# Patient Record
Sex: FEMALE | Race: WHITE | NOT HISPANIC OR LATINO | Employment: OTHER | URBAN - METROPOLITAN AREA
[De-identification: names, ages, dates, MRNs, and addresses within clinical notes are randomized per-mention and may not be internally consistent; named-entity substitution may affect disease eponyms.]

---

## 2017-01-02 ENCOUNTER — HOSPITAL ENCOUNTER (INPATIENT)
Facility: HOSPITAL | Age: 82
LOS: 6 days | Discharge: NURSING, REHAB OR LONG-TERM CARE | End: 2017-01-08
Attending: EMERGENCY MEDICINE | Admitting: INTERNAL MEDICINE

## 2017-01-02 ENCOUNTER — APPOINTMENT (OUTPATIENT)
Dept: GENERAL RADIOLOGY | Facility: HOSPITAL | Age: 82
End: 2017-01-02

## 2017-01-02 ENCOUNTER — APPOINTMENT (OUTPATIENT)
Dept: CT IMAGING | Facility: HOSPITAL | Age: 82
End: 2017-01-02

## 2017-01-02 DIAGNOSIS — N39.0 ACUTE UTI: ICD-10-CM

## 2017-01-02 DIAGNOSIS — A41.9 SEPSIS, DUE TO UNSPECIFIED ORGANISM: Primary | ICD-10-CM

## 2017-01-02 DIAGNOSIS — N17.9 ACUTE KIDNEY INJURY (HCC): ICD-10-CM

## 2017-01-02 DIAGNOSIS — J18.9 PNEUMONIA OF RIGHT UPPER LOBE DUE TO INFECTIOUS ORGANISM: ICD-10-CM

## 2017-01-02 DIAGNOSIS — R65.21 SEPTIC SHOCK (HCC): ICD-10-CM

## 2017-01-02 DIAGNOSIS — A41.9 SEPTIC SHOCK (HCC): ICD-10-CM

## 2017-01-02 DIAGNOSIS — I50.9 CONGESTIVE HEART FAILURE, UNSPECIFIED CONGESTIVE HEART FAILURE CHRONICITY, UNSPECIFIED CONGESTIVE HEART FAILURE TYPE: ICD-10-CM

## 2017-01-02 LAB
ALBUMIN SERPL-MCNC: 3.2 G/DL (ref 3.5–5.2)
ALBUMIN/GLOB SERPL: 2 G/DL
ALP SERPL-CCNC: 187 U/L (ref 40–129)
ALT SERPL W P-5'-P-CCNC: 467 U/L (ref 5–33)
ANION GAP SERPL CALCULATED.3IONS-SCNC: 15.1 MMOL/L
APTT PPP: 31.8 SECONDS (ref 24.3–38.1)
ARTERIAL PATENCY WRIST A: POSITIVE
AST SERPL-CCNC: 542 U/L (ref 5–32)
ATMOSPHERIC PRESS: 747 MMHG
BACTERIA UR QL AUTO: ABNORMAL /HPF
BASE EXCESS BLDA CALC-SCNC: 3.7 MMOL/L (ref -2.3–2.3)
BDY SITE: ABNORMAL
BILIRUB SERPL-MCNC: 2.8 MG/DL (ref 0.2–1.2)
BILIRUB UR QL STRIP: NEGATIVE
BUN BLD-MCNC: 46 MG/DL (ref 8–23)
BUN/CREAT SERPL: 14.6 (ref 7–25)
CALCIUM SPEC-SCNC: 9.3 MG/DL (ref 8.8–10.5)
CHLORIDE SERPL-SCNC: 97 MMOL/L (ref 98–107)
CLARITY UR: ABNORMAL
CO2 SERPL-SCNC: 30.9 MMOL/L (ref 22–29)
COLOR UR: YELLOW
CREAT BLD-MCNC: 3.15 MG/DL (ref 0.57–1)
D-LACTATE SERPL-SCNC: 1.8 MMOL/L (ref 0.5–2)
D-LACTATE SERPL-SCNC: 2.1 MMOL/L (ref 0.5–2)
DEPRECATED RDW RBC AUTO: 45.7 FL (ref 37–54)
ERYTHROCYTE [DISTWIDTH] IN BLOOD BY AUTOMATED COUNT: 14 % (ref 11.5–14.5)
GAS FLOW AIRWAY: 5 LPM
GFR SERPL CREATININE-BSD FRML MDRD: 14 ML/MIN/1.73
GLOBULIN UR ELPH-MCNC: 1.6 GM/DL
GLUCOSE BLD-MCNC: 58 MG/DL (ref 65–99)
GLUCOSE BLDC GLUCOMTR-MCNC: 84 MG/DL (ref 70–130)
GLUCOSE UR STRIP-MCNC: NEGATIVE MG/DL
HCO3 BLDA-SCNC: 30.2 MMOL/L (ref 22–26)
HCT VFR BLD AUTO: 40 % (ref 37–47)
HGB BLD-MCNC: 12.9 G/DL (ref 12–16)
HGB BLDA-MCNC: 13.1 G/DL (ref 12–18)
HGB UR QL STRIP.AUTO: ABNORMAL
HYALINE CASTS UR QL AUTO: ABNORMAL /LPF
INR PPP: 2.26 (ref 0.9–1.1)
KETONES UR QL STRIP: NEGATIVE
LEUKOCYTE ESTERASE UR QL STRIP.AUTO: ABNORMAL
LYMPHOCYTES # BLD MANUAL: 2.54 10*3/MM3 (ref 0.6–4.8)
LYMPHOCYTES NFR BLD MANUAL: 10 % (ref 20–45)
LYMPHOCYTES NFR BLD MANUAL: 8 % (ref 3–8)
MCH RBC QN AUTO: 28.9 PG (ref 27–31)
MCHC RBC AUTO-ENTMCNC: 32.3 G/DL (ref 31–37)
MCV RBC AUTO: 89.5 FL (ref 81–99)
MODALITY: ABNORMAL
MONOCYTES # BLD AUTO: 2.03 10*3/MM3 (ref 0–1)
NEUTROPHILS # BLD AUTO: 20.79 10*3/MM3 (ref 1.5–8.3)
NEUTROPHILS NFR BLD MANUAL: 68 % (ref 45–70)
NEUTS BAND NFR BLD MANUAL: 14 % (ref 0–5)
NITRITE UR QL STRIP: NEGATIVE
PCO2 BLDA: 53.4 MM HG (ref 35–45)
PH BLDA: 7.37 PH UNITS (ref 7.35–7.45)
PH UR STRIP.AUTO: 7 [PH] (ref 4.5–8)
PLATELET # BLD AUTO: 160 10*3/MM3 (ref 140–500)
PMV BLD AUTO: 10.7 FL (ref 7.4–10.4)
PO2 BLDA: 91.1 MM HG (ref 80–100)
POTASSIUM BLD-SCNC: 4.4 MMOL/L (ref 3.5–5.2)
PROT SERPL-MCNC: 4.8 G/DL (ref 6–8.5)
PROT UR QL STRIP: ABNORMAL
PROTHROMBIN TIME: 24.4 SECONDS (ref 12.1–15)
PULSE OX: 95 %
RBC # BLD AUTO: 4.47 10*6/MM3 (ref 4.2–5.4)
RBC # UR: ABNORMAL /HPF
RBC MORPH BLD: NORMAL
REF LAB TEST METHOD: ABNORMAL
SAO2 % BLDCOA: 96.6 % (ref 91–100)
SMALL PLATELETS BLD QL SMEAR: ADEQUATE
SODIUM BLD-SCNC: 143 MMOL/L (ref 136–145)
SP GR UR STRIP: 1.01 (ref 1–1.03)
SQUAMOUS #/AREA URNS HPF: ABNORMAL /HPF
TOTAL RATE: 24 BREATHS/MINUTE
TROPONIN T SERPL-MCNC: 0.04 NG/ML (ref 0–0.03)
UROBILINOGEN UR QL STRIP: ABNORMAL
WBC MORPH BLD: NORMAL
WBC NRBC COR # BLD: 25.35 10*3/MM3 (ref 4.8–10.8)
WBC UR QL AUTO: ABNORMAL /HPF

## 2017-01-02 PROCEDURE — 36600 WITHDRAWAL OF ARTERIAL BLOOD: CPT | Performed by: EMERGENCY MEDICINE

## 2017-01-02 PROCEDURE — 71010 HC CHEST PA OR AP: CPT

## 2017-01-02 PROCEDURE — 82803 BLOOD GASES ANY COMBINATION: CPT | Performed by: EMERGENCY MEDICINE

## 2017-01-02 PROCEDURE — 85007 BL SMEAR W/DIFF WBC COUNT: CPT | Performed by: EMERGENCY MEDICINE

## 2017-01-02 PROCEDURE — 83605 ASSAY OF LACTIC ACID: CPT | Performed by: EMERGENCY MEDICINE

## 2017-01-02 PROCEDURE — 93010 ELECTROCARDIOGRAM REPORT: CPT | Performed by: INTERNAL MEDICINE

## 2017-01-02 PROCEDURE — 70450 CT HEAD/BRAIN W/O DYE: CPT

## 2017-01-02 PROCEDURE — 87186 SC STD MICRODIL/AGAR DIL: CPT | Performed by: EMERGENCY MEDICINE

## 2017-01-02 PROCEDURE — 25010000002 VANCOMYCIN: Performed by: EMERGENCY MEDICINE

## 2017-01-02 PROCEDURE — 25010000002 PIPERACILLIN-TAZOBACTAM: Performed by: EMERGENCY MEDICINE

## 2017-01-02 PROCEDURE — 99291 CRITICAL CARE FIRST HOUR: CPT | Performed by: EMERGENCY MEDICINE

## 2017-01-02 PROCEDURE — 94799 UNLISTED PULMONARY SVC/PX: CPT

## 2017-01-02 PROCEDURE — 93005 ELECTROCARDIOGRAM TRACING: CPT | Performed by: EMERGENCY MEDICINE

## 2017-01-02 PROCEDURE — 85025 COMPLETE CBC W/AUTO DIFF WBC: CPT | Performed by: EMERGENCY MEDICINE

## 2017-01-02 PROCEDURE — 85610 PROTHROMBIN TIME: CPT | Performed by: EMERGENCY MEDICINE

## 2017-01-02 PROCEDURE — 99285 EMERGENCY DEPT VISIT HI MDM: CPT

## 2017-01-02 PROCEDURE — 99223 1ST HOSP IP/OBS HIGH 75: CPT | Performed by: HOSPITALIST

## 2017-01-02 PROCEDURE — 94640 AIRWAY INHALATION TREATMENT: CPT

## 2017-01-02 PROCEDURE — 25010000002 CEFTRIAXONE: Performed by: EMERGENCY MEDICINE

## 2017-01-02 PROCEDURE — 84484 ASSAY OF TROPONIN QUANT: CPT | Performed by: EMERGENCY MEDICINE

## 2017-01-02 PROCEDURE — 87086 URINE CULTURE/COLONY COUNT: CPT | Performed by: EMERGENCY MEDICINE

## 2017-01-02 PROCEDURE — 82962 GLUCOSE BLOOD TEST: CPT

## 2017-01-02 PROCEDURE — 25010000002 PIPERACILLIN SOD-TAZOBACTAM PER 1 G: Performed by: INTERNAL MEDICINE

## 2017-01-02 PROCEDURE — 87040 BLOOD CULTURE FOR BACTERIA: CPT | Performed by: EMERGENCY MEDICINE

## 2017-01-02 PROCEDURE — 85730 THROMBOPLASTIN TIME PARTIAL: CPT | Performed by: EMERGENCY MEDICINE

## 2017-01-02 PROCEDURE — 84145 PROCALCITONIN (PCT): CPT | Performed by: HOSPITALIST

## 2017-01-02 PROCEDURE — 80053 COMPREHEN METABOLIC PANEL: CPT | Performed by: EMERGENCY MEDICINE

## 2017-01-02 PROCEDURE — 81001 URINALYSIS AUTO W/SCOPE: CPT | Performed by: EMERGENCY MEDICINE

## 2017-01-02 RX ORDER — GUAIFENESIN 600 MG/1
600 TABLET, EXTENDED RELEASE ORAL 2 TIMES DAILY
COMMUNITY
End: 2017-01-17 | Stop reason: HOSPADM

## 2017-01-02 RX ORDER — METOPROLOL SUCCINATE 25 MG/1
25 TABLET, EXTENDED RELEASE ORAL DAILY
Status: DISCONTINUED | OUTPATIENT
Start: 2017-01-02 | End: 2017-01-02

## 2017-01-02 RX ORDER — WARFARIN SODIUM 4 MG/1
4 TABLET ORAL
Status: DISCONTINUED | OUTPATIENT
Start: 2017-01-04 | End: 2017-01-05

## 2017-01-02 RX ORDER — WARFARIN SODIUM 3 MG/1
6 TABLET ORAL
Status: DISCONTINUED | OUTPATIENT
Start: 2017-01-03 | End: 2017-01-05

## 2017-01-02 RX ORDER — WARFARIN SODIUM 3 MG/1
3 TABLET ORAL
Status: DISCONTINUED | OUTPATIENT
Start: 2017-01-04 | End: 2017-01-05

## 2017-01-02 RX ORDER — ACETAMINOPHEN 325 MG/1
650 TABLET ORAL EVERY 6 HOURS PRN
Status: DISCONTINUED | OUTPATIENT
Start: 2017-01-02 | End: 2017-01-08 | Stop reason: HOSPADM

## 2017-01-02 RX ORDER — IPRATROPIUM BROMIDE AND ALBUTEROL SULFATE 2.5; .5 MG/3ML; MG/3ML
3 SOLUTION RESPIRATORY (INHALATION)
Status: DISCONTINUED | OUTPATIENT
Start: 2017-01-02 | End: 2017-01-08 | Stop reason: HOSPADM

## 2017-01-02 RX ORDER — PANTOPRAZOLE SODIUM 40 MG/10ML
40 INJECTION, POWDER, LYOPHILIZED, FOR SOLUTION INTRAVENOUS
Status: DISCONTINUED | OUTPATIENT
Start: 2017-01-03 | End: 2017-01-04

## 2017-01-02 RX ORDER — PREDNISONE 1 MG/1
5 TABLET ORAL 2 TIMES DAILY
COMMUNITY
End: 2017-01-17 | Stop reason: HOSPADM

## 2017-01-02 RX ORDER — GABAPENTIN 300 MG/1
300 CAPSULE ORAL 2 TIMES DAILY
Status: DISCONTINUED | OUTPATIENT
Start: 2017-01-02 | End: 2017-01-08 | Stop reason: HOSPADM

## 2017-01-02 RX ORDER — DEXTROSE AND SODIUM CHLORIDE 5; .45 G/100ML; G/100ML
75 INJECTION, SOLUTION INTRAVENOUS CONTINUOUS
Status: DISCONTINUED | OUTPATIENT
Start: 2017-01-02 | End: 2017-01-03

## 2017-01-02 RX ORDER — DEXTROSE MONOHYDRATE 25 G/50ML
25 INJECTION, SOLUTION INTRAVENOUS AS NEEDED
Status: DISCONTINUED | OUTPATIENT
Start: 2017-01-02 | End: 2017-01-08 | Stop reason: HOSPADM

## 2017-01-02 RX ORDER — DEXTROSE AND SODIUM CHLORIDE 5; .45 G/100ML; G/100ML
125 INJECTION, SOLUTION INTRAVENOUS ONCE
Status: COMPLETED | OUTPATIENT
Start: 2017-01-02 | End: 2017-01-02

## 2017-01-02 RX ORDER — ATORVASTATIN CALCIUM 20 MG/1
20 TABLET, FILM COATED ORAL NIGHTLY
Status: DISCONTINUED | OUTPATIENT
Start: 2017-01-02 | End: 2017-01-02

## 2017-01-02 RX ORDER — NALOXONE HYDROCHLORIDE 1 MG/ML
2 INJECTION INTRAMUSCULAR; INTRAVENOUS; SUBCUTANEOUS ONCE
Status: COMPLETED | OUTPATIENT
Start: 2017-01-02 | End: 2017-01-02

## 2017-01-02 RX ORDER — SODIUM CHLORIDE, SODIUM LACTATE, POTASSIUM CHLORIDE, CALCIUM CHLORIDE 600; 310; 30; 20 MG/100ML; MG/100ML; MG/100ML; MG/100ML
INJECTION, SOLUTION INTRAVENOUS
Status: COMPLETED
Start: 2017-01-02 | End: 2017-01-02

## 2017-01-02 RX ORDER — NOREPINEPHRINE BITARTRATE 1 MG/ML
INJECTION, SOLUTION INTRAVENOUS
Status: DISPENSED
Start: 2017-01-02 | End: 2017-01-03

## 2017-01-02 RX ORDER — GUAIFENESIN 600 MG/1
1200 TABLET, EXTENDED RELEASE ORAL 2 TIMES DAILY
Status: DISCONTINUED | OUTPATIENT
Start: 2017-01-02 | End: 2017-01-02

## 2017-01-02 RX ORDER — NICOTINE POLACRILEX 4 MG
15 LOZENGE BUCCAL AS NEEDED
Status: DISCONTINUED | OUTPATIENT
Start: 2017-01-02 | End: 2017-01-08 | Stop reason: HOSPADM

## 2017-01-02 RX ORDER — ALLOPURINOL 100 MG/1
100 TABLET ORAL 2 TIMES DAILY
Status: DISCONTINUED | OUTPATIENT
Start: 2017-01-02 | End: 2017-01-02

## 2017-01-02 RX ORDER — MULTIPLE VITAMINS W/ MINERALS TAB 9MG-400MCG
1 TAB ORAL DAILY
Status: DISCONTINUED | OUTPATIENT
Start: 2017-01-02 | End: 2017-01-08 | Stop reason: HOSPADM

## 2017-01-02 RX ORDER — DOCUSATE SODIUM 250 MG
250 CAPSULE ORAL DAILY
Status: DISCONTINUED | OUTPATIENT
Start: 2017-01-02 | End: 2017-01-08 | Stop reason: HOSPADM

## 2017-01-02 RX ORDER — FOLIC ACID 1 MG/1
1 TABLET ORAL DAILY
Status: DISCONTINUED | OUTPATIENT
Start: 2017-01-02 | End: 2017-01-08 | Stop reason: HOSPADM

## 2017-01-02 RX ORDER — SODIUM CHLORIDE 9 MG/ML
INJECTION, SOLUTION INTRAVENOUS
Status: DISPENSED
Start: 2017-01-02 | End: 2017-01-03

## 2017-01-02 RX ORDER — SODIUM CHLORIDE 0.9 % (FLUSH) 0.9 %
10 SYRINGE (ML) INJECTION AS NEEDED
Status: DISCONTINUED | OUTPATIENT
Start: 2017-01-02 | End: 2017-01-08 | Stop reason: HOSPADM

## 2017-01-02 RX ORDER — SODIUM CHLORIDE 9 MG/ML
125 INJECTION, SOLUTION INTRAVENOUS CONTINUOUS
Status: DISCONTINUED | OUTPATIENT
Start: 2017-01-02 | End: 2017-01-02

## 2017-01-02 RX ORDER — WARFARIN SODIUM 3 MG/1
6 TABLET ORAL
Status: DISCONTINUED | OUTPATIENT
Start: 2017-01-02 | End: 2017-01-05

## 2017-01-02 RX ADMIN — NALOXONE HYDROCHLORIDE 2 MG: 1 INJECTION PARENTERAL at 14:32

## 2017-01-02 RX ADMIN — SODIUM CHLORIDE, POTASSIUM CHLORIDE, SODIUM LACTATE AND CALCIUM CHLORIDE 500 ML: 600; 310; 30; 20 INJECTION, SOLUTION INTRAVENOUS at 19:00

## 2017-01-02 RX ADMIN — SODIUM CHLORIDE 4.5 G: 900 INJECTION, SOLUTION INTRAVENOUS at 22:29

## 2017-01-02 RX ADMIN — DEXTROSE AND SODIUM CHLORIDE 75 ML/HR: 5; 450 INJECTION, SOLUTION INTRAVENOUS at 20:09

## 2017-01-02 RX ADMIN — IPRATROPIUM BROMIDE AND ALBUTEROL SULFATE 3 ML: .5; 3 SOLUTION RESPIRATORY (INHALATION) at 19:35

## 2017-01-02 RX ADMIN — SODIUM CHLORIDE, POTASSIUM CHLORIDE, SODIUM LACTATE AND CALCIUM CHLORIDE 500 ML: 600; 310; 30; 20 INJECTION, SOLUTION INTRAVENOUS at 19:47

## 2017-01-02 RX ADMIN — SODIUM CHLORIDE 1000 ML: 9 INJECTION, SOLUTION INTRAVENOUS at 16:17

## 2017-01-02 RX ADMIN — TAZOBACTAM SODIUM AND PIPERACILLIN SODIUM 4.5 G: .5; 4 INJECTION, POWDER, LYOPHILIZED, FOR SOLUTION INTRAVENOUS at 16:17

## 2017-01-02 RX ADMIN — CEFTRIAXONE 2 G: 2 INJECTION, POWDER, FOR SOLUTION INTRAMUSCULAR; INTRAVENOUS at 15:53

## 2017-01-02 RX ADMIN — NOREPINEPHRINE BITARTRATE 0.02 MCG/KG/MIN: 1 INJECTION, SOLUTION, CONCENTRATE INTRAVENOUS at 21:11

## 2017-01-02 RX ADMIN — SODIUM CHLORIDE 1000 ML: 9 INJECTION, SOLUTION INTRAVENOUS at 14:47

## 2017-01-02 RX ADMIN — DEXTROSE AND SODIUM CHLORIDE 125 ML/HR: 5; 450 INJECTION, SOLUTION INTRAVENOUS at 15:37

## 2017-01-02 RX ADMIN — VANCOMYCIN HYDROCHLORIDE 1500 MG: 750 INJECTION, POWDER, LYOPHILIZED, FOR SOLUTION INTRAVENOUS at 20:16

## 2017-01-02 NOTE — ED NOTES
Dr Sheriff at bedside to evaluate.  States no stroke workup.     Shara Velázquez RN  01/02/17 4685

## 2017-01-02 NOTE — ED PROVIDER NOTES
Subjective   History of Present Illness  History of Present Illness    Chief complaint: Altered mental status    Location: Nursing home    Quality/Severity:  Moderate    Timing/Onset/Duration: Noted this afternoon at the nursing home    Modifying Factors: Paramedics report that an amp of Narcan improved the patient's level of alertness    Associated Symptoms: The patient is unable to give associated symptoms given her altered mental status.    Narrative: This 81-year-old white female was noted to have decreased mental status at the nursing home.  EMS was called and gave the patient in for Narcan and she responded to Narcan.  The patient is unable to give me any other history.    PCP:  Gerardo Williamson      Review of Systems   Unable to perform ROS: Patient unresponsive        Medication List      ASK your doctor about these medications          acetaminophen 325 MG tablet   Commonly known as:  TYLENOL   Take 2 tablets by mouth Every 4 (Four) Hours As Needed for mild pain   (1-3).       albuterol (2.5 MG/3ML) 0.083% nebulizer solution   Commonly known as:  PROVENTIL   Take 2.5 mg by nebulization 4 (Four) Times a Day.       allopurinol 100 MG tablet   Commonly known as:  ZYLOPRIM       citalopram 20 MG tablet   Commonly known as:  CeleXA   Take 1 tablet by mouth Daily.       docusate sodium 250 MG capsule   Commonly known as:  COLACE       folic acid 1 MG tablet   Commonly known as:  FOLVITE       furosemide 40 MG tablet   Commonly known as:  LASIX   Take 2 tablets by mouth Daily.       gabapentin 300 MG capsule   Commonly known as:  NEURONTIN       glucosamine-chondroitin 500-400 MG capsule capsule       guaiFENesin 600 MG 12 hr tablet   Commonly known as:  MUCINEX       insulin aspart 100 UNIT/ML injection   Commonly known as:  novoLOG   Inject 3 Units under the skin 3 (Three) Times a Day With Meals. Hold if   blood sugar less than 120       insulin detemir 100 UNIT/ML injection   Commonly known as:  LEVEMIR   Inject 20  Units under the skin Every Night.       melatonin 5 MG tablet tablet       metoprolol succinate XL 25 MG 24 hr tablet   Commonly known as:  TOPROL-XL       multivitamin-minerals tablet       NON FORMULARY       pantoprazole 40 MG EC tablet   Commonly known as:  PROTONIX       polyethylene glycol packet   Commonly known as:  MIRALAX       predniSONE 5 MG tablet   Commonly known as:  DELTASONE       simvastatin 40 MG tablet   Commonly known as:  ZOCOR       * vitamin D3 5000 UNITS capsule capsule       * cholecalciferol 17395 UNITS capsule   Commonly known as:  VITAMIN D3       warfarin 6 MG tablet   Commonly known as:  COUMADIN       * Notice:  This list has 2 medication(s) that are the same as other   medications prescribed for you. Read the directions carefully, and ask   your doctor or other care provider to review them with you.        Past Medical History   Diagnosis Date   • Anemia    • Arthritis    • CHF (congestive heart failure)    • Chronic a-fib    • DVT (deep venous thrombosis)    • Dyslipidemia    • Gout    • HTN (hypertension)    • Hyperkalemia    • Hypertension    • Kidney disease 2011   • Lymphedema of left leg      chronic   • Obstructive sleep apnea of adult      untreated   • Pulmonary HTN    • Respiratory failure, acute      hypoxic       Allergies   Allergen Reactions   • Codeine Nausea And Vomiting   • Demerol [Meperidine]      Dizzy reaction   • Propoxyphene      Dizzy reaction       Past Surgical History   Procedure Laterality Date   • Hernia repair     • Eye surgery     • Femur fracture surgery Right      closed reduction external fixation       Family History   Problem Relation Age of Onset   • Cancer Mother    • Heart disease Father    • Diabetes Father    • COPD Brother    • Heart disease Brother        Social History     Social History   • Marital status:      Spouse name: N/A   • Number of children: N/A   • Years of education: N/A     Social History Main Topics   • Smoking  status: Never Smoker   • Smokeless tobacco: None   • Alcohol use No   • Drug use: None   • Sexual activity: Not Currently     Other Topics Concern   • None     Social History Narrative           Objective   Physical Exam   Constitutional: She appears well-developed and well-nourished. No distress.   ED Triage Vitals:  Temp: 97.3 °F (36.3 °C) (01/02/17 1422)  Heart Rate: 74 (01/02/17 1422)  Resp: 20 (01/02/17 1422)  BP: 56/20 (01/02/17 1422)  SpO2: 85 % (01/02/17 1422)  Temp src: Oral (01/02/17 1422)  Heart Rate Source: n/a  Patient Position: n/a  BP Location: n/a  FiO2 (%): n/a    The patient's vitals were reviewed by me.  Unless otherwise noted they are within normal limits.  The patient is hypotensive with a blood pressure 56/20.     HENT:   Head: Normocephalic and atraumatic.   Right Ear: External ear normal.   Left Ear: External ear normal.   Nose: Nose normal.   Dry mucous membranes   Eyes: Conjunctivae and EOM are normal. Pupils are equal, round, and reactive to light. Right eye exhibits no discharge. Left eye exhibits no discharge. No scleral icterus.   Neck: Normal range of motion. Neck supple. No JVD present. No tracheal deviation present. No thyromegaly present.   Cardiovascular: Normal rate, regular rhythm, normal heart sounds and intact distal pulses.  Exam reveals no gallop and no friction rub.    No murmur heard.  Rectal exam: Heme negative normal tone no masses.  There is some bruising noted on both right and left buttocks.   Pulmonary/Chest: Effort normal and breath sounds normal. No stridor. No respiratory distress. She has no wheezes. She has no rales. She exhibits no tenderness.   Abdominal: Soft. Bowel sounds are normal. She exhibits no distension and no mass. There is no tenderness. There is no rebound and no guarding. No hernia.   Musculoskeletal: Normal range of motion. She exhibits no edema or deformity.   Lymphadenopathy:     She has no cervical adenopathy.   Neurological:   The pupils are  equal round and reactive to light.  The patient localizes pain.   Skin: Skin is warm and dry. No rash noted. She is not diaphoretic. No erythema. No pallor.   Nursing note and vitals reviewed.      Procedures         ED Course  ED Course   Comment By Time   The left lower values were reviewed by me.  The urinalysis shows large leukocyte esterase.  There is 3-5 RBCs.  There is tenderness to count WBCs.  There is 4+ bacteria.  There is 13-20 squamous epithelial cells.  There is a venous lactate of 2.1.  The PT is 24.  The INR is 2.2.  The white blood cell count is 25,000.  RR 14 bands noted on the differential of the white blood cell count. Claudio Sheriff MD 01/02 4722   On December 6, 2016 the patient had a BNP of 1704.  The hemoglobin was 13 and a white blood cell count 9000.  The platelet count 170,000.  On October 1, 2016 the patient had a sodium of 140.  A potassium of 4.2.  A chloride of 106.  The CO2 is 24.  A glucose of 128.  And a BUN of 14.  The creatinine was 0.82. Claudio Sheriff MD 01/02 7522      4:02 PM, 01/02/17:  The EKG was obtained at 1432.  EKG was interpreted 1435.  EKG indicates a true fibrillation with a rate of 83.  There is right ventricular hypertrophy.  There is a normal axis.  There is no acute ST elevation or depression.  There is no T-wave abnormality.  There is no ectopy.            MDM  XR Chest 1 View   ED Interpretation   The chest x-ray was contemporaneously reviewed by me.  Is   cardiomegaly which appears to be unchanged.  There is pulmonary   vascular congestion.  There is a right upper lobe opacity.      CT Head Without Contrast    (Results Pending)     Labs Reviewed   COMPREHENSIVE METABOLIC PANEL - Abnormal; Notable for the following:        Result Value    Glucose 58 (*)     BUN 46 (*)     Creatinine 3.15 (*)     Chloride 97 (*)     CO2 30.9 (*)     Total Protein 4.8 (*)     Albumin 3.20 (*)     ALT (SGPT) 467 (*)     AST (SGOT) 542 (*)     Alkaline Phosphatase 187 (*)      Total Bilirubin 2.8 (*)     eGFR Non  Amer 14 (*)     All other components within normal limits    Narrative:     The MDRD GFR formula is only valid for adults with stable renal function between ages 18 and 70.   PROTIME-INR - Abnormal; Notable for the following:     Protime 24.4 (*)     INR 2.26 (*)     All other components within normal limits    Narrative:     Therapeutic Ranges for INR: 2.0-3.0 (PT 20-30)                              2.5-3.5 (PT 25-34)   URINALYSIS W/ CULTURE IF INDICATED - Abnormal; Notable for the following:     Appearance, UA Cloudy (*)     Blood, UA Moderate (2+) (*)     Protein, UA 30 mg/dL (1+) (*)     Leuk Esterase, UA Large (3+) (*)     All other components within normal limits   TROPONIN (IN-HOUSE) - Abnormal; Notable for the following:     Troponin T 0.036 (*)     All other components within normal limits    Narrative:     Troponin T Reference Ranges:  Less than 0.03 ng/mL:    Negative for AMI  0.03 to 0.09 ng/mL:      Indeterminant for AMI  Greater than 0.09 ng/mL: Positive for AMI   LACTIC ACID, PLASMA - Abnormal; Notable for the following:     Lactate 2.1 (*)     All other components within normal limits   CBC WITH AUTO DIFFERENTIAL - Abnormal; Notable for the following:     WBC 25.35 (*)     MPV 10.7 (*)     All other components within normal limits   URINALYSIS, MICROSCOPIC ONLY - Abnormal; Notable for the following:     RBC, UA 3-5 (*)     WBC, UA Too Numerous to Count (*)     Bacteria, UA 4+ (*)     Squamous Epithelial Cells, UA 13-20 (*)     All other components within normal limits   MANUAL DIFFERENTIAL - Abnormal; Notable for the following:     Lymphocyte % 10.0 (*)     Bands %  14.0 (*)     Neutrophils Absolute 20.79 (*)     Monocytes Absolute 2.03 (*)     All other components within normal limits   APTT - Normal    Narrative:     PTT = The equivalent PTT values for the therapeutic range of heparin levels at 0.1 to 0.7 U/ml are 53 to 110 seconds.   BLOOD CULTURE    URINE CULTURE   BLOOD CULTURE   LACTATE ACID, REFLEX   BLOOD GAS, ARTERIAL   POCT GLUCOSE FINGERSTICK   CBC AND DIFFERENTIAL    Narrative:     The following orders were created for panel order CBC & Differential.  Procedure                               Abnormality         Status                     ---------                               -----------         ------                     Manual Differential[[id number]]           Abnormal            Final result               Scan Slide[[id number]]                                                                   CBC Auto Differential[[id number]]         Abnormal            Final result                 Please view results for these tests on the individual orders.     4:02 PM, 01/02/17:  The patient was reassessed.  Her vital signs are improved.  Neurological exam: The patient localizes pain and her pupils are equal round and reactive to light. Abdominal Exam: Soft nontender no masses positive bowel sounds.    4:03 PM, 01/02/17:  I spoke with pharmacy and they recommended an additional dose of vancomycin 1500 mg IV.    4:03 PM, 01/02/17:  I spoke with Dr. Diallo and discussed the case with him.  He will admit the patient in serious condition.    4:11 PM, 01/02/17:  I discussed the patient's condition with her POA.  He indicated that she did not wish to have CPR or be intubated.  He indicated that she would want IV antibiotics and IV medications.    4:12 PM, 01/02/17:  The total critical care time on this patient exclusive of separately billable procedures is 35 minutes.    Final diagnoses:   Sepsis, due to unspecified organism   Acute UTI   Pneumonia of right upper lobe due to infectious organism   Congestive heart failure, unspecified congestive heart failure chronicity, unspecified congestive heart failure type   Acute kidney injury         ED Medications:  Medications   sodium chloride 0.9 % flush 10 mL (not administered)   cefTRIAXone (ROCEPHIN) 2 g/100 mL 0.9% NS VTB (GEOFFREY)  (2 g Intravenous New Bag 1/2/17 5523)   AZITHROMYCIN 500 MG/250 ML 0.9% NS IVPB (MBP) (not administered)   vancomycin 1500 mg/250 mL 0.9% NS IVPB (not administered)   Naloxone HCl (NARCAN) injection 2 mg (2 mg Intravenous Given 1/2/17 1432)   sodium chloride 0.9 % bolus 1,000 mL (1,000 mL Intravenous New Bag 1/2/17 1447)   dextrose 5 % and sodium chloride 0.45 % infusion (125 mL/hr Intravenous New Bag 1/2/17 1537)       New Medications:     Medication List      ASK your doctor about these medications          acetaminophen 325 MG tablet   Commonly known as:  TYLENOL   Take 2 tablets by mouth Every 4 (Four) Hours As Needed for mild pain   (1-3).       albuterol (2.5 MG/3ML) 0.083% nebulizer solution   Commonly known as:  PROVENTIL   Take 2.5 mg by nebulization 4 (Four) Times a Day.       allopurinol 100 MG tablet   Commonly known as:  ZYLOPRIM       citalopram 20 MG tablet   Commonly known as:  CeleXA   Take 1 tablet by mouth Daily.       docusate sodium 250 MG capsule   Commonly known as:  COLACE       folic acid 1 MG tablet   Commonly known as:  FOLVITE       furosemide 40 MG tablet   Commonly known as:  LASIX   Take 2 tablets by mouth Daily.       gabapentin 300 MG capsule   Commonly known as:  NEURONTIN       glucosamine-chondroitin 500-400 MG capsule capsule       guaiFENesin 600 MG 12 hr tablet   Commonly known as:  MUCINEX       insulin aspart 100 UNIT/ML injection   Commonly known as:  novoLOG   Inject 3 Units under the skin 3 (Three) Times a Day With Meals. Hold if   blood sugar less than 120       insulin detemir 100 UNIT/ML injection   Commonly known as:  LEVEMIR   Inject 20 Units under the skin Every Night.       melatonin 5 MG tablet tablet       metoprolol succinate XL 25 MG 24 hr tablet   Commonly known as:  TOPROL-XL       multivitamin-minerals tablet       NON FORMULARY       pantoprazole 40 MG EC tablet   Commonly known as:  PROTONIX       polyethylene glycol packet   Commonly known as:  MIRALAX        predniSONE 5 MG tablet   Commonly known as:  DELTASONE       simvastatin 40 MG tablet   Commonly known as:  ZOCOR       * vitamin D3 5000 UNITS capsule capsule       * cholecalciferol 53436 UNITS capsule   Commonly known as:  VITAMIN D3       warfarin 6 MG tablet   Commonly known as:  COUMADIN       * Notice:  This list has 2 medication(s) that are the same as other   medications prescribed for you. Read the directions carefully, and ask   your doctor or other care provider to review them with you.        Stopped Medications:     Medication List      ASK your doctor about these medications          acetaminophen 325 MG tablet   Commonly known as:  TYLENOL   Take 2 tablets by mouth Every 4 (Four) Hours As Needed for mild pain   (1-3).       albuterol (2.5 MG/3ML) 0.083% nebulizer solution   Commonly known as:  PROVENTIL   Take 2.5 mg by nebulization 4 (Four) Times a Day.       allopurinol 100 MG tablet   Commonly known as:  ZYLOPRIM       citalopram 20 MG tablet   Commonly known as:  CeleXA   Take 1 tablet by mouth Daily.       docusate sodium 250 MG capsule   Commonly known as:  COLACE       folic acid 1 MG tablet   Commonly known as:  FOLVITE       furosemide 40 MG tablet   Commonly known as:  LASIX   Take 2 tablets by mouth Daily.       gabapentin 300 MG capsule   Commonly known as:  NEURONTIN       glucosamine-chondroitin 500-400 MG capsule capsule       guaiFENesin 600 MG 12 hr tablet   Commonly known as:  MUCINEX       insulin aspart 100 UNIT/ML injection   Commonly known as:  novoLOG   Inject 3 Units under the skin 3 (Three) Times a Day With Meals. Hold if   blood sugar less than 120       insulin detemir 100 UNIT/ML injection   Commonly known as:  LEVEMIR   Inject 20 Units under the skin Every Night.       melatonin 5 MG tablet tablet       metoprolol succinate XL 25 MG 24 hr tablet   Commonly known as:  TOPROL-XL       multivitamin-minerals tablet       NON FORMULARY       pantoprazole 40 MG EC tablet    Commonly known as:  PROTONIX       polyethylene glycol packet   Commonly known as:  MIRALAX       predniSONE 5 MG tablet   Commonly known as:  DELTASONE       simvastatin 40 MG tablet   Commonly known as:  ZOCOR       * vitamin D3 5000 UNITS capsule capsule       * cholecalciferol 05738 UNITS capsule   Commonly known as:  VITAMIN D3       warfarin 6 MG tablet   Commonly known as:  COUMADIN       * Notice:  This list has 2 medication(s) that are the same as other   medications prescribed for you. Read the directions carefully, and ask   your doctor or other care provider to review them with you.          Final diagnoses:   Sepsis, due to unspecified organism   Acute UTI   Pneumonia of right upper lobe due to infectious organism   Congestive heart failure, unspecified congestive heart failure chronicity, unspecified congestive heart failure type   Acute kidney injury            Claudio Sheriff MD  01/02/17 5154

## 2017-01-02 NOTE — PROGRESS NOTES
SEPTIC SHOCK FOCUSED EXAM    *Must be completed by a licensed independent Practitioner within 6 hours of diagnosis for the following conditions -- Septic Shock or Severe Sepsis with Lactate >/= 4.    Fluid bolus must be completed prior to assessment.      Diagnosis: Septic Shock     Vital Signs (Attestation) Reviewed Temp, HR, RR, BP   General ill appearing, lethargic   Respiratory decreased breath sounds L>R   Cardiac/Chest regular rate, rhythm   Skin (documentation of skin color is required) pallor   Capillary Refill delayed   Peripheral Pulses Checked                          Radial and dorsalis pedis palpable     † Septic shock is defined by CMS as severe sepsis plus one of the following: persistent hypotension after fluid bolus OR tissue hypoperfusion (Lactic Acid ?4)  †† TWO OF THE FOLLOWING can be done in lieu of the Focused Exam: Measure CVP; Measure ScVO2; Bedside cardiovascular ultrasound; Dynamic assessment of fluid responsiveness with passive leg raise or fluid challenge.

## 2017-01-02 NOTE — IP AVS SNAPSHOT
AFTER VISIT SUMMARY             Alexandria Villanueva           About your hospitalization     You were admitted on:  January 2, 2017 You last received care in the:  [facility] SURG       Procedures & Surgeries         Medications    If you or your caregiver advised us that you are currently taking a medication and that medication is marked below as “Resume”, this simply indicates that we have reviewed those medications to make sure our new therapy recommendations do not interfere.  If you have concerns about medications other than those new ones which we are prescribing today, please consult the physician who prescribed them (or your primary physician).  Our review of your home medications is not meant to indicate that we are directing their use.             Your Medications      START taking these medications     cefTRIAXone 1 G injection   Infuse 1 g into a venous catheter Daily for 8 days.   Commonly known as:  ROCEPHIN           HYDROcodone-acetaminophen 5-325 MG per tablet   Take 1 tablet by mouth Every 6 (Six) Hours As Needed for moderate pain (4-6).   Last time this was given:  1/8/2017 11:10 AM   Commonly known as:  NORCO           ipratropium-albuterol 0.5-2.5 mg/mL nebulizer   Take 3 mL by nebulization 4 (Four) Times a Day.   Last time this was given:  1/8/2017 11:20 AM   Commonly known as:  DUO-NEB             CHANGE how you take these medications     cholecalciferol 52969 UNITS capsule   Take 50,000 Units by mouth Every 30 (Thirty) Days.   Commonly known as:  VITAMIN D3   What changed:  Another medication with the same name was removed. Continue taking this medication, and follow the directions you see here.           citalopram 20 MG tablet   Take 1 tablet by mouth Daily.   Last time this was given:  1/6/2017  9:18 AM   According to our records, you may have been taking this medication differently.   Commonly known as:  CeleXA   What changed:    - when to take this  - additional  instructions           furosemide 40 MG tablet   Take 1 tablet by mouth 2 (Two) Times a Day.   Last time this was given:  1/8/2017 11:11 AM   Commonly known as:  LASIX   What changed:    - how much to take  - when to take this             CONTINUE taking these medications     acetaminophen 325 MG tablet   Take 2 tablets by mouth Every 4 (Four) Hours As Needed for mild pain (1-3).   Last time this was given:  1/6/2017  3:15 AM   Commonly known as:  TYLENOL           docusate sodium 250 MG capsule   Take 250 mg by mouth daily.   Last time this was given:  1/8/2017  9:29 AM   Commonly known as:  COLACE           folic acid 1 MG tablet   Take 1 mg by mouth daily.   Last time this was given:  1/8/2017  9:03 AM   Commonly known as:  FOLVITE           gabapentin 300 MG capsule   Take 300 mg by mouth 2 (two) times a day.   Last time this was given:  1/8/2017  9:03 AM   Commonly known as:  NEURONTIN           glucosamine-chondroitin 500-400 MG capsule capsule   Take 2 capsules by mouth daily.           guaiFENesin 600 MG 12 hr tablet   Take 1,200 mg by mouth 2 (Two) Times a Day.   Commonly known as:  MUCINEX           insulin aspart 100 UNIT/ML injection   Inject 3 Units under the skin 3 (Three) Times a Day With Meals. Hold if blood sugar less than 120   Last time this was given:  1/8/2017  1:11 PM   Commonly known as:  novoLOG           insulin detemir 100 UNIT/ML injection   Inject 20 Units under the skin Every Night.   Last time this was given:  1/7/2017  9:19 PM   Commonly known as:  LEVEMIR           melatonin 5 MG tablet tablet   Take 5 mg by mouth every night.   Last time this was given:  1/7/2017  9:21 PM           metoprolol succinate XL 25 MG 24 hr tablet   Take 25 mg by mouth daily.   Last time this was given:  1/8/2017  9:02 AM   Commonly known as:  TOPROL-XL           multivitamin-minerals tablet   Take 1 tablet by mouth daily.   Last time this was given:  1/8/2017  9:03 AM           NON FORMULARY   2LNC  continuous           pantoprazole 40 MG EC tablet   Take 40 mg by mouth every evening.   Last time this was given:  1/7/2017  5:30 PM   Commonly known as:  PROTONIX           polyethylene glycol packet   Take 17 g by mouth daily.   Commonly known as:  MIRALAX           predniSONE 5 MG tablet   Take 5 mg by mouth 2 (Two) Times a Day.   Last time this was given:  1/8/2017  9:02 AM   Commonly known as:  DELTASONE           simvastatin 40 MG tablet   Take 40 mg by mouth every night.   Commonly known as:  ZOCOR             STOP taking these medications     albuterol (2.5 MG/3ML) 0.083% nebulizer solution   Commonly known as:  PROVENTIL           allopurinol 100 MG tablet   Commonly known as:  ZYLOPRIM           warfarin 6 MG tablet   Commonly known as:  COUMADIN                Where to Get Your Medications      Information about where to get these medications is not yet available     ! Ask your nurse or doctor about these medications     cefTRIAXone 1 G injection    furosemide 40 MG tablet    HYDROcodone-acetaminophen 5-325 MG per tablet    ipratropium-albuterol 0.5-2.5 mg/mL nebulizer                  Your Medications      Your Medication List           Morning Noon Evening Bedtime As Needed    acetaminophen 325 MG tablet   Take 2 tablets by mouth Every 4 (Four) Hours As Needed for mild pain (1-3).   Commonly known as:  TYLENOL                                cefTRIAXone 1 G injection   Infuse 1 g into a venous catheter Daily for 8 days.   Commonly known as:  ROCEPHIN                                cholecalciferol 41393 UNITS capsule   Take 50,000 Units by mouth Every 30 (Thirty) Days.   Commonly known as:  VITAMIN D3                                citalopram 20 MG tablet   Take 1 tablet by mouth Daily.   Commonly known as:  CeleXA                                docusate sodium 250 MG capsule   Take 250 mg by mouth daily.   Commonly known as:  COLACE                                folic acid 1 MG tablet   Take 1 mg by  mouth daily.   Commonly known as:  FOLVITE                                furosemide 40 MG tablet   Take 1 tablet by mouth 2 (Two) Times a Day.   Commonly known as:  LASIX                                gabapentin 300 MG capsule   Take 300 mg by mouth 2 (two) times a day.   Commonly known as:  NEURONTIN                                glucosamine-chondroitin 500-400 MG capsule capsule   Take 2 capsules by mouth daily.                                guaiFENesin 600 MG 12 hr tablet   Take 1,200 mg by mouth 2 (Two) Times a Day.   Commonly known as:  MUCINEX                                HYDROcodone-acetaminophen 5-325 MG per tablet   Take 1 tablet by mouth Every 6 (Six) Hours As Needed for moderate pain (4-6).   Commonly known as:  NORCO                                insulin aspart 100 UNIT/ML injection   Inject 3 Units under the skin 3 (Three) Times a Day With Meals. Hold if blood sugar less than 120   Commonly known as:  novoLOG                                insulin detemir 100 UNIT/ML injection   Inject 20 Units under the skin Every Night.   Commonly known as:  LEVEMIR                                ipratropium-albuterol 0.5-2.5 mg/mL nebulizer   Take 3 mL by nebulization 4 (Four) Times a Day.   Commonly known as:  DUO-NEB                                melatonin 5 MG tablet tablet   Take 5 mg by mouth every night.                                metoprolol succinate XL 25 MG 24 hr tablet   Take 25 mg by mouth daily.   Commonly known as:  TOPROL-XL                                multivitamin-minerals tablet   Take 1 tablet by mouth daily.                                NON FORMULARY   2LNC continuous                                pantoprazole 40 MG EC tablet   Take 40 mg by mouth every evening.   Commonly known as:  PROTONIX                                polyethylene glycol packet   Take 17 g by mouth daily.   Commonly known as:  MIRALAX                                predniSONE 5 MG tablet   Take 5 mg by mouth 2  (Two) Times a Day.   Commonly known as:  DELTASONE                                simvastatin 40 MG tablet   Take 40 mg by mouth every night.   Commonly known as:  ZOCOR                                         Instructions for After Discharge        Activity Instructions     Other Instructions (Specify)       With Assistance             Diet Instructions     Diet: Cardiac, Consistent Carbohydrate; Thin Liquids, No Restrictions       Discharge Diet:   Cardiac  Consistent Carbohydrate      Fluid Consistency:  Thin Liquids, No Restrictions       Diet: Regular, Consistent Carbohydrate, Cardiac; Thin Liquids, No Restrictions       Discharge Diet:   Regular  Consistent Carbohydrate  Cardiac      Fluid Consistency:  Thin Liquids, No Restrictions   [place] breakfast supplement daily             Other Instructions     Discharge Dressing / Wound Instructions       Instructions: continue waffle support surfaces and Z-guard as barrier protection to buttocks       Discharge Instructions       PT/OT to evaluate and treat at [place], PT/INR on Monday 1/9/16, Routine PICC line care. Accu checks AC and HS. BMP in 1/11/16.              Follow-ups for After Discharge        Follow-up Information     Follow up with [facility] .    Specialties:  Skilled Nursing Facility, Intermediate Care Facility    Contact information:    [address] [place] [id number] [phone]        Follow up with Gerardo Williamson MD .    Specialty:  Family Medicine    Contact information:    [address] [place], [place] [id number] [phone]        Referrals and Follow-ups to Schedule     Follow-Up    As directed    Hospitalists to continue to follow at [facility] Signup     [facility] [employer] allows you to send messages to your doctor, view your test results, renew your prescriptions, schedule appointments, and more. To sign up, go to [employer] and click on the Sign Up Now link in the New User?  box. Enter your [facility] Activation Code exactly as it appears below along with the last four digits of your Social Security Number and your Date of Birth () to complete the sign-up process. If you do not sign up before the expiration date, you must request a new code.    [facility] Activation Code: [email or web address]  Expires: 2017  2:43 PM    If you have questions, you can email [email or web address]) or call [phone] to talk to our [facility] staff. Remember, [facility] is NOT to be used for urgent needs. For medical emergencies, dial 911.           Summary of Your Hospitalization        Reason for Hospitalization     Your primary diagnosis was:  Sepsis Due To Urinary Tract Infection    Your diagnoses also included:  Heart Failure, Atrial Fibrillation (Irregular Heartbeat), Acute Kidney Failure      Care Providers     Provider Service Role Specialty    Trevor Diallo MD Internal Medicine Attending Provider Internal Medicine      Your Allergies  Date Reviewed: 2017    Allergen Reactions    Codeine Nausea And Vomiting         Demerol (Meperidine) Not Noted    Dizzy reaction         Propoxyphene Not Noted    Dizzy reaction      Patient Belongings Returned     Document Return of Belongings Flowsheet     Were the patient bedside belongings sent home?   --   Belongings Retrieved from Security & Sent Home   --    Belongings Sent to Safe   --   Medications Retrieved from Pharmacy & Sent Home   --               SYMPTOMS OF A STROKE    Call 911 or have someone take you to the Emergency Department if you have any of the following:    · Sudden numbness or weakness of your face, arm or leg especially on one side of the body  · Sudden confusion, diffiiculty speaking or trouble understanding   · Changes in your vision or loss of sight in one eye  · Sudden severe headache with no known cause  · sudden dizziness, trouble walking, loss of balance or coordination    It is important to seek emergency care right away if  you have further stroke symptoms. If you get emergency help quickly, the powerful clot-dissolving medicines can reduce the disabilities caused by a stroke.     For more information:    American Stroke Association  9-773-3-STROKE  www.strokeassociation.org           IF YOU SMOKE OR USE TOBACCO PLEASE READ THE FOLLOWING:    Why is smoking bad for me?  Smoking increases the risk of heart disease, lung disease, vascular disease, stroke, and cancer.     If you smoke, STOP!    If you would like more information on quitting smoking, please visit the [employer] website: [email or web address]   This link will provide additional resources including the QUIT line and the Beat the Pack support groups.     For more information:    American Cancer Society  ([phone] 411-9880    American Heart Association  1-707.648.9447               YOU ARE THE MOST IMPORTANT FACTOR IN YOUR RECOVERY.     Follow all instructions carefully.     I have reviewed my discharge instructions with my nurse, including the following information, if applicable:     Information about my illness and diagnosis   Follow up appointments (including lab draws)   Wound Care   Equipment Needs   Medications (new and continuing) along with side effects   Preventative information such as vaccines and smoking cessations   Diet   Pain   I know when to contact my Doctor's office or seek emergency care      I want my nurse to describe the side effects of my medications: YES NO   If the answer is no, I understand the side effects of my medications: YES NO   My nurse described the side effects of my medications in a way that I could understand: YES NO   I have taken my personal belongings and my own medications with me at discharge: YES NO            I have received this information and my questions have been answered. I have discussed any concerns I see with this plan with the nurse or physician. I understand these  instructions.    Signature of Patient or Responsible Person: _____________________________________    Date: _________________  Time: __________________    Signature of Healthcare Provider: _______________________________________  Date: _________________  Time: __________________

## 2017-01-02 NOTE — ED NOTES
Dr Sheriff aware of low sats and hypotension.  Orders rec'd.     Sahra Velázquez RN  01/02/17 3278

## 2017-01-02 NOTE — Clinical Note
Level of Care: Critical Care [6]   Diagnosis: Sepsis, due to unspecified organism [[id number]]   Admitting Physician: JOSE ANTONIO JOHNSON [[id number]]   Estimated length of stay?: Past midnight tomorrow   Certification: I certify that inpatient services are medically necessary for this patient for a duration of greater than two midnights. See H&P and MD Progress Notes for additional information about the patient's course of treatment.

## 2017-01-02 NOTE — ED NOTES
Report called to Benita in ICU.  Will update with changes when she arrives to ICU.     Sahra Velázquez RN  01/02/17 3312

## 2017-01-03 ENCOUNTER — APPOINTMENT (OUTPATIENT)
Dept: GENERAL RADIOLOGY | Facility: HOSPITAL | Age: 82
End: 2017-01-03

## 2017-01-03 PROBLEM — N39.0 SEPSIS DUE TO URINARY TRACT INFECTION (HCC): Status: ACTIVE | Noted: 2017-01-02

## 2017-01-03 PROBLEM — N17.9 ACUTE RENAL FAILURE (HCC): Status: ACTIVE | Noted: 2017-01-03

## 2017-01-03 LAB
ALBUMIN SERPL-MCNC: 2.6 G/DL (ref 3.5–5.2)
ALBUMIN/GLOB SERPL: 1 G/DL
ALP SERPL-CCNC: 147 U/L (ref 40–129)
ALT SERPL W P-5'-P-CCNC: 301 U/L (ref 5–33)
ANION GAP SERPL CALCULATED.3IONS-SCNC: 21 MMOL/L
ANISOCYTOSIS BLD QL: ABNORMAL
AST SERPL-CCNC: 237 U/L (ref 5–32)
BACTERIA UR QL AUTO: ABNORMAL /HPF
BILIRUB SERPL-MCNC: 1.4 MG/DL (ref 0.2–1.2)
BILIRUB UR QL STRIP: NEGATIVE
BUN BLD-MCNC: 43 MG/DL (ref 8–23)
BUN/CREAT SERPL: 18.6 (ref 7–25)
CALCIUM SPEC-SCNC: 7.9 MG/DL (ref 8.8–10.5)
CHLORIDE SERPL-SCNC: 100 MMOL/L (ref 98–107)
CLARITY UR: ABNORMAL
CO2 SERPL-SCNC: 22 MMOL/L (ref 22–29)
COLOR UR: YELLOW
CREAT BLD-MCNC: 2.31 MG/DL (ref 0.57–1)
D-LACTATE SERPL-SCNC: 1.4 MMOL/L (ref 0.5–2)
DEPRECATED RDW RBC AUTO: 46 FL (ref 37–54)
ERYTHROCYTE [DISTWIDTH] IN BLOOD BY AUTOMATED COUNT: 14.4 % (ref 11.5–14.5)
GFR SERPL CREATININE-BSD FRML MDRD: 20 ML/MIN/1.73
GLOBULIN UR ELPH-MCNC: 2.6 GM/DL
GLUCOSE BLD-MCNC: 247 MG/DL (ref 65–99)
GLUCOSE BLDC GLUCOMTR-MCNC: 157 MG/DL (ref 70–130)
GLUCOSE BLDC GLUCOMTR-MCNC: 278 MG/DL (ref 70–130)
GLUCOSE BLDC GLUCOMTR-MCNC: 337 MG/DL (ref 70–130)
GLUCOSE BLDC GLUCOMTR-MCNC: 340 MG/DL (ref 70–130)
GLUCOSE BLDC GLUCOMTR-MCNC: 91 MG/DL (ref 70–130)
GLUCOSE UR STRIP-MCNC: NEGATIVE MG/DL
GRAN CASTS URNS QL MICRO: ABNORMAL /LPF
HCT VFR BLD AUTO: 39.9 % (ref 37–47)
HGB BLD-MCNC: 13 G/DL (ref 12–16)
HGB UR QL STRIP.AUTO: ABNORMAL
HYALINE CASTS UR QL AUTO: ABNORMAL /LPF
INR PPP: 2.39 (ref 0.9–1.1)
KETONES UR QL STRIP: NEGATIVE
LEUKOCYTE ESTERASE UR QL STRIP.AUTO: ABNORMAL
LYMPHOCYTES # BLD MANUAL: 1.19 10*3/MM3 (ref 0.6–4.8)
LYMPHOCYTES NFR BLD MANUAL: 5 % (ref 3–8)
LYMPHOCYTES NFR BLD MANUAL: 6 % (ref 20–45)
MCH RBC QN AUTO: 28.6 PG (ref 27–31)
MCHC RBC AUTO-ENTMCNC: 32.6 G/DL (ref 31–37)
MCV RBC AUTO: 87.9 FL (ref 81–99)
MONOCYTES # BLD AUTO: 0.99 10*3/MM3 (ref 0–1)
NEUTROPHILS # BLD AUTO: 17.59 10*3/MM3 (ref 1.5–8.3)
NEUTROPHILS NFR BLD MANUAL: 78 % (ref 45–70)
NEUTS BAND NFR BLD MANUAL: 11 % (ref 0–5)
NITRITE UR QL STRIP: NEGATIVE
PH UR STRIP.AUTO: 6.5 [PH] (ref 4.5–8)
PLAT MORPH BLD: NORMAL
PLATELET # BLD AUTO: 134 10*3/MM3 (ref 140–500)
PMV BLD AUTO: 10.4 FL (ref 7.4–10.4)
POIKILOCYTOSIS BLD QL SMEAR: ABNORMAL
POTASSIUM BLD-SCNC: 3.4 MMOL/L (ref 3.5–5.2)
PROCALCITONIN SERPL-MCNC: 56.19 NG/ML (ref 0.1–0.25)
PROT SERPL-MCNC: 5.2 G/DL (ref 6–8.5)
PROT UR QL STRIP: ABNORMAL
PROTHROMBIN TIME: 25.5 SECONDS (ref 12.1–15)
RBC # BLD AUTO: 4.54 10*6/MM3 (ref 4.2–5.4)
RBC # UR: ABNORMAL /HPF
REF LAB TEST METHOD: ABNORMAL
SODIUM BLD-SCNC: 143 MMOL/L (ref 136–145)
SP GR UR STRIP: <=1.005 (ref 1–1.03)
SQUAMOUS #/AREA URNS HPF: ABNORMAL /HPF
UROBILINOGEN UR QL STRIP: ABNORMAL
VANCOMYCIN SERPL-MCNC: 12 MCG/ML
WBC MORPH BLD: NORMAL
WBC NRBC COR # BLD: 19.76 10*3/MM3 (ref 4.8–10.8)
WBC UR QL AUTO: ABNORMAL /HPF

## 2017-01-03 PROCEDURE — 71010 HC CHEST PA OR AP: CPT

## 2017-01-03 PROCEDURE — 02HV33Z INSERTION OF INFUSION DEVICE INTO SUPERIOR VENA CAVA, PERCUTANEOUS APPROACH: ICD-10-PCS | Performed by: HOSPITALIST

## 2017-01-03 PROCEDURE — 80053 COMPREHEN METABOLIC PANEL: CPT | Performed by: INTERNAL MEDICINE

## 2017-01-03 PROCEDURE — 82962 GLUCOSE BLOOD TEST: CPT

## 2017-01-03 PROCEDURE — 94799 UNLISTED PULMONARY SVC/PX: CPT

## 2017-01-03 PROCEDURE — 85025 COMPLETE CBC W/AUTO DIFF WBC: CPT | Performed by: INTERNAL MEDICINE

## 2017-01-03 PROCEDURE — 25010000002 VANCOMYCIN PER 500 MG: Performed by: INTERNAL MEDICINE

## 2017-01-03 PROCEDURE — 94640 AIRWAY INHALATION TREATMENT: CPT

## 2017-01-03 PROCEDURE — 99232 SBSQ HOSP IP/OBS MODERATE 35: CPT | Performed by: INTERNAL MEDICINE

## 2017-01-03 PROCEDURE — 81001 URINALYSIS AUTO W/SCOPE: CPT | Performed by: INTERNAL MEDICINE

## 2017-01-03 PROCEDURE — 25010000002 PIPERACILLIN SOD-TAZOBACTAM PER 1 G: Performed by: HOSPITALIST

## 2017-01-03 PROCEDURE — 85610 PROTHROMBIN TIME: CPT | Performed by: HOSPITALIST

## 2017-01-03 PROCEDURE — 63710000001 INSULIN ASPART PER 5 UNITS: Performed by: HOSPITALIST

## 2017-01-03 PROCEDURE — 25010000002 HYDROCORTISONE SODIUM SUCCINATE 100 MG RECONSTITUTED SOLUTION: Performed by: HOSPITALIST

## 2017-01-03 PROCEDURE — 80202 ASSAY OF VANCOMYCIN: CPT | Performed by: INTERNAL MEDICINE

## 2017-01-03 PROCEDURE — 87086 URINE CULTURE/COLONY COUNT: CPT | Performed by: INTERNAL MEDICINE

## 2017-01-03 PROCEDURE — 99222 1ST HOSP IP/OBS MODERATE 55: CPT | Performed by: INTERNAL MEDICINE

## 2017-01-03 PROCEDURE — C1894 INTRO/SHEATH, NON-LASER: HCPCS

## 2017-01-03 PROCEDURE — 83605 ASSAY OF LACTIC ACID: CPT | Performed by: INTERNAL MEDICINE

## 2017-01-03 PROCEDURE — 25010000002 PIPERACILLIN SOD-TAZOBACTAM PER 1 G: Performed by: INTERNAL MEDICINE

## 2017-01-03 PROCEDURE — 85007 BL SMEAR W/DIFF WBC COUNT: CPT | Performed by: INTERNAL MEDICINE

## 2017-01-03 RX ORDER — SODIUM CHLORIDE 0.9 % (FLUSH) 0.9 %
10 SYRINGE (ML) INJECTION EVERY 12 HOURS SCHEDULED
Status: DISCONTINUED | OUTPATIENT
Start: 2017-01-03 | End: 2017-01-08 | Stop reason: HOSPADM

## 2017-01-03 RX ORDER — SODIUM CHLORIDE 450 MG/100ML
75 INJECTION, SOLUTION INTRAVENOUS CONTINUOUS
Status: DISCONTINUED | OUTPATIENT
Start: 2017-01-03 | End: 2017-01-08 | Stop reason: HOSPADM

## 2017-01-03 RX ORDER — SODIUM CHLORIDE 0.9 % (FLUSH) 0.9 %
10 SYRINGE (ML) INJECTION AS NEEDED
Status: DISCONTINUED | OUTPATIENT
Start: 2017-01-03 | End: 2017-01-08 | Stop reason: HOSPADM

## 2017-01-03 RX ORDER — VANCOMYCIN HYDROCHLORIDE
1250 ONCE
Status: DISCONTINUED | OUTPATIENT
Start: 2017-01-03 | End: 2017-01-03 | Stop reason: CLARIF

## 2017-01-03 RX ADMIN — INSULIN ASPART 2 UNITS: 100 INJECTION, SOLUTION INTRAVENOUS; SUBCUTANEOUS at 08:08

## 2017-01-03 RX ADMIN — IPRATROPIUM BROMIDE AND ALBUTEROL SULFATE 3 ML: .5; 3 SOLUTION RESPIRATORY (INHALATION) at 07:42

## 2017-01-03 RX ADMIN — GABAPENTIN 300 MG: 300 CAPSULE ORAL at 08:16

## 2017-01-03 RX ADMIN — HYDROCORTISONE SODIUM SUCCINATE 50 MG: 100 INJECTION, POWDER, FOR SOLUTION INTRAMUSCULAR; INTRAVENOUS at 06:08

## 2017-01-03 RX ADMIN — SODIUM CHLORIDE, PRESERVATIVE FREE 10 ML: 5 INJECTION INTRAVENOUS at 12:14

## 2017-01-03 RX ADMIN — SODIUM CHLORIDE, PRESERVATIVE FREE 10 ML: 5 INJECTION INTRAVENOUS at 20:33

## 2017-01-03 RX ADMIN — PIPERACILLIN AND TAZOBACTAM 2.25 G: 2; .25 INJECTION, POWDER, LYOPHILIZED, FOR SOLUTION INTRAVENOUS; PARENTERAL at 21:54

## 2017-01-03 RX ADMIN — PIPERACILLIN AND TAZOBACTAM 2.25 G: 2; .25 INJECTION, POWDER, LYOPHILIZED, FOR SOLUTION INTRAVENOUS; PARENTERAL at 15:14

## 2017-01-03 RX ADMIN — HYDROCORTISONE SODIUM SUCCINATE 50 MG: 100 INJECTION, POWDER, FOR SOLUTION INTRAMUSCULAR; INTRAVENOUS at 00:49

## 2017-01-03 RX ADMIN — IPRATROPIUM BROMIDE AND ALBUTEROL SULFATE 3 ML: .5; 3 SOLUTION RESPIRATORY (INHALATION) at 14:32

## 2017-01-03 RX ADMIN — FOLIC ACID 1 MG: 1 TABLET ORAL at 08:16

## 2017-01-03 RX ADMIN — HYDROCORTISONE SODIUM SUCCINATE 50 MG: 100 INJECTION, POWDER, FOR SOLUTION INTRAMUSCULAR; INTRAVENOUS at 12:14

## 2017-01-03 RX ADMIN — HYDROCORTISONE SODIUM SUCCINATE 50 MG: 100 INJECTION, POWDER, FOR SOLUTION INTRAMUSCULAR; INTRAVENOUS at 18:23

## 2017-01-03 RX ADMIN — SODIUM CHLORIDE 75 ML/HR: 4.5 INJECTION, SOLUTION INTRAVENOUS at 18:20

## 2017-01-03 RX ADMIN — DEXTROSE AND SODIUM CHLORIDE 75 ML/HR: 5; 450 INJECTION, SOLUTION INTRAVENOUS at 08:16

## 2017-01-03 RX ADMIN — PANTOPRAZOLE SODIUM 40 MG: 40 INJECTION, POWDER, FOR SOLUTION INTRAVENOUS at 06:08

## 2017-01-03 RX ADMIN — INSULIN ASPART 5 UNITS: 100 INJECTION, SOLUTION INTRAVENOUS; SUBCUTANEOUS at 20:32

## 2017-01-03 RX ADMIN — GABAPENTIN 300 MG: 300 CAPSULE ORAL at 17:11

## 2017-01-03 RX ADMIN — VANCOMYCIN HYDROCHLORIDE 1250 MG: 1 INJECTION, POWDER, LYOPHILIZED, FOR SOLUTION INTRAVENOUS at 20:32

## 2017-01-03 RX ADMIN — INSULIN ASPART 5 UNITS: 100 INJECTION, SOLUTION INTRAVENOUS; SUBCUTANEOUS at 17:15

## 2017-01-03 RX ADMIN — SODIUM CHLORIDE 4.5 G: 900 INJECTION, SOLUTION INTRAVENOUS at 04:05

## 2017-01-03 RX ADMIN — WARFARIN SODIUM 6 MG: 3 TABLET ORAL at 17:11

## 2017-01-03 RX ADMIN — IPRATROPIUM BROMIDE AND ALBUTEROL SULFATE 3 ML: .5; 3 SOLUTION RESPIRATORY (INHALATION) at 19:29

## 2017-01-03 RX ADMIN — INSULIN ASPART 4 UNITS: 100 INJECTION, SOLUTION INTRAVENOUS; SUBCUTANEOUS at 12:13

## 2017-01-03 RX ADMIN — MULTIPLE VITAMINS W/ MINERALS TAB 1 TABLET: TAB at 08:16

## 2017-01-03 NOTE — PLAN OF CARE
Problem: Sepsis (Adult)  Intervention: Provide Oxygenation/Ventilation/Perfusion Support    01/02/17 2111   Respiratory Interventions   Airway/Ventilation Management airway patency maintained

## 2017-01-03 NOTE — PROGRESS NOTES
"    HOSPITALIST SERVICES  @ [facility] LARON MALAGON            HOSPITALIST TEAM   PROGRESS NOTE      Patient Care Team:  Gerardo Williamson MD as PCP - General  Gerardo Williamson MD as PCP - Family Medicine        Chief Complaint:        Lethargic and febrile at [facility]      Subjective    82 y/o female with chronic a-fib on coumadin, hypertension, gout, chronic anemia, Chronic hypoxic respiratory failure with nocturnal hypoxia and KIERA, Chronic diastolic CHF, cor pulmonale and severe pulmonary HTN, h/o DVT, Chronic lymphedema, DM-2, CKD stage II-III, chronic immobility, Vitamin D deficiency and obesity presented to the ER via EMS from the [facility] secondary to lethagy and fever to 100.3. The patient admitted to ICU with septic shock with suspected urinary source. She is on Levophed along with fluids.         Interval History and ROS:     Patient States she feels better since yesterday  Patient Complaints: No new complaints. Is hungry and wants to eat  Patient Denies:  Any sx of chest pain, n/v, abdominal pain or n/v  History taken from: Patient and her Nurse      Objective    Vital Signs  Temp:  [96.9 °F (36.1 °C)-98 °F (36.7 °C)] 98 °F (36.7 °C)  Heart Rate:  [] 92  Resp:  [16-20] 16  BP: ()/(20-94) 108/61  Arterial Line BP: ()/(17-94) 96/48    Flowsheet Rows         First Filed Value    Admission Height  64\" (162.6 cm) Documented at 01/02/2017 1422    Admission Weight  222 lb 2 oz (101 kg) Documented at 01/02/2017 1422              Physical Exam:      PHYSICAL EXAMINATION:    VITAL SIGNS: As per Nurse's notes    GENERAL APPEARANCE: The patient is a well developed, well nourished,  female, in no acute distress without complaints of shortness of breath, dyspnea or acute pain. Lips and nail beds are pink. Her BP is being maintained with Levophed    HEENT: Normocephalic, atraumatic. PERRL. The sclerae anicteric and conjunctivae pink and moist.    NECK: Supple and symmetric. No masses. " No thyromegaly. No tenderness. Trachea central. No carotid bruits. No evidence of JVD.    SKIN: Warm, dry and intact. No rash or lesions or wounds or patechiae.    LUNGS: Clear to auscultation bilaterally. Diminished breath sounds near bases.     CARDIOVASCULAR: Irregularly irregular. S1, S2 normal without murmur/gallop/rub. No S3, S4.     ABDOMEN: Obese, protuberent, Soft, non-tender, non-distended. No masses. No rebound/guarding.     EXTREMITIES:  No evidence of cyanosis, clubbing, but 2-3+ edema with lymphedema legs. No rash or lesions. pedal pulses hard to palpate. No ulcers or varicosities identified.     MUSCULOSKELETAL: Unremarkable. Muscle strength and tone normal.    PSYCHIATRY: Responds appropriately to questions. No evidence of acute anxiety, depression, panic attacks or hallucinations.    MENTAL STATUS EXAMINATION: Affect and insight appropriate. Speech and behavior are normal. Patient is alert and oriented x3. Thought Process WNL.     NEUROLOGIC: Examination is grossly intact globally with no focal deficits. Cranial nerves II through XII are grossly intact.        Results Review:     I reviewed the patient's new clinical results.    Lab Results (last 24 hours)     Procedure Component Value Units Date/Time    Urinalysis With / Culture If Indicated [[id number]]  (Abnormal) Collected:  01/02/17 1451    Specimen:  Urine from Urine, Catheter Updated:  01/02/17 1507     Color, UA Yellow      Appearance, UA Cloudy (A)      pH, UA 7.0      Specific Gravity, UA 1.010      Glucose, UA Negative      Ketones, UA Negative      Bilirubin, UA Negative      Blood, UA Moderate (2+) (A)      Protein, UA 30 mg/dL (1+) (A)      Leuk Esterase, UA Large (3+) (A)      Nitrite, UA Negative      Urobilinogen, UA 1.0 E.U./dL     Urinalysis, Microscopic Only [[id number]]  (Abnormal) Collected:  01/02/17 1451    Specimen:  Urine from Urine, Catheter Updated:  01/02/17 1518     RBC, UA 3-5 (A) /HPF      WBC, UA Too Numerous to Count  (A) /HPF      Bacteria, UA 4+ (A) /HPF      Squamous Epithelial Cells, UA 13-20 (A) /HPF      Hyaline Casts, UA None Seen /LPF      Methodology Manual Light Microscopy     CBC Auto Differential [[id number]]  (Abnormal) Collected:  01/02/17 1441    Specimen:  Blood Updated:  01/02/17 1523     WBC 25.35 (H) 10*3/mm3      RBC 4.47 10*6/mm3      Hemoglobin 12.9 g/dL      Hematocrit 40.0 %      MCV 89.5 fL      MCH 28.9 pg      MCHC 32.3 g/dL      RDW 14.0 %      RDW-SD 45.7 fl      MPV 10.7 (H) fL      Platelets 160 10*3/mm3     Manual Differential [[id number]]  (Abnormal) Collected:  01/02/17 1441    Specimen:  Blood Updated:  01/02/17 1523     Neutrophil % 68.0 %      Lymphocyte % 10.0 (L) %      Monocyte % 8.0 %      Bands %  14.0 (H) %      Neutrophils Absolute 20.79 (H) 10*3/mm3      Lymphocytes Absolute 2.54 10*3/mm3      Monocytes Absolute 2.03 (H) 10*3/mm3      RBC Morphology Normal      WBC Morphology Normal      Platelet Estimate Adequate     CBC & Differential [[id number]] Collected:  01/02/17 1441    Specimen:  Blood Updated:  01/02/17 1523    Narrative:       The following orders were created for panel order CBC & Differential.  Procedure                               Abnormality         Status                     ---------                               -----------         ------                     Manual Differential[[id number]]           Abnormal            Final result               Scan Slide[[id number]]                                                                   CBC Auto Differential[[id number]]         Abnormal            Final result                 Please view results for these tests on the individual orders.    Protime-INR [[id number]]  (Abnormal) Collected:  01/02/17 1441    Specimen:  Blood Updated:  01/02/17 1523     Protime 24.4 (H) Seconds      INR 2.26 (H)     Narrative:       Therapeutic Ranges for INR: 2.0-3.0 (PT 20-30)                              2.5-3.5 (PT 25-34)    aPTT [[id number]]  (Normal)  Collected:  01/02/17 1441    Specimen:  Blood Updated:  01/02/17 1523     PTT 31.8 seconds     Narrative:       PTT = The equivalent PTT values for the therapeutic range of heparin levels at 0.1 to 0.7 U/ml are 53 to 110 seconds.    Lactic Acid, Plasma [[id number]]  (Abnormal) Collected:  01/02/17 1441    Specimen:  Blood Updated:  01/02/17 1526     Lactate 2.1 (C) mmol/L     Comprehensive Metabolic Panel [[id number]]  (Abnormal) Collected:  01/02/17 1441    Specimen:  Blood Updated:  01/02/17 1548     Glucose 58 (L) mg/dL      BUN 46 (H) mg/dL      Creatinine 3.15 (H) mg/dL      Sodium 143 mmol/L      Potassium 4.4 mmol/L       Specimen hemolyzed.  Results may be affected.        Chloride 97 (L) mmol/L      CO2 30.9 (H) mmol/L      Calcium 9.3 mg/dL      Total Protein 4.8 (L) g/dL      Albumin 3.20 (L) g/dL      ALT (SGPT) 467 (H) U/L       Specimen hemolyzed.  Results may be affected.        AST (SGOT) 542 (H) U/L       Specimen hemolyzed.  Results may be affected.        Alkaline Phosphatase 187 (H) U/L      Total Bilirubin 2.8 (H) mg/dL      eGFR Non African Amer 14 (L) mL/min/1.73      Globulin 1.6 gm/dL      A/G Ratio 2.0 g/dL      BUN/Creatinine Ratio 14.6      Anion Gap 15.1 mmol/L     Narrative:       The MDRD GFR formula is only valid for adults with stable renal function between ages 18 and 70.    Troponin [[id number]]  (Abnormal) Collected:  01/02/17 1441    Specimen:  Blood Updated:  01/02/17 1548     Troponin T 0.036 (H) ng/mL       Specimen hemolyzed.  Results may be affected.       Narrative:       Troponin T Reference Ranges:  Less than 0.03 ng/mL:    Negative for AMI  0.03 to 0.09 ng/mL:      Indeterminant for AMI  Greater than 0.09 ng/mL: Positive for AMI    Blood Gas, Arterial [[id number]]  (Abnormal) Collected:  01/02/17 1618    Specimen:  Arterial Blood Updated:  01/02/17 1626     Site Arterial: left radial      Prashant's Test Positive      pH, Arterial 7.370 pH units      pCO2, Arterial 53.4 (H) mm Hg   "    pO2, Arterial 91.1 mm Hg      HCO3, Arterial 30.2 (H) mmol/L      Base Excess, Arterial 3.7 (H) mmol/L      O2 Saturation Calculated 96.6 %      Hemoglobin, Blood Gas 13.1 g/dL      Barometric Pressure for Blood Gas 747 mmHg      Modality Nasal Cannula      Flow Rate 5 lpm      Rate 24 Breaths/minute      Pulse Ox 95 %     Lactate Acid, Reflex [[id number]]  (Normal) Collected:  01/02/17 1850    Specimen:  Blood Updated:  01/02/17 1929     Lactate 1.8 mmol/L     POC Glucose Fingerstick [[id number]]  (Normal) Collected:  01/02/17 2114    Specimen:  Blood Updated:  01/02/17 2120     Glucose 84 mg/dL     Narrative:       Meter: [id number] : [id number] Prashant Loredo RN    POC Glucose Fingerstick [[id number]]  (Normal) Collected:  01/03/17 0009    Specimen:  Blood Updated:  01/03/17 0016     Glucose 91 mg/dL     Narrative:       Meter: [id number] : [id number] Davian Bee RN Validator    Procalcitonin [[id number]]  (Abnormal) Collected:  01/02/17 2300    Specimen:  Blood Updated:  01/03/17 0110     Procalcitonin 56.19 (C) ng/mL     Narrative:       As a Marker for Sepsis (Non-Neonates):   1. <0.5 ng/mL represents a low risk of severe sepsis and/or septic shock.  1. >2 ng/mL represents a high risk of severe sepsis and/or septic shock.    As a Marker for Lower Respiratory Tract Infections that require antibiotic therapy:  PCT on Admission     Antibiotic Therapy             6-12 Hrs later  > 0.5                Strongly Recommended            >0.25 - <0.5         Recommended  0.1 - 0.25           Discouraged                   Remeasure/reassess PCT  <0.1                 Strongly Discouraged          Remeasure/reassess PCT      As 28 day mortality risk marker: \"Change in Procalcitonin Result\" (> 80 % or <=80 %) if Day 0 (or Day 1) and Day 4 values are available. Refer to http://www.GLSSs-pct-calculator.com/   Change in PCT <=80 %   A decrease of PCT levels below or equal to 80 % defines a positive change in PCT test result " representing a higher risk for 28-day all-cause mortality of patients diagnosed with severe sepsis or septic shock.  Change in PCT > 80 %   A decrease of PCT levels of more than 80 % defines a negative change in PCT result representing a lower risk for 28-day all-cause mortality of patients diagnosed with severe sepsis or septic shock.                Blood Culture [[id number]]  (Normal) Collected:  01/02/17 1441    Specimen:  Blood from Blood, Venous Line Updated:  01/03/17 0307     Blood Culture No growth at less than 24 hours     Blood Culture [[id number]]  (Normal) Collected:  01/02/17 1516    Specimen:  Blood from Blood, Venous Line Updated:  01/03/17 0401     Blood Culture No growth at less than 24 hours     CBC Auto Differential [[id number]]  (Abnormal) Collected:  01/03/17 0601    Specimen:  Blood Updated:  01/03/17 0630     WBC 19.76 (H) 10*3/mm3      RBC 4.54 10*6/mm3      Hemoglobin 13.0 g/dL      Hematocrit 39.9 %      MCV 87.9 fL      MCH 28.6 pg      MCHC 32.6 g/dL      RDW 14.4 %      RDW-SD 46.0 fl      MPV 10.4 fL      Platelets 134 (L) 10*3/mm3     POC Glucose Fingerstick [[id number]]  (Abnormal) Collected:  01/03/17 0627    Specimen:  Blood Updated:  01/03/17 0635     Glucose 157 (H) mg/dL     Narrative:       Meter: [id number] : [id number] Prashant Loredo RN    Urine Culture [[id number]] Collected:  01/03/17 0616    Specimen:  Urine from Urine, Catheter Updated:  01/03/17 0648    Lactic Acid, Plasma [[id number]]  (Normal) Collected:  01/03/17 0638    Specimen:  Blood Updated:  01/03/17 0659     Lactate 1.4 mmol/L     Urinalysis With / Culture If Indicated [[id number]]  (Abnormal) Collected:  01/03/17 0616    Specimen:  Urine from Urine, Catheter Updated:  01/03/17 0716     Color, UA Yellow      Appearance, UA Slightly Cloudy (A)       Corrected result. Previous result was Clear on 1/3/2017 at 0655 EST        pH, UA 6.5      Specific Gravity, UA <=1.005      Glucose, UA Negative      Ketones, UA Negative       Bilirubin, UA Negative      Blood, UA Large (3+) (A)      Protein, UA 30 mg/dL (1+) (A)      Leuk Esterase, UA Large (3+) (A)      Nitrite, UA Negative      Urobilinogen, UA 0.2 E.U./dL     Urinalysis, Microscopic Only [[id number]]  (Abnormal) Collected:  01/03/17 0616    Specimen:  Urine from Urine, Catheter Updated:  01/03/17 0716     RBC, UA 3-5 (A) /HPF      WBC, UA 31-50 (A) /HPF      Bacteria, UA 3+ (A) /HPF      Squamous Epithelial Cells, UA 3-6 (A) /HPF      Hyaline Casts, UA None Seen /LPF      Granular Casts, UA 0-2 /LPF      Methodology Manual Light Microscopy     CBC & Differential [[id number]] Collected:  01/03/17 0601    Specimen:  Blood Updated:  01/03/17 0736    Narrative:       The following orders were created for panel order CBC & Differential.  Procedure                               Abnormality         Status                     ---------                               -----------         ------                     Manual Differential[[id number]]           Abnormal            Final result               Scan Slide[[id number]]                                                                   CBC Auto Differential[[id number]]         Abnormal            Final result                 Please view results for these tests on the individual orders.    Manual Differential [[id number]]  (Abnormal) Collected:  01/03/17 0601    Specimen:  Blood Updated:  01/03/17 0736     Neutrophil % 78.0 (H) %      Lymphocyte % 6.0 (L) %      Monocyte % 5.0 %      Bands %  11.0 (H) %      Neutrophils Absolute 17.59 (H) 10*3/mm3      Lymphocytes Absolute 1.19 10*3/mm3      Monocytes Absolute 0.99 10*3/mm3      Anisocytosis Slight/1+      Poikilocytes Slight/1+      WBC Morphology Normal      Platelet Morphology Normal     Comprehensive Metabolic Panel [[id number]]     Specimen:  Blood Updated:  01/03/17 0741    Urine Culture [[id number]]  (Abnormal) Collected:  01/02/17 1451    Specimen:  Urine from Urine, Catheter Updated:  01/03/17 3957      Urine Culture >100,000 CFU/mL Escherichia coli (A)           Imaging Results (last 24 hours)     Procedure Component Value Units Date/Time    XR Chest 1 View [[id number]] Resulted:  01/02/17 1552     Updated:  01/02/17 1555    CT Head Without Contrast [[id number]] Resulted:  01/02/17 1616     Updated:  01/02/17 1617          Xray reviewed personally by physician.      ECG reviewed personally by physician  ECG/EMG Results (most recent)     Procedure Component Value Units Date/Time    ECG 12 Lead [[id number]] Collected:  01/02/17 1432     Updated:  01/02/17 1434          Medication Review:   I have reviewed the patient's current medication list    Current Facility-Administered Medications:   •  acetaminophen (TYLENOL) tablet 650 mg, 650 mg, Oral, Q6H PRN, Trevor Diallo MD  •  dextrose (D50W) solution 25 g, 25 g, Intravenous, PRN, Glendy Gonsalez MD  •  dextrose (GLUTOSE) oral gel 15 g, 15 g, Oral, PRN, Glendy Gonsalez MD  •  dextrose 5 % and sodium chloride 0.45 % infusion, 75 mL/hr, Intravenous, Continuous, Glendy Gonsalez MD, Last Rate: 75 mL/hr at 01/02/17 2009, 75 mL/hr at 01/02/17 2009  •  docusate sodium (COLACE) capsule 250 mg, 250 mg, Oral, Daily, Trevor Diallo MD, 250 mg at 01/02/17 1949  •  folic acid (FOLVITE) tablet 1 mg, 1 mg, Oral, Daily, Trevor Diallo MD, 1 mg at 01/02/17 1949  •  gabapentin (NEURONTIN) capsule 300 mg, 300 mg, Oral, BID, Trevor Diallo MD, 300 mg at 01/02/17 1950  •  hydrocortisone sodium succinate (Solu-CORTEF) injection 50 mg, 50 mg, Intravenous, Q6H, Glendy Gonsalez MD, 50 mg at 01/03/17 0608  •  insulin aspart (novoLOG) injection 0-7 Units, 0-7 Units, Subcutaneous, 4x Daily AC & at Bedtime, Glendy Gonsalez MD, 0 Units at 01/02/17 2115  •  ipratropium-albuterol (DUO-NEB) nebulizer solution 3 mL, 3 mL, Nebulization, 4x Daily - RT, Trevor Diallo MD, 3 mL at 01/03/17 0742  •  multivitamin with minerals 1 tablet, 1  tablet, Oral, Daily, Trevor Diallo MD, 1 tablet at 01/02/17 1950  •  norepinephrine (LEVOPHED) 1 MG/ML injection  - ADS Override Pull, , , ,   •  norepinephrine (LEVOPHED) 32 mcg/mL (8 mg/250 mL) infusion, 0.02-0.3 mcg/kg/min, Intravenous, Titrated, Glendy Gonsalez MD, Last Rate: 3.79 mL/hr at 01/03/17 0630, 0.02 mcg/kg/min at 01/03/17 0630  •  pantoprazole (PROTONIX) injection 40 mg, 40 mg, Intravenous, Q AM, Trevor Diallo MD, 40 mg at 01/03/17 0608  •  Pharmacy Consult - Pharmacy to dose, , Does not apply, Continuous PRN, Glendy Gonsalez MD  •  Pharmacy to dose vancomycin, , Does not apply, Continuous PRN, Trevor Diallo MD  •  piperacillin-tazobactam (ZOSYN) IVPB 2.25 g in 100 mL NS, 2.25 g, Intravenous, Q8H, Glendy Gonsalez MD  •  sodium chloride 0.9 % bolus 1,000 mL, 1,000 mL, Intravenous, Once, Claudio Sheriff MD, 1,000 mL at 01/02/17 1952  •  Insert peripheral IV, , , Once **AND** sodium chloride 0.9 % flush 10 mL, 10 mL, Intravenous, PRN, Claudio Sheriff MD  •  sodium chloride 0.9 % infusion  - ADS Override Pull, , , ,   •  [START ON 1/4/2017] vancomycin 1500 mg/500 mL 0.9% NS IVPB ([facility]), 1,500 mg, Intravenous, Q48H, Glendy Gonsalez MD  •  [START ON 1/4/2017] warfarin (COUMADIN) tablet 4 mg, 4 mg, Oral, Once per day on Wed Fri **AND** [START ON 1/4/2017] warfarin (COUMADIN) tablet 3 mg, 3 mg, Oral, Once per day on Wed Fri, Glendy Gonsalez MD  •  warfarin (COUMADIN) tablet 6 mg, 6 mg, Oral, Once per day on Sun Mon Tue Thu Sat, Trevor Diallo MD  •  warfarin (COUMADIN) tablet 6 mg, 6 mg, Oral, Once, Glendy Gonsalez MD, 6 mg at 01/02/17 1951      Assessment/Plan       ASSESSMENT AND PLAN:               SUMMARY:    ?   PROPHYLAXIS:   -DVT Prophylaxis: On Coumadin  -Davidson Catheter: Patient is more alert and responsive now; Will remove indwelling Davidson    NUTRITION AND FLUIDS:  -Diet/ Nutrition: Regular, cardiac low salt diet    -Fluid Status/Electrolytes: D5 1/2NS 1 L 75 mL/Hr  With Levophed drip    SOCIAL ISSUES:    -Behavioral/ Agitation Issues: NONE   -Social Issues: Lives at home with his family.       THERAPEUTIC:    -ANTIBIOTICS: Inj. Vanco and Inj Zithromax  -PAIN MANAGEMENT: N/A            PRIMARY DIAGNOSES:    1. Septic Shock: Secondary to UTI, see below. Patient received IVF bolus in ER but remained hypotensive. Patient admitted to ICU and advanced cardiac monitoring with EV-1000 initiated. Initially patient was fluid responsive with a NL SVR and continued fluids were administered then the patient's SV stabilized and SVR began to drop so levophed has been started to keep MAP > or = 65. Fluids put to maintinence, continue to monitor closely. LA now WNL. Patient afebrile here. Patient on chronic low dose steroids out-patient so stress dose steroids started here, would taper quickly back to po prednisone once BP stable.     2. Complicated UTI: Patient started on Vanco and zosyn which will cover for urinary, GI or pulmonary causes of sepsis. UCx. Pending, await results.     3. Atalectasis vs infiltrate: Suspect atalectasis secondary to the patient lethargy. Start IS and check procalcitonin level. Abx's will cover for PNA as noted above, monitor.     4. Abdominal pain and N/V: Unclear etiology. Abdomen mildly tender on exam but no c/o crampy pain or N/V since yesterday. If acute infectious etiology is present Abx's will cover. Patient has had no diarrhea. Consider CT scan when patient more hemodynamically stable.     5. 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 Aggressive fluid resusitation and normalization of BP. Re-check labs in AM.     6. Elevated LFTs: Suspect secondary to shock liver from hypotension. Monitor for now.      7. DM-2: Hold insulin, patient on D5 1/2 NS secondary to hypoglycemia, patient NPO. Fernando hayes.     8. Acute on Chronic hypoxic respiratory failure: Suspect secondary to  atalectasis and lethargy. Start IS and titrate O2 as tolerated back to patient's previous 2L.      9. Nocturnal hypoxia and KIERA: O2 As above.      10. Chronic diastolic CHF with Cor Pulmonale and pulmonary HTN: Currently no signs of volume overload, monitor closely as patient has received a significant amount of IVFs over a short period of time. Home lasix on hold secondary to above.     11. Chronic A-fib on coumadin: Holding BB secondary to hypotension. INR theraputic, continue coumadin when patient more awake and safely able to take po. Monitor INR. Indeterminate troponin noted and likely secondary to ARF and poor clearance. No s/s of acute cardiac process. EKG with A-fib.     12. Chronic lymphedema: Legs wrapped, no acute issues.     13. Vitamin D deficiency: supplementation on hold until patient more awake.     14. H/O DVT: INR theraputic on coumadin, no acute issues here.     15. Code status: I spoke with patient's POA Vincent Amos and patient is noted to be a DNR (i.e. No CPR or Ventilator)  ?     SECONDARY DIAGNOSES:  ?     As above      SURGICAL DIAGNOSES:      S/P Cataract surgery, S/P closed reduction and external fixation Rt Femoral fracture, S/P Herniorrhaphy        PLAN:    -Labs and diagnostic tests reviewed: Venous lactate 1.4, WBC 19.76, Hb 13.0, Plt 134, 78.0N, 11.0L, Glucose 157    -Diagnostic tests reviewed:    CXR consistent with RUL pneumonia,, CHF    -Will request Cardio consults  -Any new recommendations: To continue fluids and Levophed for now  -New Labs ordered: CBC, CMP and Lactic Acid for AM  -New diagnostic tests ordered: N/A  -Any changes in medications: N/A  -Patient is clinically stable and hemodynamically she is on Levophed  -To continue current management and supportive care  -Will follow patient closely  -Nothing new to add for right now  -Discharge planning issues: N/A      Plan for disposition:Patient should be able to return to [facility] once she is ready for  discharge    Trevor Diallo MD  01/03/17  8:06 AM            Trevor Diallo M.D., [facility]  Internal Medicine/ Hospitalist          Time:       EMR Dragon/Transcription disclaimer:      Much of this encounter note is an electronic transcription/translation of spoken language to printed text. The electronic translation of spoken language may permit erroneous, or at times, nonsensical words or phrases to be inadvertently transcribed; Although I have reviewed the note for such errors, some may still exist.

## 2017-01-03 NOTE — CONSULTS
Date of Hospital Visit: 17  Encounter Provider: Jeronimo Mac MD  Place of Service: [facility] CARDIOLOGY  Patient Name: Alexandria Villanueva  :1935  Referral Provider: Trevor Diallo MD    Chief complaint: history of CHF    History of Present Illness    Ms. Villanueva is an 80 yo patient of Dr. Davey who was admitted with altered mental status.  She was diagnosed with a UTI and admitted, but decompensated overnight and was felt to be septic.  She was transferred to the ICU and started on norepi, which was stopped this morning. She is more awake and alert today.  She denies dyspnea or chest pain.  She has chronic lower extremity edema and has lymphedema.  An echocardiogram in 2016 showed an LVEF of 45% with severe RV enlargement (due to morbid obesity and KIERA).    She has permanent atrial fibrillation; her rate here has been fine.    Past Medical History   Diagnosis Date   • Anemia    • Arthritis    • Chronic diastolic (congestive) heart failure    • DVT (deep venous thrombosis)    • Dyslipidemia    • Gout    • HTN (hypertension)    • Hypertension    • Kidney disease    • Lymphedema    • Morbid obesity    • Obstructive sleep apnea of adult      untreated   • Permanent atrial fibrillation    • Respiratory failure, acute      hypoxic       Past Surgical History   Procedure Laterality Date   • Hernia repair     • Eye surgery     • Femur fracture surgery Right      closed reduction external fixation       Prior to Admission medications    Medication Sig Start Date End Date Taking? Authorizing Provider   cholecalciferol (VITAMIN D3) 90549 UNITS capsule Take 50,000 Units by mouth Every 30 (Thirty) Days.   Yes Historical Provider, MD   citalopram (CeleXA) 20 MG tablet Take 1 tablet by mouth Daily.  Patient taking differently: Take 20 mg by mouth 3 (Three) Times a Week. MON, WED, FRI 10/2/16  Yes Alice Garcia DO   docusate sodium (COLACE) 250 MG capsule Take 250 mg by  mouth daily.   Yes Historical Provider, MD   folic acid (FOLVITE) 1 MG tablet Take 1 mg by mouth daily.   Yes Historical Provider, MD   furosemide (LASIX) 40 MG tablet Take 2 tablets by mouth Daily. 10/2/16  Yes Alice Garcia DO   gabapentin (NEURONTIN) 300 MG capsule Take 300 mg by mouth 2 (two) times a day.   Yes Historical Provider, MD   glucosamine-chondroitin 500-400 MG capsule capsule Take 2 capsules by mouth daily.   Yes Historical Provider, MD   guaiFENesin (MUCINEX) 600 MG 12 hr tablet Take 1,200 mg by mouth 2 (Two) Times a Day.   Yes Historical Provider, MD   melatonin 5 MG tablet tablet Take 5 mg by mouth every night.   Yes Historical Provider, MD   metoprolol succinate XL (TOPROL-XL) 25 MG 24 hr tablet Take 25 mg by mouth daily.   Yes Historical Provider, MD   multivitamin-minerals (OCUVITE) tablet Take 1 tablet by mouth daily.   Yes Historical Provider, MD   pantoprazole (PROTONIX) 40 MG EC tablet Take 40 mg by mouth every evening.   Yes Historical Provider, MD   predniSONE (DELTASONE) 5 MG tablet Take 5 mg by mouth 2 (Two) Times a Day.   Yes Historical Provider, MD   simvastatin (ZOCOR) 40 MG tablet Take 40 mg by mouth every night.   Yes Historical Provider, MD   warfarin (COUMADIN) 6 MG tablet Take 6 mg by mouth Daily. 7 MG Wednesday AND Friday, 6 MG MON, TUE, THURS, SAT, SUN   Yes Historical Provider, MD   acetaminophen (TYLENOL) 325 MG tablet Take 2 tablets by mouth Every 4 (Four) Hours As Needed for mild pain (1-3). 10/2/16   Alice Garcia DO   albuterol (PROVENTIL) (2.5 MG/3ML) 0.083% nebulizer solution Take 2.5 mg by nebulization 4 (Four) Times a Day. 10/2/16   Alice Garcia DO   allopurinol (ZYLOPRIM) 100 MG tablet Take 100 mg by mouth 2 (two) times a day.    Historical Provider, MD   Cholecalciferol (VITAMIN D3) 5000 UNITS capsule capsule Take 50,000 Units by mouth every 30 (thirty) days.    Historical Provider, MD   insulin aspart (NovoLOG) 100 UNIT/ML injection Inject 3 Units  under the skin 3 (Three) Times a Day With Meals. Hold if blood sugar less than 120 10/2/16   Alice Garcia DO   insulin detemir (LEVEMIR) 100 UNIT/ML injection Inject 20 Units under the skin Every Night. 10/2/16   Alice Garcia DO   NON FORMULARY 2LNC continuous    Historical Provider, MD   polyethylene glycol (MIRALAX) packet Take 17 g by mouth daily.    Historical Provider, MD       Social History     Social History   • Marital status:      Spouse name: N/A   • Number of children: N/A   • Years of education: N/A     Occupational History   • Not on file.     Social History Main Topics   • Smoking status: Never Smoker   • Smokeless tobacco: Not on file   • Alcohol use No   • Drug use: Not on file   • Sexual activity: Not Currently     Other Topics Concern   • Not on file     Social History Narrative       Family History   Problem Relation Age of Onset   • Cancer Mother    • Heart disease Father    • Diabetes Father    • COPD Brother    • Heart disease Brother        REVIEW OF SYSTEMS:   CONSTITUTIONAL: + fatigue  HEENT: Eyes: No visual loss, blurred vision, double vision or yellow sclerae. Ears, Nose, Throat: No hearing loss, sneezing, congestion, runny nose or sore throat.   SKIN: No rash or itching.   CARDIOVASCULAR: No chest pain, chest pressure or chest discomfort. No palpitations.  RESPIRATORY: No shortness of breath, cough, hempotysis or sputum.   GASTROINTESTINAL: No anorexia, nausea, vomiting or diarrhea. No abdominal pain, bright red blood per rectum or melena.  GENITOURINARY: no burning on urination, hematuria, increased frequency.  NEUROLOGICAL: No headache, dizziness, syncope, paralysis, ataxia, numbness or tingling in the extremities. No change in bowel or bladder control.   MUSCULOSKELETAL: diffuse joint pain, limited mobility  HEMATOLOGIC: No anemia, bleeding or bruising.   LYMPHATICS: No enlarged nodes. No history of splenectomy.   PSYCHIATRIC: No history of depression or anxiety.  "  ENDOCRINOLOGIC: No reports of sweating, cold or heat intolerance. No polyuria or polydipsia.   ALLERGIES: No history of asthma, hives, eczema.    Objective:     Vitals:    01/03/17 0633 01/03/17 0647 01/03/17 0700 01/03/17 0744   BP: 127/49 108/61     BP Location:       Patient Position:       Pulse: 105 90  92   Resp:    16   Temp:       TempSrc:       SpO2: 94% 93% 94% 92%   Weight:       Height:         Body mass index is 38.13 kg/(m^2).  Flowsheet Rows         First Filed Value    Admission Height  64\" (162.6 cm) Documented at 01/02/2017 1422    Admission Weight  222 lb 2 oz (101 kg) Documented at 01/02/2017 1422          Physical Exam   Constitutional: She is oriented to person, place, and time.   obese   HENT:   Head: Normocephalic.   Nose: Nose normal.   Mouth/Throat: Oropharynx is clear and moist.   Eyes: Conjunctivae and EOM are normal. Pupils are equal, round, and reactive to light.   Neck: Normal range of motion.   Cannot assess JVD due to body habitus   Cardiovascular: Normal rate and intact distal pulses.  An irregularly irregular rhythm present. Exam reveals distant heart sounds.    Pulmonary/Chest: Effort normal and breath sounds normal.   Abdominal: Soft.   Cannot feel organs or aorta   Musculoskeletal: Normal range of motion. She exhibits edema (NON-pitting edema).   Neurological: She is alert and oriented to person, place, and time. No cranial nerve deficit.   Skin: Skin is warm and dry. No erythema.   Psychiatric: She has a normal mood and affect. Her behavior is normal. Judgment and thought content normal. Cognition and memory are impaired.   Vitals reviewed.              Lab Review:                  Results from last 7 days  Lab Units 01/02/17  1441   SODIUM mmol/L 143   POTASSIUM mmol/L 4.4   CHLORIDE mmol/L 97*   TOTAL CO2 mmol/L 30.9*   BUN mg/dL 46*   CREATININE mg/dL 3.15*   GLUCOSE mg/dL 58*   CALCIUM mg/dL 9.3       Results from last 7 days  Lab Units 01/02/17  1441   TROPONIN T ng/mL " 0.036*       Results from last 7 days  Lab Units 01/03/17  0601   WBC 10*3/mm3 19.76*   HEMOGLOBIN g/dL 13.0   HEMATOCRIT % 39.9   PLATELETS 10*3/mm3 134*       Results from last 7 days  Lab Units 01/03/17  0934 01/02/17  1441   INR  2.39* 2.26*   APTT seconds  --  31.8                 I personally viewed and interpreted the patient's EKG/Telemetry data -- AF, o/w unremarkable, no change from prior    Assessment/PLan:         Sepsis due to urinary tract infection    Acute renal failure    Chronic diastolic (congestive) heart failure    Permanent atrial fibrillation    Her cardiac status is stable.  I suspect she was actually a little dry on arrival.  Her BP has normalized and she's no longer requiring pressors.  It's reasonable to continue IVF for another day; I would stop them tomorrow if she's taking good PO, to avoid volume overload.  Her echo was just checked in 6/2016.  She has severe RV enlargement due to morbid obesity and KIERA and will always have lower extremity edema.    Her AF is rate controlled.  Her INR is therapeutic; watch this while on antibiotics.    Resume metoprolol soon (probably tomorrow).  Can decide when to restart furosemide at a later date.

## 2017-01-03 NOTE — PROGRESS NOTES
Pharmacy was consulted to dose Vancomycin.  Patient was given Vancomycin 1500mg IV x1 1/2/17 @2016.      CrCl= 22.4    Pharmacy will pulse dose Vancomycin by levels.  Random Vanc level= 12.    Will give a one time dose of Vancomycin 1250mg IV.  Pharmacy will continue to follow.\    Elmira Ariza, PharmD  1/3/2017   6:58 PM

## 2017-01-03 NOTE — PROGRESS NOTES
"Adult Nutrition  Assessment/PES    Patient Name:  Alexandria Villanueva  YOB: 1935  MRN: [id number]  Admit Date:  1/2/2017    Assessment Date:  1/3/2017        Reason for Assessment       01/03/17 1150    Reason for Assessment    Reason For Assessment/Visit nurse/nurse practitioner consult    Identified At Risk By Screening Criteria MST SCORE 2+    Diagnosis --   Sepsis hx HTN, CHF, DM, CKD              Nutrition/Diet History       01/03/17 1151    Nutrition/Diet History    Typical Food/Fluid Intake Pt appears to be in procedure at visit to room. Per diet clerk pt requesting Diabetic diet this am, spoke with MD and changed order per his verbal agreement.             Anthropometrics       01/03/17 1154    Anthropometrics    RD Documented Current Weight  101 kg (222 lb 10.6 oz)    Anthropometrics (Special Considerations)    RD Calculated BMI (kg/m2) 38.1    Body Mass Index (BMI)    BMI Grade 35 - 39.9 - obesity - grade II            Labs/Tests/Procedures/Meds       01/03/17 1154    Labs/Tests/Procedures/Meds    Labs/Tests Review Reviewed;AST;ALT;Glucose    Medication Review Reviewed, pertinent;Insulin;Multivitamin;Antibiotic;Anticoag   folic acid, levophed    Significant Vitals Other (comment)   noted suspected deep tissue injury to coccyx              Estimated/Assessed Needs       01/03/17 1155    Calculation Measurements    Weight Used For Calculations 54.4 kg (120 lb)   IBW for obese, critical care     Height Used for Calculations 1.626 m (5' 4.02\")    Estimated/Assessed Energy Needs    Energy Need Method Kcal/kg    kcal/kg 25    25 Kcal/Kg (kcal) 1360.8    Estimated Kcal Range  1361 kcal     Estimated/Assessed Protein Needs    Weight Used for Protein Calculation 54.4 kg (120 lb)    Protein (gm/kg) 1.5    1.5 Gm Protein (gm) 81.65    Estimated/Assessed Fluid Needs    Fluid Need Method RDA method    RDA Method (mL)  1361            Nutrition Prescription Ordered       01/03/17 1157    Nutrition " Prescription PO    Common Modifiers Cardiac;Consistent Carbohydrate            Evaluation of Received Nutrient/Fluid Intake       01/03/17 1158    Evaluation of Received Nutrient/Fluid Intake    Nutrition Delivered Fluid Evaluation    Fluid Intake Evaluation    % Fluid Needs no I/O complete recent admit    PO Evaluation    Number of Days PO Intake Evaluated Insufficient Data    % PO Intake recent admit              Problem/Interventions:        Problem 1       01/03/17 1200    Nutrition Diagnoses Problem 1    Problem 1 Inadequate Intake/Infusion    Inadequate Intake Type Oral    Etiology (related to) Factors Affecting Nutrition                    Intervention Goal       01/03/17 1201    Intervention Goal    PO Increase intake;PO intake (%)    PO Intake % 50 %   goal is greater 50% of meals             Nutrition Intervention       01/03/17 1201    Nutrition Intervention    RD/Tech Action Follow Tx progress            Nutrition Prescription       01/03/17 1202    Other Orders    Other Continue current diet order             Education/Evaluation       01/03/17 1202    Education    Education Education not appropriate at this time    Monitor/Evaluation    Monitor Per protocol;I&O;PO intake;Pertinent labs;Weight;Skin status        Comments:    Agree with current diet and MVT to aid with skin.    Will cont to follow and monitor.    Electronically signed by:  Marguerite Manning RD  01/03/17 12:02 PM

## 2017-01-03 NOTE — PLAN OF CARE
Problem: Patient Care Overview (Adult)  Goal: Plan of Care Review  Outcome: Ongoing (interventions implemented as appropriate)    01/03/17 0457   Coping/Psychosocial Response Interventions   Plan Of Care Reviewed With patient   Patient Care Overview   Progress progress toward functional goals as expected   Outcome Evaluation   Outcome Summary/Follow up Plan pt remains on levophed gtt; pts mentation has improved, she is more alert and responsive; VS improving; afebrile through the night; continue to wean levophed       Goal: Adult Individualization and Mutuality  Outcome: Ongoing (interventions implemented as appropriate)  Goal: Discharge Needs Assessment  Outcome: Ongoing (interventions implemented as appropriate)    Problem: Sepsis (Adult)  Goal: Signs and Symptoms of Listed Potential Problems Will be Absent or Manageable (Sepsis)  Outcome: Ongoing (interventions implemented as appropriate)    01/03/17 0457   Sepsis   Problems Assessed (Sepsis) all   Problems Present (Sepsis) hypoperfusion/hemodynamic instability;hypoxia/hypoxemia;progression of infection;situational response         Problem: Pressure Ulcer (Adult)  Goal: Signs and Symptoms of Listed Potential Problems Will be Absent or Manageable (Pressure Ulcer)  Outcome: Ongoing (interventions implemented as appropriate)    01/03/17 0457   Pressure Ulcer   Problems Assessed (Pressure Ulcer) all   Problems Present (Pressure Ulcer) (deep tissue injury to buttock)

## 2017-01-03 NOTE — PLAN OF CARE
Problem: Patient Care Overview (Adult)  Goal: Discharge Needs Assessment  Outcome: Ongoing (interventions implemented as appropriate)    01/02/17 2000 01/03/17 0853   Discharge Needs Assessment   Discharge Planning Comments --  Patient is resident at [facility]. Plans to return to [place] when medically stable.    Living Environment   Transportation Available van, wheelchair accessible --    Self-Care   Equipment Currently Used at Home wheelchair;lift device --

## 2017-01-03 NOTE — PLAN OF CARE
Problem: Sepsis (Adult)  Goal: Signs and Symptoms of Listed Potential Problems Will be Absent or Manageable (Sepsis)  Outcome: Ongoing (interventions implemented as appropriate)    01/02/17 2018   Sepsis   Problems Assessed (Sepsis) hypoperfusion/hemodynamic instability;hypoxia/hypoxemia   Problems Present (Sepsis) hypoperfusion/hemodynamic instability;hypoxia/hypoxemia

## 2017-01-03 NOTE — H&P
[facility]     Gerardo Williamson MD    CHIEF COMPLAINT: lethargy, fever    HISTORY OF PRESENT ILLNESS:    82 y/o female with chronic a-fib on coumadin, hypertension, gout, chronic anemia, Chronic hypoxic respiratory failure with nocturnal hypoxia and KIERA, Chronic diastolic CHF, cor pulmonale and severe pulmonary HTN, h/o DVT, Chronic lymphedema, DM-2, CKD stage II-III, chronic immobility, Vitamin D deficiency and obesity presented to the ER via EMS from the [facility] secondary to lethagy and fever to 100.3.  I spoke with the patient's nurse at the [facility] and she notes that yesterday the patient c/o abdominal pain and nausea.  The abdominal pain per nursing started just below her sternum and moved down below her umbilicus and was crampy in nature.  The patient vomited twice and the attending on call was notified and the patient was given a 1 time dose of phenergan.  This caused some sedation in the patient then this morning the patient remained lethargic.  The nurse at [facility] noted the patient looking very pale and a temp to 100.3 and the patient was sent to ER for evaluation.  The patient was found to have low BP with elevated WBC, ARF and elevated LFTs, patient admitted to ICU with septic shock with suspected urinary source.  The patient is very lethargy at the time of my exam but will open her eyes and answer my questions.  She notes no crampy abdominal pain today and no more vomiting.  Denies dysuria.  Denies any SOA or CP.  Denies heart palpitations.  Notes soreness in her legs and feet.  Denies any cough or congestion.  Denies any diarrhea.      Past Medical History   Diagnosis Date   • Anemia    • Arthritis    • CHF (congestive heart failure)    • Chronic a-fib    • DVT (deep venous thrombosis)    • Dyslipidemia    • Gout    • HTN (hypertension)    • Hyperkalemia    • Hypertension    • Kidney disease 2011   • Lymphedema of left leg      chronic   • Obstructive sleep apnea of  adult      untreated   • Pulmonary HTN    • Respiratory failure, acute      hypoxic     Past Surgical History   Procedure Laterality Date   • Hernia repair     • Eye surgery     • Femur fracture surgery Right      closed reduction external fixation     Family History   Problem Relation Age of Onset   • Cancer Mother    • Heart disease Father    • Diabetes Father    • COPD Brother    • Heart disease Brother      Social History   Substance Use Topics   • Smoking status: Never Smoker   • Smokeless tobacco: None   • Alcohol use No     Prescriptions Prior to Admission   Medication Sig Dispense Refill Last Dose   • cholecalciferol (VITAMIN D3) 33877 UNITS capsule Take 50,000 Units by mouth Every 30 (Thirty) Days.   1/2/2017 at 0900   • citalopram (CeleXA) 20 MG tablet Take 1 tablet by mouth Daily. (Patient taking differently: Take 20 mg by mouth 3 (Three) Times a Week. MON, WED, FRI)   1/2/2017 at 0900   • docusate sodium (COLACE) 250 MG capsule Take 250 mg by mouth daily.   1/2/2017 at 0900   • folic acid (FOLVITE) 1 MG tablet Take 1 mg by mouth daily.   1/2/2017 at 0900   • furosemide (LASIX) 40 MG tablet Take 2 tablets by mouth Daily.   1/2/2017 at 0600   • gabapentin (NEURONTIN) 300 MG capsule Take 300 mg by mouth 2 (two) times a day.   1/2/2017 at 0900   • glucosamine-chondroitin 500-400 MG capsule capsule Take 2 capsules by mouth daily.   1/2/2017 at 0900   • guaiFENesin (MUCINEX) 600 MG 12 hr tablet Take 1,200 mg by mouth 2 (Two) Times a Day.   1/2/2017 at 0900   • melatonin 5 MG tablet tablet Take 5 mg by mouth every night.   1/1/2017 at 2100   • metoprolol succinate XL (TOPROL-XL) 25 MG 24 hr tablet Take 25 mg by mouth daily.   1/2/2017 at 0900   • multivitamin-minerals (OCUVITE) tablet Take 1 tablet by mouth daily.   1/2/2017 at 0900   • pantoprazole (PROTONIX) 40 MG EC tablet Take 40 mg by mouth every evening.   1/1/2017 at 2100   • predniSONE (DELTASONE) 5 MG tablet Take 5 mg by mouth 2 (Two) Times a Day.    "1/2/2017 at 0900   • simvastatin (ZOCOR) 40 MG tablet Take 40 mg by mouth every night.   1/1/2017 at 2100   • warfarin (COUMADIN) 6 MG tablet Take 6 mg by mouth Daily. 7 MG Wednesday AND Friday, 6 MG MON, TUE, THURS, SAT, SUN   1/1/2017 at 1600   • acetaminophen (TYLENOL) 325 MG tablet Take 2 tablets by mouth Every 4 (Four) Hours As Needed for mild pain (1-3).  0 Unknown at Unknown time   • albuterol (PROVENTIL) (2.5 MG/3ML) 0.083% nebulizer solution Take 2.5 mg by nebulization 4 (Four) Times a Day.  12    • allopurinol (ZYLOPRIM) 100 MG tablet Take 100 mg by mouth 2 (two) times a day.   9/27/16 at 0900   • Cholecalciferol (VITAMIN D3) 5000 UNITS capsule capsule Take 50,000 Units by mouth every 30 (thirty) days.   9/8/2016 at 0900   • insulin aspart (NovoLOG) 100 UNIT/ML injection Inject 3 Units under the skin 3 (Three) Times a Day With Meals. Hold if blood sugar less than 120  12    • insulin detemir (LEVEMIR) 100 UNIT/ML injection Inject 20 Units under the skin Every Night.  12    • NON FORMULARY 2LNC continuous   9/27/2016 at 0700   • polyethylene glycol (MIRALAX) packet Take 17 g by mouth daily.   9/27/2016 at 0900     Allergies:  Codeine; Demerol [meperidine]; and Propoxyphene    REVIEW OF SYSTEMS:  Please see the above history of present illness for pertinent positives and negatives.  The remainder of the patient's systems have been reviewed and are negative.     Vital Signs  Temp:  [97.3 °F (36.3 °C)] 97.3 °F (36.3 °C)  Heart Rate:  [63-96] 76  Resp:  [20] 20  BP: ()/(20-91) 87/65  Arterial Line BP: (83-93)/(49-59) 88/54   O2 Saturations: 92-97% on 5L NC    Flowsheet Rows         First Filed Value    Admission Height  64\" (162.6 cm) Documented at 01/02/2017 1422    Admission Weight  222 lb 2 oz (101 kg) Documented at 01/02/2017 1422           Physical Exam:  Physical Exam   Constitutional: Patient appears well-developed and Obese and ill appearing   HEENT:   Head: Normocephalic and atraumatic.   Eyes:  " Pupils are equal, round, and reactive to light. EOM are intact. Sclera are anicteric and non-injected.  Mouth and Throat: Patient has dry mucous membranes. Oropharynx is clear of any erythema or exudate.     Neck: Neck supple. No JVD present. No thyromegaly present. No lymphadenopathy present.  Cardiovascular: Irregularly irregular rhythm, rate WNL  Exam reveals no gallop and no friction rub.  No murmur heard.  Pulmonary/Chest: Lungs with diminished breath sounds bilateral bases L>R, No respiratory distress. No wheezes. No rhonchi. No rales.   Abdominal: Obese, Soft. Bowel sounds are normal. No mass. There is no hepatosplenomegaly. Mild diffuse ttp.   Musculoskeletal: Normal Muscle tone  Extremities: Chronic lymphedema with LEs wrapped. Pulses are palpable in all 4 extremities.  Neurological: Lethargic but will wake to voice and answer questions. Patient is alert and oriented to person, place, and time. Cranial nerves II-XII are grossly intact with no focal deficits noted.  Skin: Skin is cool in finger tips and toes. No rash noted. Nails show no clubbing.  No cyanosis or erythema.     Results Review:    I reviewed the patient's new clinical results.  Lab Results (most recent)     Procedure Component Value Units Date/Time    Blood Culture [[id number]] Collected:  01/02/17 1441    Specimen:  Blood from Blood, Venous Line Updated:  01/02/17 1502    Urine Culture [[id number]] Collected:  01/02/17 1451    Specimen:  Urine from Urine, Catheter Updated:  01/02/17 1507    Urinalysis With / Culture If Indicated [[id number]]  (Abnormal) Collected:  01/02/17 1451    Specimen:  Urine from Urine, Catheter Updated:  01/02/17 1507     Color, UA Yellow      Appearance, UA Cloudy (A)      pH, UA 7.0      Specific Gravity, UA 1.010      Glucose, UA Negative      Ketones, UA Negative      Bilirubin, UA Negative      Blood, UA Moderate (2+) (A)      Protein, UA 30 mg/dL (1+) (A)      Leuk Esterase, UA Large (3+) (A)      Nitrite, UA Negative       Urobilinogen, UA 1.0 E.U./dL     Urinalysis, Microscopic Only [[id number]]  (Abnormal) Collected:  01/02/17 1451    Specimen:  Urine from Urine, Catheter Updated:  01/02/17 1518     RBC, UA 3-5 (A) /HPF      WBC, UA Too Numerous to Count (A) /HPF      Bacteria, UA 4+ (A) /HPF      Squamous Epithelial Cells, UA 13-20 (A) /HPF      Hyaline Casts, UA None Seen /LPF      Methodology Manual Light Microscopy     CBC Auto Differential [[id number]]  (Abnormal) Collected:  01/02/17 1441    Specimen:  Blood Updated:  01/02/17 1523     WBC 25.35 (H) 10*3/mm3      RBC 4.47 10*6/mm3      Hemoglobin 12.9 g/dL      Hematocrit 40.0 %      MCV 89.5 fL      MCH 28.9 pg      MCHC 32.3 g/dL      RDW 14.0 %      RDW-SD 45.7 fl      MPV 10.7 (H) fL      Platelets 160 10*3/mm3     Manual Differential [[id number]]  (Abnormal) Collected:  01/02/17 1441    Specimen:  Blood Updated:  01/02/17 1523     Neutrophil % 68.0 %      Lymphocyte % 10.0 (L) %      Monocyte % 8.0 %      Bands %  14.0 (H) %      Neutrophils Absolute 20.79 (H) 10*3/mm3      Lymphocytes Absolute 2.54 10*3/mm3      Monocytes Absolute 2.03 (H) 10*3/mm3      RBC Morphology Normal      WBC Morphology Normal      Platelet Estimate Adequate     CBC & Differential [[id number]] Collected:  01/02/17 1441    Specimen:  Blood Updated:  01/02/17 1523    Narrative:       The following orders were created for panel order CBC & Differential.  Procedure                               Abnormality         Status                     ---------                               -----------         ------                     Manual Differential[[id number]]           Abnormal            Final result               Scan Slide[[id number]]                                                                   CBC Auto Differential[[id number]]         Abnormal            Final result                 Please view results for these tests on the individual orders.    Protime-INR [[id number]]  (Abnormal) Collected:  01/02/17  1441    Specimen:  Blood Updated:  01/02/17 1523     Protime 24.4 (H) Seconds      INR 2.26 (H)     Narrative:       Therapeutic Ranges for INR: 2.0-3.0 (PT 20-30)                              2.5-3.5 (PT 25-34)    aPTT [[id number]]  (Normal) Collected:  01/02/17 1441    Specimen:  Blood Updated:  01/02/17 1523     PTT 31.8 seconds     Narrative:       PTT = The equivalent PTT values for the therapeutic range of heparin levels at 0.1 to 0.7 U/ml are 53 to 110 seconds.    Lactic Acid, Plasma [[id number]]  (Abnormal) Collected:  01/02/17 1441    Specimen:  Blood Updated:  01/02/17 1526     Lactate 2.1 (C) mmol/L     Blood Culture [[id number]] Collected:  01/02/17 1516    Specimen:  Blood from Blood, Venous Line Updated:  01/02/17 1541    Comprehensive Metabolic Panel [[id number]]  (Abnormal) Collected:  01/02/17 1441    Specimen:  Blood Updated:  01/02/17 1548     Glucose 58 (L) mg/dL      BUN 46 (H) mg/dL      Creatinine 3.15 (H) mg/dL      Sodium 143 mmol/L      Potassium 4.4 mmol/L       Specimen hemolyzed.  Results may be affected.        Chloride 97 (L) mmol/L      CO2 30.9 (H) mmol/L      Calcium 9.3 mg/dL      Total Protein 4.8 (L) g/dL      Albumin 3.20 (L) g/dL      ALT (SGPT) 467 (H) U/L       Specimen hemolyzed.  Results may be affected.        AST (SGOT) 542 (H) U/L       Specimen hemolyzed.  Results may be affected.        Alkaline Phosphatase 187 (H) U/L      Total Bilirubin 2.8 (H) mg/dL      eGFR Non African Amer 14 (L) mL/min/1.73      Globulin 1.6 gm/dL      A/G Ratio 2.0 g/dL      BUN/Creatinine Ratio 14.6      Anion Gap 15.1 mmol/L     Narrative:       The MDRD GFR formula is only valid for adults with stable renal function between ages 18 and 70.    Troponin [[id number]]  (Abnormal) Collected:  01/02/17 1441    Specimen:  Blood Updated:  01/02/17 1548     Troponin T 0.036 (H) ng/mL       Specimen hemolyzed.  Results may be affected.       Narrative:       Troponin T Reference Ranges:  Less than 0.03 ng/mL:     Negative for AMI  0.03 to 0.09 ng/mL:      Indeterminant for AMI  Greater than 0.09 ng/mL: Positive for AMI    Blood Gas, Arterial [[id number]]  (Abnormal) Collected:  01/02/17 1618    Specimen:  Arterial Blood Updated:  01/02/17 1626     Site Arterial: left radial      Prashant's Test Positive      pH, Arterial 7.370 pH units      pCO2, Arterial 53.4 (H) mm Hg      pO2, Arterial 91.1 mm Hg      HCO3, Arterial 30.2 (H) mmol/L      Base Excess, Arterial 3.7 (H) mmol/L      O2 Saturation Calculated 96.6 %      Hemoglobin, Blood Gas 13.1 g/dL      Barometric Pressure for Blood Gas 747 mmHg      Modality Nasal Cannula      Flow Rate 5 lpm      Rate 24 Breaths/minute      Pulse Ox 95 %     Lactate Acid, Reflex [[id number]] Collected:  01/02/17 1850    Specimen:  Blood Updated:  01/02/17 1910          Imaging Results (most recent)     Procedure Component Value Units Date/Time    XR Chest 1 View [[id number]] Resulted:  01/02/17 1552     Updated:  01/02/17 1555    CT Head Without Contrast [[id number]] Resulted:  01/02/17 1616     Updated:  01/02/17 1617          ECG/EMG Results (most recent)     Procedure Component Value Units Date/Time    ECG 12 Lead [[id number]] Collected:  01/02/17 1432     Updated:  01/02/17 1434            Assessment/Plan     1. Septic Shock: Secondary to UTI, see below.  Patient received IVF bolus in ER but remained hypotensive.  Patient admitted to ICU and advanced cardiac monitoring with EV-1000 initiated.  Initially patient was fluid responsive with a NL SVR and continued fluids were administered then the patient's SV stabilized and SVR began to drop so levophed has been started to keep MAP > or =  65. Fluids put to maintinence, continue to monitor closely.  LA now WNL. Patient afebrile here. Patient on chronic low dose steroids out-patient so stress dose steroids started here, would taper quickly back to po prednisone once BP stable.    2. Complicated UTI: Patient started on Vanco and zosyn which will cover  for urinary, GI or pulmonary causes of sepsis.  UCx. Pending, await results.    3. Atalectasis vs infiltrate: Suspect atalectasis secondary to the patient lethargy.  Start IS and check procalcitonin level.  Abx's will cover for PNA as noted above, monitor.    4. Abdominal pain and N/V: Unclear etiology.  Abdomen mildly tender on exam but no c/o crampy pain or N/V since yesterday.  If acute infectious etiology is present Abx's will cover.  Patient has had no diarrhea.  Consider CT scan when patient more hemodynamically stable.    5. 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  Aggressive fluid resusitation and normalization of BP.  Re-check labs in AM.    6. Elevated LFTs: Suspect secondary to shock liver from hypotension.  Monitor for now.     7. DM-2: Hold insulin, patient on D5 1/2 NS secondary to hypoglycemia, patient NPO.  Montor accuchecks.    8. Acute on Chronic hypoxic respiratory failure: Suspect secondary to atalectasis and lethargy.  Start IS and titrate O2 as tolerated back to patient's previous 2L.     9. Nocturnal hypoxia and KIERA: O2 As above.     10. Chronic diastolic CHF with Cor Pulmonale and pulmonary HTN:  Currently no signs of volume overload, monitor closely as patient has received a significant amount of IVFs over a short period of time.  Home lasix on hold secondary to above.    11. Chronic A-fib on coumadin: Holding BB secondary to hypotension.  INR theraputic, continue coumadin when patient more awake and safely able to take po. Monitor INR. Indeterminate troponin noted and likely secondary to ARF and poor clearance. No s/s of acute cardiac process. EKG with A-fib.    12. Chronic lymphedema: Legs wrapped, no acute issues.    13. Vitamin D deficiency: supplementation on hold until patient more awake.    14. H/O DVT: INR theraputic on coumadin, no acute issues here.    15. Code status: I spoke with patient's POA Vincent Amos and patient is noted to be a DNR (i.e. No CPR or  Ventilator)    I discussed the patients findings and my recommendations with patient and Vincent KOLB.     Glendy Gonsalez MD  01/02/17  8:14 PM

## 2017-01-04 ENCOUNTER — APPOINTMENT (OUTPATIENT)
Dept: GENERAL RADIOLOGY | Facility: HOSPITAL | Age: 82
End: 2017-01-04

## 2017-01-04 LAB
ANION GAP SERPL CALCULATED.3IONS-SCNC: 13.7 MMOL/L
ANISOCYTOSIS BLD QL: ABNORMAL
BACTERIA SPEC AEROBE CULT: ABNORMAL
BACTERIA SPEC AEROBE CULT: ABNORMAL
BUN BLD-MCNC: 38 MG/DL (ref 8–23)
BUN/CREAT SERPL: 19.7 (ref 7–25)
CALCIUM SPEC-SCNC: 8.4 MG/DL (ref 8.8–10.5)
CHLORIDE SERPL-SCNC: 100 MMOL/L (ref 98–107)
CO2 SERPL-SCNC: 27.3 MMOL/L (ref 22–29)
CREAT BLD-MCNC: 1.93 MG/DL (ref 0.57–1)
DEPRECATED RDW RBC AUTO: 45.2 FL (ref 37–54)
ERYTHROCYTE [DISTWIDTH] IN BLOOD BY AUTOMATED COUNT: 13.7 % (ref 11.5–14.5)
GFR SERPL CREATININE-BSD FRML MDRD: 25 ML/MIN/1.73
GLUCOSE BLD-MCNC: 272 MG/DL (ref 65–99)
GLUCOSE BLDC GLUCOMTR-MCNC: 225 MG/DL (ref 70–130)
GLUCOSE BLDC GLUCOMTR-MCNC: 229 MG/DL (ref 70–130)
GLUCOSE BLDC GLUCOMTR-MCNC: 278 MG/DL (ref 70–130)
GLUCOSE BLDC GLUCOMTR-MCNC: 321 MG/DL (ref 70–130)
HCT VFR BLD AUTO: 35.8 % (ref 37–47)
HGB BLD-MCNC: 11.4 G/DL (ref 12–16)
INR PPP: 2.19 (ref 0.9–1.1)
LYMPHOCYTES # BLD MANUAL: 0.69 10*3/MM3 (ref 0.6–4.8)
LYMPHOCYTES NFR BLD MANUAL: 2 % (ref 3–8)
LYMPHOCYTES NFR BLD MANUAL: 7 % (ref 20–45)
MAGNESIUM SERPL-MCNC: 2 MG/DL (ref 1.6–2.4)
MCH RBC QN AUTO: 28.4 PG (ref 27–31)
MCHC RBC AUTO-ENTMCNC: 31.8 G/DL (ref 31–37)
MCV RBC AUTO: 89.3 FL (ref 81–99)
MONOCYTES # BLD AUTO: 0.2 10*3/MM3 (ref 0–1)
NEUTROPHILS # BLD AUTO: 8.98 10*3/MM3 (ref 1.5–8.3)
NEUTROPHILS NFR BLD MANUAL: 85 % (ref 45–70)
NEUTS BAND NFR BLD MANUAL: 6 % (ref 0–5)
PLAT MORPH BLD: NORMAL
PLATELET # BLD AUTO: 109 10*3/MM3 (ref 140–500)
PMV BLD AUTO: 10.9 FL (ref 7.4–10.4)
POIKILOCYTOSIS BLD QL SMEAR: ABNORMAL
POTASSIUM BLD-SCNC: 2.8 MMOL/L (ref 3.5–5.2)
POTASSIUM BLD-SCNC: 3.4 MMOL/L (ref 3.5–5.2)
PROCALCITONIN SERPL-MCNC: 14.71 NG/ML (ref 0.1–0.25)
PROTHROMBIN TIME: 24.7 SECONDS (ref 12.1–15)
RBC # BLD AUTO: 4.01 10*6/MM3 (ref 4.2–5.4)
SODIUM BLD-SCNC: 141 MMOL/L (ref 136–145)
VANCOMYCIN SERPL-MCNC: 16.9 MCG/ML
WBC MORPH BLD: NORMAL
WBC NRBC COR # BLD: 9.87 10*3/MM3 (ref 4.8–10.8)

## 2017-01-04 PROCEDURE — 94640 AIRWAY INHALATION TREATMENT: CPT

## 2017-01-04 PROCEDURE — 99232 SBSQ HOSP IP/OBS MODERATE 35: CPT | Performed by: HOSPITALIST

## 2017-01-04 PROCEDURE — 63710000001 INSULIN DETEMIR PER 5 UNITS: Performed by: HOSPITALIST

## 2017-01-04 PROCEDURE — 83735 ASSAY OF MAGNESIUM: CPT | Performed by: HOSPITALIST

## 2017-01-04 PROCEDURE — 25010000002 HYDROCORTISONE SODIUM SUCCINATE 100 MG RECONSTITUTED SOLUTION: Performed by: HOSPITALIST

## 2017-01-04 PROCEDURE — 63710000001 PREDNISONE PER 5 MG: Performed by: HOSPITALIST

## 2017-01-04 PROCEDURE — 94799 UNLISTED PULMONARY SVC/PX: CPT

## 2017-01-04 PROCEDURE — 80048 BASIC METABOLIC PNL TOTAL CA: CPT | Performed by: INTERNAL MEDICINE

## 2017-01-04 PROCEDURE — 85007 BL SMEAR W/DIFF WBC COUNT: CPT | Performed by: INTERNAL MEDICINE

## 2017-01-04 PROCEDURE — 80202 ASSAY OF VANCOMYCIN: CPT | Performed by: INTERNAL MEDICINE

## 2017-01-04 PROCEDURE — 63710000001 INSULIN ASPART PER 5 UNITS: Performed by: HOSPITALIST

## 2017-01-04 PROCEDURE — 84132 ASSAY OF SERUM POTASSIUM: CPT | Performed by: INTERNAL MEDICINE

## 2017-01-04 PROCEDURE — 25010000002 KETOROLAC TROMETHAMINE PER 15 MG

## 2017-01-04 PROCEDURE — 99232 SBSQ HOSP IP/OBS MODERATE 35: CPT | Performed by: INTERNAL MEDICINE

## 2017-01-04 PROCEDURE — 84145 PROCALCITONIN (PCT): CPT | Performed by: HOSPITALIST

## 2017-01-04 PROCEDURE — 82962 GLUCOSE BLOOD TEST: CPT

## 2017-01-04 PROCEDURE — 85025 COMPLETE CBC W/AUTO DIFF WBC: CPT | Performed by: INTERNAL MEDICINE

## 2017-01-04 PROCEDURE — 85610 PROTHROMBIN TIME: CPT | Performed by: INTERNAL MEDICINE

## 2017-01-04 PROCEDURE — 74020 HC XR ABDOMEN FLAT & UPRIGHT: CPT

## 2017-01-04 PROCEDURE — 25010000002 PIPERACILLIN SOD-TAZOBACTAM PER 1 G: Performed by: HOSPITALIST

## 2017-01-04 RX ORDER — POTASSIUM CHLORIDE 1.5 G/1.77G
40 POWDER, FOR SOLUTION ORAL AS NEEDED
Status: DISCONTINUED | OUTPATIENT
Start: 2017-01-04 | End: 2017-01-08 | Stop reason: HOSPADM

## 2017-01-04 RX ORDER — CITALOPRAM 20 MG/1
20 TABLET ORAL 3 TIMES WEEKLY
Status: DISCONTINUED | OUTPATIENT
Start: 2017-01-04 | End: 2017-01-08 | Stop reason: HOSPADM

## 2017-01-04 RX ORDER — CHOLECALCIFEROL (VITAMIN D3) 125 MCG
5 CAPSULE ORAL NIGHTLY
Status: DISCONTINUED | OUTPATIENT
Start: 2017-01-04 | End: 2017-01-08 | Stop reason: HOSPADM

## 2017-01-04 RX ORDER — METOPROLOL SUCCINATE 25 MG/1
25 TABLET, EXTENDED RELEASE ORAL DAILY
Status: DISCONTINUED | OUTPATIENT
Start: 2017-01-04 | End: 2017-01-08 | Stop reason: HOSPADM

## 2017-01-04 RX ORDER — PANTOPRAZOLE SODIUM 40 MG/1
40 TABLET, DELAYED RELEASE ORAL EVERY EVENING
Status: DISCONTINUED | OUTPATIENT
Start: 2017-01-04 | End: 2017-01-08 | Stop reason: HOSPADM

## 2017-01-04 RX ORDER — KETOROLAC TROMETHAMINE 30 MG/ML
INJECTION, SOLUTION INTRAMUSCULAR; INTRAVENOUS
Status: COMPLETED
Start: 2017-01-04 | End: 2017-01-04

## 2017-01-04 RX ORDER — PREDNISONE 1 MG/1
5 TABLET ORAL 2 TIMES DAILY WITH MEALS
Status: DISCONTINUED | OUTPATIENT
Start: 2017-01-04 | End: 2017-01-08 | Stop reason: HOSPADM

## 2017-01-04 RX ORDER — POTASSIUM CHLORIDE 20 MEQ/1
TABLET, EXTENDED RELEASE ORAL
Status: DISPENSED
Start: 2017-01-04 | End: 2017-01-05

## 2017-01-04 RX ORDER — KETOROLAC TROMETHAMINE 30 MG/ML
30 INJECTION, SOLUTION INTRAMUSCULAR; INTRAVENOUS ONCE
Status: COMPLETED | OUTPATIENT
Start: 2017-01-04 | End: 2017-01-04

## 2017-01-04 RX ORDER — POTASSIUM CHLORIDE 750 MG/1
40 CAPSULE, EXTENDED RELEASE ORAL AS NEEDED
Status: DISCONTINUED | OUTPATIENT
Start: 2017-01-04 | End: 2017-01-08 | Stop reason: HOSPADM

## 2017-01-04 RX ADMIN — CITALOPRAM HYDROBROMIDE 20 MG: 20 TABLET ORAL at 15:46

## 2017-01-04 RX ADMIN — PANTOPRAZOLE SODIUM 40 MG: 40 TABLET, DELAYED RELEASE ORAL at 18:53

## 2017-01-04 RX ADMIN — IPRATROPIUM BROMIDE AND ALBUTEROL SULFATE 3 ML: .5; 3 SOLUTION RESPIRATORY (INHALATION) at 13:59

## 2017-01-04 RX ADMIN — PIPERACILLIN AND TAZOBACTAM 2.25 G: 2; .25 INJECTION, POWDER, LYOPHILIZED, FOR SOLUTION INTRAVENOUS; PARENTERAL at 06:02

## 2017-01-04 RX ADMIN — PANTOPRAZOLE SODIUM 40 MG: 40 INJECTION, POWDER, FOR SOLUTION INTRAVENOUS at 06:02

## 2017-01-04 RX ADMIN — PIPERACILLIN AND TAZOBACTAM 2.25 G: 2; .25 INJECTION, POWDER, LYOPHILIZED, FOR SOLUTION INTRAVENOUS; PARENTERAL at 14:04

## 2017-01-04 RX ADMIN — HYDROCORTISONE SODIUM SUCCINATE 50 MG: 100 INJECTION, POWDER, FOR SOLUTION INTRAMUSCULAR; INTRAVENOUS at 06:02

## 2017-01-04 RX ADMIN — SODIUM CHLORIDE, PRESERVATIVE FREE 10 ML: 5 INJECTION INTRAVENOUS at 08:36

## 2017-01-04 RX ADMIN — PREDNISONE 5 MG: 5 TABLET ORAL at 17:49

## 2017-01-04 RX ADMIN — INSULIN ASPART 3 UNITS: 100 INJECTION, SOLUTION INTRAVENOUS; SUBCUTANEOUS at 17:49

## 2017-01-04 RX ADMIN — SODIUM CHLORIDE, PRESERVATIVE FREE 10 ML: 5 INJECTION INTRAVENOUS at 20:58

## 2017-01-04 RX ADMIN — POTASSIUM CHLORIDE 40 MEQ: 750 CAPSULE, EXTENDED RELEASE ORAL at 12:46

## 2017-01-04 RX ADMIN — MELATONIN 5 MG TABLET 5 MG: at 20:58

## 2017-01-04 RX ADMIN — INSULIN DETEMIR 20 UNITS: 100 INJECTION, SOLUTION SUBCUTANEOUS at 22:20

## 2017-01-04 RX ADMIN — WARFARIN SODIUM 4 MG: 4 TABLET ORAL at 18:53

## 2017-01-04 RX ADMIN — PIPERACILLIN AND TAZOBACTAM 2.25 G: 2; .25 INJECTION, POWDER, LYOPHILIZED, FOR SOLUTION INTRAVENOUS; PARENTERAL at 21:02

## 2017-01-04 RX ADMIN — SODIUM CHLORIDE, PRESERVATIVE FREE 10 ML: 5 INJECTION INTRAVENOUS at 20:59

## 2017-01-04 RX ADMIN — PREDNISONE 5 MG: 5 TABLET ORAL at 10:28

## 2017-01-04 RX ADMIN — INSULIN ASPART 5 UNITS: 100 INJECTION, SOLUTION INTRAVENOUS; SUBCUTANEOUS at 12:48

## 2017-01-04 RX ADMIN — WARFARIN SODIUM 3 MG: 3 TABLET ORAL at 17:49

## 2017-01-04 RX ADMIN — IPRATROPIUM BROMIDE AND ALBUTEROL SULFATE 3 ML: .5; 3 SOLUTION RESPIRATORY (INHALATION) at 08:14

## 2017-01-04 RX ADMIN — IPRATROPIUM BROMIDE AND ALBUTEROL SULFATE 3 ML: .5; 3 SOLUTION RESPIRATORY (INHALATION) at 20:12

## 2017-01-04 RX ADMIN — KETOROLAC TROMETHAMINE 30 MG: 30 INJECTION, SOLUTION INTRAMUSCULAR; INTRAVENOUS at 22:45

## 2017-01-04 RX ADMIN — POTASSIUM CHLORIDE 40 MEQ: 1.5 POWDER, FOR SOLUTION ORAL at 10:29

## 2017-01-04 RX ADMIN — IPRATROPIUM BROMIDE AND ALBUTEROL SULFATE 3 ML: .5; 3 SOLUTION RESPIRATORY (INHALATION) at 17:05

## 2017-01-04 RX ADMIN — FOLIC ACID 1 MG: 1 TABLET ORAL at 08:35

## 2017-01-04 RX ADMIN — HYDROCORTISONE SODIUM SUCCINATE 50 MG: 100 INJECTION, POWDER, FOR SOLUTION INTRAMUSCULAR; INTRAVENOUS at 00:14

## 2017-01-04 RX ADMIN — KETOROLAC TROMETHAMINE 30 MG: 30 INJECTION, SOLUTION INTRAMUSCULAR at 22:45

## 2017-01-04 RX ADMIN — POTASSIUM CHLORIDE 40 MEQ: 750 CAPSULE, EXTENDED RELEASE ORAL at 17:50

## 2017-01-04 RX ADMIN — GABAPENTIN 300 MG: 300 CAPSULE ORAL at 08:35

## 2017-01-04 RX ADMIN — POTASSIUM CHLORIDE 40 MEQ: 750 CAPSULE, EXTENDED RELEASE ORAL at 23:28

## 2017-01-04 RX ADMIN — ACETAMINOPHEN 650 MG: 325 TABLET, FILM COATED ORAL at 12:45

## 2017-01-04 RX ADMIN — INSULIN ASPART 4 UNITS: 100 INJECTION, SOLUTION INTRAVENOUS; SUBCUTANEOUS at 20:58

## 2017-01-04 RX ADMIN — METOPROLOL SUCCINATE 25 MG: 25 TABLET, EXTENDED RELEASE ORAL at 08:35

## 2017-01-04 RX ADMIN — SODIUM CHLORIDE 75 ML/HR: 4.5 INJECTION, SOLUTION INTRAVENOUS at 10:33

## 2017-01-04 RX ADMIN — INSULIN ASPART 3 UNITS: 100 INJECTION, SOLUTION INTRAVENOUS; SUBCUTANEOUS at 08:36

## 2017-01-04 RX ADMIN — ACETAMINOPHEN 650 MG: 325 TABLET, FILM COATED ORAL at 20:58

## 2017-01-04 RX ADMIN — GABAPENTIN 300 MG: 300 CAPSULE ORAL at 17:49

## 2017-01-04 RX ADMIN — ACETAMINOPHEN 650 MG: 325 TABLET, FILM COATED ORAL at 04:33

## 2017-01-04 RX ADMIN — MULTIPLE VITAMINS W/ MINERALS TAB 1 TABLET: TAB at 08:35

## 2017-01-04 RX ADMIN — DOCUSATE SODIUM 250 MG: 250 CAPSULE, LIQUID FILLED ORAL at 08:35

## 2017-01-04 NOTE — PLAN OF CARE
Problem: Patient Care Overview (Adult)  Goal: Adult Individualization and Mutuality  Outcome: Ongoing (interventions implemented as appropriate)    01/04/17 0984   Individualization   Patient Specific Preferences meds in applesauce         Problem: Sepsis (Adult)  Goal: Signs and Symptoms of Listed Potential Problems Will be Absent or Manageable (Sepsis)  Outcome: Ongoing (interventions implemented as appropriate)    Problem: Pressure Ulcer (Adult)  Goal: Signs and Symptoms of Listed Potential Problems Will be Absent or Manageable (Pressure Ulcer)  Outcome: Ongoing (interventions implemented as appropriate)    Problem: Skin Integrity Impairment, Risk/Actual (Adult)  Goal: Skin Integrity/Wound Healing  Outcome: Ongoing (interventions implemented as appropriate)

## 2017-01-04 NOTE — PLAN OF CARE
Problem: Sepsis (Adult)  Intervention: Provide Oxygenation/Ventilation/Perfusion Support    01/03/17 2479   Respiratory Interventions   Airway/Ventilation Management airway patency maintained;pulmonary hygiene promoted

## 2017-01-04 NOTE — PLAN OF CARE
Problem: Patient Care Overview (Adult)  Goal: Plan of Care Review  Outcome: Ongoing (interventions implemented as appropriate)    01/04/17 0234   Coping/Psychosocial Response Interventions   Plan Of Care Reviewed With patient   Patient Care Overview   Progress progress toward functional goals as expected   Outcome Evaluation   Outcome Summary/Follow up Plan levophed gtt has been turned off; pts BP is stabilizing; continues on IV antibiotics; VSS       Goal: Adult Individualization and Mutuality  Outcome: Ongoing (interventions implemented as appropriate)  Goal: Discharge Needs Assessment  Outcome: Ongoing (interventions implemented as appropriate)    Problem: Sepsis (Adult)  Goal: Signs and Symptoms of Listed Potential Problems Will be Absent or Manageable (Sepsis)  Outcome: Ongoing (interventions implemented as appropriate)    01/04/17 0234   Sepsis   Problems Assessed (Sepsis) all   Problems Present (Sepsis) hypoperfusion/hemodynamic instability;situational response         Problem: Pressure Ulcer (Adult)  Goal: Signs and Symptoms of Listed Potential Problems Will be Absent or Manageable (Pressure Ulcer)  Outcome: Ongoing (interventions implemented as appropriate)    Problem: Skin Integrity Impairment, Risk/Actual (Adult)  Goal: Identify Related Risk Factors and Signs and Symptoms  Outcome: Outcome(s) achieved Date Met:  01/04/17 01/04/17 0234   Skin Integrity Impairment, Risk/Actual   Skin Integrity Impairment, Risk/Actual: Related Risk Factors age extremes;edema;fluid/nutrition status;immobility;moisture   Signs and Symptoms (Skin Integrity Impairment) edema       Goal: Skin Integrity/Wound Healing  Outcome: Ongoing (interventions implemented as appropriate)    01/04/17 0234   Skin Integrity Impairment, Risk/Actual (Adult)   Skin Integrity/Wound Healing making progress toward outcome

## 2017-01-04 NOTE — PROGRESS NOTES
"    Patient Name: Alexandria Villanueva  :1935  81 y.o.      Patient Care Team:  Gerardo Williamson MD as PCP - General  Gerardo Williamson MD as PCP - Family Medicine    Chief Complaint: UTI, sepsis, chronic AFIB    Interval History: Overnight the patient has done well without any specific cardiac complaints.  She denies chest pain or shortness of breath.  Did not sleep well.       Objective   Vital Signs  Temp:  [97 °F (36.1 °C)-98.3 °F (36.8 °C)] 97.3 °F (36.3 °C)  Heart Rate:  [] 74  Resp:  [16-20] 20  BP: ()/(47-90) 149/64  Arterial Line BP: ()/(41-54) 101/53    Intake/Output Summary (Last 24 hours) at 17 0742  Last data filed at 17 0602   Gross per 24 hour   Intake 3400.98 ml   Output   2125 ml   Net 1275.98 ml     Flowsheet Rows         First Filed Value    Admission Height  64\" (162.6 cm) Documented at 2017 1422    Admission Weight  222 lb 2 oz (101 kg) Documented at 2017 1422          Physical Exam:   General Appearance:    Alert, cooperative, in no acute distress   Lungs:     Clear to auscultation.  Normal respiratory effort and rate.      Heart:    irregular rhythm and normal rate, normal S1 and S2, no murmurs, gallops or rubs.     Chest Wall:    No abnormalities observed   Abdomen:     Soft, nontender, positive bowel sounds.     Extremities:   no cyanosis, clubbing or edema.  No marked joint deformities.  Adequate musculoskeletal strength.       Results Review:      Results from last 7 days  Lab Units 17  0430   SODIUM mmol/L 141   POTASSIUM mmol/L 2.8*   CHLORIDE mmol/L 100   TOTAL CO2 mmol/L 27.3   BUN mg/dL 38*   CREATININE mg/dL 1.93*   GLUCOSE mg/dL 272*   CALCIUM mg/dL 8.4*       Results from last 7 days  Lab Units 17  1441   TROPONIN T ng/mL 0.036*       Results from last 7 days  Lab Units 17  0601   WBC 10*3/mm3 19.76*   HEMOGLOBIN g/dL 13.0   HEMATOCRIT % 39.9   PLATELETS 10*3/mm3 134*       Results from last 7 days  Lab Units " 01/04/17  0430 01/03/17  0934 01/02/17  1441   INR  2.19* 2.39* 2.26*   APTT seconds  --   --  31.8                       Medication Review:     docusate sodium 250 mg Oral Daily   folic acid 1 mg Oral Daily   gabapentin 300 mg Oral BID   hydrocortisone sodium succinate 50 mg Intravenous Q6H   insulin aspart 0-7 Units Subcutaneous 4x Daily AC & at Bedtime   ipratropium-albuterol 3 mL Nebulization 4x Daily - RT   multivitamin with minerals 1 tablet Oral Daily   pantoprazole 40 mg Intravenous Q AM   piperacillin-tazobactam 2.25 g Intravenous Q8H   sodium chloride 1,000 mL Intravenous Once   sodium chloride 10 mL Intracatheter Q12H   sodium chloride 10 mL Intracatheter Q12H   warfarin 4 mg Oral Once per day on Wed Fri   And      warfarin 3 mg Oral Once per day on Wed Fri   warfarin 6 mg Oral Once per day on Sun Mon Tue Thu Sat   warfarin 6 mg Oral Once          norepinephrine 0.02-0.3 mcg/kg/min Last Rate: Stopped (01/03/17 0150)   Pharmacy Consult - Pharmacy to dose     Pharmacy to dose vancomycin     sodium chloride 75 mL/hr Last Rate: 75 mL/hr (01/03/17 7647)       Assessment/Plan   Active Problems:    Sepsis due to urinary tract infection    Chronic diastolic (congestive) heart failure    Permanent atrial fibrillation    Acute renal failure    This morning I will resume her home metoprolol dose.  She is also hypokalemic-potassium protocol.    Luis Fernando Stevens III, MD  [facility]  01/04/17  7:42 AM

## 2017-01-04 NOTE — PROGRESS NOTES
"Hospitalist Team      Patient Care Team:  Gerardo Williamson MD as PCP - General  Gerardo Williamson MD as PCP - Family Medicine        Chief Complaint:  F/U septic shock and UTI    Subjective    Interval History and ROS:     Patient feeling much better today.  Her only complaint is low back pain which is chronic but has been exacerbated here.  She denies any SOA or N/V.  She does note a little epigastric discomfort but no N/V and is tolerating her diet.  Notes minimal nonproductive cough.  Afebrile.      Objective    Vital Signs  Temp:  [97 °F (36.1 °C)-98.3 °F (36.8 °C)] 97.3 °F (36.3 °C)  Heart Rate:  [] 85  Resp:  [16-20] 20  BP: ()/(47-99) 151/99  O2 Saturations: 95% on 2L NC    Flowsheet Rows         First Filed Value    Admission Height  64\" (162.6 cm) Documented at 01/02/2017 1422    Admission Weight  222 lb 2 oz (101 kg) Documented at 01/02/2017 1422          Physical Exam:  Constitutional: Patient appears well-developed and Obese and in NAD   HEENT:    Head: Normocephalic and atraumatic.   Eyes: Pupils are equal, round, and reactive to light. EOM are intact. Sclera are anicteric and non-injected.  Mouth and Throat: Patient has moist mucous membranes. Oropharynx is clear of any erythema or exudate.   Neck: Neck supple. No JVD present. No lymphadenopathy present.  Cardiovascular: Irregularly irregular rhythm, rate WNL. Exam reveals no gallop and no friction rub. No murmur heard.  Pulmonary/Chest: Lungs with diminished breath sounds bilateral bases L>R, No respiratory distress. No wheezes. No rhonchi. No rales.   Abdominal: Obese, Soft. Bowel sounds are normal. No mass. There is no hepatosplenomegaly. Mild ttp in the epigastric area.   Extremities: Chronic lymphedema with LEs unchanged from baseline. Pulses are palpable in all 4 extremities.  Neurological: Patient is alert and oriented to person, place, and time.   Skin: No rash noted. Nails show no clubbing.  No cyanosis or erythema.  Psychiatric: " Normal mood and affect    Results Review:     I reviewed the patient's new clinical results.    Lab Results (last 24 hours)     Procedure Component Value Units Date/Time    Protime-INR [[id number]]  (Abnormal) Collected:  01/03/17 0934    Specimen:  Blood Updated:  01/03/17 0957     Protime 25.5 (H) Seconds      INR 2.39 (H)     Narrative:       Therapeutic Ranges for INR: 2.0-3.0 (PT 20-30)                              2.5-3.5 (PT 25-34)    Comprehensive Metabolic Panel [[id number]]  (Abnormal) Collected:  01/03/17 0934    Specimen:  Blood Updated:  01/03/17 1035     Glucose 247 (H) mg/dL      BUN 43 (H) mg/dL      Creatinine 2.31 (H) mg/dL      Sodium 143 mmol/L      Potassium 3.4 (L) mmol/L      Chloride 100 mmol/L      CO2 22.0 mmol/L      Calcium 7.9 (L) mg/dL      Total Protein 5.2 (L) g/dL      Albumin 2.60 (L) g/dL      ALT (SGPT) 301 (H) U/L      AST (SGOT) 237 (H) U/L      Alkaline Phosphatase 147 (H) U/L      Total Bilirubin 1.4 (H) mg/dL      eGFR Non African Amer 20 (L) mL/min/1.73      Globulin 2.6 gm/dL      A/G Ratio 1.0 g/dL      BUN/Creatinine Ratio 18.6      Anion Gap 21.0 mmol/L     Narrative:       The MDRD GFR formula is only valid for adults with stable renal function between ages 18 and 70.    POC Glucose Fingerstick [[id number]]  (Abnormal) Collected:  01/03/17 1206    Specimen:  Blood Updated:  01/03/17 1215     Glucose 278 (H) mg/dL     Narrative:       Meter: [id number] : [id number] Kimber BETANCOURT    Blood Culture [[id number]]  (Normal) Collected:  01/02/17 1441    Specimen:  Blood from Blood, Venous Line Updated:  01/03/17 1601     Blood Culture No growth at 24 hours     POC Glucose Fingerstick [[id number]]  (Abnormal) Collected:  01/03/17 1659    Specimen:  Blood Updated:  01/03/17 1724     Glucose 337 (H) mg/dL     Narrative:       Meter: [id number] : [id number] Kimber BETANCOURT    Vancomycin, Random [[id number]] Collected:  01/03/17 1804    Specimen:  Blood Updated:  01/03/17 0198      Vancomycin Random 12.00 mcg/mL     POC Glucose Fingerstick [[id number]]  (Abnormal) Collected:  01/03/17 2028    Specimen:  Blood Updated:  01/03/17 2034     Glucose 340 (H) mg/dL     Narrative:       Meter: [id number] : [id number] Elfego Jordan    Basic Metabolic Panel [[id number]]  (Abnormal) Collected:  01/04/17 0430    Specimen:  Blood from Arm, Right Updated:  01/04/17 0639     Glucose 272 (H) mg/dL      BUN 38 (H) mg/dL      Creatinine 1.93 (H) mg/dL      Sodium 141 mmol/L      Potassium 2.8 (L) mmol/L      Chloride 100 mmol/L      CO2 27.3 mmol/L      Calcium 8.4 (L) mg/dL      eGFR Non African Amer 25 (L) mL/min/1.73      BUN/Creatinine Ratio 19.7      Anion Gap 13.7 mmol/L     Narrative:       The MDRD GFR formula is only valid for adults with stable renal function between ages 18 and 70.    Blood Culture [[id number]]  (Abnormal) Collected:  01/02/17 1516    Specimen:  Blood from Blood, Venous Line Updated:  01/04/17 0659     Blood Culture Abnormal Stain (A)       Gram Negative Bacilli (A)      Gram Stain Result Aerobic Bottle Gram negative bacilli     Protime-INR [[id number]]  (Abnormal) Collected:  01/04/17 0430    Specimen:  Blood from Arm, Right Updated:  01/04/17 0703     Protime 24.7 (H) Seconds      INR 2.19 (H)     Narrative:       Therapeutic Ranges for INR: 2.0-3.0 (PT 20-30)                              2.5-3.5 (PT 25-34)    CBC Auto Differential [[id number]]  (Abnormal) Collected:  01/04/17 0430    Specimen:  Blood from Arm, Right Updated:  01/04/17 0753     WBC 9.87 10*3/mm3      RBC 4.01 (L) 10*6/mm3      Hemoglobin 11.4 (L) g/dL      Hematocrit 35.8 (L) %      MCV 89.3 fL      MCH 28.4 pg      MCHC 31.8 g/dL      RDW 13.7 %      RDW-SD 45.2 fl      MPV 10.9 (H) fL      Platelets 109 (L) 10*3/mm3     POC Glucose Fingerstick [[id number]]  (Abnormal) Collected:  01/04/17 0828    Specimen:  Blood Updated:  01/04/17 0837     Glucose 229 (H) mg/dL     Narrative:       Meter: [id number] : [id number]  David Edgar    Urine Culture [[id number]]  (Abnormal) Collected:  01/03/17 0616    Specimen:  Urine from Urine, Catheter Updated:  01/04/17 0842     Urine Culture <10,000 CFU/mL Escherichia coli (A)       Call if further workup needed.       CBC & Differential [[id number]] Collected:  01/04/17 0430    Specimen:  Blood Updated:  01/04/17 0843    Narrative:       The following orders were created for panel order CBC & Differential.  Procedure                               Abnormality         Status                     ---------                               -----------         ------                     Manual Differential[[id number]]           Abnormal            Final result               Scan Slide[[id number]]                                                                   CBC Auto Differential[[id number]]         Abnormal            Final result                 Please view results for these tests on the individual orders.    Manual Differential [[id number]]  (Abnormal) Collected:  01/04/17 0430    Specimen:  Blood from Arm, Right Updated:  01/04/17 0843     Neutrophil % 85.0 (H) %      Lymphocyte % 7.0 (L) %      Monocyte % 2.0 (L) %      Bands %  6.0 (H) %      Neutrophils Absolute 8.98 (H) 10*3/mm3      Lymphocytes Absolute 0.69 10*3/mm3      Monocytes Absolute 0.20 10*3/mm3      Anisocytosis Slight/1+      Poikilocytes Slight/1+      WBC Morphology Normal      Platelet Morphology Normal           Imaging Results (last 24 hours)     Procedure Component Value Units Date/Time    CT Head Without Contrast [[id number]] Collected:  01/03/17 0844     Updated:  01/03/17 0948    Narrative:       EXAM: NONCONTRAST CT HEAD      DATE: 01/02/2017 AT 1526      HISTORY: 81-year-old female became unresponsive around 1 PM at the  nursing home. No known injury.     COMPARISON: Noncontrast CT head 06/12/2009.     FINDINGS: A few of the images are moderately degraded by motion.  Allowing for this limitation, no gross acute intracranial  hemorrhage,  mass lesion, mass effect, or midline shift. Gray matter-white matter  junction distinction appears preserved, there is no compelling CT  evidence of acute or evolving infarct. Physiologic calcifications  demonstrated within the basal ganglia. No definite displaced calvarial  fracture is seen. Major paranasal sinuses and mastoid air cells appear  clear.       Impression:       1. This study is significantly degraded by patient motion, limiting the  diagnostic quality of the study. Recommend repeat imaging, if required,  when the patient is able to cooperate.  2. No gross acute intracranial findings.  3. Preliminary report was provided by Dr. Bateman on 01/02/2016 at  1609.     This report was finalized on 1/3/2017 9:46 AM by Dr. Ariadne Singer MD.       XR Chest 1 View [[id number]] Collected:  01/03/17 0957     Updated:  01/03/17 1002    Narrative:       EXAM: AP portable chest     DATE: 01/02/2017 at 1503     HISTORY: Unresponsive 2:00 PM today.     COMPARISON: AP portable chest 09/27/2016.     FINDINGS: Stable moderate cardiac enlargement particularly involving the  left heart border. Suspected small left pleural effusion with left  basilar atelectasis or infiltrate. Right lung appears free of acute  airspace disease.. No pneumothorax. Mild central vascular congestion is  thought to be present, but no erika pulmonary edema is identified.       Impression:       1. Suspected small left pleural effusion with left basilar atelectasis  or infiltrate in the retrocardiac distribution.  2. Stable cardiomegaly.  3. Central vascular congestion. No erika pulmonary edema.        4. Preliminary report was provided by Dr. Diaz on 01/02/2016 1855.     This report was finalized on 1/3/2017 10:00 AM by Dr. Ariadne Singer MD.       XR Chest 1 View [[id number]] Collected:  01/03/17 1208     Updated:  01/03/17 1211    Narrative:       EXAM: AP portable chest     DATE: 01/03/2017 at 1200     HISTORY: PICC line placement  today     COMPARISON: AP portable chest 01/02/2017.     FINDINGS: Right arm approach PICC extends into the upper SVC. No visible  pneumothorax. Persistent left basilar consolidation. Suspected small  left pleural effusion stable cardiac enlargement.       Impression:       :  1. Right arm approach PICC tip extends to the upper SVC level. No  visible pneumothorax. The remainder of FINDINGS are not appreciated  change from 01/02/2017 at 1503.     This report was finalized on 1/3/2017 12:09 PM by Dr. Ariadne Singer MD.             ECG/EMG Results (most recent)     Procedure Component Value Units Date/Time    ECG 12 Lead [[id number]] Collected:  01/02/17 1432     Updated:  01/03/17 1004    Narrative:       RR Interval= 723 ms  PA Interval= ms  QRSD Interval= 76 ms  QT Interval= 400 ms  QTc Interval= 470 ms  Heart Rate= 83 ms  P Axis= deg  QRS Axis= 105 deg  T Wave Axis= 64 deg  I: 40 Axis= 85 deg  T: 40 Axis= 122 deg  ST Axis= 48 deg  ATRIAL FIBRILLATION, V-RATE 67-93  CONSIDER RIGHT VENTRICULAR HYPERTROPHY  NO SIGNIFICANT CHANGE FROM PREVIOUS ECG  Electronically Signed by:  Nika Mack ([facility]) 03-Jan-2017 10:03:33  Date and Time of Study: 2017-01-02 14:32:34          Medication Review:   I have reviewed the patient's current medication list    Current Facility-Administered Medications:   •  acetaminophen (TYLENOL) tablet 650 mg, 650 mg, Oral, Q6H PRN, Trevor Diallo MD, 650 mg at 01/04/17 2058  •  citalopram (CeleXA) tablet 20 mg, 20 mg, Oral, Once per day on Mon Wed Fri, Glendy Gonsalez MD, 20 mg at 01/04/17 1546  •  dextrose (D50W) solution 25 g, 25 g, Intravenous, PRN, Glendy Gonsalez MD  •  dextrose (GLUTOSE) oral gel 15 g, 15 g, Oral, PRN, Glendy Gonsalez MD  •  docusate sodium (COLACE) capsule 250 mg, 250 mg, Oral, Daily, Trevor Diallo MD, 250 mg at 01/04/17 0835  •  folic acid (FOLVITE) tablet 1 mg, 1 mg, Oral, Daily, Trevor Diallo MD, 1 mg at 01/04/17 0835  •   gabapentin (NEURONTIN) capsule 300 mg, 300 mg, Oral, BID, Trevor Diallo MD, 300 mg at 01/04/17 1749  •  insulin aspart (novoLOG) injection 0-7 Units, 0-7 Units, Subcutaneous, 4x Daily AC & at Bedtime, Glendy Gonsalez MD, 4 Units at 01/04/17 2058  •  ipratropium-albuterol (DUO-NEB) nebulizer solution 3 mL, 3 mL, Nebulization, 4x Daily - RT, Trevor Diallo MD, 3 mL at 01/04/17 2012  •  melatonin tablet 5 mg, 5 mg, Oral, Nightly, Glendy Gonsalez MD, 5 mg at 01/04/17 2058  •  metoprolol succinate XL (TOPROL-XL) 24 hr tablet 25 mg, 25 mg, Oral, Daily, Luis Fernando Stevens III, MD, 25 mg at 01/04/17 0835  •  multivitamin with minerals 1 tablet, 1 tablet, Oral, Daily, Trevor Diallo MD, 1 tablet at 01/04/17 0835  •  pantoprazole (PROTONIX) EC tablet 40 mg, 40 mg, Oral, Q PM, Glendy Gonsalez MD, 40 mg at 01/04/17 1853  •  Pharmacy Consult - Pharmacy to dose, , Does not apply, Continuous PRN, Glendy Gonsalez MD  •  Pharmacy to dose vancomycin, , Does not apply, Continuous PRN, Trevor Diallo MD  •  piperacillin-tazobactam (ZOSYN) IVPB 2.25 g in 100 mL NS, 2.25 g, Intravenous, Q8H, Glendy Gonsalez MD, Last Rate: 0 mL/hr at 01/03/17 2231, 2.25 g at 01/04/17 1404  •  potassium chloride (KLOR-CON) packet 40 mEq, 40 mEq, Oral, PRN, Luis Fernando Stevens III, MD, 40 mEq at 01/04/17 1029  •  potassium chloride (MICRO-K) CR capsule 40 mEq, 40 mEq, Oral, PRN, Luis Fernando Stevens III, MD, 40 mEq at 01/04/17 1750  •  predniSONE (DELTASONE) tablet 5 mg, 5 mg, Oral, BID With Meals, Glendy Gonsalez MD, 5 mg at 01/04/17 1749  •  sodium chloride 0.45 % infusion, 75 mL/hr, Intravenous, Continuous, Trevor Diallo MD, Last Rate: 75 mL/hr at 01/04/17 1033, 75 mL/hr at 01/04/17 1033  •  sodium chloride 0.9 % bolus 1,000 mL, 1,000 mL, Intravenous, Once, Claudio Sheriff MD, 1,000 mL at 01/02/17 1952  •  [CANCELED] Insert peripheral IV, , , Once **AND** sodium chloride 0.9 % flush  10 mL, 10 mL, Intravenous, PRN, Claudio Sheriff MD  •  sodium chloride 0.9 % flush 10 mL, 10 mL, Intracatheter, Q12H, Trevor Diallo MD, 10 mL at 01/04/17 2059  •  sodium chloride 0.9 % flush 10 mL, 10 mL, Intracatheter, PRN, Trevor Diallo MD  •  sodium chloride 0.9 % flush 10 mL, 10 mL, Intracatheter, Q12H, Trevor Diallo MD, 10 mL at 01/04/17 2058  •  sodium chloride 0.9 % flush 10 mL, 10 mL, Intracatheter, PRN, Trevor Diallo MD  •  warfarin (COUMADIN) tablet 4 mg, 4 mg, Oral, Once per day on Wed Fri, 4 mg at 01/04/17 1853 **AND** warfarin (COUMADIN) tablet 3 mg, 3 mg, Oral, Once per day on Wed Fri, Glendy Gonsalez MD, 3 mg at 01/04/17 1749  •  warfarin (COUMADIN) tablet 6 mg, 6 mg, Oral, Once per day on Sun Mon Tue Thu Sat, Trevor Diallo MD, 6 mg at 01/03/17 1711  •  warfarin (COUMADIN) tablet 6 mg, 6 mg, Oral, Once, Glendy Gonsalez MD, 6 mg at 01/02/17 1951      Assessment/Plan     1. Septic Shock: Secondary to UTI and probable PNA, patient s/p fluid resusitasion and was on levophed briefly secondary to decreased SVR.  Now hemodynamics stable, actually patient slightly hypertensive.  Patient awake and alert and mentation at baseline.  Blood culture with gram negative bacilli and urine with e-coli. PCT trending down.  Continue Abx's see below.    2. Complicated UTI: Patient started on Vanco and zosyn. + e-coli sensetive to zosyn. With + blood culture pending continue zosyn for now.       3. Probable PNA: Initially suspect atalectasis secondary to the patient lethargy but procalcitonin level was significantly elevated. Patient initiated on vanco and zosyn.   Patient not coughing up sputum for sample.  No h/o MRSA, will consider de-escalating Abx therapy tomorrow.    4. Abdominal pain and N/V: Unclear etiology. Occurred at NH prior to admission, possibly secondary to infection and #2 above.  Still with some mild epigastric discomfort but tolerating diet, check abdominal  x-ray.      5. 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 Improving with treatment of sepsis and IVFs, continue IVFs today.      6. Elevated LFTs: Improving last checked, Suspect secondary to shock liver from hypotension. Monitor for now.        7. DM-2: BG elevated and patient on diet. Resume levemir and continue to monitor accu checks.      8. Acute on Chronic hypoxic respiratory failure: Resolved, Suspect secondary to lethargy plus possible PNA. Continue IS. Patient now back on home 2L NC.      9. Nocturnal hypoxia and KIERA: O2 As above.        10. Chronic diastolic CHF with Cor Pulmonale and pulmonary HTN: Currently no signs of volume overload, monitor closely as patient has received a significant amount of IVFs. Home lasix on hold secondary to above.      11. Chronic A-fib on coumadin: Cardiology following and BB resumed today. INR theraputic, continue coumadin  Monitor INR. Indeterminate troponin noted and likely secondary to ARF and poor clearance. No s/s of acute cardiac process. EKG with A-fib.      12. Chronic lymphedema: no acute issues.      13. Vitamin D deficiency: resume supplementation at discharge.      14. H/O DVT: INR theraputic on coumadin, no acute issues here.    15. Hypokalemia: Mag WNL, protocol initiated.      Plan for disposition:  Back to [facility], transfer out of ICU today.    Glendy Gonsalez MD  01/04/17  9:01 PM

## 2017-01-04 NOTE — CONSULTS
"Adult Nutrition  Assessment/PES    Patient Name:  Alexandria Villanueva  YOB: 1935  MRN: [id number]  Admit Date:  1/2/2017    Assessment Date:  1/4/2017        Reason for Assessment       01/04/17 1649    Reason for Assessment    Reason For Assessment/Visit nurse/nurse practitioner consult    Endocrine DM    Renal CKD    Other diagnosis --   sepsis due to UTI              Nutrition/Diet History       01/04/17 1650    Nutrition/Diet History    Patient Reported Diet/Restrictions/Preferences other (see comments)   at nursing home is on a diet for blood glucose and low sodium to avoid fluid accumulation    Oral Other (comment)   poor dentition and wants to try ground/chopped meats            Anthropometrics       01/04/17 1652    Anthropometrics    Height 162.6 cm (64.02\")    RD Documented Weight on Admission 101 kg (222 lb 2 oz)    Anthropometrics (Special Considerations)    RD Calculated BMI (kg/m2) 38    Ideal Body Weight (IBW)    Ideal Body Weight (IBW), Female 55.44    Body Mass Index (BMI)    BMI Grade 35 - 39.9 - obesity - grade II            Labs/Tests/Procedures/Meds       01/04/17 1652    Labs/Tests/Procedures/Meds    Labs/Tests Review Reviewed   potassium 2.8-supplement given    Medication Review Reviewed, pertinent;Multivitamin            Physical Findings       01/04/17 1653    Physical Appearance    Skin other (see comments)   coccyx suspected to have deep tissue injury              Nutrition Prescription Ordered       01/04/17 1655    Nutrition Prescription PO    Common Modifiers Cardiac;Consistent Carbohydrate            Evaluation of Received Nutrient/Fluid Intake       01/04/17 1655    Fluid Intake Evaluation    % Fluid Needs --   insufficient data as has been NPO part of the time    PO Evaluation    Number of Meals 2    % PO Intake 75%              Problem/Interventions:          Problem 2       01/04/17 1656    Nutrition Diagnoses Problem 2    Problem 2 Increased Nutrient Needs    Etiology " (related to) Other (comment)   skin breakdown            Problem 3       01/04/17 1701    Nutrition Diagnoses Problem 3    Problem 3 Biting/Chewing Difficulty    Etiology (related to) Factors Affecting Nutrition    Signs/Symptoms (evidenced by) Report/Observation    Reported/Observed By Patient                Intervention Goal       01/04/17 1659    Intervention Goal    PO PO intake (%)    PO Intake % 75 %   or greater of meals and any supplements ordered and meet estimated fluid needs            Nutrition Intervention       01/04/17 1659    Nutrition Intervention    RD/Tech Action Interview for preference;Encourage intake;Follow Tx progress            Nutrition Prescription       01/04/17 1659    Nutrition Prescription PO    PO Prescription Begin/change supplement;Begin/change diet    Begin/Change Diet to Soft Texture    Texture Ground   add soft texture for meats (chopped or ground as needed), pt. does not want pureed    Supplement Leola Breakfast Essentials    Supplement Frequency Daily    Common Modifiers Cardiac;Consistent Carbohydrate            Education/Evaluation       01/04/17 1700    Education    Education Education not appropriate at this time    Monitor/Evaluation    Monitor I&O;PO intake;Supplement intake;Pertinent labs;Weight;Food/activity diary      Recommend:  1.  Add soft meats (chopped or ground as needed) pt. does not not want pureed.  2.  Also, send Leola Breakfast Essentials once daily to provide additional nutrition/protein for wound healing.     Electronically signed by:  Heydi Flores RD  01/04/17 5:03 PM

## 2017-01-05 LAB
ALBUMIN SERPL-MCNC: 2.4 G/DL (ref 3.5–5.2)
ALBUMIN/GLOB SERPL: 0.8 G/DL
ALP SERPL-CCNC: 129 U/L (ref 40–129)
ALT SERPL W P-5'-P-CCNC: 152 U/L (ref 5–33)
ANION GAP SERPL CALCULATED.3IONS-SCNC: 12.5 MMOL/L
AST SERPL-CCNC: 27 U/L (ref 5–32)
BACTERIA SPEC AEROBE CULT: ABNORMAL
BACTERIA SPEC AEROBE CULT: ABNORMAL
BASOPHILS # BLD AUTO: 0.02 10*3/MM3 (ref 0–0.2)
BASOPHILS NFR BLD AUTO: 0.2 % (ref 0–2)
BILIRUB SERPL-MCNC: 0.6 MG/DL (ref 0.2–1.2)
BUN BLD-MCNC: 32 MG/DL (ref 8–23)
BUN/CREAT SERPL: 19.9 (ref 7–25)
CALCIUM SPEC-SCNC: 9.1 MG/DL (ref 8.8–10.5)
CHLORIDE SERPL-SCNC: 107 MMOL/L (ref 98–107)
CO2 SERPL-SCNC: 23.5 MMOL/L (ref 22–29)
CREAT BLD-MCNC: 1.61 MG/DL (ref 0.57–1)
DEPRECATED RDW RBC AUTO: 44.2 FL (ref 37–54)
EOSINOPHIL # BLD AUTO: 0.01 10*3/MM3 (ref 0.1–0.3)
EOSINOPHIL NFR BLD AUTO: 0.1 % (ref 0–4)
ERYTHROCYTE [DISTWIDTH] IN BLOOD BY AUTOMATED COUNT: 13.6 % (ref 11.5–14.5)
GFR SERPL CREATININE-BSD FRML MDRD: 31 ML/MIN/1.73
GLOBULIN UR ELPH-MCNC: 3 GM/DL
GLUCOSE BLD-MCNC: 157 MG/DL (ref 65–99)
GLUCOSE BLDC GLUCOMTR-MCNC: 119 MG/DL (ref 70–130)
GLUCOSE BLDC GLUCOMTR-MCNC: 151 MG/DL (ref 70–130)
GLUCOSE BLDC GLUCOMTR-MCNC: 151 MG/DL (ref 70–130)
GLUCOSE BLDC GLUCOMTR-MCNC: 158 MG/DL (ref 70–130)
GLUCOSE BLDC GLUCOMTR-MCNC: 159 MG/DL (ref 70–130)
GRAM STN SPEC: ABNORMAL
HCT VFR BLD AUTO: 38.2 % (ref 37–47)
HGB BLD-MCNC: 12.1 G/DL (ref 12–16)
IMM GRANULOCYTES # BLD: 0.09 10*3/MM3 (ref 0–0.03)
IMM GRANULOCYTES NFR BLD: 0.8 % (ref 0–0.5)
INR PPP: 3.14 (ref 0.9–1.1)
LYMPHOCYTES # BLD AUTO: 0.76 10*3/MM3 (ref 0.6–4.8)
LYMPHOCYTES NFR BLD AUTO: 6.6 % (ref 20–45)
MCH RBC QN AUTO: 28.1 PG (ref 27–31)
MCHC RBC AUTO-ENTMCNC: 31.7 G/DL (ref 31–37)
MCV RBC AUTO: 88.8 FL (ref 81–99)
MONOCYTES # BLD AUTO: 0.47 10*3/MM3 (ref 0–1)
MONOCYTES NFR BLD AUTO: 4.1 % (ref 3–8)
NEUTROPHILS # BLD AUTO: 10.2 10*3/MM3 (ref 1.5–8.3)
NEUTROPHILS NFR BLD AUTO: 88.2 % (ref 45–70)
NRBC BLD MANUAL-RTO: 0 /100 WBC (ref 0–0)
PLATELET # BLD AUTO: 132 10*3/MM3 (ref 140–500)
PMV BLD AUTO: 10.6 FL (ref 7.4–10.4)
POTASSIUM BLD-SCNC: 3.9 MMOL/L (ref 3.5–5.2)
POTASSIUM BLD-SCNC: 4.2 MMOL/L (ref 3.5–5.2)
PROT SERPL-MCNC: 5.4 G/DL (ref 6–8.5)
PROTHROMBIN TIME: 33 SECONDS (ref 12.1–15)
RBC # BLD AUTO: 4.3 10*6/MM3 (ref 4.2–5.4)
SODIUM BLD-SCNC: 143 MMOL/L (ref 136–145)
WBC NRBC COR # BLD: 11.55 10*3/MM3 (ref 4.8–10.8)

## 2017-01-05 PROCEDURE — 80053 COMPREHEN METABOLIC PANEL: CPT | Performed by: HOSPITALIST

## 2017-01-05 PROCEDURE — 99231 SBSQ HOSP IP/OBS SF/LOW 25: CPT | Performed by: INTERNAL MEDICINE

## 2017-01-05 PROCEDURE — 25010000002 PIPERACILLIN SOD-TAZOBACTAM PER 1 G: Performed by: HOSPITALIST

## 2017-01-05 PROCEDURE — 82962 GLUCOSE BLOOD TEST: CPT

## 2017-01-05 PROCEDURE — 25010000002 VANCOMYCIN PER 500 MG: Performed by: INTERNAL MEDICINE

## 2017-01-05 PROCEDURE — 63710000001 INSULIN ASPART PER 5 UNITS: Performed by: HOSPITALIST

## 2017-01-05 PROCEDURE — 94640 AIRWAY INHALATION TREATMENT: CPT

## 2017-01-05 PROCEDURE — 25010000002 VANCOMYCIN 750 MG RECONSTITUTED SOLUTION 1 EACH VIAL: Performed by: INTERNAL MEDICINE

## 2017-01-05 PROCEDURE — 63710000001 INSULIN DETEMIR PER 5 UNITS: Performed by: HOSPITALIST

## 2017-01-05 PROCEDURE — 85025 COMPLETE CBC W/AUTO DIFF WBC: CPT | Performed by: HOSPITALIST

## 2017-01-05 PROCEDURE — 63710000001 PREDNISONE PER 5 MG: Performed by: HOSPITALIST

## 2017-01-05 PROCEDURE — 84132 ASSAY OF SERUM POTASSIUM: CPT | Performed by: INTERNAL MEDICINE

## 2017-01-05 PROCEDURE — 99232 SBSQ HOSP IP/OBS MODERATE 35: CPT | Performed by: HOSPITALIST

## 2017-01-05 PROCEDURE — 85610 PROTHROMBIN TIME: CPT | Performed by: HOSPITALIST

## 2017-01-05 RX ORDER — HYDROCODONE BITARTRATE AND ACETAMINOPHEN 5; 325 MG/1; MG/1
1 TABLET ORAL EVERY 6 HOURS PRN
Status: DISCONTINUED | OUTPATIENT
Start: 2017-01-05 | End: 2017-01-08 | Stop reason: HOSPADM

## 2017-01-05 RX ORDER — POTASSIUM CHLORIDE 20 MEQ/1
TABLET, EXTENDED RELEASE ORAL
Status: DISPENSED
Start: 2017-01-05 | End: 2017-01-05

## 2017-01-05 RX ORDER — WARFARIN SODIUM 5 MG/1
5 TABLET ORAL
Status: DISCONTINUED | OUTPATIENT
Start: 2017-01-05 | End: 2017-01-08 | Stop reason: HOSPADM

## 2017-01-05 RX ADMIN — IPRATROPIUM BROMIDE AND ALBUTEROL SULFATE 3 ML: .5; 3 SOLUTION RESPIRATORY (INHALATION) at 15:54

## 2017-01-05 RX ADMIN — SODIUM CHLORIDE, PRESERVATIVE FREE 10 ML: 5 INJECTION INTRAVENOUS at 22:53

## 2017-01-05 RX ADMIN — SODIUM CHLORIDE, PRESERVATIVE FREE 10 ML: 5 INJECTION INTRAVENOUS at 10:26

## 2017-01-05 RX ADMIN — ACETAMINOPHEN 650 MG: 325 TABLET, FILM COATED ORAL at 01:00

## 2017-01-05 RX ADMIN — GABAPENTIN 300 MG: 300 CAPSULE ORAL at 18:10

## 2017-01-05 RX ADMIN — MELATONIN 5 MG TABLET 5 MG: at 22:39

## 2017-01-05 RX ADMIN — GABAPENTIN 300 MG: 300 CAPSULE ORAL at 10:14

## 2017-01-05 RX ADMIN — INSULIN ASPART 2 UNITS: 100 INJECTION, SOLUTION INTRAVENOUS; SUBCUTANEOUS at 12:29

## 2017-01-05 RX ADMIN — MULTIPLE VITAMINS W/ MINERALS TAB 1 TABLET: TAB at 10:14

## 2017-01-05 RX ADMIN — PIPERACILLIN AND TAZOBACTAM 2.25 G: 2; .25 INJECTION, POWDER, LYOPHILIZED, FOR SOLUTION INTRAVENOUS; PARENTERAL at 05:16

## 2017-01-05 RX ADMIN — VANCOMYCIN HYDROCHLORIDE 1250 MG: 500 INJECTION, POWDER, LYOPHILIZED, FOR SOLUTION INTRAVENOUS at 10:33

## 2017-01-05 RX ADMIN — IPRATROPIUM BROMIDE AND ALBUTEROL SULFATE 3 ML: .5; 3 SOLUTION RESPIRATORY (INHALATION) at 08:22

## 2017-01-05 RX ADMIN — INSULIN ASPART 2 UNITS: 100 INJECTION, SOLUTION INTRAVENOUS; SUBCUTANEOUS at 22:52

## 2017-01-05 RX ADMIN — SODIUM CHLORIDE, PRESERVATIVE FREE 10 ML: 5 INJECTION INTRAVENOUS at 10:25

## 2017-01-05 RX ADMIN — FOLIC ACID 1 MG: 1 TABLET ORAL at 10:14

## 2017-01-05 RX ADMIN — WARFARIN SODIUM 5 MG: 5 TABLET ORAL at 18:10

## 2017-01-05 RX ADMIN — PANTOPRAZOLE SODIUM 40 MG: 40 TABLET, DELAYED RELEASE ORAL at 18:10

## 2017-01-05 RX ADMIN — METOPROLOL SUCCINATE 25 MG: 25 TABLET, EXTENDED RELEASE ORAL at 10:14

## 2017-01-05 RX ADMIN — INSULIN DETEMIR 20 UNITS: 100 INJECTION, SOLUTION SUBCUTANEOUS at 22:52

## 2017-01-05 RX ADMIN — PREDNISONE 5 MG: 5 TABLET ORAL at 18:10

## 2017-01-05 RX ADMIN — ACETAMINOPHEN 650 MG: 325 TABLET, FILM COATED ORAL at 07:02

## 2017-01-05 RX ADMIN — SODIUM CHLORIDE 75 ML/HR: 4.5 INJECTION, SOLUTION INTRAVENOUS at 04:17

## 2017-01-05 RX ADMIN — IPRATROPIUM BROMIDE AND ALBUTEROL SULFATE 3 ML: .5; 3 SOLUTION RESPIRATORY (INHALATION) at 12:11

## 2017-01-05 RX ADMIN — IPRATROPIUM BROMIDE AND ALBUTEROL SULFATE 3 ML: .5; 3 SOLUTION RESPIRATORY (INHALATION) at 20:54

## 2017-01-05 RX ADMIN — POTASSIUM CHLORIDE 40 MEQ: 750 CAPSULE, EXTENDED RELEASE ORAL at 04:14

## 2017-01-05 RX ADMIN — PREDNISONE 5 MG: 5 TABLET ORAL at 10:14

## 2017-01-05 RX ADMIN — HYDROCODONE BITARTRATE AND ACETAMINOPHEN 1 TABLET: 5; 325 TABLET ORAL at 18:10

## 2017-01-05 RX ADMIN — DOCUSATE SODIUM 250 MG: 250 CAPSULE, LIQUID FILLED ORAL at 10:13

## 2017-01-05 RX ADMIN — PIPERACILLIN AND TAZOBACTAM 2.25 G: 2; .25 INJECTION, POWDER, LYOPHILIZED, FOR SOLUTION INTRAVENOUS; PARENTERAL at 22:51

## 2017-01-05 RX ADMIN — INSULIN ASPART 2 UNITS: 100 INJECTION, SOLUTION INTRAVENOUS; SUBCUTANEOUS at 18:10

## 2017-01-05 RX ADMIN — PIPERACILLIN AND TAZOBACTAM 2.25 G: 2; .25 INJECTION, POWDER, LYOPHILIZED, FOR SOLUTION INTRAVENOUS; PARENTERAL at 15:16

## 2017-01-05 NOTE — PLAN OF CARE
Problem: Patient Care Overview (Adult)  Goal: Plan of Care Review  Outcome: Ongoing (interventions implemented as appropriate)    01/05/17 0504   Coping/Psychosocial Response Interventions   Plan Of Care Reviewed With patient   Patient Care Overview   Progress progress toward functional goals is gradual   Outcome Evaluation   Outcome Summary/Follow up Plan pt c/o unrelieved back pain; MD aware; pt recieving 650 tylenol q4hr; Kpad to her back; 1x dose of toradol given overnight with minimal relief to pt.        Goal: Adult Individualization and Mutuality  Outcome: Ongoing (interventions implemented as appropriate)  Goal: Discharge Needs Assessment  Outcome: Ongoing (interventions implemented as appropriate)    Problem: Sepsis (Adult)  Goal: Signs and Symptoms of Listed Potential Problems Will be Absent or Manageable (Sepsis)  Outcome: Ongoing (interventions implemented as appropriate)    01/05/17 0504   Sepsis   Problems Assessed (Sepsis) all   Problems Present (Sepsis) none         Problem: Skin Integrity Impairment, Risk/Actual (Adult)  Goal: Skin Integrity/Wound Healing  Outcome: Ongoing (interventions implemented as appropriate)    01/05/17 0504   Skin Integrity Impairment, Risk/Actual (Adult)   Skin Integrity/Wound Healing making progress toward outcome

## 2017-01-05 NOTE — PROGRESS NOTES
"Hospitalist Team      Patient Care Team:  Gerardo Williamson MD as PCP - General  Gerardo Williamson MD as PCP - Family Medicine        Chief Complaint:  F/U septic shock and UTI    Subjective    Interval History and ROS:     Patient's major complaint today is back pain in mid-lower back, continues to worsen. Pain not relieved by toradol given overnight, getting up to chair or heating pad. Afebrile.  Patient denies any SOA or N/V. Notes poor appetite.  Denies abdominal pain today.    Objective    Vital Signs  Temp:  [98 °F (36.7 °C)-98.1 °F (36.7 °C)] 98.1 °F (36.7 °C)  Heart Rate:  [75-96] 88  Resp:  [16-20] 20  BP: (116-138)/(71-83) 116/71   O2 Saturations: 93-96% on 2L    Flowsheet Rows         First Filed Value    Admission Height  64\" (162.6 cm) Documented at 01/02/2017 1422    Admission Weight  222 lb 2 oz (101 kg) Documented at 01/02/2017 1422          Physical Exam:  Constitutional: Patient appears well-developed and Obese and in NAD    HEENT:    Head: Normocephalic and atraumatic.    Eyes: Pupils are equal, round, and reactive to light. EOM are intact. Sclera are anicteric and non-injected.  Mouth and Throat: Patient has moist mucous membranes. Oropharynx is clear of any erythema or exudate.    Neck: Neck supple. No JVD present. No lymphadenopathy present.  Cardiovascular: Irregularly irregular rhythm, rate WNL. Exam reveals no gallop and no friction rub. No murmur heard.  Pulmonary/Chest: Lungs with diminished breath sounds bilateral bases L>R, No respiratory distress. No wheezes. No rhonchi. No rales.    Abdominal: Obese, Soft. Bowel sounds are normal. No mass. No tenderness.   Extremities: Chronic lymphedema with LEs unchanged from baseline. Pulses are palpable in all 4 extremities.  Neurological: Patient is alert and oriented to person, place, and time.    Skin: No rash noted. Nails show no clubbing. No cyanosis or erythema.  Psychiatric: Normal mood and affect  Musculoskeletal: TTP over lower thoracic and " upper lumbar spine, no significant paraspinus muscle tenderness.    Results Review:     I reviewed the patient's new clinical results.    Lab Results (last 24 hours)     Procedure Component Value Units Date/Time    Magnesium [[id number]]  (Normal) Collected:  01/04/17 0914    Specimen:  Blood Updated:  01/04/17 1411     Magnesium 2.0 mg/dL     Blood Culture [[id number]]  (Normal) Collected:  01/02/17 1441    Specimen:  Blood from Blood, Venous Line Updated:  01/04/17 1601     Blood Culture No growth at 2 days     POC Glucose Fingerstick [[id number]]  (Abnormal) Collected:  01/04/17 1708    Specimen:  Blood Updated:  01/04/17 1715     Glucose 225 (H) mg/dL     Narrative:       Meter: [id number] : [id number] David Edgar    Vancomycin, Random [[id number]] Collected:  01/04/17 1915    Specimen:  Blood Updated:  01/04/17 1935     Vancomycin Random 16.90 mcg/mL     POC Glucose Fingerstick [[id number]]  (Abnormal) Collected:  01/04/17 2043    Specimen:  Blood Updated:  01/04/17 2049     Glucose 278 (H) mg/dL     Narrative:       Meter: [id number] : [id number] Sukhjinder French    Potassium [[id number]]  (Abnormal) Collected:  01/04/17 2237    Specimen:  Blood Updated:  01/04/17 2311     Potassium 3.4 (L) mmol/L     CBC & Differential [[id number]] Collected:  01/05/17 0359    Specimen:  Blood Updated:  01/05/17 0416    Narrative:       The following orders were created for panel order CBC & Differential.  Procedure                               Abnormality         Status                     ---------                               -----------         ------                     CBC Auto Differential[[id number]]         Abnormal            Final result                 Please view results for these tests on the individual orders.    CBC Auto Differential [[id number]]  (Abnormal) Collected:  01/05/17 0359    Specimen:  Blood Updated:  01/05/17 0416     WBC 11.55 (H) 10*3/mm3      RBC 4.30 10*6/mm3      Hemoglobin 12.1 g/dL       Hematocrit 38.2 %      MCV 88.8 fL      MCH 28.1 pg      MCHC 31.7 g/dL      RDW 13.6 %      RDW-SD 44.2 fl      MPV 10.6 (H) fL      Platelets 132 (L) 10*3/mm3      Neutrophil % 88.2 (H) %      Lymphocyte % 6.6 (L) %      Monocyte % 4.1 %      Eosinophil % 0.1 %      Basophil % 0.2 %      Immature Grans % 0.8 (H) %      Neutrophils, Absolute 10.20 (H) 10*3/mm3      Lymphocytes, Absolute 0.76 10*3/mm3      Monocytes, Absolute 0.47 10*3/mm3      Eosinophils, Absolute 0.01 (L) 10*3/mm3      Basophils, Absolute 0.02 10*3/mm3      Immature Grans, Absolute 0.09 (H) 10*3/mm3      nRBC 0.0 /100 WBC     Protime-INR [[id number]]  (Abnormal) Collected:  01/05/17 0359    Specimen:  Blood Updated:  01/05/17 0433     Protime 33.0 (H) Seconds      INR 3.14 (H)     Narrative:       Therapeutic Ranges for INR: 2.0-3.0 (PT 20-30)                              2.5-3.5 (PT 25-34)    Comprehensive Metabolic Panel [[id number]]  (Abnormal) Collected:  01/05/17 0403    Specimen:  Blood Updated:  01/05/17 0438     Glucose 157 (H) mg/dL      BUN 32 (H) mg/dL      Creatinine 1.61 (H) mg/dL      Sodium 143 mmol/L      Potassium 4.2 mmol/L      Chloride 107 mmol/L      CO2 23.5 mmol/L      Calcium 9.1 mg/dL      Total Protein 5.4 (L) g/dL      Albumin 2.40 (L) g/dL      ALT (SGPT) 152 (H) U/L      AST (SGOT) 27 U/L      Alkaline Phosphatase 129 U/L      Total Bilirubin 0.6 mg/dL      eGFR Non African Amer 31 (L) mL/min/1.73      Globulin 3.0 gm/dL      A/G Ratio 0.8 g/dL      BUN/Creatinine Ratio 19.9      Anion Gap 12.5 mmol/L     Narrative:       The MDRD GFR formula is only valid for adults with stable renal function between ages 18 and 70.    Blood Culture [[id number]]  (Abnormal)  (Susceptibility) Collected:  01/02/17 1516    Specimen:  Blood from Blood, Venous Line Updated:  01/05/17 0615     Blood Culture Abnormal Stain (A)       Escherichia coli (A)      Gram Stain Result Aerobic Bottle Gram negative bacilli     Susceptibility       Escherichia coli     ERNESTO     Ampicillin >=32 ug/ml Resistant     Ampicillin + Sulbactam >=32 ug/ml Resistant     Cefazolin >=64 ug/ml Resistant     Cefepime <=1 ug/ml Susceptible     Cefoxitin 32 ug/ml Resistant     Ceftriaxone <=1 ug/ml Susceptible     Ertapenem <=0.5 ug/ml Susceptible     Gentamicin <=1 ug/ml Susceptible     Levofloxacin <=0.12 ug/ml Susceptible     Meropenem <=0.25 ug/ml Susceptible     Piperacillin + Tazobactam 8 ug/ml Susceptible     Trimethoprim + Sulfamethoxazole <=20 ug/ml Susceptible                    POC Glucose Fingerstick [[id number]]  (Normal) Collected:  01/05/17 0741    Specimen:  Blood Updated:  01/05/17 0758     Glucose 119 mg/dL     Narrative:       Meter: [id number] : [id number] Valentin Chang    Potassium [[id number]]  (Normal) Collected:  01/05/17 0911    Specimen:  Blood Updated:  01/05/17 0933     Potassium 3.9 mmol/L     POC Glucose Fingerstick [[id number]]  (Abnormal) Collected:  01/05/17 1151    Specimen:  Blood Updated:  01/05/17 1207     Glucose 151 (H) mg/dL     Narrative:       Meter: [id number] : [id number] Valentin Chang          Imaging Results (last 24 hours)     Procedure Component Value Units Date/Time    XR Abdomen Flat & Upright [[id number]] Collected:  01/04/17 1442     Updated:  01/04/17 1448    Narrative:       FLAT AND UPRIGHT VIEWS OF THE ABDOMEN     INDICATION: Flank pain and urinary tract infection for the past 4 days     PROCEDURE: Supine and upright views of the abdomen     COMPARISON: Portable chest from 01/03/2017     FINDINGS: Stable cardiomegaly and left basilar opacity/pleural fluid. No  free air. Nonobstructed bowel gas pattern. No definite radiodense  urinary system calculus but study is significantly degraded by body  habitus       Impression:       No clearly acute finding.     Cardiomegaly with persistent left basilar opacity/pleural fluid     This report was finalized on 1/4/2017 2:46 PM by Dr. Kei Diaz MD.             ECG/EMG Results  (most recent)     Procedure Component Value Units Date/Time    ECG 12 Lead [[id number]] Collected:  01/02/17 1432     Updated:  01/03/17 1004    Narrative:       RR Interval= 723 ms  ID Interval= ms  QRSD Interval= 76 ms  QT Interval= 400 ms  QTc Interval= 470 ms  Heart Rate= 83 ms  P Axis= deg  QRS Axis= 105 deg  T Wave Axis= 64 deg  I: 40 Axis= 85 deg  T: 40 Axis= 122 deg  ST Axis= 48 deg  ATRIAL FIBRILLATION, V-RATE 67-93  CONSIDER RIGHT VENTRICULAR HYPERTROPHY  NO SIGNIFICANT CHANGE FROM PREVIOUS ECG  Electronically Signed by:  Nika Mack ([facility]) 03-Jan-2017 10:03:33  Date and Time of Study: 2017-01-02 14:32:34          Medication Review:   I have reviewed the patient's current medication list    Current Facility-Administered Medications:   •  acetaminophen (TYLENOL) tablet 650 mg, 650 mg, Oral, Q6H PRN, Trevor Diallo MD, 650 mg at 01/05/17 0702  •  citalopram (CeleXA) tablet 20 mg, 20 mg, Oral, Once per day on Mon Wed Fri, Glendy Gonsalez MD, 20 mg at 01/04/17 1546  •  dextrose (D50W) solution 25 g, 25 g, Intravenous, PRN, Glendy Gonsalez MD  •  dextrose (GLUTOSE) oral gel 15 g, 15 g, Oral, PRN, Glendy Gonsalez MD  •  docusate sodium (COLACE) capsule 250 mg, 250 mg, Oral, Daily, Trevor Diallo MD, 250 mg at 01/05/17 1013  •  folic acid (FOLVITE) tablet 1 mg, 1 mg, Oral, Daily, Trevor Diallo MD, 1 mg at 01/05/17 1014  •  gabapentin (NEURONTIN) capsule 300 mg, 300 mg, Oral, BID, Trevor Diallo MD, 300 mg at 01/05/17 1014  •  insulin aspart (novoLOG) injection 0-7 Units, 0-7 Units, Subcutaneous, 4x Daily AC & at Bedtime, Glendy Gonsalez MD, 2 Units at 01/05/17 1229  •  insulin detemir (LEVEMIR) injection 20 Units, 20 Units, Subcutaneous, Nightly, Glendy Gonsalez MD, 20 Units at 01/04/17 2220  •  ipratropium-albuterol (DUO-NEB) nebulizer solution 3 mL, 3 mL, Nebulization, 4x Daily - RT, Trevor Diallo MD, 3 mL at 01/05/17 1211  •  melatonin  tablet 5 mg, 5 mg, Oral, Nightly, Glendy Gonsalez MD, 5 mg at 01/04/17 2058  •  metoprolol succinate XL (TOPROL-XL) 24 hr tablet 25 mg, 25 mg, Oral, Daily, Luis Fernando Stevens III, MD, 25 mg at 01/05/17 1014  •  multivitamin with minerals 1 tablet, 1 tablet, Oral, Daily, Trevor Diallo MD, 1 tablet at 01/05/17 1014  •  pantoprazole (PROTONIX) EC tablet 40 mg, 40 mg, Oral, Q PM, Glendy Gonsalez MD, 40 mg at 01/04/17 1853  •  Pharmacy Consult - Pharmacy to dose, , Does not apply, Continuous PRN, Glendy Gonsalez MD  •  Pharmacy to dose vancomycin, , Does not apply, Continuous PRN, Trevor Diallo MD  •  piperacillin-tazobactam (ZOSYN) IVPB 2.25 g in 100 mL NS, 2.25 g, Intravenous, Q8H, Glendy Gonsalez MD, Stopped at 01/05/17 0604  •  potassium chloride (K-DUR,KLOR-CON) 20 MEQ CR tablet  - ADS Override Pull, , , ,   •  potassium chloride (KLOR-CON) packet 40 mEq, 40 mEq, Oral, PRN, Luis Fernando Stevens III, MD, 40 mEq at 01/04/17 1029  •  potassium chloride (MICRO-K) CR capsule 40 mEq, 40 mEq, Oral, PRN, Luis Fernando Stevens III, MD, 40 mEq at 01/05/17 0414  •  predniSONE (DELTASONE) tablet 5 mg, 5 mg, Oral, BID With Meals, Glendy Gonsalez MD, 5 mg at 01/05/17 1014  •  sodium chloride 0.45 % infusion, 75 mL/hr, Intravenous, Continuous, Trevor Diallo MD, Last Rate: 75 mL/hr at 01/05/17 0417, 75 mL/hr at 01/05/17 0417  •  sodium chloride 0.9 % bolus 1,000 mL, 1,000 mL, Intravenous, Once, Claudio Sheriff MD, 1,000 mL at 01/02/17 1952  •  [CANCELED] Insert peripheral IV, , , Once **AND** sodium chloride 0.9 % flush 10 mL, 10 mL, Intravenous, PRN, Claudio Sheriff MD  •  sodium chloride 0.9 % flush 10 mL, 10 mL, Intracatheter, Q12H, Trevor Diallo MD, 10 mL at 01/05/17 1026  •  sodium chloride 0.9 % flush 10 mL, 10 mL, Intracatheter, PRN, Trevor Diallo MD  •  sodium chloride 0.9 % flush 10 mL, 10 mL, Intracatheter, Q12H, Trevor Diallo MD, 10 mL at 01/05/17 1025  •   sodium chloride 0.9 % flush 10 mL, 10 mL, Intracatheter, PRN, Trevor Diallo MD  •  warfarin (COUMADIN) tablet 5 mg, 5 mg, Oral, Daily, Glendy Gonsalez MD      Assessment/Plan     1. Septic Shock: Now resolved, Secondary to UTI and probable PNA, patient s/p fluid resusitasion and was on levophed briefly secondary to decreased SVR. Now hemodynamics stable. Patient awake and alert and mentation at baseline. Blood culture e-coli and urine with e-coli. PCT trending down. Continue Abx's see below.     2. Complicated UTI: Patient started on Vanco and zosyn. + e-coli sensetive to zosyn. E-coli also sensative to levaquin, WBC nearing NL and patient afebrile, will change in AM to po levoquin in anticipation of discharge back to NH soon.      3. Probable PNA: Initially suspect atalectasis secondary to the patient lethargy but procalcitonin level was significantly elevated. Patient initiated on vanco and zosyn.   Patient not coughing up sputum for sample. No h/o MRSA, D/C vancomycin, likely change to po levaquin to complete course tomorrow as above.       4. Abdominal pain and N/V: Unclear etiology. Occurred at NH prior to admission, possibly secondary to sepsis and #2 above. Abdominal x-ray negative.  Appears resolved but patient with poor appetite, will have nutrition to see.      5. 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 Improving with treatment of sepsis and IVFs, continue IVFs for now. Baseline Cr appear 0.8-1.1.      6. Elevated LFTs: Improving, Suspect secondary to shock liver from hypotension. Monitor for now.        7. DM-2: BG much better now that home levemir resumed. Continue to monitor accu checks.      8. Acute on Chronic hypoxic respiratory failure: Resolved, Suspect secondary to lethargy plus possible PNA. Continue IS. Patient now back on home 2L NC.      9. Nocturnal hypoxia and KIERA: O2 As above.        10. Chronic diastolic CHF with Cor Pulmonale and pulmonary HTN:  Currently no signs of volume overload, monitor closely as patient has received a significant amount of IVFs. Home lasix on hold secondary to above.      11. Chronic A-fib on coumadin: Cardiology following and BB resumed. INR supratheraputic, reduce coumadin dose and monitor INR. Indeterminate troponin noted and likely secondary to ARF and poor clearance. No s/s of acute cardiac process. EKG with A-fib.      12. Chronic lymphedema: no acute issues.      13. Vitamin D deficiency: resume supplementation at discharge.      14. H/O DVT: INR supra-theraputic on coumadin, no acute issues here. See above.     15. Hypokalemia: Mag WNL, resolved with replacement, on protocol.    16. Acute on Chronic Back pain: ttp over spine, discussed with radiology and will get MRI thoracic and lumbar spine to r/o infection or fracture. Start low dose norco for now.    Plan for disposition: Back to [facility] soon.    Glendy Gonsalez MD  01/05/17  1:07 PM

## 2017-01-05 NOTE — PROGRESS NOTES
"    Patient Name: Alexandria Villanueva  :1935  81 y.o.      Patient Care Team:  Gerardo iWlliamson MD as PCP - General  Gerardo Williamson MD as PCP - Family Medicine    Chief Complaint: UTI, sepsis, chronic AFIB    Interval History: C/O back pain.  She denies chest pain or shortness of breath.  Did not sleep well.       Objective   Vital Signs  Temp:  [97.8 °F (36.6 °C)-98.1 °F (36.7 °C)] 98.1 °F (36.7 °C)  Heart Rate:  [73-96] 96  Resp:  [16-20] 20  BP: (109-138)/(71-83) 116/71    Intake/Output Summary (Last 24 hours) at 17 1001  Last data filed at 17 0604   Gross per 24 hour   Intake 1797.5 ml   Output    400 ml   Net 1397.5 ml     Flowsheet Rows         First Filed Value    Admission Height  64\" (162.6 cm) Documented at 2017 1422    Admission Weight  222 lb 2 oz (101 kg) Documented at 2017 1422          Physical Exam:   General Appearance:    Alert, cooperative, c/o back pain, uncomfortable   Lungs:     Clear to auscultation.  Normal respiratory effort and rate.      Heart:    irregular rhythm and normal rate, normal S1 and S2, no murmurs, gallops or rubs.     Chest Wall:    No abnormalities observed   Abdomen:     Soft, nontender, positive bowel sounds.     Extremities:   no cyanosis, clubbing or edema.  No marked joint deformities.  Adequate musculoskeletal strength.       Results Review:      Results from last 7 days  Lab Units 17  0911 17  0403   SODIUM mmol/L  --  143   POTASSIUM mmol/L 3.9 4.2   CHLORIDE mmol/L  --  107   TOTAL CO2 mmol/L  --  23.5   BUN mg/dL  --  32*   CREATININE mg/dL  --  1.61*   GLUCOSE mg/dL  --  157*   CALCIUM mg/dL  --  9.1       Results from last 7 days  Lab Units 17  1441   TROPONIN T ng/mL 0.036*       Results from last 7 days  Lab Units 17  0359   WBC 10*3/mm3 11.55*   HEMOGLOBIN g/dL 12.1   HEMATOCRIT % 38.2   PLATELETS 10*3/mm3 132*       Results from last 7 days  Lab Units 17  0359 17  0430 17  0934 " 01/02/17  1441   INR  3.14* 2.19* 2.39* 2.26*   APTT seconds  --   --   --  31.8       Results from last 7 days  Lab Units 01/04/17  0914   MAGNESIUM mg/dL 2.0                   Medication Review:     citalopram 20 mg Oral Once per day on Mon Wed Fri   docusate sodium 250 mg Oral Daily   folic acid 1 mg Oral Daily   gabapentin 300 mg Oral BID   insulin aspart 0-7 Units Subcutaneous 4x Daily AC & at Bedtime   insulin detemir 20 Units Subcutaneous Nightly   ipratropium-albuterol 3 mL Nebulization 4x Daily - RT   melatonin 5 mg Oral Nightly   metoprolol succinate XL 25 mg Oral Daily   multivitamin with minerals 1 tablet Oral Daily   pantoprazole 40 mg Oral Q PM   piperacillin-tazobactam 2.25 g Intravenous Q8H   potassium chloride      potassium chloride      predniSONE 5 mg Oral BID With Meals   sodium chloride 1,000 mL Intravenous Once   sodium chloride 10 mL Intracatheter Q12H   sodium chloride 10 mL Intracatheter Q12H   vancomycin 1,250 mg Intravenous Once   warfarin 4 mg Oral Once per day on Wed Fri   And      warfarin 3 mg Oral Once per day on Wed Fri   warfarin 6 mg Oral Once per day on Sun Mon Tue Thu Sat   warfarin 6 mg Oral Once          Pharmacy Consult - Pharmacy to dose     Pharmacy to dose vancomycin     sodium chloride 75 mL/hr Last Rate: 75 mL/hr (01/05/17 0417)       Assessment/Plan   Principal Problem:    Sepsis due to urinary tract infection  Active Problems:    Chronic diastolic (congestive) heart failure    Permanent atrial fibrillation    Acute renal failure    Stable cardiac status back on oral regimen.  We'll follow on an as-needed basis-please call if we can help in any way.    Luis Fernando Stevens III, MD  [facility]  01/05/17  10:01 AM

## 2017-01-05 NOTE — PLAN OF CARE
Problem: Patient Care Overview (Adult)  Goal: Discharge Needs Assessment  Outcome: Ongoing (interventions implemented as appropriate)    01/02/17 2000 01/05/17 0911   Discharge Needs Assessment   Discharge Planning Comments --  Received approval from Dr. Gonsalez to have [facility] start precert for possible return to facility Fri/Sat.    Living Environment   Transportation Available van, wheelchair accessible --    Self-Care   Equipment Currently Used at Home wheelchair;lift device --

## 2017-01-06 ENCOUNTER — APPOINTMENT (OUTPATIENT)
Dept: MRI IMAGING | Facility: HOSPITAL | Age: 82
End: 2017-01-06

## 2017-01-06 LAB
ALBUMIN SERPL-MCNC: 2.8 G/DL (ref 3.5–5.2)
ALBUMIN/GLOB SERPL: 1.1 G/DL
ALP SERPL-CCNC: 115 U/L (ref 40–129)
ALT SERPL W P-5'-P-CCNC: 95 U/L (ref 5–33)
ANION GAP SERPL CALCULATED.3IONS-SCNC: 10.4 MMOL/L
AST SERPL-CCNC: 17 U/L (ref 5–32)
BASOPHILS # BLD AUTO: 0.01 10*3/MM3 (ref 0–0.2)
BASOPHILS NFR BLD AUTO: 0.1 % (ref 0–2)
BILIRUB SERPL-MCNC: 0.5 MG/DL (ref 0.2–1.2)
BUN BLD-MCNC: 26 MG/DL (ref 8–23)
BUN/CREAT SERPL: 19.4 (ref 7–25)
CALCIUM SPEC-SCNC: 9.6 MG/DL (ref 8.8–10.5)
CHLORIDE SERPL-SCNC: 109 MMOL/L (ref 98–107)
CO2 SERPL-SCNC: 25.6 MMOL/L (ref 22–29)
CREAT BLD-MCNC: 1.34 MG/DL (ref 0.57–1)
DEPRECATED RDW RBC AUTO: 44.6 FL (ref 37–54)
EOSINOPHIL # BLD AUTO: 0.02 10*3/MM3 (ref 0.1–0.3)
EOSINOPHIL NFR BLD AUTO: 0.2 % (ref 0–4)
ERYTHROCYTE [DISTWIDTH] IN BLOOD BY AUTOMATED COUNT: 13.7 % (ref 11.5–14.5)
GFR SERPL CREATININE-BSD FRML MDRD: 38 ML/MIN/1.73
GLOBULIN UR ELPH-MCNC: 2.5 GM/DL
GLUCOSE BLD-MCNC: 95 MG/DL (ref 65–99)
GLUCOSE BLDC GLUCOMTR-MCNC: 106 MG/DL (ref 70–130)
GLUCOSE BLDC GLUCOMTR-MCNC: 136 MG/DL (ref 70–130)
GLUCOSE BLDC GLUCOMTR-MCNC: 199 MG/DL (ref 70–130)
GLUCOSE BLDC GLUCOMTR-MCNC: 73 MG/DL (ref 70–130)
HCT VFR BLD AUTO: 37.7 % (ref 37–47)
HGB BLD-MCNC: 12 G/DL (ref 12–16)
IMM GRANULOCYTES # BLD: 0.05 10*3/MM3 (ref 0–0.03)
IMM GRANULOCYTES NFR BLD: 0.5 % (ref 0–0.5)
INR PPP: 3.16 (ref 0.9–1.1)
LYMPHOCYTES # BLD AUTO: 0.84 10*3/MM3 (ref 0.6–4.8)
LYMPHOCYTES NFR BLD AUTO: 8.1 % (ref 20–45)
MCH RBC QN AUTO: 28.1 PG (ref 27–31)
MCHC RBC AUTO-ENTMCNC: 31.8 G/DL (ref 31–37)
MCV RBC AUTO: 88.3 FL (ref 81–99)
MONOCYTES # BLD AUTO: 0.49 10*3/MM3 (ref 0–1)
MONOCYTES NFR BLD AUTO: 4.8 % (ref 3–8)
NEUTROPHILS # BLD AUTO: 8.9 10*3/MM3 (ref 1.5–8.3)
NEUTROPHILS NFR BLD AUTO: 86.3 % (ref 45–70)
NRBC BLD MANUAL-RTO: 0 /100 WBC (ref 0–0)
PLATELET # BLD AUTO: 104 10*3/MM3 (ref 140–500)
PMV BLD AUTO: 10 FL (ref 7.4–10.4)
POTASSIUM BLD-SCNC: 4.5 MMOL/L (ref 3.5–5.2)
PROT SERPL-MCNC: 5.3 G/DL (ref 6–8.5)
PROTHROMBIN TIME: 33.1 SECONDS (ref 12.1–15)
RBC # BLD AUTO: 4.27 10*6/MM3 (ref 4.2–5.4)
SODIUM BLD-SCNC: 145 MMOL/L (ref 136–145)
VANCOMYCIN SERPL-MCNC: 15.4 MCG/ML
WBC NRBC COR # BLD: 10.31 10*3/MM3 (ref 4.8–10.8)

## 2017-01-06 PROCEDURE — 80053 COMPREHEN METABOLIC PANEL: CPT | Performed by: HOSPITALIST

## 2017-01-06 PROCEDURE — 94640 AIRWAY INHALATION TREATMENT: CPT

## 2017-01-06 PROCEDURE — 80202 ASSAY OF VANCOMYCIN: CPT | Performed by: INTERNAL MEDICINE

## 2017-01-06 PROCEDURE — 85610 PROTHROMBIN TIME: CPT | Performed by: HOSPITALIST

## 2017-01-06 PROCEDURE — 94799 UNLISTED PULMONARY SVC/PX: CPT

## 2017-01-06 PROCEDURE — 63710000001 PREDNISONE PER 5 MG: Performed by: HOSPITALIST

## 2017-01-06 PROCEDURE — 72157 MRI CHEST SPINE W/O & W/DYE: CPT

## 2017-01-06 PROCEDURE — 63710000001 INSULIN DETEMIR PER 5 UNITS: Performed by: HOSPITALIST

## 2017-01-06 PROCEDURE — A9577 INJ MULTIHANCE: HCPCS | Performed by: INTERNAL MEDICINE

## 2017-01-06 PROCEDURE — 82962 GLUCOSE BLOOD TEST: CPT

## 2017-01-06 PROCEDURE — 85025 COMPLETE CBC W/AUTO DIFF WBC: CPT | Performed by: HOSPITALIST

## 2017-01-06 PROCEDURE — 99231 SBSQ HOSP IP/OBS SF/LOW 25: CPT | Performed by: HOSPITALIST

## 2017-01-06 PROCEDURE — 0 GADOBENATE DIMEGLUMINE 529 MG/ML SOLUTION: Performed by: INTERNAL MEDICINE

## 2017-01-06 PROCEDURE — 25010000002 PIPERACILLIN SOD-TAZOBACTAM PER 1 G: Performed by: HOSPITALIST

## 2017-01-06 PROCEDURE — 63710000001 INSULIN ASPART PER 5 UNITS: Performed by: HOSPITALIST

## 2017-01-06 PROCEDURE — 72158 MRI LUMBAR SPINE W/O & W/DYE: CPT

## 2017-01-06 RX ADMIN — PIPERACILLIN AND TAZOBACTAM 2.25 G: 2; .25 INJECTION, POWDER, LYOPHILIZED, FOR SOLUTION INTRAVENOUS; PARENTERAL at 14:17

## 2017-01-06 RX ADMIN — INSULIN ASPART 2 UNITS: 100 INJECTION, SOLUTION INTRAVENOUS; SUBCUTANEOUS at 21:50

## 2017-01-06 RX ADMIN — PIPERACILLIN AND TAZOBACTAM 2.25 G: 2; .25 INJECTION, POWDER, LYOPHILIZED, FOR SOLUTION INTRAVENOUS; PARENTERAL at 21:54

## 2017-01-06 RX ADMIN — HYDROCODONE BITARTRATE AND ACETAMINOPHEN 1 TABLET: 5; 325 TABLET ORAL at 16:35

## 2017-01-06 RX ADMIN — INSULIN DETEMIR 20 UNITS: 100 INJECTION, SOLUTION SUBCUTANEOUS at 21:50

## 2017-01-06 RX ADMIN — SODIUM CHLORIDE, PRESERVATIVE FREE 10 ML: 5 INJECTION INTRAVENOUS at 09:21

## 2017-01-06 RX ADMIN — MULTIPLE VITAMINS W/ MINERALS TAB 1 TABLET: TAB at 09:18

## 2017-01-06 RX ADMIN — DOCUSATE SODIUM 250 MG: 250 CAPSULE, LIQUID FILLED ORAL at 09:18

## 2017-01-06 RX ADMIN — SODIUM CHLORIDE, PRESERVATIVE FREE 10 ML: 5 INJECTION INTRAVENOUS at 21:52

## 2017-01-06 RX ADMIN — GADOBENATE DIMEGLUMINE 20 ML: 529 INJECTION, SOLUTION INTRAVENOUS at 12:15

## 2017-01-06 RX ADMIN — GABAPENTIN 300 MG: 300 CAPSULE ORAL at 17:40

## 2017-01-06 RX ADMIN — PREDNISONE 5 MG: 5 TABLET ORAL at 09:18

## 2017-01-06 RX ADMIN — PIPERACILLIN AND TAZOBACTAM 2.25 G: 2; .25 INJECTION, POWDER, LYOPHILIZED, FOR SOLUTION INTRAVENOUS; PARENTERAL at 05:51

## 2017-01-06 RX ADMIN — METOPROLOL SUCCINATE 25 MG: 25 TABLET, EXTENDED RELEASE ORAL at 09:18

## 2017-01-06 RX ADMIN — ACETAMINOPHEN 650 MG: 325 TABLET, FILM COATED ORAL at 03:15

## 2017-01-06 RX ADMIN — SODIUM CHLORIDE, PRESERVATIVE FREE 10 ML: 5 INJECTION INTRAVENOUS at 09:24

## 2017-01-06 RX ADMIN — IPRATROPIUM BROMIDE AND ALBUTEROL SULFATE 3 ML: .5; 3 SOLUTION RESPIRATORY (INHALATION) at 07:52

## 2017-01-06 RX ADMIN — MELATONIN 5 MG TABLET 5 MG: at 21:53

## 2017-01-06 RX ADMIN — IPRATROPIUM BROMIDE AND ALBUTEROL SULFATE 3 ML: .5; 3 SOLUTION RESPIRATORY (INHALATION) at 16:33

## 2017-01-06 RX ADMIN — WARFARIN SODIUM 5 MG: 5 TABLET ORAL at 17:40

## 2017-01-06 RX ADMIN — IPRATROPIUM BROMIDE AND ALBUTEROL SULFATE 3 ML: .5; 3 SOLUTION RESPIRATORY (INHALATION) at 20:12

## 2017-01-06 RX ADMIN — CITALOPRAM HYDROBROMIDE 20 MG: 20 TABLET ORAL at 09:18

## 2017-01-06 RX ADMIN — FOLIC ACID 1 MG: 1 TABLET ORAL at 09:19

## 2017-01-06 RX ADMIN — PREDNISONE 5 MG: 5 TABLET ORAL at 17:41

## 2017-01-06 RX ADMIN — PANTOPRAZOLE SODIUM 40 MG: 40 TABLET, DELAYED RELEASE ORAL at 16:35

## 2017-01-06 RX ADMIN — SODIUM CHLORIDE 75 ML/HR: 4.5 INJECTION, SOLUTION INTRAVENOUS at 03:11

## 2017-01-06 RX ADMIN — GABAPENTIN 300 MG: 300 CAPSULE ORAL at 09:18

## 2017-01-06 NOTE — CONSULTS
"Adult Nutrition  Assessment/PES    Patient Name:  Alexandria Villanueva  YOB: 1935  MRN: [id number]  Admit Date:  1/2/2017    Assessment Date:  1/6/2017        Reason for Assessment       01/06/17 1115    Reason for Assessment    Reason For Assessment/Visit physician consult    Diagnosis --   Septic shock, PNA, UTI, Abd pain: improved              Nutrition/Diet History       01/06/17 1116    Nutrition/Diet History    Typical Food/Fluid Intake Pt being taken off unit at visit. Received consult per pt with poor appetite, noted 60% average of meals recorded.             Anthropometrics       01/06/17 1117    Anthropometrics    RD Documented Current Weight  --   no new wt    Anthropometrics (Special Considerations)    RD Calculated BMI (kg/m2) 38    Body Mass Index (BMI)    BMI Grade 35 - 39.9 - obesity - grade II            Labs/Tests/Procedures/Meds       01/06/17 1117    Labs/Tests/Procedures/Meds    Labs/Tests Review Reviewed;Glucose;Creat;BUN;ALT    Medication Review Reviewed, pertinent;Multivitamin;Antibiotic;Anticoag;Insulin   melatonin, folic acid noted              Estimated/Assessed Needs       01/06/17 1118    Calculation Measurements    Weight Used For Calculations 101 kg (222 lb 10.6 oz)    Height Used for Calculations 1.626 m (5' 4.02\")    Estimated/Assessed Energy Needs    Energy Need Method [place] Jeor    Age 81    RMR ([place]-St[place] Jeor Equation) 1465.25    Activity Factors ([place] St or)  Confined to bed  1.2    Total estimated needs ([place] St[place] Jeor) 1758 kcal     Estimated Kcal Range  5055-1209 kcal ( mifflin 1.2 - 250-500 kcal for obesity)    Estimated/Assessed Protein Needs    Weight Used for Protein Calculation 101 kg (222 lb 10.6 oz)    Protein (gm/kg) 0.8    0.8 Gm Protein (gm) 80.8    Estimated/Assessed Fluid Needs    Fluid Need Method RDA method    RDA Method (mL)  1379            Nutrition Prescription Ordered       01/06/17 1121    Nutrition Prescription PO    Current PO " Diet Soft Texture    Texture Ground    Common Modifiers Cardiac;Consistent Carbohydrate            Evaluation of Received Nutrient/Fluid Intake       01/06/17 1121    Fluid Intake Evaluation    Oral Fluid (mL) 720    Total Fluid Intake (mL) 720    % Fluid Needs 52% of needs ave 3 days    PO Evaluation    Number of Meals 7    % PO Intake 60%              Problem/Interventions:        Problem 1       01/06/17 1122    Nutrition Diagnoses Problem 1    Problem 1 Inadequate Intake/Infusion    Inadequate Intake Type Oral    Etiology (related to) Factors Affecting Nutrition    Signs/Symptoms (evidenced by) PO Intake                    Intervention Goal       01/06/17 1123    Intervention Goal    PO Increase intake    PO Intake % 75 %   greater 75% of meals            Nutrition Intervention       01/06/17 1123    Nutrition Intervention    RD/Tech Action Follow Tx progress            Nutrition Prescription       01/06/17 1123    Other Orders    Other continue current diet order and oral supplement.            Education/Evaluation       01/06/17 1123    Education    Education Education not appropriate at this time    Monitor/Evaluation    Monitor Per protocol;I&O;PO intake;Supplement intake;Pertinent labs;Weight        Comments:    Pt ave intake recorded 60% of meals with 1 oral supplement ordered per day to aid with coccyx (suspected deep injury).    Encourage po and voice prefs.    Will cont to follow and monitor.     Electronically signed by:  Marguerite Manning RD  01/06/17 11:24 AM

## 2017-01-06 NOTE — PLAN OF CARE
Problem: Patient Care Overview (Adult)  Goal: Plan of Care Review  Outcome: Ongoing (interventions implemented as appropriate)    01/06/17 1415   Coping/Psychosocial Response Interventions   Plan Of Care Reviewed With patient   Patient Care Overview   Progress (all bony prominences intact)   Outcome Evaluation   Outcome Summary/Follow up Plan WOCN consult received per nursing, to clarify that chronic purplish/blue discoloration on (R) buttock, extending slightly onto (L) buttock, is indeed, a 'birthmark.'        Goal: Adult Individualization and Mutuality  Outcome: Ongoing (interventions implemented as appropriate)    01/06/17 1415   Mutuality/Individual Preferences   What Information Would Help Us Give You More Personalized Care? Patient has chronic purple/blue 'birthmark' on (R) buttock, extending toward (L) and wants nursing to be aware whenever she is a patient here.         Problem: Pressure Ulcer (Adult)  Goal: Signs and Symptoms of Listed Potential Problems Will be Absent or Manageable (Pressure Ulcer)  Outcome: Outcome(s) achieved Date Met:  01/06/17

## 2017-01-06 NOTE — PLAN OF CARE
Problem: Skin Integrity Impairment, Risk/Actual (Adult)  Goal: Skin Integrity/Wound Healing  Outcome: Ongoing (interventions implemented as appropriate)    01/06/17 9117   Skin Integrity Impairment, Risk/Actual (Adult)   Skin Integrity/Wound Healing making progress toward outcome

## 2017-01-06 NOTE — PROGRESS NOTES
"Hospitalist Team      Patient Care Team:  Gerardo Williamson MD as PCP - General  Gerardo Williamson MD as PCP - Family Medicine        Chief Complaint:  UTI with septic shock    Subjective    Interval History and ROS:     Patient States I just do not understand why I developed an infection.  We discussed all of the reasons for developing UTI and she now understands  Patient Complaints: concerned that she is weak again and was doing so well.  I reassured her that we would get her back into PT and OT when we go back to the [facility].  Patient Denies:  Nausea and vomiting and abdominal pain.  She is eating her evening meal and doing quiet well.  History taken from: patient      Objective    Vital Signs  Temp:  [97.2 °F (36.2 °C)-98 °F (36.7 °C)] 97.2 °F (36.2 °C)  Heart Rate:  [84-97] 89  Resp:  [16-20] 20  BP: (118-152)/(70-85) 152/85    Flowsheet Rows         First Filed Value    Admission Height  64\" (162.6 cm) Documented at 01/02/2017 1422    Admission Weight  222 lb 2 oz (101 kg) Documented at 01/02/2017 1422          Physical Exam:    Physical Exam   Constitutional: Patient appears well-developed (obese) and well-nourished and in no acute distress at this time.  She is essentially bed ridden secondary to leg pain from a fracture that has never healed well.  She did not want to go through the surgery she needed at the time of the fracture.  Also she has chronic back pain that waxes and wanes.  HEENT:   Head: Normocephalic and atraumatic.   Eyes:  Pupils are equal, round, and reactive to light. EOM are intact. Sclera are anicteric and non-injected.  Mouth and Throat: Patient has moist mucous membranes. Oropharynx is clear of any erythema or exudate.     Neck: Neck supple. No JVD present. No thyromegaly present. No lymphadenopathy present.  Cardiovascular: Regular rate, regular rhythm, S1 normal and S2 normal.  Exam reveals no gallop and no friction rub.  No murmur heard.  Pulmonary/Chest: Lungs are clear to auscultation " bilaterally. No respiratory distress. No wheezes. No rhonchi. No rales.   Abdominal: Soft.  Obese.   Bowel sounds are normal. No distension and no mass. There is no hepatosplenomegaly. There is no tenderness.   Musculoskeletal: Normal Muscle tone  Extremities: No edema however the legs are large. Pulses are palpable in all 4 extremities.  Neurological: Patient is alert and oriented to person, place, and time. Cranial nerves II-XII are grossly intact with no focal deficits.  Skin: Skin is warm. No rash noted. Nails show no clubbing.  No cyanosis or erythema.         Results Review:     I reviewed the patient's new clinical results.    Lab Results (last 24 hours)     Procedure Component Value Units Date/Time    POC Glucose Fingerstick [[id number]]  (Abnormal) Collected:  01/05/17 2052    Specimen:  Blood Updated:  01/05/17 2101     Glucose 158 (H) mg/dL     Narrative:       Meter: [id number] : [id number] Sukhjinder French    POC Glucose Fingerstick [[id number]]  (Abnormal) Collected:  01/05/17 2235    Specimen:  Blood Updated:  01/05/17 2243     Glucose 151 (H) mg/dL     Narrative:       Meter: [id number] : [id number] Mikey Alexander    CBC & Differential [[id number]] Collected:  01/06/17 0551    Specimen:  Blood Updated:  01/06/17 0608    Narrative:       The following orders were created for panel order CBC & Differential.  Procedure                               Abnormality         Status                     ---------                               -----------         ------                     CBC Auto Differential[[id number]]         Abnormal            Final result                 Please view results for these tests on the individual orders.    CBC Auto Differential [[id number]]  (Abnormal) Collected:  01/06/17 0551    Specimen:  Blood Updated:  01/06/17 0608     WBC 10.31 10*3/mm3      RBC 4.27 10*6/mm3      Hemoglobin 12.0 g/dL      Hematocrit 37.7 %      MCV 88.3 fL      MCH 28.1 pg      MCHC 31.8 g/dL      RDW  13.7 %      RDW-SD 44.6 fl      MPV 10.0 fL      Platelets 104 (L) 10*3/mm3      Neutrophil % 86.3 (H) %      Lymphocyte % 8.1 (L) %      Monocyte % 4.8 %      Eosinophil % 0.2 %      Basophil % 0.1 %      Immature Grans % 0.5 %      Neutrophils, Absolute 8.90 (H) 10*3/mm3      Lymphocytes, Absolute 0.84 10*3/mm3      Monocytes, Absolute 0.49 10*3/mm3      Eosinophils, Absolute 0.02 (L) 10*3/mm3      Basophils, Absolute 0.01 10*3/mm3      Immature Grans, Absolute 0.05 (H) 10*3/mm3      nRBC 0.0 /100 WBC     Comprehensive Metabolic Panel [[id number]]  (Abnormal) Collected:  01/06/17 0551    Specimen:  Blood Updated:  01/06/17 0621     Glucose 95 mg/dL      BUN 26 (H) mg/dL      Creatinine 1.34 (H) mg/dL      Sodium 145 mmol/L      Potassium 4.5 mmol/L      Chloride 109 (H) mmol/L      CO2 25.6 mmol/L      Calcium 9.6 mg/dL      Total Protein 5.3 (L) g/dL      Albumin 2.80 (L) g/dL      ALT (SGPT) 95 (H) U/L      AST (SGOT) 17 U/L      Alkaline Phosphatase 115 U/L      Total Bilirubin 0.5 mg/dL      eGFR Non African Amer 38 (L) mL/min/1.73      Globulin 2.5 gm/dL      A/G Ratio 1.1 g/dL      BUN/Creatinine Ratio 19.4      Anion Gap 10.4 mmol/L     Narrative:       The MDRD GFR formula is only valid for adults with stable renal function between ages 18 and 70.    Protime-INR [[id number]]  (Abnormal) Collected:  01/06/17 0551    Specimen:  Blood Updated:  01/06/17 0628     Protime 33.1 (H) Seconds      INR 3.16 (H)     Narrative:       Therapeutic Ranges for INR: 2.0-3.0 (PT 20-30)                              2.5-3.5 (PT 25-34)    POC Glucose Fingerstick [[id number]]  (Normal) Collected:  01/06/17 0727    Specimen:  Blood Updated:  01/06/17 0744     Glucose 73 mg/dL     Narrative:       Meter: [id number] : [id number] Valentinkarson Pughie    Vancomycin, Random [[id number]] Collected:  01/06/17 0944    Specimen:  Blood Updated:  01/06/17 1013     Vancomycin Random 15.40 mcg/mL     POC Glucose Fingerstick [[id number]]  (Normal)  Collected:  01/06/17 1255    Specimen:  Blood Updated:  01/06/17 1320     Glucose 106 mg/dL     Narrative:       Meter: [id number] : [id number]    Blood Culture [[id number]]  (Normal) Collected:  01/02/17 1441    Specimen:  Blood from Blood, Venous Line Updated:  01/06/17 1601     Blood Culture No growth at 4 days     POC Glucose Fingerstick [[id number]]  (Abnormal) Collected:  01/06/17 1621    Specimen:  Blood Updated:  01/06/17 1633     Glucose 136 (H) mg/dL     Narrative:       Meter: [id number] : [id number] Valentin Chang          Imaging Results (last 24 hours)     Procedure Component Value Units Date/Time    MRI Thoracic Spine With & Without Contrast [[id number]] Resulted:  01/06/17 1434     Updated:  01/06/17 1445    Narrative:       THORACIC SPINE MRI WITH/WITHOUT CONTRAST    HISTORY: Chronic back pain with recent superimposed acute onset of back   pain, patient unable to bear weight, recent history of sepsis and elevated   white count.    TECHNIQUE: Multiplanar imaging of the thoracic spine was performed with   short and long TR. 20 cc of Multihance was used.    FINDINGS:    There are moderately severe degenerative changes seen at all thoracic   disks. At the mid to lower thoracic levels prominent posterior disk   osteophyte complexes are seen accompanied by facet hypertrophy. This   causes moderate canal narrowing throughout the thoracic spine.  There is   no evidence of cord compression as a result. T10-11 shows the most   prominent central stenosis. The cord itself is normal in size and signal.   There is no evidence of diskitis or vertebral osteomyelitis. There is no   evidence of abnormal enhancement after contrast administration. No   paraspinal mass lesions are noted. There is a chronic appearing healed   wedge compression fracture of T12. No acute fractures are noted.      Impression:           1. There is degenerative disk disease seen throughout the thoracic spine   especially at the mid to lower  thoracic levels most prominent at T10-11.   Degree of central canal narrowing is only moderate without evidence of   cord compression.  2. No evidence of focal cord lesion.  3. No evidence of diskitis or vertebral osteomyelitis.  4. Old healed mild wedge compression fracture of T12.    MRI Lumbar Spine With & Without Contrast [[id number]] Resulted:  01/06/17 1446     Updated:  01/06/17 1500    Narrative:       MRI OF LUMBAR SPINE WITH/WITHOUT CONTRAST    HISTORY:  Acute superimposed on chronic mid and lower back pain with   difficulty walking and difficulty with weight bearing on the right leg.   Recent history of septic shock and elevated white count.    TECHNIQUE: Multiplanar imaging of the lumbar spine was performed with   short and long TR. 20 cc of Multihance was used.    FINDING:    There are degenerative changes at all lower thoracic and lumbar levels.    At L1-2, the disk is collapsed. There is a broad based posterior disk   osteophyte complex that results in only mild to moderate central stenosis.       At the L2-3 level the disk is collapsed. There is a degenerative   retrolisthesis of about 1 cm. This results in moderate central spinal   stenosis. Facet hypertrophy is also seen.    At L3-4, there is a broad based posterior disk osteophyte complex with   advanced facet disease. Central stenosis is moderately severe. Foraminal   stenosis is also moderately severe on both sides.    At L4-5, there is broad based posterior disk bulging and osteophyte   formation with advanced facet disease and severe central stenosis.   Foraminal stenosis is moderate bilaterally.    At L5-S1, the disk is degenerated but shows only minimal bulging. There is   advanced facet hypertrophy with moderate central stenosis as a result.   Foraminal stenosis at L5 S1 is mild to moderate on both sidesl    The conus is normal.   The nerve roots of the cauda equina have a normal   branching pattern. After contrast administration no abnormal  enhancement   is seen. There is no evidence of diskitis or vertebral osteomyelitis. No   paraspinal abscesses are identified.      Impression:          1, Advanced multilevel lumbar degenerative disk and facet disease as   described level by level. Central stenosis is most prominent at L4-5 but   is also present to a significant degree at L3-4 and L5 S1.  2. No evidence of epidural abscess, diskitis or vertebral osteomyelitis.   No active inflammatory process is seen. No evidence of recent fracture.          Xray not reviewed personally by physician.      ECG not reviewed personally by physician  ECG/EMG Results (most recent)     Procedure Component Value Units Date/Time    ECG 12 Lead [[id number]] Collected:  01/02/17 1432     Updated:  01/03/17 1004    Narrative:       RR Interval= 723 ms  IA Interval= ms  QRSD Interval= 76 ms  QT Interval= 400 ms  QTc Interval= 470 ms  Heart Rate= 83 ms  P Axis= deg  QRS Axis= 105 deg  T Wave Axis= 64 deg  I: 40 Axis= 85 deg  T: 40 Axis= 122 deg  ST Axis= 48 deg  ATRIAL FIBRILLATION, V-RATE 67-93  CONSIDER RIGHT VENTRICULAR HYPERTROPHY  NO SIGNIFICANT CHANGE FROM PREVIOUS ECG  Electronically Signed by:  Nika Mack ([facility]) 03-Jan-2017 10:03:33  Date and Time of Study: 2017-01-02 14:32:34          Medication Review:   I have reviewed the patient's current medication list    Current Facility-Administered Medications:   •  acetaminophen (TYLENOL) tablet 650 mg, 650 mg, Oral, Q6H PRN, Trevor Diallo MD, 650 mg at 01/06/17 0315  •  citalopram (CeleXA) tablet 20 mg, 20 mg, Oral, Once per day on Mon Wed Fri, Glendy Gonsalez MD, 20 mg at 01/06/17 0918  •  dextrose (D50W) solution 25 g, 25 g, Intravenous, PRN, Glendy Gonsalez MD  •  dextrose (GLUTOSE) oral gel 15 g, 15 g, Oral, PRN, Glendy Gonsalez MD  •  docusate sodium (COLACE) capsule 250 mg, 250 mg, Oral, Daily, Trevor Diallo MD, 250 mg at 01/06/17 0918  •  folic acid (FOLVITE) tablet 1 mg,  1 mg, Oral, Daily, Trevor Diallo MD, 1 mg at 01/06/17 0919  •  gabapentin (NEURONTIN) capsule 300 mg, 300 mg, Oral, BID, Trevor Diallo MD, 300 mg at 01/06/17 1740  •  HYDROcodone-acetaminophen (NORCO) 5-325 MG per tablet 1 tablet, 1 tablet, Oral, Q6H PRN, Glendy Gonsalez MD, 1 tablet at 01/06/17 1635  •  insulin aspart (novoLOG) injection 0-7 Units, 0-7 Units, Subcutaneous, 4x Daily AC & at Bedtime, Glendy Gonsalez MD, 2 Units at 01/05/17 2252  •  insulin detemir (LEVEMIR) injection 20 Units, 20 Units, Subcutaneous, Nightly, Glendy Gonsalez MD, 20 Units at 01/05/17 2252  •  ipratropium-albuterol (DUO-NEB) nebulizer solution 3 mL, 3 mL, Nebulization, 4x Daily - RT, Trevor Diallo MD, 3 mL at 01/06/17 1633  •  melatonin tablet 5 mg, 5 mg, Oral, Nightly, Glendy Gonsalez MD, 5 mg at 01/05/17 2239  •  metoprolol succinate XL (TOPROL-XL) 24 hr tablet 25 mg, 25 mg, Oral, Daily, Luis Fernando Stevens III, MD, 25 mg at 01/06/17 0918  •  multivitamin with minerals 1 tablet, 1 tablet, Oral, Daily, Trevor Diallo MD, 1 tablet at 01/06/17 0918  •  pantoprazole (PROTONIX) EC tablet 40 mg, 40 mg, Oral, Q PM, Glendy Gonsalez MD, 40 mg at 01/06/17 1635  •  Pharmacy Consult - Pharmacy to dose, , Does not apply, Continuous PRN, Glendy Gonsalez MD  •  piperacillin-tazobactam (ZOSYN) IVPB 2.25 g in 100 mL NS, 2.25 g, Intravenous, Q8H, Glendy Gonsalez MD, Last Rate: 0 mL/hr at 01/06/17 0646, 2.25 g at 01/06/17 1417  •  potassium chloride (KLOR-CON) packet 40 mEq, 40 mEq, Oral, PRN, Luis Fernando Stevens III, MD, 40 mEq at 01/04/17 1029  •  potassium chloride (MICRO-K) CR capsule 40 mEq, 40 mEq, Oral, PRN, Luis Fernando Stevens III, MD, 40 mEq at 01/05/17 0414  •  predniSONE (DELTASONE) tablet 5 mg, 5 mg, Oral, BID With Meals, Glendy Gonsalez MD, 5 mg at 01/06/17 9470  •  sodium chloride 0.45 % infusion, 75 mL/hr, Intravenous, Continuous, Trevor DYER  MD Yoel, Last Rate: 75 mL/hr at 01/06/17 0311, 75 mL/hr at 01/06/17 0311  •  sodium chloride 0.9 % bolus 1,000 mL, 1,000 mL, Intravenous, Once, Claudio Sheriff MD, 1,000 mL at 01/02/17 1952  •  [CANCELED] Insert peripheral IV, , , Once **AND** sodium chloride 0.9 % flush 10 mL, 10 mL, Intravenous, PRN, Claudio Sheriff MD  •  sodium chloride 0.9 % flush 10 mL, 10 mL, Intracatheter, Q12H, Trevor Diallo MD, 10 mL at 01/06/17 0921  •  sodium chloride 0.9 % flush 10 mL, 10 mL, Intracatheter, PRN, Trevor Diallo MD  •  sodium chloride 0.9 % flush 10 mL, 10 mL, Intracatheter, Q12H, Trevor Diallo MD, 10 mL at 01/06/17 0924  •  sodium chloride 0.9 % flush 10 mL, 10 mL, Intracatheter, PRN, Trevor Diallo MD  •  warfarin (COUMADIN) tablet 5 mg, 5 mg, Oral, Daily, Glendy Gonsalez MD, 5 mg at 01/06/17 1740      Assessment/Plan     Septic shock resolved now secondary to complicated UTI  Probable pneumonia  Acute on chronic renal disease secondary to sepsis  Probable shock liver secondary to sepsis  DM2  Chronic back pain with acute exacerbation      Plan for disposition:  Return to the [facility] when improved.    Alice Garcia DO  01/06/17  6:08 PM      Time: 20 minutes

## 2017-01-06 NOTE — NURSING NOTE
WOCN consult received per nursing, to clarify that chronic purplish/blue discoloration on (R) buttock, extending slightly onto (L) buttock, is indeed, a 'birthmark.' Patient informed chart will be marked for future.  Please re-consult prn. Has waffle support surfaces bed/chair and Z-guard in use as barrier protection to buttocks.    See orders per recommendation: continue waffle support surfaces and Z-guard as barrier protection to buttocks.

## 2017-01-07 LAB
BACTERIA SPEC AEROBE CULT: NORMAL
GLUCOSE BLDC GLUCOMTR-MCNC: 136 MG/DL (ref 70–130)
GLUCOSE BLDC GLUCOMTR-MCNC: 177 MG/DL (ref 70–130)
GLUCOSE BLDC GLUCOMTR-MCNC: 243 MG/DL (ref 70–130)
GLUCOSE BLDC GLUCOMTR-MCNC: 88 MG/DL (ref 70–130)

## 2017-01-07 PROCEDURE — 63710000001 INSULIN ASPART PER 5 UNITS: Performed by: HOSPITALIST

## 2017-01-07 PROCEDURE — 99231 SBSQ HOSP IP/OBS SF/LOW 25: CPT | Performed by: HOSPITALIST

## 2017-01-07 PROCEDURE — 63710000001 INSULIN DETEMIR PER 5 UNITS: Performed by: HOSPITALIST

## 2017-01-07 PROCEDURE — 82962 GLUCOSE BLOOD TEST: CPT

## 2017-01-07 PROCEDURE — 63710000001 PREDNISONE PER 5 MG: Performed by: HOSPITALIST

## 2017-01-07 PROCEDURE — 25010000002 PIPERACILLIN SOD-TAZOBACTAM PER 1 G: Performed by: HOSPITALIST

## 2017-01-07 PROCEDURE — 94640 AIRWAY INHALATION TREATMENT: CPT

## 2017-01-07 RX ADMIN — SODIUM CHLORIDE, PRESERVATIVE FREE 10 ML: 5 INJECTION INTRAVENOUS at 21:21

## 2017-01-07 RX ADMIN — MULTIPLE VITAMINS W/ MINERALS TAB 1 TABLET: TAB at 09:58

## 2017-01-07 RX ADMIN — PIPERACILLIN AND TAZOBACTAM 2.25 G: 2; .25 INJECTION, POWDER, LYOPHILIZED, FOR SOLUTION INTRAVENOUS; PARENTERAL at 21:19

## 2017-01-07 RX ADMIN — HYDROCODONE BITARTRATE AND ACETAMINOPHEN 1 TABLET: 5; 325 TABLET ORAL at 09:58

## 2017-01-07 RX ADMIN — GABAPENTIN 300 MG: 300 CAPSULE ORAL at 17:30

## 2017-01-07 RX ADMIN — SODIUM CHLORIDE, PRESERVATIVE FREE 10 ML: 5 INJECTION INTRAVENOUS at 09:02

## 2017-01-07 RX ADMIN — PREDNISONE 5 MG: 5 TABLET ORAL at 17:30

## 2017-01-07 RX ADMIN — IPRATROPIUM BROMIDE AND ALBUTEROL SULFATE 3 ML: .5; 3 SOLUTION RESPIRATORY (INHALATION) at 15:12

## 2017-01-07 RX ADMIN — MELATONIN 5 MG TABLET 5 MG: at 21:21

## 2017-01-07 RX ADMIN — INSULIN DETEMIR 20 UNITS: 100 INJECTION, SOLUTION SUBCUTANEOUS at 21:19

## 2017-01-07 RX ADMIN — FOLIC ACID 1 MG: 1 TABLET ORAL at 09:58

## 2017-01-07 RX ADMIN — INSULIN ASPART 3 UNITS: 100 INJECTION, SOLUTION INTRAVENOUS; SUBCUTANEOUS at 21:20

## 2017-01-07 RX ADMIN — METOPROLOL SUCCINATE 25 MG: 25 TABLET, EXTENDED RELEASE ORAL at 09:58

## 2017-01-07 RX ADMIN — PIPERACILLIN AND TAZOBACTAM 2.25 G: 2; .25 INJECTION, POWDER, LYOPHILIZED, FOR SOLUTION INTRAVENOUS; PARENTERAL at 14:47

## 2017-01-07 RX ADMIN — PREDNISONE 5 MG: 5 TABLET ORAL at 09:58

## 2017-01-07 RX ADMIN — HYDROCODONE BITARTRATE AND ACETAMINOPHEN 1 TABLET: 5; 325 TABLET ORAL at 04:39

## 2017-01-07 RX ADMIN — INSULIN ASPART 2 UNITS: 100 INJECTION, SOLUTION INTRAVENOUS; SUBCUTANEOUS at 17:30

## 2017-01-07 RX ADMIN — SODIUM CHLORIDE 75 ML/HR: 4.5 INJECTION, SOLUTION INTRAVENOUS at 01:04

## 2017-01-07 RX ADMIN — PANTOPRAZOLE SODIUM 40 MG: 40 TABLET, DELAYED RELEASE ORAL at 17:30

## 2017-01-07 RX ADMIN — DOCUSATE SODIUM 250 MG: 250 CAPSULE, LIQUID FILLED ORAL at 09:58

## 2017-01-07 RX ADMIN — IPRATROPIUM BROMIDE AND ALBUTEROL SULFATE 3 ML: .5; 3 SOLUTION RESPIRATORY (INHALATION) at 19:50

## 2017-01-07 RX ADMIN — HYDROCODONE BITARTRATE AND ACETAMINOPHEN 1 TABLET: 5; 325 TABLET ORAL at 17:30

## 2017-01-07 RX ADMIN — SODIUM CHLORIDE 75 ML/HR: 4.5 INJECTION, SOLUTION INTRAVENOUS at 14:47

## 2017-01-07 RX ADMIN — IPRATROPIUM BROMIDE AND ALBUTEROL SULFATE 3 ML: .5; 3 SOLUTION RESPIRATORY (INHALATION) at 11:27

## 2017-01-07 RX ADMIN — GABAPENTIN 300 MG: 300 CAPSULE ORAL at 09:58

## 2017-01-07 RX ADMIN — SODIUM CHLORIDE, PRESERVATIVE FREE 10 ML: 5 INJECTION INTRAVENOUS at 10:00

## 2017-01-07 RX ADMIN — WARFARIN SODIUM 5 MG: 5 TABLET ORAL at 17:30

## 2017-01-07 RX ADMIN — PIPERACILLIN AND TAZOBACTAM 2.25 G: 2; .25 INJECTION, POWDER, LYOPHILIZED, FOR SOLUTION INTRAVENOUS; PARENTERAL at 06:24

## 2017-01-07 RX ADMIN — IPRATROPIUM BROMIDE AND ALBUTEROL SULFATE 3 ML: .5; 3 SOLUTION RESPIRATORY (INHALATION) at 07:23

## 2017-01-07 NOTE — PROGRESS NOTES
"Hospitalist Team      Patient Care Team:  Gerardo Williamson MD as PCP - General  Gerardo Williamson MD as PCP - Family Medicine        Chief Complaint:  UTI with septic shock    Subjective    Interval History and ROS:     Patient States I slept better/  dreaming  Patient Complaints: no complaints offered  Patient Denies:  Nausea, vomiting, diarrhea,   History taken from: patient      Objective    Vital Signs  Temp:  [97 °F (36.1 °C)-98.5 °F (36.9 °C)] 97.6 °F (36.4 °C)  Heart Rate:  [60-89] 82  Resp:  [17-20] 17  BP: (122-157)/(75-91) 122/76  Body mass index is 38.13 kg/(m^2).    Flowsheet Rows         First Filed Value    Admission Height  64\" (162.6 cm) Documented at 01/02/2017 1422    Admission Weight  222 lb 2 oz (101 kg) Documented at 01/02/2017 1422          Physical Exam:    Physical Exam   Constitutional: Patient appears depressed today and well-nourished and in no acute distress .  She is afraid of going home.  HEENT:   Head: Normocephalic and atraumatic.   Eyes:  Pupils are equal, round, and reactive to light. EOM are intact. Sclera are anicteric and non-injected.  Mouth and Throat: Patient has moist mucous membranes. Oropharynx is clear of any erythema or exudate.     Neck: Neck supple. No JVD present. No thyromegaly present. No lymphadenopathy present.  Cardiovascular: Regular rate, regular rhythm, S1 normal and S2 normal.  Exam reveals no gallop and no friction rub.  No murmur heard.  Pulmonary/Chest: Lungs are clear to auscultation bilaterally and respirations are shallow. No respiratory distress. No wheezes. No rhonchi. No rales.   Abdominal: Soft. Bowel sounds are normal. No distension and no mass. There is no hepatosplenomegaly. There is no tenderness.   Musculoskeletal: Normal Muscle tone  Extremities: No edema. Pulses are palpable in all 4 extremities.  Neurological: Patient is alert and oriented to person, place, and time. Cranial nerves II-XII are grossly intact with no focal deficits.  Skin: Skin is " warm. No rash noted. Nails show no clubbing.  There is bruising from iv's on both hands and left is worse than right.       Results Review:     I reviewed the patient's new clinical results.    Lab Results (last 24 hours)     Procedure Component Value Units Date/Time    Blood Culture [[id number]]  (Normal) Collected:  01/02/17 1441    Specimen:  Blood from Blood, Venous Line Updated:  01/06/17 1601     Blood Culture No growth at 4 days     POC Glucose Fingerstick [[id number]]  (Abnormal) Collected:  01/06/17 1621    Specimen:  Blood Updated:  01/06/17 1633     Glucose 136 (H) mg/dL     Narrative:       Meter: [id number] : [id number] Valentin Chang    POC Glucose Fingerstick [[id number]]  (Abnormal) Collected:  01/06/17 2035    Specimen:  Blood Updated:  01/06/17 2043     Glucose 199 (H) mg/dL     Narrative:       Meter: [id number] : [id number] Murali Zhou CNA    POC Glucose Fingerstick [[id number]]  (Normal) Collected:  01/07/17 0746    Specimen:  Blood Updated:  01/07/17 0753     Glucose 88 mg/dL     Narrative:       Meter: [id number] : [id number] Eagle Kim    POC Glucose Fingerstick [[id number]]  (Abnormal) Collected:  01/07/17 1203    Specimen:  Blood Updated:  01/07/17 1210     Glucose 136 (H) mg/dL     Narrative:       Meter: [id number] : [id number] Alexsandra Olmos PCA          Imaging Results (last 24 hours)     Procedure Component Value Units Date/Time    MRI Thoracic Spine With & Without Contrast [[id number]] Resulted:  01/07/17 0806     Updated:  01/07/17 0806    Narrative:       THORACIC SPINE MRI WITH/WITHOUT CONTRAST    HISTORY: Chronic back pain with recent superimposed acute onset of back   pain, patient unable to bear weight, recent history of sepsis and elevated   white count.    TECHNIQUE: Multiplanar imaging of the thoracic spine was performed with   short and long TR. 20 cc of Multihance was used.    FINDINGS:    There are moderately severe degenerative changes seen at all thoracic    disks. At the mid to lower thoracic levels prominent posterior disk   osteophyte complexes are seen accompanied by facet hypertrophy. This   causes moderate canal narrowing throughout the thoracic spine.  There is   no evidence of cord compression as a result. T10-11 shows the most   prominent central stenosis. The cord itself is normal in size and signal.   There is no evidence of diskitis or vertebral osteomyelitis. There is no   evidence of abnormal enhancement after contrast administration. No   paraspinal mass lesions are noted. There is a chronic appearing healed   wedge compression fracture of T12. No acute fractures are noted.      Impression:           1. There is degenerative disk disease seen throughout the thoracic spine   especially at the mid to lower thoracic levels most prominent at T10-11.   Degree of central canal narrowing is only moderate without evidence of   cord compression.  2. No evidence of focal cord lesion.  3. No evidence of diskitis or vertebral osteomyelitis.  4. Old healed mild wedge compression fracture of T12.    MRI Lumbar Spine With & Without Contrast [[id number]] Resulted:  01/07/17 0806     Updated:  01/07/17 0806    Narrative:       MRI OF LUMBAR SPINE WITH/WITHOUT CONTRAST    HISTORY:  Acute superimposed on chronic mid and lower back pain with   difficulty walking and difficulty with weight bearing on the right leg.   Recent history of septic shock and elevated white count.    TECHNIQUE: Multiplanar imaging of the lumbar spine was performed with   short and long TR. 20 cc of Multihance was used.    FINDING:    There are degenerative changes at all lower thoracic and lumbar levels.    At L1-2, the disk is collapsed. There is a broad based posterior disk   osteophyte complex that results in only mild to moderate central stenosis.       At the L2-3 level the disk is collapsed. There is a degenerative   retrolisthesis of about 1 cm. This results in moderate central spinal    stenosis. Facet hypertrophy is also seen.    At L3-4, there is a broad based posterior disk osteophyte complex with   advanced facet disease. Central stenosis is moderately severe. Foraminal   stenosis is also moderately severe on both sides.    At L4-5, there is broad based posterior disk bulging and osteophyte   formation with advanced facet disease and severe central stenosis.   Foraminal stenosis is moderate bilaterally.    At L5-S1, the disk is degenerated but shows only minimal bulging. There is   advanced facet hypertrophy with moderate central stenosis as a result.   Foraminal stenosis at L5 S1 is mild to moderate on both sidesl    The conus is normal.   The nerve roots of the cauda equina have a normal   branching pattern. After contrast administration no abnormal enhancement   is seen. There is no evidence of diskitis or vertebral osteomyelitis. No   paraspinal abscesses are identified.      Impression:          1, Advanced multilevel lumbar degenerative disk and facet disease as   described level by level. Central stenosis is most prominent at L4-5 but   is also present to a significant degree at L3-4 and L5 S1.  2. No evidence of epidural abscess, diskitis or vertebral osteomyelitis.   No active inflammatory process is seen. No evidence of recent fracture.          Xray not reviewed personally by physician.      ECG not reviewed personally by physician  ECG/EMG Results (most recent)     Procedure Component Value Units Date/Time    ECG 12 Lead [[id number]] Collected:  01/02/17 1432     Updated:  01/03/17 1004    Narrative:       RR Interval= 723 ms  WA Interval= ms  QRSD Interval= 76 ms  QT Interval= 400 ms  QTc Interval= 470 ms  Heart Rate= 83 ms  P Axis= deg  QRS Axis= 105 deg  T Wave Axis= 64 deg  I: 40 Axis= 85 deg  T: 40 Axis= 122 deg  ST Axis= 48 deg  ATRIAL FIBRILLATION, V-RATE 67-93  CONSIDER RIGHT VENTRICULAR HYPERTROPHY  NO SIGNIFICANT CHANGE FROM PREVIOUS ECG  Electronically Signed by:   Nika Mack ([facility]) 03-Jan-2017 10:03:33  Date and Time of Study: 2017-01-02 14:32:34          Medication Review:   I have reviewed the patient's current medication list    Current Facility-Administered Medications:   •  acetaminophen (TYLENOL) tablet 650 mg, 650 mg, Oral, Q6H PRN, Trevor Diallo MD, 650 mg at 01/06/17 0315  •  citalopram (CeleXA) tablet 20 mg, 20 mg, Oral, Once per day on Mon Wed Fri, Glendy Gonsalez MD, 20 mg at 01/06/17 0918  •  dextrose (D50W) solution 25 g, 25 g, Intravenous, PRN, Glendy Gonsalez MD  •  dextrose (GLUTOSE) oral gel 15 g, 15 g, Oral, PRN, Glendy Gonsalez MD  •  docusate sodium (COLACE) capsule 250 mg, 250 mg, Oral, Daily, Trevor Diallo MD, 250 mg at 01/07/17 0958  •  folic acid (FOLVITE) tablet 1 mg, 1 mg, Oral, Daily, Trevor Diallo MD, 1 mg at 01/07/17 0958  •  gabapentin (NEURONTIN) capsule 300 mg, 300 mg, Oral, BID, Trevor Diallo MD, 300 mg at 01/07/17 0958  •  HYDROcodone-acetaminophen (NORCO) 5-325 MG per tablet 1 tablet, 1 tablet, Oral, Q6H PRN, Glendy Gonsalez MD, 1 tablet at 01/07/17 0958  •  insulin aspart (novoLOG) injection 0-7 Units, 0-7 Units, Subcutaneous, 4x Daily AC & at Bedtime, Glendy Gonsalez MD, 2 Units at 01/06/17 2150  •  insulin detemir (LEVEMIR) injection 20 Units, 20 Units, Subcutaneous, Nightly, Glendy Gonsalez MD, 20 Units at 01/06/17 2150  •  ipratropium-albuterol (DUO-NEB) nebulizer solution 3 mL, 3 mL, Nebulization, 4x Daily - RT, Trevor Diallo MD, 3 mL at 01/07/17 1512  •  melatonin tablet 5 mg, 5 mg, Oral, Nightly, Glendy Gonsalez MD, 5 mg at 01/06/17 2153  •  metoprolol succinate XL (TOPROL-XL) 24 hr tablet 25 mg, 25 mg, Oral, Daily, Luis Fernando Stevens III, MD, 25 mg at 01/07/17 0958  •  multivitamin with minerals 1 tablet, 1 tablet, Oral, Daily, Trevor Diallo MD, 1 tablet at 01/07/17 0958  •  pantoprazole (PROTONIX) EC tablet 40 mg, 40 mg, Oral,  Q PM, Glendy Gonsalez MD, 40 mg at 01/06/17 1635  •  Pharmacy Consult - Pharmacy to dose, , Does not apply, Continuous PRN, Glendy Gonsalez MD  •  piperacillin-tazobactam (ZOSYN) IVPB 2.25 g in 100 mL NS, 2.25 g, Intravenous, Q8H, Glendy Gonsalez MD, Last Rate: 0 mL/hr at 01/06/17 2234, 2.25 g at 01/07/17 1447  •  potassium chloride (KLOR-CON) packet 40 mEq, 40 mEq, Oral, PRN, Luis Fernando Stevens III, MD, 40 mEq at 01/04/17 1029  •  potassium chloride (MICRO-K) CR capsule 40 mEq, 40 mEq, Oral, PRN, Luis Fernando Stevens III, MD, 40 mEq at 01/05/17 0414  •  predniSONE (DELTASONE) tablet 5 mg, 5 mg, Oral, BID With Meals, Glendy Gonsalez MD, 5 mg at 01/07/17 0958  •  sodium chloride 0.45 % infusion, 75 mL/hr, Intravenous, Continuous, Trevor Diallo MD, Last Rate: 75 mL/hr at 01/07/17 1447, 75 mL/hr at 01/07/17 1447  •  sodium chloride 0.9 % bolus 1,000 mL, 1,000 mL, Intravenous, Once, Claudio Sheriff MD, 1,000 mL at 01/02/17 1952  •  [CANCELED] Insert peripheral IV, , , Once **AND** sodium chloride 0.9 % flush 10 mL, 10 mL, Intravenous, PRN, Claudio Sheriff MD  •  sodium chloride 0.9 % flush 10 mL, 10 mL, Intracatheter, Q12H, Trevor Diallo MD, 10 mL at 01/07/17 1000  •  sodium chloride 0.9 % flush 10 mL, 10 mL, Intracatheter, PRN, Trevor Diallo MD  •  sodium chloride 0.9 % flush 10 mL, 10 mL, Intracatheter, Q12H, Trevor Diallo MD, 10 mL at 01/07/17 0902  •  sodium chloride 0.9 % flush 10 mL, 10 mL, Intracatheter, PRN, Trevor Diallo MD  •  warfarin (COUMADIN) tablet 5 mg, 5 mg, Oral, Daily, Glendy Gonsalez MD, 5 mg at 01/06/17 1740      Assessment/Plan     Septic shock resolved now secondary to complicated UTI  Probable pneumonia/ Procalcitonin 14  Acute on chronic renal disease secondary to sepsis  Probable shock liver secondary to sepsis  DM2/ blood sugar 136  Chronic back pain with acute exacerbation  High risk drug:  Coumadin/  INR 3.1  CKD:  GFR is  38      Plan for disposition:  Return to [facility] in am.    Alice Garcia DO  01/07/17  3:52 PM      Time: 15 min

## 2017-01-07 NOTE — PLAN OF CARE
Problem: Patient Care Overview (Adult)  Goal: Plan of Care Review  Outcome: Ongoing (interventions implemented as appropriate)    01/07/17 1942   Outcome Evaluation   Outcome Summary/Follow up Plan Q2Hr turning, skin dry/intact this shift, Birthmark noted on buttocks; Pt requests modesty precautions during interventions, No s/s of sepsis noted, monitoring CMP daily         Problem: Sepsis (Adult)  Goal: Signs and Symptoms of Listed Potential Problems Will be Absent or Manageable (Sepsis)  Outcome: Ongoing (interventions implemented as appropriate)    Problem: Skin Integrity Impairment, Risk/Actual (Adult)  Goal: Skin Integrity/Wound Healing  Outcome: Ongoing (interventions implemented as appropriate)

## 2017-01-07 NOTE — PLAN OF CARE
Problem: Patient Care Overview (Adult)  Goal: Plan of Care Review  Outcome: Ongoing (interventions implemented as appropriate)    01/07/17 0343   Coping/Psychosocial Response Interventions   Plan Of Care Reviewed With patient   Patient Care Overview   Progress no change         Problem: Sepsis (Adult)  Goal: Signs and Symptoms of Listed Potential Problems Will be Absent or Manageable (Sepsis)  Outcome: Ongoing (interventions implemented as appropriate)    Problem: Skin Integrity Impairment, Risk/Actual (Adult)  Goal: Skin Integrity/Wound Healing  Outcome: Ongoing (interventions implemented as appropriate)

## 2017-01-07 NOTE — PLAN OF CARE
Problem: Patient Care Overview (Adult)  Goal: Plan of Care Review    01/07/17 0323   Coping/Psychosocial Response Interventions   Plan Of Care Reviewed With patient   Patient Care Overview   Progress progress towards functional goals is fair

## 2017-01-08 VITALS
BODY MASS INDEX: 37.92 KG/M2 | OXYGEN SATURATION: 97 % | DIASTOLIC BLOOD PRESSURE: 65 MMHG | RESPIRATION RATE: 20 BRPM | HEART RATE: 85 BPM | WEIGHT: 222.13 LBS | SYSTOLIC BLOOD PRESSURE: 130 MMHG | HEIGHT: 64 IN | TEMPERATURE: 97.2 F

## 2017-01-08 LAB
ALBUMIN SERPL-MCNC: 2.1 G/DL (ref 3.5–5.2)
ALBUMIN/GLOB SERPL: 0.9 G/DL
ALP SERPL-CCNC: 93 U/L (ref 40–129)
ALT SERPL W P-5'-P-CCNC: 38 U/L (ref 5–33)
ANION GAP SERPL CALCULATED.3IONS-SCNC: 12.4 MMOL/L
AST SERPL-CCNC: 10 U/L (ref 5–32)
BILIRUB SERPL-MCNC: 0.3 MG/DL (ref 0.2–1.2)
BUN BLD-MCNC: 18 MG/DL (ref 8–23)
BUN/CREAT SERPL: 18.8 (ref 7–25)
CALCIUM SPEC-SCNC: 8.6 MG/DL (ref 8.8–10.5)
CHLORIDE SERPL-SCNC: 101 MMOL/L (ref 98–107)
CO2 SERPL-SCNC: 21.6 MMOL/L (ref 22–29)
CREAT BLD-MCNC: 0.96 MG/DL (ref 0.57–1)
GFR SERPL CREATININE-BSD FRML MDRD: 56 ML/MIN/1.73
GLOBULIN UR ELPH-MCNC: 2.4 GM/DL
GLUCOSE BLD-MCNC: 127 MG/DL (ref 65–99)
GLUCOSE BLDC GLUCOMTR-MCNC: 214 MG/DL (ref 70–130)
GLUCOSE BLDC GLUCOMTR-MCNC: 96 MG/DL (ref 70–130)
INR PPP: 6.44 (ref 0.9–1.1)
POTASSIUM BLD-SCNC: 3.9 MMOL/L (ref 3.5–5.2)
PROT SERPL-MCNC: 4.5 G/DL (ref 6–8.5)
PROTHROMBIN TIME: 58.6 SECONDS (ref 12.1–15)
SODIUM BLD-SCNC: 135 MMOL/L (ref 136–145)

## 2017-01-08 PROCEDURE — 25010000002 PIPERACILLIN SOD-TAZOBACTAM PER 1 G: Performed by: HOSPITALIST

## 2017-01-08 PROCEDURE — 99239 HOSP IP/OBS DSCHRG MGMT >30: CPT | Performed by: HOSPITALIST

## 2017-01-08 PROCEDURE — 94640 AIRWAY INHALATION TREATMENT: CPT

## 2017-01-08 PROCEDURE — 25010000002 CEFTRIAXONE PER 250 MG: Performed by: HOSPITALIST

## 2017-01-08 PROCEDURE — 63710000001 INSULIN ASPART PER 5 UNITS: Performed by: HOSPITALIST

## 2017-01-08 PROCEDURE — 80053 COMPREHEN METABOLIC PANEL: CPT | Performed by: HOSPITALIST

## 2017-01-08 PROCEDURE — 63710000001 PREDNISONE PER 5 MG: Performed by: HOSPITALIST

## 2017-01-08 PROCEDURE — 82962 GLUCOSE BLOOD TEST: CPT

## 2017-01-08 PROCEDURE — 85610 PROTHROMBIN TIME: CPT | Performed by: HOSPITALIST

## 2017-01-08 RX ORDER — IPRATROPIUM BROMIDE AND ALBUTEROL SULFATE 2.5; .5 MG/3ML; MG/3ML
3 SOLUTION RESPIRATORY (INHALATION)
Qty: 360 ML
Start: 2017-01-08

## 2017-01-08 RX ORDER — CEFTRIAXONE 1 G/1
1 INJECTION, POWDER, FOR SOLUTION INTRAMUSCULAR; INTRAVENOUS DAILY
Refills: 0
Start: 2017-01-08 | End: 2017-01-17 | Stop reason: HOSPADM

## 2017-01-08 RX ORDER — HYDROCODONE BITARTRATE AND ACETAMINOPHEN 5; 325 MG/1; MG/1
1 TABLET ORAL EVERY 6 HOURS PRN
Refills: 0 | Status: ON HOLD
Start: 2017-01-08 | End: 2017-03-15

## 2017-01-08 RX ORDER — FUROSEMIDE 40 MG/1
40 TABLET ORAL 2 TIMES DAILY
Status: DISCONTINUED | OUTPATIENT
Start: 2017-01-08 | End: 2017-01-08 | Stop reason: HOSPADM

## 2017-01-08 RX ORDER — FUROSEMIDE 80 MG
80 TABLET ORAL 2 TIMES DAILY
Status: DISCONTINUED | OUTPATIENT
Start: 2017-01-08 | End: 2017-01-08

## 2017-01-08 RX ORDER — FUROSEMIDE 40 MG/1
40 TABLET ORAL 2 TIMES DAILY
Start: 2017-01-08 | End: 2017-01-17 | Stop reason: HOSPADM

## 2017-01-08 RX ADMIN — GABAPENTIN 300 MG: 300 CAPSULE ORAL at 09:03

## 2017-01-08 RX ADMIN — METOPROLOL SUCCINATE 25 MG: 25 TABLET, EXTENDED RELEASE ORAL at 09:02

## 2017-01-08 RX ADMIN — INSULIN ASPART 3 UNITS: 100 INJECTION, SOLUTION INTRAVENOUS; SUBCUTANEOUS at 13:11

## 2017-01-08 RX ADMIN — CEFTRIAXONE 1 G: 1 INJECTION, SOLUTION INTRAVENOUS at 11:11

## 2017-01-08 RX ADMIN — SODIUM CHLORIDE, PRESERVATIVE FREE 10 ML: 5 INJECTION INTRAVENOUS at 09:04

## 2017-01-08 RX ADMIN — HYDROCODONE BITARTRATE AND ACETAMINOPHEN 1 TABLET: 5; 325 TABLET ORAL at 04:53

## 2017-01-08 RX ADMIN — PIPERACILLIN AND TAZOBACTAM 2.25 G: 2; .25 INJECTION, POWDER, LYOPHILIZED, FOR SOLUTION INTRAVENOUS; PARENTERAL at 06:43

## 2017-01-08 RX ADMIN — HYDROCODONE BITARTRATE AND ACETAMINOPHEN 1 TABLET: 5; 325 TABLET ORAL at 11:10

## 2017-01-08 RX ADMIN — MULTIPLE VITAMINS W/ MINERALS TAB 1 TABLET: TAB at 09:03

## 2017-01-08 RX ADMIN — IPRATROPIUM BROMIDE AND ALBUTEROL SULFATE 3 ML: .5; 3 SOLUTION RESPIRATORY (INHALATION) at 07:03

## 2017-01-08 RX ADMIN — FOLIC ACID 1 MG: 1 TABLET ORAL at 09:03

## 2017-01-08 RX ADMIN — PREDNISONE 5 MG: 5 TABLET ORAL at 09:02

## 2017-01-08 RX ADMIN — FUROSEMIDE 40 MG: 40 TABLET ORAL at 11:11

## 2017-01-08 RX ADMIN — DOCUSATE SODIUM 250 MG: 250 CAPSULE, LIQUID FILLED ORAL at 09:29

## 2017-01-08 RX ADMIN — SODIUM CHLORIDE 75 ML/HR: 4.5 INJECTION, SOLUTION INTRAVENOUS at 06:38

## 2017-01-08 RX ADMIN — IPRATROPIUM BROMIDE AND ALBUTEROL SULFATE 3 ML: .5; 3 SOLUTION RESPIRATORY (INHALATION) at 11:20

## 2017-01-08 NOTE — NURSING NOTE
Per patient's request notified Marissa, sister, of patient's transfer back to [facility] today per ambulance.  Marissa will notify any other family members.    Marissa's phone # : [phone].

## 2017-01-08 NOTE — PLAN OF CARE
Problem: Patient Care Overview (Adult)  Goal: Plan of Care Review    01/07/17 2131   Coping/Psychosocial Response Interventions   Plan Of Care Reviewed With patient   Patient Care Overview   Progress progress towards functional goals is fair

## 2017-01-08 NOTE — DISCHARGE SUMMARY
Alexandria Villanueva  1935  [id number]        Hospitalists Discharge Summary    Date of Admission: 1/2/2017  Date of Discharge:  1/8/2017    Primary Discharge Diagnoses:   1. Septic Shock  2. Complicated UTI  3. Probable PNA  4. Abdominal pain and N/V  5. 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 Elevated LFTs   7. DM-2  8. Acute on Chronic hypoxic respiratory failure    9. Nocturnal hypoxia and KIERA    10. Chronic diastolic CHF with Cor Pulmonale and pulmonary HTN  11. Chronic A-fib on coumadin    12. Chronic lymphedema  13. Vitamin D deficiency    14. H/O DVT    15. Hypokalemia   16. Acute on Chronic Back pain    PCP  Patient Care Team:  Gerardo Williamson MD as PCP - General  Gerardo Williamson MD as PCP - Family Medicine    Consults:   Consults     Date and Time Order Name Status Description    1/3/2017 0831 Inpatient Consult to Cardiology            Operations and Procedures Performed:       Xr Abdomen Flat & Upright    Result Date: 1/4/2017  Narrative: FLAT AND UPRIGHT VIEWS OF THE ABDOMEN  INDICATION: Flank pain and urinary tract infection for the past 4 days  PROCEDURE: Supine and upright views of the abdomen  COMPARISON: Portable chest from 01/03/2017  FINDINGS: Stable cardiomegaly and left basilar opacity/pleural fluid. No free air. Nonobstructed bowel gas pattern. No definite radiodense urinary system calculus but study is significantly degraded by body habitus      Impression: No clearly acute finding.  Cardiomegaly with persistent left basilar opacity/pleural fluid  This report was finalized on 1/4/2017 2:46 PM by Dr. Kei Diaz MD.      Ct Head Without Contrast    Result Date: 1/3/2017  Narrative: EXAM: NONCONTRAST CT HEAD  DATE: 01/02/2017 AT 1526  HISTORY: 81-year-old female became unresponsive around 1 PM at the nursing home. No known injury.  COMPARISON: Noncontrast CT head 06/12/2009.  FINDINGS: A few of the images are moderately degraded by motion. Allowing for  this limitation, no gross acute intracranial hemorrhage, mass lesion, mass effect, or midline shift. Gray matter-white matter junction distinction appears preserved, there is no compelling CT evidence of acute or evolving infarct. Physiologic calcifications demonstrated within the basal ganglia. No definite displaced calvarial fracture is seen. Major paranasal sinuses and mastoid air cells appear clear.      Impression: 1. This study is significantly degraded by patient motion, limiting the diagnostic quality of the study. Recommend repeat imaging, if required, when the patient is able to cooperate. 2. No gross acute intracranial findings. 3. Preliminary report was provided by Dr. Bateman on 01/02/2016 at 1609.  This report was finalized on 1/3/2017 9:46 AM by Dr. Ariadne Singer MD.      Mri Thoracic Spine With & Without Contrast    Result Date: 1/7/2017  Narrative: THORACIC SPINE MRI WITH/WITHOUT CONTRAST HISTORY: Chronic back pain with recent superimposed acute onset of back pain, patient unable to bear weight, recent history of sepsis and elevated white count. TECHNIQUE: Multiplanar imaging of the thoracic spine was performed with short and long TR. 20 cc of Multihance was used. FINDINGS: There are moderately severe degenerative changes seen at all thoracic disks. At the mid to lower thoracic levels prominent posterior disk osteophyte complexes are seen accompanied by facet hypertrophy. This causes moderate canal narrowing throughout the thoracic spine.  There is no evidence of cord compression as a result. T10-11 shows the most prominent central stenosis. The cord itself is normal in size and signal. There is no evidence of diskitis or vertebral osteomyelitis. There is no evidence of abnormal enhancement after contrast administration. No paraspinal mass lesions are noted. There is a chronic appearing healed wedge compression fracture of T12. No acute fractures are noted.     Impression:   1. There is  degenerative disk disease seen throughout the thoracic spine especially at the mid to lower thoracic levels most prominent at T10-11. Degree of central canal narrowing is only moderate without evidence of cord compression. 2. No evidence of focal cord lesion. 3. No evidence of diskitis or vertebral osteomyelitis. 4. Old healed mild wedge compression fracture of T12.    Xr Chest 1 View    Result Date: 1/3/2017  Narrative: EXAM: AP portable chest  DATE: 01/03/2017 at 1200  HISTORY: PICC line placement today  COMPARISON: AP portable chest 01/02/2017.  FINDINGS: Right arm approach PICC extends into the upper SVC. No visible pneumothorax. Persistent left basilar consolidation. Suspected small left pleural effusion stable cardiac enlargement.      Impression: : 1. Right arm approach PICC tip extends to the upper SVC level. No visible pneumothorax. The remainder of FINDINGS are not appreciated change from 01/02/2017 at 1503.  This report was finalized on 1/3/2017 12:09 PM by Dr. Ariadne Singer MD.      Xr Chest 1 View    Result Date: 1/3/2017  Narrative: EXAM: AP portable chest  DATE: 01/02/2017 at 1503  HISTORY: Unresponsive 2:00 PM today.  COMPARISON: AP portable chest 09/27/2016.  FINDINGS: Stable moderate cardiac enlargement particularly involving the left heart border. Suspected small left pleural effusion with left basilar atelectasis or infiltrate. Right lung appears free of acute airspace disease.. No pneumothorax. Mild central vascular congestion is thought to be present, but no erika pulmonary edema is identified.      Impression: 1. Suspected small left pleural effusion with left basilar atelectasis or infiltrate in the retrocardiac distribution. 2. Stable cardiomegaly. 3. Central vascular congestion. No erika pulmonary edema.   4. Preliminary report was provided by Dr. Diaz on 01/02/2016 6466.  This report was finalized on 1/3/2017 10:00 AM by Dr. Ariadne Singer MD.      Mri Lumbar Spine With & Without  Contrast    Result Date: 1/7/2017  Narrative: MRI OF LUMBAR SPINE WITH/WITHOUT CONTRAST HISTORY:  Acute superimposed on chronic mid and lower back pain with difficulty walking and difficulty with weight bearing on the right leg. Recent history of septic shock and elevated white count. TECHNIQUE: Multiplanar imaging of the lumbar spine was performed with short and long TR. 20 cc of Multihance was used. FINDING: There are degenerative changes at all lower thoracic and lumbar levels. At L1-2, the disk is collapsed. There is a broad based posterior disk osteophyte complex that results in only mild to moderate central stenosis. At the L2-3 level the disk is collapsed. There is a degenerative retrolisthesis of about 1 cm. This results in moderate central spinal stenosis. Facet hypertrophy is also seen. At L3-4, there is a broad based posterior disk osteophyte complex with advanced facet disease. Central stenosis is moderately severe. Foraminal stenosis is also moderately severe on both sides. At L4-5, there is broad based posterior disk bulging and osteophyte formation with advanced facet disease and severe central stenosis. Foraminal stenosis is moderate bilaterally. At L5-S1, the disk is degenerated but shows only minimal bulging. There is advanced facet hypertrophy with moderate central stenosis as a result. Foraminal stenosis at L5 S1 is mild to moderate on both sidesl The conus is normal.   The nerve roots of the cauda equina have a normal branching pattern. After contrast administration no abnormal enhancement is seen. There is no evidence of diskitis or vertebral osteomyelitis. No paraspinal abscesses are identified.     Impression:  1, Advanced multilevel lumbar degenerative disk and facet disease as described level by level. Central stenosis is most prominent at L4-5 but is also present to a significant degree at L3-4 and L5 S1. 2. No evidence of epidural abscess, diskitis or vertebral osteomyelitis. No active  inflammatory process is seen. No evidence of recent fracture.      Allergies:  is allergic to codeine; demerol [meperidine]; and propoxyphene.    [employer]  reviewed    Discharge Medications:   [facility]   Home Medication Instructions EDYTA:[id number]    Printed on:01/08/17 8370   Medication Information                      acetaminophen (TYLENOL) 325 MG tablet  Take 2 tablets by mouth Every 4 (Four) Hours As Needed for mild pain (1-3).             cefTRIAXone (ROCEPHIN) 1 G injection  Infuse 1 g into a venous catheter Daily for 8 days.             cholecalciferol (VITAMIN D3) 33264 UNITS capsule  Take 50,000 Units by mouth Every 30 (Thirty) Days.             citalopram (CeleXA) 20 MG tablet  Take 1 tablet by mouth Daily.             docusate sodium (COLACE) 250 MG capsule  Take 250 mg by mouth daily.             folic acid (FOLVITE) 1 MG tablet  Take 1 mg by mouth daily.             furosemide (LASIX) 40 MG tablet  Take 1 tablet by mouth 2 (Two) Times a Day.             gabapentin (NEURONTIN) 300 MG capsule  Take 300 mg by mouth 2 (two) times a day.             glucosamine-chondroitin 500-400 MG capsule capsule  Take 2 capsules by mouth daily.             guaiFENesin (MUCINEX) 600 MG 12 hr tablet  Take 1,200 mg by mouth 2 (Two) Times a Day.             HYDROcodone-acetaminophen (NORCO) 5-325 MG per tablet  Take 1 tablet by mouth Every 6 (Six) Hours As Needed for moderate pain (4-6).             insulin aspart (NovoLOG) 100 UNIT/ML injection  Inject 3 Units under the skin 3 (Three) Times a Day With Meals. Hold if blood sugar less than 120             insulin detemir (LEVEMIR) 100 UNIT/ML injection  Inject 20 Units under the skin Every Night.             ipratropium-albuterol (DUO-NEB) 0.5-2.5 mg/mL nebulizer  Take 3 mL by nebulization 4 (Four) Times a Day.             melatonin 5 MG tablet tablet  Take 5 mg by mouth every night.             metoprolol succinate XL (TOPROL-XL) 25 MG 24 hr tablet  Take 25 mg by  mouth daily.             multivitamin-minerals (OCUVITE) tablet  Take 1 tablet by mouth daily.             NON FORMULARY  2LNC continuous             pantoprazole (PROTONIX) 40 MG EC tablet  Take 40 mg by mouth every evening.             polyethylene glycol (MIRALAX) packet  Take 17 g by mouth daily.             predniSONE (DELTASONE) 5 MG tablet  Take 5 mg by mouth 2 (Two) Times a Day.             simvastatin (ZOCOR) 40 MG tablet  Take 40 mg by mouth every night.                 History of Present Illness:  80 y/o female with chronic a-fib on coumadin, hypertension, gout, chronic anemia, Chronic hypoxic respiratory failure with nocturnal hypoxia and KIERA, Chronic diastolic CHF, cor pulmonale and severe pulmonary HTN, h/o DVT, Chronic lymphedema, DM-2, CKD stage II-III, chronic immobility, Vitamin D deficiency and obesity presented to the ER via EMS from the [facility] secondary to lethagy and fever to 100.3. I spoke with the patient's nurse at the [facility] and she notes that yesterday the patient c/o abdominal pain and nausea. The abdominal pain per nursing started just below her sternum and moved down below her umbilicus and was crampy in nature. The patient vomited twice and the attending on call was notified and the patient was given a 1 time dose of phenergan. This caused some sedation in the patient then this morning the patient remained lethargic. The nurse at [facility] noted the patient looking very pale and a temp to 100.3 and the patient was sent to ER for evaluation. The patient was found to have low BP with elevated WBC, ARF and elevated LFTs, patient admitted to ICU with septic shock with suspected urinary source. The patient is very lethargy at the time of my exam but will open her eyes and answer my questions. She notes no crampy abdominal pain today and no more vomiting. Denies dysuria. Denies any SOA or CP. Denies heart palpitations. Notes soreness in her legs and feet. Denies any cough or  congestion. Denies any diarrhea.    Hospital Course  1. Septic Shock: Now resolved, Secondary to UTI and probable PNA, patient s/p fluid resusitation and was on levophed briefly secondary to decreased SVR. Now hemodynamics stable. Patient awake and alert and mentation at baseline. Good po intake. Blood culture e-coli and urine with e-coli. PCT trending down. Continue Abx's see below.      2. Complicated UTI: Patient started on Vanco and zosyn. + e-coli sensetive to zosyn. E-coli also sensative to Rocephin, WBC NL and patient afebrile, will change IV Rocephin to complete course, patient has PICC line in place.  (considered levoquin but multiple interactions with patient's chronic meds).   '    3. Probable PNA: Initially suspect atalectasis secondary to the patient lethargy but procalcitonin level was significantly elevated. Patient initiated on vanco and zosyn.   Patient not coughing up sputum for sample. No h/o MRSA, Patient denies SOA.  Abx's de-escalated during hospital course and will finish IV rocephin at NH as above.     4. Abdominal pain and N/V: Unclear etiology. Occurred at NH prior to admission, possibly secondary to sepsis and #2 above. Abdominal x-ray negative. Resolved and patient now tolerating po.  Appetite improving.      5. 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 Renal function at baseline and will resume lasix at 1/2 previous dose.       6. Elevated LFTs: Nearly back to NL, Suspect secondary to shock liver from hypotension. Monitor for now.  Ok to resume statin at discharge.      7. DM-2: Patient on home levemir, resume mealtime insulin at discharge. Continue to monitor accu checks.      8. Acute on Chronic hypoxic respiratory failure: Resolved, Suspect secondary to lethargy plus possible PNA. Continue IS. Patient now back on home 2L NC.      9. Nocturnal hypoxia and KIERA: O2 As above.        10. Chronic diastolic CHF with Cor  Pulmonale and pulmonary HTN: Currently no signs of volume overload, monitor closely as patient has received a significant amount of IVFs. Resuming 1/2 previous lasix dose (was on 80mg BID prior to admission).  Recheck BMP at NH and if ok can go back to previous dosing.      11. Chronic A-fib on coumadin: Cardiology followed here and BB resumed. INR supratheraputic, will D/c coumadin and monitor INR at NH. Patient with no overt bleeding. Indeterminate troponin noted and likely secondary to ARF and poor clearance. No s/s of acute cardiac process. EKG with A-fib.      12. Chronic lymphedema: no acute issues.      13. Vitamin D deficiency: resume supplementation at discharge.      14. H/O DVT: INR supra-theraputic, D/C coumadin and monitor. No acute issues here. See above.      15. Hypokalemia: Mag WNL, resolved with replacement.     16. Acute on Chronic Back pain: MRI negative for acute finding. DDDz. Continue Norco PRN.     Last Lab Results:   Lab Results (most recent)     Procedure Component Value Units Date/Time    Urinalysis With / Culture If Indicated [[id number]]  (Abnormal) Collected:  01/02/17 1451    Specimen:  Urine from Urine, Catheter Updated:  01/02/17 1507     Color, UA Yellow      Appearance, UA Cloudy (A)      pH, UA 7.0      Specific Gravity, UA 1.010      Glucose, UA Negative      Ketones, UA Negative      Bilirubin, UA Negative      Blood, UA Moderate (2+) (A)      Protein, UA 30 mg/dL (1+) (A)      Leuk Esterase, UA Large (3+) (A)      Nitrite, UA Negative      Urobilinogen, UA 1.0 E.U./dL     Urinalysis, Microscopic Only [[id number]]  (Abnormal) Collected:  01/02/17 1451    Specimen:  Urine from Urine, Catheter Updated:  01/02/17 1518     RBC, UA 3-5 (A) /HPF      WBC, UA Too Numerous to Count (A) /HPF      Bacteria, UA 4+ (A) /HPF      Squamous Epithelial Cells, UA 13-20 (A) /HPF      Hyaline Casts, UA None Seen /LPF      Methodology Manual Light Microscopy     CBC Auto Differential [[id number]]   (Abnormal) Collected:  01/02/17 1441    Specimen:  Blood Updated:  01/02/17 1523     WBC 25.35 (H) 10*3/mm3      RBC 4.47 10*6/mm3      Hemoglobin 12.9 g/dL      Hematocrit 40.0 %      MCV 89.5 fL      MCH 28.9 pg      MCHC 32.3 g/dL      RDW 14.0 %      RDW-SD 45.7 fl      MPV 10.7 (H) fL      Platelets 160 10*3/mm3     Manual Differential [[id number]]  (Abnormal) Collected:  01/02/17 1441    Specimen:  Blood Updated:  01/02/17 1523     Neutrophil % 68.0 %      Lymphocyte % 10.0 (L) %      Monocyte % 8.0 %      Bands %  14.0 (H) %      Neutrophils Absolute 20.79 (H) 10*3/mm3      Lymphocytes Absolute 2.54 10*3/mm3      Monocytes Absolute 2.03 (H) 10*3/mm3      RBC Morphology Normal      WBC Morphology Normal      Platelet Estimate Adequate     CBC & Differential [[id number]] Collected:  01/02/17 1441    Specimen:  Blood Updated:  01/02/17 1523    Narrative:       The following orders were created for panel order CBC & Differential.  Procedure                               Abnormality         Status                     ---------                               -----------         ------                     Manual Differential[[id number]]           Abnormal            Final result               Scan Slide[[id number]]                                                                   CBC Auto Differential[[id number]]         Abnormal            Final result                 Please view results for these tests on the individual orders.    Protime-INR [[id number]]  (Abnormal) Collected:  01/02/17 1441    Specimen:  Blood Updated:  01/02/17 1523     Protime 24.4 (H) Seconds      INR 2.26 (H)     Narrative:       Therapeutic Ranges for INR: 2.0-3.0 (PT 20-30)                              2.5-3.5 (PT 25-34)    aPTT [[id number]]  (Normal) Collected:  01/02/17 1441    Specimen:  Blood Updated:  01/02/17 1523     PTT 31.8 seconds     Narrative:       PTT = The equivalent PTT values for the therapeutic range of heparin levels at 0.1 to 0.7 U/ml  are 53 to 110 seconds.    Lactic Acid, Plasma [[id number]]  (Abnormal) Collected:  01/02/17 1441    Specimen:  Blood Updated:  01/02/17 1526     Lactate 2.1 (C) mmol/L     Comprehensive Metabolic Panel [[id number]]  (Abnormal) Collected:  01/02/17 1441    Specimen:  Blood Updated:  01/02/17 1548     Glucose 58 (L) mg/dL      BUN 46 (H) mg/dL      Creatinine 3.15 (H) mg/dL      Sodium 143 mmol/L      Potassium 4.4 mmol/L       Specimen hemolyzed.  Results may be affected.        Chloride 97 (L) mmol/L      CO2 30.9 (H) mmol/L      Calcium 9.3 mg/dL      Total Protein 4.8 (L) g/dL      Albumin 3.20 (L) g/dL      ALT (SGPT) 467 (H) U/L       Specimen hemolyzed.  Results may be affected.        AST (SGOT) 542 (H) U/L       Specimen hemolyzed.  Results may be affected.        Alkaline Phosphatase 187 (H) U/L      Total Bilirubin 2.8 (H) mg/dL      eGFR Non African Amer 14 (L) mL/min/1.73      Globulin 1.6 gm/dL      A/G Ratio 2.0 g/dL      BUN/Creatinine Ratio 14.6      Anion Gap 15.1 mmol/L     Narrative:       The MDRD GFR formula is only valid for adults with stable renal function between ages 18 and 70.    Troponin [[id number]]  (Abnormal) Collected:  01/02/17 1441    Specimen:  Blood Updated:  01/02/17 1548     Troponin T 0.036 (H) ng/mL       Specimen hemolyzed.  Results may be affected.       Narrative:       Troponin T Reference Ranges:  Less than 0.03 ng/mL:    Negative for AMI  0.03 to 0.09 ng/mL:      Indeterminant for AMI  Greater than 0.09 ng/mL: Positive for AMI    Blood Gas, Arterial [[id number]]  (Abnormal) Collected:  01/02/17 1618    Specimen:  Arterial Blood Updated:  01/02/17 1626     Site Arterial: left radial      Prashant's Test Positive      pH, Arterial 7.370 pH units      pCO2, Arterial 53.4 (H) mm Hg      pO2, Arterial 91.1 mm Hg      HCO3, Arterial 30.2 (H) mmol/L      Base Excess, Arterial 3.7 (H) mmol/L      O2 Saturation Calculated 96.6 %      Hemoglobin, Blood Gas 13.1 g/dL      Barometric Pressure  "for Blood Gas 747 mmHg      Modality Nasal Cannula      Flow Rate 5 lpm      Rate 24 Breaths/minute      Pulse Ox 95 %     Lactate Acid, Reflex [[id number]]  (Normal) Collected:  01/02/17 1850    Specimen:  Blood Updated:  01/02/17 1929     Lactate 1.8 mmol/L     POC Glucose Fingerstick [[id number]]  (Normal) Collected:  01/02/17 2114    Specimen:  Blood Updated:  01/02/17 2120     Glucose 84 mg/dL     Narrative:       Meter: [id number] : [id number] Prashant Loredo RN    POC Glucose Fingerstick [[id number]]  (Normal) Collected:  01/03/17 0009    Specimen:  Blood Updated:  01/03/17 0016     Glucose 91 mg/dL     Narrative:       Meter: [id number] : [id number] Davian Bee RN Validator    Procalcitonin [[id number]]  (Abnormal) Collected:  01/02/17 2300    Specimen:  Blood Updated:  01/03/17 0110     Procalcitonin 56.19 (C) ng/mL     Narrative:       As a Marker for Sepsis (Non-Neonates):   1. <0.5 ng/mL represents a low risk of severe sepsis and/or septic shock.  1. >2 ng/mL represents a high risk of severe sepsis and/or septic shock.    As a Marker for Lower Respiratory Tract Infections that require antibiotic therapy:  PCT on Admission     Antibiotic Therapy             6-12 Hrs later  > 0.5                Strongly Recommended            >0.25 - <0.5         Recommended  0.1 - 0.25           Discouraged                   Remeasure/reassess PCT  <0.1                 Strongly Discouraged          Remeasure/reassess PCT      As 28 day mortality risk marker: \"Change in Procalcitonin Result\" (> 80 % or <=80 %) if Day 0 (or Day 1) and Day 4 values are available. Refer to http://www.[employer]-pct-calculator.com/   Change in PCT <=80 %   A decrease of PCT levels below or equal to 80 % defines a positive change in PCT test result representing a higher risk for 28-day all-cause mortality of patients diagnosed with severe sepsis or septic shock.  Change in PCT > 80 %   A decrease of PCT levels of more than 80 % defines a negative " change in PCT result representing a lower risk for 28-day all-cause mortality of patients diagnosed with severe sepsis or septic shock.                CBC Auto Differential [[id number]]  (Abnormal) Collected:  01/03/17 0601    Specimen:  Blood Updated:  01/03/17 0630     WBC 19.76 (H) 10*3/mm3      RBC 4.54 10*6/mm3      Hemoglobin 13.0 g/dL      Hematocrit 39.9 %      MCV 87.9 fL      MCH 28.6 pg      MCHC 32.6 g/dL      RDW 14.4 %      RDW-SD 46.0 fl      MPV 10.4 fL      Platelets 134 (L) 10*3/mm3     POC Glucose Fingerstick [[id number]]  (Abnormal) Collected:  01/03/17 0627    Specimen:  Blood Updated:  01/03/17 0635     Glucose 157 (H) mg/dL     Narrative:       Meter: [id number] : [id number] Prashant Loredo RN    Lactic Acid, Plasma [[id number]]  (Normal) Collected:  01/03/17 0638    Specimen:  Blood Updated:  01/03/17 0659     Lactate 1.4 mmol/L     Urinalysis With / Culture If Indicated [[id number]]  (Abnormal) Collected:  01/03/17 0616    Specimen:  Urine from Urine, Catheter Updated:  01/03/17 0716     Color, UA Yellow      Appearance, UA Slightly Cloudy (A)       Corrected result. Previous result was Clear on 1/3/2017 at 0655 EST        pH, UA 6.5      Specific Gravity, UA <=1.005      Glucose, UA Negative      Ketones, UA Negative      Bilirubin, UA Negative      Blood, UA Large (3+) (A)      Protein, UA 30 mg/dL (1+) (A)      Leuk Esterase, UA Large (3+) (A)      Nitrite, UA Negative      Urobilinogen, UA 0.2 E.U./dL     Urinalysis, Microscopic Only [[id number]]  (Abnormal) Collected:  01/03/17 0616    Specimen:  Urine from Urine, Catheter Updated:  01/03/17 0716     RBC, UA 3-5 (A) /HPF      WBC, UA 31-50 (A) /HPF      Bacteria, UA 3+ (A) /HPF      Squamous Epithelial Cells, UA 3-6 (A) /HPF      Hyaline Casts, UA None Seen /LPF      Granular Casts, UA 0-2 /LPF      Methodology Manual Light Microscopy     CBC & Differential [[id number]] Collected:  01/03/17 0601    Specimen:  Blood Updated:  01/03/17 0736     Narrative:       The following orders were created for panel order CBC & Differential.  Procedure                               Abnormality         Status                     ---------                               -----------         ------                     Manual Differential[[id number]]           Abnormal            Final result               Scan Slide[[id number]]                                                                   CBC Auto Differential[[id number]]         Abnormal            Final result                 Please view results for these tests on the individual orders.    Manual Differential [[id number]]  (Abnormal) Collected:  01/03/17 0601    Specimen:  Blood Updated:  01/03/17 0736     Neutrophil % 78.0 (H) %      Lymphocyte % 6.0 (L) %      Monocyte % 5.0 %      Bands %  11.0 (H) %      Neutrophils Absolute 17.59 (H) 10*3/mm3      Lymphocytes Absolute 1.19 10*3/mm3      Monocytes Absolute 0.99 10*3/mm3      Anisocytosis Slight/1+      Poikilocytes Slight/1+      WBC Morphology Normal      Platelet Morphology Normal     Protime-INR [[id number]]  (Abnormal) Collected:  01/03/17 0934    Specimen:  Blood Updated:  01/03/17 0957     Protime 25.5 (H) Seconds      INR 2.39 (H)     Narrative:       Therapeutic Ranges for INR: 2.0-3.0 (PT 20-30)                              2.5-3.5 (PT 25-34)    Comprehensive Metabolic Panel [[id number]]  (Abnormal) Collected:  01/03/17 0934    Specimen:  Blood Updated:  01/03/17 1035     Glucose 247 (H) mg/dL      BUN 43 (H) mg/dL      Creatinine 2.31 (H) mg/dL      Sodium 143 mmol/L      Potassium 3.4 (L) mmol/L      Chloride 100 mmol/L      CO2 22.0 mmol/L      Calcium 7.9 (L) mg/dL      Total Protein 5.2 (L) g/dL      Albumin 2.60 (L) g/dL      ALT (SGPT) 301 (H) U/L      AST (SGOT) 237 (H) U/L      Alkaline Phosphatase 147 (H) U/L      Total Bilirubin 1.4 (H) mg/dL      eGFR Non African Amer 20 (L) mL/min/1.73      Globulin 2.6 gm/dL      A/G Ratio 1.0 g/dL      BUN/Creatinine  Ratio 18.6      Anion Gap 21.0 mmol/L     Narrative:       The MDRD GFR formula is only valid for adults with stable renal function between ages 18 and 70.    POC Glucose Fingerstick [[id number]]  (Abnormal) Collected:  01/03/17 1206    Specimen:  Blood Updated:  01/03/17 1215     Glucose 278 (H) mg/dL     Narrative:       Meter: [id number] : [id number] Faulker Carrie PCA    POC Glucose Fingerstick [[id number]]  (Abnormal) Collected:  01/03/17 1659    Specimen:  Blood Updated:  01/03/17 1724     Glucose 337 (H) mg/dL     Narrative:       Meter: [id number] : [id number] Faulker Carrie PCA    Vancomycin, Random [[id number]] Collected:  01/03/17 1804    Specimen:  Blood Updated:  01/03/17 1854     Vancomycin Random 12.00 mcg/mL     POC Glucose Fingerstick [[id number]]  (Abnormal) Collected:  01/03/17 2028    Specimen:  Blood Updated:  01/03/17 2034     Glucose 340 (H) mg/dL     Narrative:       Meter: [id number] : [id number] [facility]    Basic Metabolic Panel [[id number]]  (Abnormal) Collected:  01/04/17 0430    Specimen:  Blood from Arm, Right Updated:  01/04/17 0639     Glucose 272 (H) mg/dL      BUN 38 (H) mg/dL      Creatinine 1.93 (H) mg/dL      Sodium 141 mmol/L      Potassium 2.8 (L) mmol/L      Chloride 100 mmol/L      CO2 27.3 mmol/L      Calcium 8.4 (L) mg/dL      eGFR Non African Amer 25 (L) mL/min/1.73      BUN/Creatinine Ratio 19.7      Anion Gap 13.7 mmol/L     Narrative:       The MDRD GFR formula is only valid for adults with stable renal function between ages 18 and 70.    Protime-INR [[id number]]  (Abnormal) Collected:  01/04/17 0430    Specimen:  Blood from Arm, Right Updated:  01/04/17 0703     Protime 24.7 (H) Seconds      INR 2.19 (H)     Narrative:       Therapeutic Ranges for INR: 2.0-3.0 (PT 20-30)                              2.5-3.5 (PT 25-34)    CBC Auto Differential [[id number]]  (Abnormal) Collected:  01/04/17 0430    Specimen:  Blood from Arm, Right Updated:  01/04/17 0753     WBC 9.87  10*3/mm3      RBC 4.01 (L) 10*6/mm3      Hemoglobin 11.4 (L) g/dL      Hematocrit 35.8 (L) %      MCV 89.3 fL      MCH 28.4 pg      MCHC 31.8 g/dL      RDW 13.7 %      RDW-SD 45.2 fl      MPV 10.9 (H) fL      Platelets 109 (L) 10*3/mm3     POC Glucose Fingerstick [[id number]]  (Abnormal) Collected:  01/04/17 0828    Specimen:  Blood Updated:  01/04/17 0837     Glucose 229 (H) mg/dL     Narrative:       Meter: [id number] : [id number] David Edgar    Urine Culture [[id number]]  (Abnormal) Collected:  01/03/17 0616    Specimen:  Urine from Urine, Catheter Updated:  01/04/17 0842     Urine Culture <10,000 CFU/mL Escherichia coli (A)       Call if further workup needed.       CBC & Differential [[id number]] Collected:  01/04/17 0430    Specimen:  Blood Updated:  01/04/17 0843    Narrative:       The following orders were created for panel order CBC & Differential.  Procedure                               Abnormality         Status                     ---------                               -----------         ------                     Manual Differential[[id number]]           Abnormal            Final result               Scan Slide[[id number]]                                                                   CBC Auto Differential[[id number]]         Abnormal            Final result                 Please view results for these tests on the individual orders.    Manual Differential [[id number]]  (Abnormal) Collected:  01/04/17 0430    Specimen:  Blood from Arm, Right Updated:  01/04/17 0843     Neutrophil % 85.0 (H) %      Lymphocyte % 7.0 (L) %      Monocyte % 2.0 (L) %      Bands %  6.0 (H) %      Neutrophils Absolute 8.98 (H) 10*3/mm3      Lymphocytes Absolute 0.69 10*3/mm3      Monocytes Absolute 0.20 10*3/mm3      Anisocytosis Slight/1+      Poikilocytes Slight/1+      WBC Morphology Normal      Platelet Morphology Normal     Urine Culture [[id number]]  (Abnormal)  (Susceptibility) Collected:  01/02/17 1451    Specimen:   "Urine from Urine, Catheter Updated:  01/04/17 1010     Urine Culture >100,000 CFU/mL Escherichia coli (A)     Susceptibility      Escherichia coli     ERNESTO     Ampicillin >=32 ug/ml Resistant     Ampicillin + Sulbactam >=32 ug/ml Resistant     Cefazolin >=64 ug/ml Resistant     Cefepime <=1 ug/ml Susceptible     Ceftriaxone <=1 ug/ml Susceptible     Ciprofloxacin <=0.25 ug/ml Susceptible     Ertapenem <=0.5 ug/ml Susceptible     Gentamicin <=1 ug/ml Susceptible     Levofloxacin <=0.12 ug/ml Susceptible     Nitrofurantoin <=16 ug/ml Susceptible     Piperacillin + Tazobactam 8 ug/ml Susceptible     Tetracycline <=1 ug/ml Susceptible     Trimethoprim + Sulfamethoxazole <=20 ug/ml Susceptible                    Procalcitonin [[id number]]  (Abnormal) Collected:  01/04/17 0914    Specimen:  Blood Updated:  01/04/17 1125     Procalcitonin 14.71 (C) ng/mL     Narrative:       As a Marker for Sepsis (Non-Neonates):   1. <0.5 ng/mL represents a low risk of severe sepsis and/or septic shock.  1. >2 ng/mL represents a high risk of severe sepsis and/or septic shock.    As a Marker for Lower Respiratory Tract Infections that require antibiotic therapy:  PCT on Admission     Antibiotic Therapy             6-12 Hrs later  > 0.5                Strongly Recommended            >0.25 - <0.5         Recommended  0.1 - 0.25           Discouraged                   Remeasure/reassess PCT  <0.1                 Strongly Discouraged          Remeasure/reassess PCT      As 28 day mortality risk marker: \"Change in Procalcitonin Result\" (> 80 % or <=80 %) if Day 0 (or Day 1) and Day 4 values are available. Refer to http://www.[employer]-pct-calculator.com/   Change in PCT <=80 %   A decrease of PCT levels below or equal to 80 % defines a positive change in PCT test result representing a higher risk for 28-day all-cause mortality of patients diagnosed with severe sepsis or septic shock.  Change in PCT > 80 %   A decrease of PCT levels of more than 80 % " defines a negative change in PCT result representing a lower risk for 28-day all-cause mortality of patients diagnosed with severe sepsis or septic shock.                POC Glucose Fingerstick [[id number]]  (Abnormal) Collected:  01/04/17 1155    Specimen:  Blood Updated:  01/04/17 1201     Glucose 321 (H) mg/dL     Narrative:       Meter: [id number] : [id number] David Edagr    Magnesium [[id number]]  (Normal) Collected:  01/04/17 0914    Specimen:  Blood Updated:  01/04/17 1411     Magnesium 2.0 mg/dL     POC Glucose Fingerstick [[id number]]  (Abnormal) Collected:  01/04/17 1708    Specimen:  Blood Updated:  01/04/17 1715     Glucose 225 (H) mg/dL     Narrative:       Meter: [id number] : [id number] David Edgar    Vancomycin, Random [[id number]] Collected:  01/04/17 1915    Specimen:  Blood Updated:  01/04/17 1935     Vancomycin Random 16.90 mcg/mL     POC Glucose Fingerstick [[id number]]  (Abnormal) Collected:  01/04/17 2043    Specimen:  Blood Updated:  01/04/17 2049     Glucose 278 (H) mg/dL     Narrative:       Meter: [id number] : [id number] Sukhjinder French    Potassium [[id number]]  (Abnormal) Collected:  01/04/17 2237    Specimen:  Blood Updated:  01/04/17 2311     Potassium 3.4 (L) mmol/L     CBC & Differential [[id number]] Collected:  01/05/17 0359    Specimen:  Blood Updated:  01/05/17 0416    Narrative:       The following orders were created for panel order CBC & Differential.  Procedure                               Abnormality         Status                     ---------                               -----------         ------                     CBC Auto Differential[[id number]]         Abnormal            Final result                 Please view results for these tests on the individual orders.    CBC Auto Differential [[id number]]  (Abnormal) Collected:  01/05/17 0359    Specimen:  Blood Updated:  01/05/17 0416     WBC 11.55 (H) 10*3/mm3      RBC 4.30 10*6/mm3      Hemoglobin 12.1 g/dL       Hematocrit 38.2 %      MCV 88.8 fL      MCH 28.1 pg      MCHC 31.7 g/dL      RDW 13.6 %      RDW-SD 44.2 fl      MPV 10.6 (H) fL      Platelets 132 (L) 10*3/mm3      Neutrophil % 88.2 (H) %      Lymphocyte % 6.6 (L) %      Monocyte % 4.1 %      Eosinophil % 0.1 %      Basophil % 0.2 %      Immature Grans % 0.8 (H) %      Neutrophils, Absolute 10.20 (H) 10*3/mm3      Lymphocytes, Absolute 0.76 10*3/mm3      Monocytes, Absolute 0.47 10*3/mm3      Eosinophils, Absolute 0.01 (L) 10*3/mm3      Basophils, Absolute 0.02 10*3/mm3      Immature Grans, Absolute 0.09 (H) 10*3/mm3      nRBC 0.0 /100 WBC     Protime-INR [[id number]]  (Abnormal) Collected:  01/05/17 0359    Specimen:  Blood Updated:  01/05/17 0433     Protime 33.0 (H) Seconds      INR 3.14 (H)     Narrative:       Therapeutic Ranges for INR: 2.0-3.0 (PT 20-30)                              2.5-3.5 (PT 25-34)    Comprehensive Metabolic Panel [[id number]]  (Abnormal) Collected:  01/05/17 0403    Specimen:  Blood Updated:  01/05/17 0438     Glucose 157 (H) mg/dL      BUN 32 (H) mg/dL      Creatinine 1.61 (H) mg/dL      Sodium 143 mmol/L      Potassium 4.2 mmol/L      Chloride 107 mmol/L      CO2 23.5 mmol/L      Calcium 9.1 mg/dL      Total Protein 5.4 (L) g/dL      Albumin 2.40 (L) g/dL      ALT (SGPT) 152 (H) U/L      AST (SGOT) 27 U/L      Alkaline Phosphatase 129 U/L      Total Bilirubin 0.6 mg/dL      eGFR Non African Amer 31 (L) mL/min/1.73      Globulin 3.0 gm/dL      A/G Ratio 0.8 g/dL      BUN/Creatinine Ratio 19.9      Anion Gap 12.5 mmol/L     Narrative:       The MDRD GFR formula is only valid for adults with stable renal function between ages 18 and 70.    Blood Culture [[id number]]  (Abnormal)  (Susceptibility) Collected:  01/02/17 1516    Specimen:  Blood from Blood, Venous Line Updated:  01/05/17 0615     Blood Culture Abnormal Stain (A)       Escherichia coli (A)      Gram Stain Result Aerobic Bottle Gram negative bacilli     Susceptibility       Escherichia coli     ERNESTO     Ampicillin >=32 ug/ml Resistant     Ampicillin + Sulbactam >=32 ug/ml Resistant     Cefazolin >=64 ug/ml Resistant     Cefepime <=1 ug/ml Susceptible     Cefoxitin 32 ug/ml Resistant     Ceftriaxone <=1 ug/ml Susceptible     Ertapenem <=0.5 ug/ml Susceptible     Gentamicin <=1 ug/ml Susceptible     Levofloxacin <=0.12 ug/ml Susceptible     Meropenem <=0.25 ug/ml Susceptible     Piperacillin + Tazobactam 8 ug/ml Susceptible     Trimethoprim + Sulfamethoxazole <=20 ug/ml Susceptible                    POC Glucose Fingerstick [[id number]]  (Normal) Collected:  01/05/17 0741    Specimen:  Blood Updated:  01/05/17 0758     Glucose 119 mg/dL     Narrative:       Meter: [id number] : [id number] Valentin Charlene    Potassium [[id number]]  (Normal) Collected:  01/05/17 0911    Specimen:  Blood Updated:  01/05/17 0933     Potassium 3.9 mmol/L     POC Glucose Fingerstick [[id number]]  (Abnormal) Collected:  01/05/17 1151    Specimen:  Blood Updated:  01/05/17 1207     Glucose 151 (H) mg/dL     Narrative:       Meter: [id number] : [id number] Valentin Charlene    POC Glucose Fingerstick [[id number]]  (Abnormal) Collected:  01/05/17 1637    Specimen:  Blood Updated:  01/05/17 1655     Glucose 159 (H) mg/dL     Narrative:       Meter: [id number] : [id number] Valentin Charlene    POC Glucose Fingerstick [[id number]]  (Abnormal) Collected:  01/05/17 2052    Specimen:  Blood Updated:  01/05/17 2101     Glucose 158 (H) mg/dL     Narrative:       Meter: [id number] : [id number] Sukhjinder French    POC Glucose Fingerstick [[id number]]  (Abnormal) Collected:  01/05/17 2235    Specimen:  Blood Updated:  01/05/17 2243     Glucose 151 (H) mg/dL     Narrative:       Meter: [id number] : [id number] Mikey Alexander    CBC & Differential [[id number]] Collected:  01/06/17 0551    Specimen:  Blood Updated:  01/06/17 0608    Narrative:       The following orders were created for panel order CBC & Differential.  Procedure                                Abnormality         Status                     ---------                               -----------         ------                     CBC Auto Differential[[id number]]         Abnormal            Final result                 Please view results for these tests on the individual orders.    CBC Auto Differential [[id number]]  (Abnormal) Collected:  01/06/17 0551    Specimen:  Blood Updated:  01/06/17 0608     WBC 10.31 10*3/mm3      RBC 4.27 10*6/mm3      Hemoglobin 12.0 g/dL      Hematocrit 37.7 %      MCV 88.3 fL      MCH 28.1 pg      MCHC 31.8 g/dL      RDW 13.7 %      RDW-SD 44.6 fl      MPV 10.0 fL      Platelets 104 (L) 10*3/mm3      Neutrophil % 86.3 (H) %      Lymphocyte % 8.1 (L) %      Monocyte % 4.8 %      Eosinophil % 0.2 %      Basophil % 0.1 %      Immature Grans % 0.5 %      Neutrophils, Absolute 8.90 (H) 10*3/mm3      Lymphocytes, Absolute 0.84 10*3/mm3      Monocytes, Absolute 0.49 10*3/mm3      Eosinophils, Absolute 0.02 (L) 10*3/mm3      Basophils, Absolute 0.01 10*3/mm3      Immature Grans, Absolute 0.05 (H) 10*3/mm3      nRBC 0.0 /100 WBC     Comprehensive Metabolic Panel [[id number]]  (Abnormal) Collected:  01/06/17 0551    Specimen:  Blood Updated:  01/06/17 0621     Glucose 95 mg/dL      BUN 26 (H) mg/dL      Creatinine 1.34 (H) mg/dL      Sodium 145 mmol/L      Potassium 4.5 mmol/L      Chloride 109 (H) mmol/L      CO2 25.6 mmol/L      Calcium 9.6 mg/dL      Total Protein 5.3 (L) g/dL      Albumin 2.80 (L) g/dL      ALT (SGPT) 95 (H) U/L      AST (SGOT) 17 U/L      Alkaline Phosphatase 115 U/L      Total Bilirubin 0.5 mg/dL      eGFR Non African Amer 38 (L) mL/min/1.73      Globulin 2.5 gm/dL      A/G Ratio 1.1 g/dL      BUN/Creatinine Ratio 19.4      Anion Gap 10.4 mmol/L     Narrative:       The MDRD GFR formula is only valid for adults with stable renal function between ages 18 and 70.    Protime-INR [[id number]]  (Abnormal) Collected:  01/06/17 0551    Specimen:  Blood  Updated:  01/06/17 0628     Protime 33.1 (H) Seconds      INR 3.16 (H)     Narrative:       Therapeutic Ranges for INR: 2.0-3.0 (PT 20-30)                              2.5-3.5 (PT 25-34)    POC Glucose Fingerstick [[id number]]  (Normal) Collected:  01/06/17 0727    Specimen:  Blood Updated:  01/06/17 0744     Glucose 73 mg/dL     Narrative:       Meter: [id number] : [id number] Valentin Chang    Vancomycin, Random [[id number]] Collected:  01/06/17 0944    Specimen:  Blood Updated:  01/06/17 1013     Vancomycin Random 15.40 mcg/mL     POC Glucose Fingerstick [[id number]]  (Normal) Collected:  01/06/17 1255    Specimen:  Blood Updated:  01/06/17 1320     Glucose 106 mg/dL     Narrative:       Meter: [id number] : [id number]    POC Glucose Fingerstick [[id number]]  (Abnormal) Collected:  01/06/17 1621    Specimen:  Blood Updated:  01/06/17 1633     Glucose 136 (H) mg/dL     Narrative:       Meter: [id number] : [id number] Valentin Chang    POC Glucose Fingerstick [[id number]]  (Abnormal) Collected:  01/06/17 2035    Specimen:  Blood Updated:  01/06/17 2043     Glucose 199 (H) mg/dL     Narrative:       Meter: [id number] : [id number] Murali Zhou CNA    POC Glucose Fingerstick [[id number]]  (Normal) Collected:  01/07/17 0746    Specimen:  Blood Updated:  01/07/17 0753     Glucose 88 mg/dL     Narrative:       Meter: [id number] : [id number] Eagle Kim    POC Glucose Fingerstick [[id number]]  (Abnormal) Collected:  01/07/17 1203    Specimen:  Blood Updated:  01/07/17 1210     Glucose 136 (H) mg/dL     Narrative:       Meter: [id number] : [id number] Alexsandra BETANCOURT    Blood Culture [[id number]]  (Normal) Collected:  01/02/17 1441    Specimen:  Blood from Blood, Venous Line Updated:  01/07/17 1601     Blood Culture No growth at 5 days     POC Glucose Fingerstick [[id number]]  (Abnormal) Collected:  01/07/17 1635    Specimen:  Blood Updated:  01/07/17 1641     Glucose 177 (H) mg/dL     Narrative:       Meter:  [id number] : [id number] Eagle Kim    POC Glucose Fingerstick [[id number]]  (Abnormal) Collected:  01/07/17 2056    Specimen:  Blood Updated:  01/07/17 2103     Glucose 243 (H) mg/dL     Narrative:       Meter: [id number] : [id number] Reginaldo Alarcon    POC Glucose Fingerstick [[id number]]  (Normal) Collected:  01/08/17 0753    Specimen:  Blood Updated:  01/08/17 0805     Glucose 96 mg/dL     Narrative:       Meter: [id number] : [id number] Kimber Butler [facility]    Comprehensive Metabolic Panel [[id number]]  (Abnormal) Collected:  01/08/17 0934    Specimen:  Blood Updated:  01/08/17 1002     Glucose 127 (H) mg/dL      BUN 18 mg/dL      Creatinine 0.96 mg/dL      Sodium 135 (L) mmol/L      Potassium 3.9 mmol/L      Chloride 101 mmol/L      CO2 21.6 (L) mmol/L      Calcium 8.6 (L) mg/dL      Total Protein 4.5 (L) g/dL      Albumin 2.10 (L) g/dL      ALT (SGPT) 38 (H) U/L      AST (SGOT) 10 U/L      Alkaline Phosphatase 93 U/L      Total Bilirubin 0.3 mg/dL      eGFR Non African Amer 56 (L) mL/min/1.73      Globulin 2.4 gm/dL      A/G Ratio 0.9 g/dL      BUN/Creatinine Ratio 18.8      Anion Gap 12.4 mmol/L     Narrative:       The MDRD GFR formula is only valid for adults with stable renal function between ages 18 and 70.        Imaging Results (most recent)     Procedure Component Value Units Date/Time    CT Head Without Contrast [[id number]] Collected:  01/03/17 0844     Updated:  01/03/17 0948    Narrative:       EXAM: NONCONTRAST CT HEAD      DATE: 01/02/2017 AT 1526      HISTORY: 81-year-old female became unresponsive around 1 PM at the  nursing home. No known injury.     COMPARISON: Noncontrast CT head 06/12/2009.     FINDINGS: A few of the images are moderately degraded by motion.  Allowing for this limitation, no gross acute intracranial hemorrhage,  mass lesion, mass effect, or midline shift. Gray matter-white matter  junction distinction appears preserved, there is no compelling CT  evidence of acute or  evolving infarct. Physiologic calcifications  demonstrated within the basal ganglia. No definite displaced calvarial  fracture is seen. Major paranasal sinuses and mastoid air cells appear  clear.       Impression:       1. This study is significantly degraded by patient motion, limiting the  diagnostic quality of the study. Recommend repeat imaging, if required,  when the patient is able to cooperate.  2. No gross acute intracranial findings.  3. Preliminary report was provided by Dr. Bateman on 01/02/2016 at  1609.     This report was finalized on 1/3/2017 9:46 AM by Dr. Ariadne Singer MD.       XR Chest 1 View [[id number]] Collected:  01/03/17 0957     Updated:  01/03/17 1002    Narrative:       EXAM: AP portable chest     DATE: 01/02/2017 at 1503     HISTORY: Unresponsive 2:00 PM today.     COMPARISON: AP portable chest 09/27/2016.     FINDINGS: Stable moderate cardiac enlargement particularly involving the  left heart border. Suspected small left pleural effusion with left  basilar atelectasis or infiltrate. Right lung appears free of acute  airspace disease.. No pneumothorax. Mild central vascular congestion is  thought to be present, but no erika pulmonary edema is identified.       Impression:       1. Suspected small left pleural effusion with left basilar atelectasis  or infiltrate in the retrocardiac distribution.  2. Stable cardiomegaly.  3. Central vascular congestion. No erika pulmonary edema.        4. Preliminary report was provided by Dr. Diaz on 01/02/2016 1855.     This report was finalized on 1/3/2017 10:00 AM by Dr. Ariadne Singer MD.       XR Chest 1 View [[id number]] Collected:  01/03/17 1208     Updated:  01/03/17 1211    Narrative:       EXAM: AP portable chest     DATE: 01/03/2017 at 1200     HISTORY: PICC line placement today     COMPARISON: AP portable chest 01/02/2017.     FINDINGS: Right arm approach PICC extends into the upper SVC. No visible  pneumothorax. Persistent left basilar  consolidation. Suspected small  left pleural effusion stable cardiac enlargement.       Impression:       :  1. Right arm approach PICC tip extends to the upper SVC level. No  visible pneumothorax. The remainder of FINDINGS are not appreciated  change from 01/02/2017 at 1503.     This report was finalized on 1/3/2017 12:09 PM by Dr. Ariadne Singer MD.       XR Abdomen Flat & Upright [[id number]] Collected:  01/04/17 1442     Updated:  01/04/17 1448    Narrative:       FLAT AND UPRIGHT VIEWS OF THE ABDOMEN     INDICATION: Flank pain and urinary tract infection for the past 4 days     PROCEDURE: Supine and upright views of the abdomen     COMPARISON: Portable chest from 01/03/2017     FINDINGS: Stable cardiomegaly and left basilar opacity/pleural fluid. No  free air. Nonobstructed bowel gas pattern. No definite radiodense  urinary system calculus but study is significantly degraded by body  habitus       Impression:       No clearly acute finding.     Cardiomegaly with persistent left basilar opacity/pleural fluid     This report was finalized on 1/4/2017 2:46 PM by Dr. Kei Diaz MD.       MRI Thoracic Spine With & Without Contrast [[id number]] Resulted:  01/07/17 0806     Updated:  01/07/17 0806    Narrative:       THORACIC SPINE MRI WITH/WITHOUT CONTRAST    HISTORY: Chronic back pain with recent superimposed acute onset of back   pain, patient unable to bear weight, recent history of sepsis and elevated   white count.    TECHNIQUE: Multiplanar imaging of the thoracic spine was performed with   short and long TR. 20 cc of Multihance was used.    FINDINGS:    There are moderately severe degenerative changes seen at all thoracic   disks. At the mid to lower thoracic levels prominent posterior disk   osteophyte complexes are seen accompanied by facet hypertrophy. This   causes moderate canal narrowing throughout the thoracic spine.  There is   no evidence of cord compression as a result. T10-11 shows the most   prominent  central stenosis. The cord itself is normal in size and signal.   There is no evidence of diskitis or vertebral osteomyelitis. There is no   evidence of abnormal enhancement after contrast administration. No   paraspinal mass lesions are noted. There is a chronic appearing healed   wedge compression fracture of T12. No acute fractures are noted.      Impression:           1. There is degenerative disk disease seen throughout the thoracic spine   especially at the mid to lower thoracic levels most prominent at T10-11.   Degree of central canal narrowing is only moderate without evidence of   cord compression.  2. No evidence of focal cord lesion.  3. No evidence of diskitis or vertebral osteomyelitis.  4. Old healed mild wedge compression fracture of T12.    MRI Lumbar Spine With & Without Contrast [[id number]] Resulted:  01/07/17 0806     Updated:  01/07/17 0806    Narrative:       MRI OF LUMBAR SPINE WITH/WITHOUT CONTRAST    HISTORY:  Acute superimposed on chronic mid and lower back pain with   difficulty walking and difficulty with weight bearing on the right leg.   Recent history of septic shock and elevated white count.    TECHNIQUE: Multiplanar imaging of the lumbar spine was performed with   short and long TR. 20 cc of Multihance was used.    FINDING:    There are degenerative changes at all lower thoracic and lumbar levels.    At L1-2, the disk is collapsed. There is a broad based posterior disk   osteophyte complex that results in only mild to moderate central stenosis.       At the L2-3 level the disk is collapsed. There is a degenerative   retrolisthesis of about 1 cm. This results in moderate central spinal   stenosis. Facet hypertrophy is also seen.    At L3-4, there is a broad based posterior disk osteophyte complex with   advanced facet disease. Central stenosis is moderately severe. Foraminal   stenosis is also moderately severe on both sides.    At L4-5, there is broad based posterior disk bulging and  osteophyte   formation with advanced facet disease and severe central stenosis.   Foraminal stenosis is moderate bilaterally.    At L5-S1, the disk is degenerated but shows only minimal bulging. There is   advanced facet hypertrophy with moderate central stenosis as a result.   Foraminal stenosis at L5 S1 is mild to moderate on both sidesl    The conus is normal.   The nerve roots of the cauda equina have a normal   branching pattern. After contrast administration no abnormal enhancement   is seen. There is no evidence of diskitis or vertebral osteomyelitis. No   paraspinal abscesses are identified.      Impression:          1, Advanced multilevel lumbar degenerative disk and facet disease as   described level by level. Central stenosis is most prominent at L4-5 but   is also present to a significant degree at L3-4 and L5 S1.  2. No evidence of epidural abscess, diskitis or vertebral osteomyelitis.   No active inflammatory process is seen. No evidence of recent fracture.          PROCEDURES      Condition on Discharge:  Stable    Physical Exam at Discharge  Vital Signs  Temp:  [97.1 °F (36.2 °C)-97.6 °F (36.4 °C)] 97.2 °F (36.2 °C)  Heart Rate:  [80-89] 85  Resp:  [16-20] 20  BP: (118-130)/(65-76) 130/65   O2 Saturations: 95% on 2L    Physical Exam:  Physical Exam   Constitutional: Patient appears well-developed, morbidly obese and in no acute distress   HEENT:   Head: Normocephalic and atraumatic.   Eyes:  Pupils are equal, round, and reactive to light. EOM are intact. Sclera are anicteric and non-injected.  Mouth and Throat: Patient has moist mucous membranes. Oropharynx is clear of any erythema or exudate.     Neck: Neck supple. No JVD noted.   Cardiovascular: Irregularly irregular rhythm, Rate NL.  Exam reveals no gallop and no friction rub.  No murmur heard.  Pulmonary/Chest: Lungs with diminished breath sounds at bases. No respiratory distress. No wheezes. No rhonchi. No rales.   Abdominal: Morbidly obese.  Soft. Bowel sounds are normal. There is no tenderness.   Extremities: Chronic lymphedema unchanged. Pulses are palpable in all 4 extremities.  Neurological: Patient is alert and oriented to person, place, and time  Skin: PICC in RUE, Healing bruises on extremities.    Discharge Disposition  [facility]  PT/OT to eval and treat  BMP on 1/11/16  INR on 1/9/16  Routine PICC care    Visiting Nurse:    No     Home PT/OT:  No     Home Safety Evaluation:  No     DME  N/A    Discharge Diet:           Dietary Orders            Start     Ordered    01/05/17 0000  Dietary Nutrition Supplement: Essie Breakfast Essentials  Once     Question:  Select Supplement:  Answer:  Essie Breakfast Essentials    01/04/17 2019 01/04/17 2019  Diet Soft Texture; Ground; Cardiac, Consistent Carbohydrate  Diet Effective Now     Question Answer Comment   Diet Texture / Consistency Soft Texture    Select Texture: Ground    Common Modifiers Cardiac    Common Modifiers Consistent Carbohydrate        01/04/17 2019          Activity at Discharge:  With assistance    Pre-discharge education  N/A      Follow-up Appointments  No future appointments.  Referrals and Follow-ups to Schedule     Follow-Up    As directed    Hospitalists to continue to follow at [facility]             Hospitalist group to follow at the [place].    Test Results Pending at Discharge       Glendy Gonsalez MD  01/08/17  2:14 PM    Time: Discharge >30 min Discussion of plan with patient, ordering and review of labs, documentation, review of chart and orders.

## 2017-01-08 NOTE — PLAN OF CARE
Problem: Patient Care Overview (Adult)  Goal: Discharge Needs Assessment  Outcome: Ongoing (interventions implemented as appropriate)    01/02/17 2000 01/08/17 6890   Discharge Needs Assessment   Discharge Planning Comments --  patient transferred back to [facility] to intermediate level of care.    Living Environment   Transportation Available van, wheelchair accessible --    Self-Care   Equipment Currently Used at Home wheelchair;lift device --

## 2017-01-08 NOTE — PLAN OF CARE
Problem: Patient Care Overview (Adult)  Goal: Discharge Needs Assessment  Outcome: Ongoing (interventions implemented as appropriate)    01/02/17 2000 01/08/17 4699   Discharge Needs Assessment   Discharge Planning Comments --  Per patient's request notified Marissa, sister, of patient's transfer back to [facility] today per ambulance. Marissa will notify any other family members. Marissa's phone # : [phone].   Living Environment   Transportation Available van, wheelchair accessible --    Self-Care   Equipment Currently Used at Home wheelchair;lift device --

## 2017-01-08 NOTE — NURSING NOTE
Notified Chyna @[facility] of planned transfer back today.  Nurse to call report to Carly on [address].    Phone [phone]  Fax  [phone]

## 2017-01-08 NOTE — PLAN OF CARE
Problem: Patient Care Overview (Adult)  Goal: Discharge Needs Assessment  Outcome: Ongoing (interventions implemented as appropriate)    01/02/17 2000 01/08/17 7043   Discharge Needs Assessment   Discharge Planning Comments --  Notified Chyna at [facility] of patient's planned transfer back today. Nurse to call report to Carly on [address].    Living Environment   Transportation Available van, wheelchair accessible --    Self-Care   Equipment Currently Used at Home wheelchair;lift device --

## 2017-01-12 ENCOUNTER — APPOINTMENT (OUTPATIENT)
Dept: GENERAL RADIOLOGY | Facility: HOSPITAL | Age: 82
End: 2017-01-12

## 2017-01-12 ENCOUNTER — HOSPITAL ENCOUNTER (INPATIENT)
Facility: HOSPITAL | Age: 82
LOS: 5 days | Discharge: NURSING, REHAB OR LONG-TERM CARE | End: 2017-01-17
Attending: EMERGENCY MEDICINE | Admitting: INTERNAL MEDICINE

## 2017-01-12 ENCOUNTER — APPOINTMENT (OUTPATIENT)
Dept: CT IMAGING | Facility: HOSPITAL | Age: 82
End: 2017-01-12

## 2017-01-12 DIAGNOSIS — J96.20 ACUTE ON CHRONIC RESPIRATORY FAILURE, UNSPECIFIED WHETHER WITH HYPOXIA OR HYPERCAPNIA (HCC): Primary | ICD-10-CM

## 2017-01-12 DIAGNOSIS — I50.9 CONGESTIVE HEART FAILURE, UNSPECIFIED CONGESTIVE HEART FAILURE CHRONICITY, UNSPECIFIED CONGESTIVE HEART FAILURE TYPE: ICD-10-CM

## 2017-01-12 DIAGNOSIS — J10.1 INFLUENZA A: ICD-10-CM

## 2017-01-12 PROBLEM — Z79.01 CHRONIC ANTICOAGULATION: Chronic | Status: ACTIVE | Noted: 2017-01-12

## 2017-01-12 LAB
ALBUMIN SERPL-MCNC: 2.8 G/DL (ref 3.5–5.2)
ALBUMIN/GLOB SERPL: 0.9 G/DL
ALP SERPL-CCNC: 115 U/L (ref 40–129)
ALT SERPL W P-5'-P-CCNC: 22 U/L (ref 5–33)
AMYLASE SERPL-CCNC: 55 U/L (ref 28–100)
ANION GAP SERPL CALCULATED.3IONS-SCNC: 12.6 MMOL/L
ARTERIAL PATENCY WRIST A: POSITIVE
ARTERIAL PATENCY WRIST A: POSITIVE
AST SERPL-CCNC: 19 U/L (ref 5–32)
ATMOSPHERIC PRESS: 748 MMHG
ATMOSPHERIC PRESS: 749 MMHG
BASE EXCESS BLDA CALC-SCNC: 6.6 MMOL/L (ref -2.3–2.3)
BASE EXCESS BLDA CALC-SCNC: 8.2 MMOL/L (ref -2.3–2.3)
BASOPHILS # BLD AUTO: 0.02 10*3/MM3 (ref 0–0.2)
BASOPHILS NFR BLD AUTO: 0.2 % (ref 0–2)
BDY SITE: ABNORMAL
BDY SITE: ABNORMAL
BILIRUB SERPL-MCNC: 0.4 MG/DL (ref 0.2–1.2)
BILIRUB UR QL STRIP: NEGATIVE
BUN BLD-MCNC: 19 MG/DL (ref 8–23)
BUN/CREAT SERPL: 18.3 (ref 7–25)
CALCIUM SPEC-SCNC: 9.7 MG/DL (ref 8.8–10.5)
CHLORIDE SERPL-SCNC: 104 MMOL/L (ref 98–107)
CLARITY UR: ABNORMAL
CO2 SERPL-SCNC: 29.4 MMOL/L (ref 22–29)
COLOR UR: YELLOW
CREAT BLD-MCNC: 1.04 MG/DL (ref 0.57–1)
D-LACTATE SERPL-SCNC: 0.7 MMOL/L (ref 0.5–2)
DEPRECATED RDW RBC AUTO: 45.7 FL (ref 37–54)
EOSINOPHIL # BLD AUTO: 0.14 10*3/MM3 (ref 0.1–0.3)
EOSINOPHIL NFR BLD AUTO: 1.7 % (ref 0–4)
EPAP: 6
EPAP: 8
ERYTHROCYTE [DISTWIDTH] IN BLOOD BY AUTOMATED COUNT: 13.8 % (ref 11.5–14.5)
FLUAV AG NPH QL: POSITIVE
FLUBV AG NPH QL IA: NEGATIVE
GFR SERPL CREATININE-BSD FRML MDRD: 51 ML/MIN/1.73
GLOBULIN UR ELPH-MCNC: 3.1 GM/DL
GLUCOSE BLD-MCNC: 98 MG/DL (ref 65–99)
GLUCOSE BLDC GLUCOMTR-MCNC: 106 MG/DL (ref 70–130)
GLUCOSE BLDC GLUCOMTR-MCNC: 114 MG/DL (ref 70–130)
GLUCOSE BLDC GLUCOMTR-MCNC: 174 MG/DL (ref 70–130)
GLUCOSE UR STRIP-MCNC: NEGATIVE MG/DL
HCO3 BLDA-SCNC: 33 MMOL/L (ref 22–26)
HCO3 BLDA-SCNC: 35 MMOL/L (ref 22–26)
HCT VFR BLD AUTO: 38.7 % (ref 37–47)
HGB BLD-MCNC: 12.1 G/DL (ref 12–16)
HGB BLDA-MCNC: 10.4 G/DL (ref 12–18)
HGB BLDA-MCNC: 13.3 G/DL (ref 12–18)
HGB UR QL STRIP.AUTO: NEGATIVE
HOROWITZ INDEX BLD+IHG-RTO: 40 %
HOROWITZ INDEX BLD+IHG-RTO: 40 %
IMM GRANULOCYTES # BLD: 0.08 10*3/MM3 (ref 0–0.03)
IMM GRANULOCYTES NFR BLD: 0.9 % (ref 0–0.5)
INR PPP: 2.43 (ref 0.9–1.1)
INR PPP: 2.49 (ref 0.9–1.1)
IPAP: 12
IPAP: 14
KETONES UR QL STRIP: NEGATIVE
LEUKOCYTE ESTERASE UR QL STRIP.AUTO: NEGATIVE
LIPASE SERPL-CCNC: 22 U/L (ref 13–60)
LYMPHOCYTES # BLD AUTO: 0.83 10*3/MM3 (ref 0.6–4.8)
LYMPHOCYTES NFR BLD AUTO: 9.8 % (ref 20–45)
MCH RBC QN AUTO: 28.1 PG (ref 27–31)
MCHC RBC AUTO-ENTMCNC: 31.3 G/DL (ref 31–37)
MCV RBC AUTO: 89.8 FL (ref 81–99)
MODALITY: ABNORMAL
MODALITY: ABNORMAL
MONOCYTES # BLD AUTO: 0.44 10*3/MM3 (ref 0–1)
MONOCYTES NFR BLD AUTO: 5.2 % (ref 3–8)
NEUTROPHILS # BLD AUTO: 6.92 10*3/MM3 (ref 1.5–8.3)
NEUTROPHILS NFR BLD AUTO: 82.2 % (ref 45–70)
NITRITE UR QL STRIP: NEGATIVE
NRBC BLD MANUAL-RTO: 0 /100 WBC (ref 0–0)
NT-PROBNP SERPL-MCNC: 7334 PG/ML (ref 5–450)
PCO2 BLDA: 57.2 MM HG (ref 35–45)
PCO2 BLDA: 58.5 MM HG (ref 35–45)
PH BLDA: 7.38 PH UNITS (ref 7.35–7.45)
PH BLDA: 7.39 PH UNITS (ref 7.35–7.45)
PH UR STRIP.AUTO: 5.5 [PH] (ref 4.5–8)
PLATELET # BLD AUTO: 234 10*3/MM3 (ref 140–500)
PMV BLD AUTO: 9.8 FL (ref 7.4–10.4)
PO2 BLDA: 69.3 MM HG (ref 80–100)
PO2 BLDA: 76.8 MM HG (ref 80–100)
POTASSIUM BLD-SCNC: 4 MMOL/L (ref 3.5–5.2)
PROT SERPL-MCNC: 5.9 G/DL (ref 6–8.5)
PROT UR QL STRIP: NEGATIVE
PROTHROMBIN TIME: 26.9 SECONDS (ref 12.1–15)
PROTHROMBIN TIME: 27.4 SECONDS (ref 12.1–15)
PULSE OX: 95 %
RBC # BLD AUTO: 4.31 10*6/MM3 (ref 4.2–5.4)
SAO2 % BLDCOA: 94.2 % (ref 91–100)
SAO2 % BLDCOA: 95 % (ref 91–100)
SET MECH RESP RATE: 14
SET MECH RESP RATE: 16
SODIUM BLD-SCNC: 146 MMOL/L (ref 136–145)
SP GR UR STRIP: 1.01 (ref 1–1.03)
TOTAL RATE: 22 BREATHS/MINUTE
TROPONIN T SERPL-MCNC: <0.01 NG/ML (ref 0–0.03)
UROBILINOGEN UR QL STRIP: ABNORMAL
WBC NRBC COR # BLD: 8.43 10*3/MM3 (ref 4.8–10.8)

## 2017-01-12 PROCEDURE — 84484 ASSAY OF TROPONIN QUANT: CPT | Performed by: EMERGENCY MEDICINE

## 2017-01-12 PROCEDURE — 99222 1ST HOSP IP/OBS MODERATE 55: CPT | Performed by: INTERNAL MEDICINE

## 2017-01-12 PROCEDURE — 25010000002 MEROPENEM PER 100 MG: Performed by: INTERNAL MEDICINE

## 2017-01-12 PROCEDURE — 25010000002 VANCOMYCIN 750 MG RECONSTITUTED SOLUTION 1 EACH VIAL: Performed by: INTERNAL MEDICINE

## 2017-01-12 PROCEDURE — 85610 PROTHROMBIN TIME: CPT | Performed by: EMERGENCY MEDICINE

## 2017-01-12 PROCEDURE — 82803 BLOOD GASES ANY COMBINATION: CPT | Performed by: EMERGENCY MEDICINE

## 2017-01-12 PROCEDURE — 94799 UNLISTED PULMONARY SVC/PX: CPT

## 2017-01-12 PROCEDURE — 83880 ASSAY OF NATRIURETIC PEPTIDE: CPT | Performed by: EMERGENCY MEDICINE

## 2017-01-12 PROCEDURE — 80053 COMPREHEN METABOLIC PANEL: CPT | Performed by: EMERGENCY MEDICINE

## 2017-01-12 PROCEDURE — 82803 BLOOD GASES ANY COMBINATION: CPT | Performed by: INTERNAL MEDICINE

## 2017-01-12 PROCEDURE — 71250 CT THORAX DX C-: CPT

## 2017-01-12 PROCEDURE — 99291 CRITICAL CARE FIRST HOUR: CPT | Performed by: EMERGENCY MEDICINE

## 2017-01-12 PROCEDURE — 63710000001 INSULIN ASPART PER 5 UNITS: Performed by: INTERNAL MEDICINE

## 2017-01-12 PROCEDURE — 36600 WITHDRAWAL OF ARTERIAL BLOOD: CPT | Performed by: EMERGENCY MEDICINE

## 2017-01-12 PROCEDURE — 25010000002 VANCOMYCIN PER 500 MG: Performed by: INTERNAL MEDICINE

## 2017-01-12 PROCEDURE — 82150 ASSAY OF AMYLASE: CPT | Performed by: INTERNAL MEDICINE

## 2017-01-12 PROCEDURE — 85610 PROTHROMBIN TIME: CPT | Performed by: INTERNAL MEDICINE

## 2017-01-12 PROCEDURE — 94640 AIRWAY INHALATION TREATMENT: CPT

## 2017-01-12 PROCEDURE — 94660 CPAP INITIATION&MGMT: CPT

## 2017-01-12 PROCEDURE — 63710000001 INSULIN DETEMIR PER 5 UNITS: Performed by: INTERNAL MEDICINE

## 2017-01-12 PROCEDURE — 99284 EMERGENCY DEPT VISIT MOD MDM: CPT

## 2017-01-12 PROCEDURE — 87804 INFLUENZA ASSAY W/OPTIC: CPT | Performed by: EMERGENCY MEDICINE

## 2017-01-12 PROCEDURE — 71010 HC CHEST PA OR AP: CPT

## 2017-01-12 PROCEDURE — 85025 COMPLETE CBC W/AUTO DIFF WBC: CPT | Performed by: EMERGENCY MEDICINE

## 2017-01-12 PROCEDURE — 36600 WITHDRAWAL OF ARTERIAL BLOOD: CPT | Performed by: INTERNAL MEDICINE

## 2017-01-12 PROCEDURE — 87040 BLOOD CULTURE FOR BACTERIA: CPT | Performed by: EMERGENCY MEDICINE

## 2017-01-12 PROCEDURE — 93010 ELECTROCARDIOGRAM REPORT: CPT | Performed by: INTERNAL MEDICINE

## 2017-01-12 PROCEDURE — 81003 URINALYSIS AUTO W/O SCOPE: CPT | Performed by: EMERGENCY MEDICINE

## 2017-01-12 PROCEDURE — 93005 ELECTROCARDIOGRAM TRACING: CPT | Performed by: EMERGENCY MEDICINE

## 2017-01-12 PROCEDURE — 83690 ASSAY OF LIPASE: CPT | Performed by: INTERNAL MEDICINE

## 2017-01-12 PROCEDURE — 84484 ASSAY OF TROPONIN QUANT: CPT | Performed by: INTERNAL MEDICINE

## 2017-01-12 PROCEDURE — 82962 GLUCOSE BLOOD TEST: CPT

## 2017-01-12 PROCEDURE — 25010000002 METHYLPREDNISOLONE PER 40 MG: Performed by: INTERNAL MEDICINE

## 2017-01-12 PROCEDURE — 83605 ASSAY OF LACTIC ACID: CPT | Performed by: EMERGENCY MEDICINE

## 2017-01-12 RX ORDER — FUROSEMIDE 80 MG
40 TABLET ORAL DAILY
COMMUNITY
Start: 2017-01-12 | End: 2017-01-17 | Stop reason: HOSPADM

## 2017-01-12 RX ORDER — NICOTINE POLACRILEX 4 MG
15 LOZENGE BUCCAL AS NEEDED
Status: DISCONTINUED | OUTPATIENT
Start: 2017-01-12 | End: 2017-01-17 | Stop reason: HOSPADM

## 2017-01-12 RX ORDER — DEXTROSE MONOHYDRATE 50 MG/ML
75 INJECTION, SOLUTION INTRAVENOUS CONTINUOUS
Status: DISCONTINUED | OUTPATIENT
Start: 2017-01-12 | End: 2017-01-12

## 2017-01-12 RX ORDER — PANTOPRAZOLE SODIUM 40 MG/1
40 TABLET, DELAYED RELEASE ORAL EVERY EVENING
Status: DISCONTINUED | OUTPATIENT
Start: 2017-01-12 | End: 2017-01-17 | Stop reason: HOSPADM

## 2017-01-12 RX ORDER — FUROSEMIDE 40 MG/1
40 TABLET ORAL DAILY
Status: DISCONTINUED | OUTPATIENT
Start: 2017-01-12 | End: 2017-01-13 | Stop reason: SDUPTHER

## 2017-01-12 RX ORDER — DEXTROSE MONOHYDRATE 25 G/50ML
25 INJECTION, SOLUTION INTRAVENOUS AS NEEDED
Status: DISCONTINUED | OUTPATIENT
Start: 2017-01-12 | End: 2017-01-17 | Stop reason: HOSPADM

## 2017-01-12 RX ORDER — SODIUM CHLORIDE 9 MG/ML
INJECTION, SOLUTION INTRAVENOUS
Status: DISPENSED
Start: 2017-01-12 | End: 2017-01-13

## 2017-01-12 RX ORDER — CEFTRIAXONE 1 G/1
1 INJECTION, POWDER, FOR SOLUTION INTRAMUSCULAR; INTRAVENOUS DAILY
Status: DISCONTINUED | OUTPATIENT
Start: 2017-01-12 | End: 2017-01-12

## 2017-01-12 RX ORDER — DOCUSATE SODIUM 250 MG
250 CAPSULE ORAL DAILY
Status: DISCONTINUED | OUTPATIENT
Start: 2017-01-12 | End: 2017-01-12

## 2017-01-12 RX ORDER — CITALOPRAM 20 MG/1
20 TABLET ORAL DAILY
Status: DISCONTINUED | OUTPATIENT
Start: 2017-01-12 | End: 2017-01-17 | Stop reason: HOSPADM

## 2017-01-12 RX ORDER — WARFARIN SODIUM 3 MG/1
6 TABLET ORAL
Status: DISCONTINUED | OUTPATIENT
Start: 2017-01-12 | End: 2017-01-13

## 2017-01-12 RX ORDER — OSELTAMIVIR PHOSPHATE 75 MG/1
75 CAPSULE ORAL EVERY 12 HOURS SCHEDULED
Status: DISPENSED | OUTPATIENT
Start: 2017-01-12 | End: 2017-01-17

## 2017-01-12 RX ORDER — DOCUSATE SODIUM 100 MG/1
100 CAPSULE, LIQUID FILLED ORAL 2 TIMES DAILY
Status: DISCONTINUED | OUTPATIENT
Start: 2017-01-12 | End: 2017-01-17 | Stop reason: HOSPADM

## 2017-01-12 RX ORDER — DEXTROSE MONOHYDRATE 50 MG/ML
100 INJECTION, SOLUTION INTRAVENOUS CONTINUOUS
Status: DISCONTINUED | OUTPATIENT
Start: 2017-01-12 | End: 2017-01-13

## 2017-01-12 RX ORDER — SODIUM CHLORIDE 9 MG/ML
30 INJECTION, SOLUTION INTRAVENOUS CONTINUOUS
Status: DISCONTINUED | OUTPATIENT
Start: 2017-01-12 | End: 2017-01-12

## 2017-01-12 RX ORDER — FOLIC ACID 1 MG/1
1 TABLET ORAL DAILY
Status: DISCONTINUED | OUTPATIENT
Start: 2017-01-12 | End: 2017-01-17 | Stop reason: HOSPADM

## 2017-01-12 RX ORDER — MULTIPLE VITAMINS W/ MINERALS TAB 9MG-400MCG
1 TAB ORAL DAILY
Status: DISCONTINUED | OUTPATIENT
Start: 2017-01-12 | End: 2017-01-17 | Stop reason: HOSPADM

## 2017-01-12 RX ORDER — POLYETHYLENE GLYCOL 3350 17 G/17G
17 POWDER, FOR SOLUTION ORAL DAILY
Status: DISCONTINUED | OUTPATIENT
Start: 2017-01-12 | End: 2017-01-17 | Stop reason: HOSPADM

## 2017-01-12 RX ORDER — LANOLIN ALCOHOL/MO/W.PET/CERES
6 CREAM (GRAM) TOPICAL NIGHTLY
Status: ON HOLD | COMMUNITY
End: 2019-03-27

## 2017-01-12 RX ORDER — METOPROLOL SUCCINATE 25 MG/1
25 TABLET, EXTENDED RELEASE ORAL DAILY
Status: DISCONTINUED | OUTPATIENT
Start: 2017-01-12 | End: 2017-01-17 | Stop reason: HOSPADM

## 2017-01-12 RX ORDER — SODIUM CHLORIDE 0.9 % (FLUSH) 0.9 %
10 SYRINGE (ML) INJECTION AS NEEDED
Status: DISCONTINUED | OUTPATIENT
Start: 2017-01-12 | End: 2017-01-17 | Stop reason: HOSPADM

## 2017-01-12 RX ORDER — METHYLPREDNISOLONE SODIUM SUCCINATE 40 MG/ML
40 INJECTION, POWDER, LYOPHILIZED, FOR SOLUTION INTRAMUSCULAR; INTRAVENOUS EVERY 8 HOURS
Status: DISCONTINUED | OUTPATIENT
Start: 2017-01-12 | End: 2017-01-14

## 2017-01-12 RX ORDER — IPRATROPIUM BROMIDE AND ALBUTEROL SULFATE 2.5; .5 MG/3ML; MG/3ML
3 SOLUTION RESPIRATORY (INHALATION)
Status: DISCONTINUED | OUTPATIENT
Start: 2017-01-12 | End: 2017-01-17 | Stop reason: HOSPADM

## 2017-01-12 RX ORDER — ACETAMINOPHEN 325 MG/1
650 TABLET ORAL EVERY 4 HOURS PRN
Status: DISCONTINUED | OUTPATIENT
Start: 2017-01-12 | End: 2017-01-17 | Stop reason: HOSPADM

## 2017-01-12 RX ORDER — WARFARIN SODIUM 6 MG/1
6 TABLET ORAL
COMMUNITY
End: 2017-03-31

## 2017-01-12 RX ORDER — ACETAMINOPHEN 325 MG/1
650 TABLET ORAL EVERY 6 HOURS PRN
Status: DISCONTINUED | OUTPATIENT
Start: 2017-01-12 | End: 2017-01-12

## 2017-01-12 RX ORDER — LANOLIN ALCOHOL/MO/W.PET/CERES
6 CREAM (GRAM) TOPICAL NIGHTLY
Status: DISCONTINUED | OUTPATIENT
Start: 2017-01-12 | End: 2017-01-17 | Stop reason: HOSPADM

## 2017-01-12 RX ORDER — HYDROCODONE BITARTRATE AND ACETAMINOPHEN 5; 325 MG/1; MG/1
1 TABLET ORAL EVERY 6 HOURS PRN
Status: DISCONTINUED | OUTPATIENT
Start: 2017-01-12 | End: 2017-01-17 | Stop reason: HOSPADM

## 2017-01-12 RX ORDER — OSELTAMIVIR PHOSPHATE 75 MG/1
75 CAPSULE ORAL ONCE
Status: COMPLETED | OUTPATIENT
Start: 2017-01-12 | End: 2017-01-12

## 2017-01-12 RX ORDER — GABAPENTIN 300 MG/1
300 CAPSULE ORAL 2 TIMES DAILY
Status: DISCONTINUED | OUTPATIENT
Start: 2017-01-12 | End: 2017-01-17 | Stop reason: HOSPADM

## 2017-01-12 RX ADMIN — OSELTAMIVIR PHOSPHATE 75 MG: 75 CAPSULE ORAL at 07:28

## 2017-01-12 RX ADMIN — MEROPENEM 500 MG: 500 INJECTION, POWDER, FOR SOLUTION INTRAVENOUS at 16:22

## 2017-01-12 RX ADMIN — DOCUSATE SODIUM 100 MG: 100 CAPSULE, LIQUID FILLED ORAL at 10:18

## 2017-01-12 RX ADMIN — CITALOPRAM HYDROBROMIDE 20 MG: 20 TABLET ORAL at 12:39

## 2017-01-12 RX ADMIN — MEROPENEM 500 MG: 500 INJECTION, POWDER, FOR SOLUTION INTRAVENOUS at 23:08

## 2017-01-12 RX ADMIN — CHOLECALCIFEROL CAP 1.25 MG (50000 UNIT) 50000 UNITS: 1.25 CAP at 12:40

## 2017-01-12 RX ADMIN — WARFARIN SODIUM 6 MG: 3 TABLET ORAL at 17:42

## 2017-01-12 RX ADMIN — INSULIN ASPART 2 UNITS: 100 INJECTION, SOLUTION INTRAVENOUS; SUBCUTANEOUS at 21:08

## 2017-01-12 RX ADMIN — METHYLPREDNISOLONE SODIUM SUCCINATE 40 MG: 40 INJECTION, POWDER, FOR SOLUTION INTRAMUSCULAR; INTRAVENOUS at 21:00

## 2017-01-12 RX ADMIN — PANTOPRAZOLE SODIUM 40 MG: 40 TABLET, DELAYED RELEASE ORAL at 17:42

## 2017-01-12 RX ADMIN — DEXTROSE MONOHYDRATE 75 ML/HR: 50 INJECTION, SOLUTION INTRAVENOUS at 10:38

## 2017-01-12 RX ADMIN — IPRATROPIUM BROMIDE AND ALBUTEROL SULFATE 3 ML: .5; 3 SOLUTION RESPIRATORY (INHALATION) at 19:10

## 2017-01-12 RX ADMIN — IPRATROPIUM BROMIDE AND ALBUTEROL SULFATE 3 ML: .5; 3 SOLUTION RESPIRATORY (INHALATION) at 12:05

## 2017-01-12 RX ADMIN — DOCUSATE SODIUM 100 MG: 100 CAPSULE, LIQUID FILLED ORAL at 17:42

## 2017-01-12 RX ADMIN — DEXTROSE MONOHYDRATE 100 ML/HR: 50 INJECTION, SOLUTION INTRAVENOUS at 16:21

## 2017-01-12 RX ADMIN — MULTIPLE VITAMINS W/ MINERALS TAB 1 TABLET: TAB at 12:41

## 2017-01-12 RX ADMIN — IPRATROPIUM BROMIDE AND ALBUTEROL SULFATE 3 ML: .5; 3 SOLUTION RESPIRATORY (INHALATION) at 15:11

## 2017-01-12 RX ADMIN — GABAPENTIN 300 MG: 300 CAPSULE ORAL at 17:42

## 2017-01-12 RX ADMIN — VANCOMYCIN HYDROCHLORIDE 1750 MG: 1 INJECTION, POWDER, LYOPHILIZED, FOR SOLUTION INTRAVENOUS at 12:40

## 2017-01-12 RX ADMIN — METOPROLOL SUCCINATE 25 MG: 25 TABLET, EXTENDED RELEASE ORAL at 12:43

## 2017-01-12 RX ADMIN — INSULIN DETEMIR 20 UNITS: 100 INJECTION, SOLUTION SUBCUTANEOUS at 21:08

## 2017-01-12 RX ADMIN — METHYLPREDNISOLONE SODIUM SUCCINATE 40 MG: 40 INJECTION, POWDER, FOR SOLUTION INTRAMUSCULAR; INTRAVENOUS at 12:44

## 2017-01-12 RX ADMIN — OSELTAMIVIR PHOSPHATE 75 MG: 75 CAPSULE ORAL at 21:00

## 2017-01-12 RX ADMIN — GABAPENTIN 300 MG: 300 CAPSULE ORAL at 12:41

## 2017-01-12 RX ADMIN — FOLIC ACID 1 MG: 1 TABLET ORAL at 12:40

## 2017-01-12 RX ADMIN — Medication 6 MG: at 21:00

## 2017-01-12 NOTE — PLAN OF CARE
Problem: Patient Care Overview (Adult)  Goal: Discharge Needs Assessment  Outcome: Ongoing (interventions implemented as appropriate)    01/12/17 1150 01/12/17 1221 01/12/17 1426   Discharge Needs Assessment   Discharge Planning Comments --  --  Patient is resident at [facility]. Plan is for her to return their once medically stable. Will continue to follow.    Living Environment   Transportation Available --  none --    Self-Care   Equipment Currently Used at Home respiratory supplies;oxygen;bath bench;wheelchair --  --

## 2017-01-12 NOTE — ED NOTES
Patient's nurse at the [facility] the patient's morning Lasix dose of 80 mg po was given at 0530, prior to her transfer to this hospital.     Rose Vega RN  01/12/17 0742

## 2017-01-12 NOTE — ED NOTES
"Dr. Cazares in to speak to patient regarding her Code Status.  When asked if she would want to be intubated, patient states, \"yes.\"  Patient's paperwork has her marked as a DNR.  Paperwork requested from the [facility].     Rose Vega RN  01/12/17 0706    "

## 2017-01-12 NOTE — Clinical Note
Level of Care: Critical Care [6]   Diagnosis: Acute on chronic respiratory failure, unspecified whether with hypoxia or hypercapnia [[id number]]   Admitting Physician: JOSE ANTONIO JOHNSON [[id number]]   Attending Physician: JOSE ANTONIO JOHNSON [[id number]]

## 2017-01-12 NOTE — H&P
HOSPITALIST SERVICES     @ [facility] [place]                Hospitalist Team    ADMISSION HISTORY AND PHYSICAL    PATIENT:      Patient Care Team:  Gerardo Williamson MD as PCP - General  Gerardo Williamson MD as PCP - Family Medicine    CHIEF COMPLAINT:     Moderately severe shortness of breath that developed overnight    HISTORY OF PRESENT ILLNESS:      Ms. Alexandria Villanueva is an 81 year old morbidly obese  female who has hx of COPD, CHF, Chronic Atrial Fib who arrived via EMS from [facility] for evaluation of worsening dyspnea. Apparently she has a long history of chronic respiratory failure with congestive heart failure complications as well as a recent pneumonia. She was just in the hospital about 1 week ago for some similar symptoms but also a UTI. She has an indwelling PICC line through which she is currently receiving Rocephin daily. As per nursing home staff the patient developed some worsening shortness of breath symptoms without any benefit from her the usual nebulized treatments. She was also given 80 mg of Lasix orally as she was felt to be in CHF. When her symptoms began to progress to more worrisome level, they called EMS for further assistance. Patient denies any chest pain. She does say she has had a cough that is only minimally productive recently. She is unsure about any fevers. The remainder of the history is difficult to obtain from the patient independently due to her age and clinical condition. Patient is on BiPAP at this time and it is difficult to obtain much hx from her.          Past Medical History   Diagnosis Date   • Anemia    • Arthritis    • Chronic diastolic (congestive) heart failure    • DVT (deep venous thrombosis)    • Dyslipidemia    • Gout    • HTN (hypertension)    • Hypertension    • Lymphedema    • Morbid obesity    • Obstructive sleep apnea of adult      untreated   • Permanent atrial fibrillation    • Respiratory failure, acute      hypoxic     Past  Surgical History   Procedure Laterality Date   • Hernia repair     • Eye surgery     • Femur fracture surgery Right      closed reduction external fixation     Family History   Problem Relation Age of Onset   • Cancer Mother    • Heart disease Father    • Diabetes Father    • COPD Brother    • Heart disease Brother      Social History   Substance Use Topics   • Smoking status: Never Smoker   • Smokeless tobacco: None   • Alcohol use No     Prescriptions Prior to Admission   Medication Sig Dispense Refill Last Dose   • cefTRIAXone (ROCEPHIN) 1 G injection Infuse 1 g into a venous catheter Daily for 8 days.  0 1/11/2017 at 0900   • citalopram (CeleXA) 20 MG tablet Take 1 tablet by mouth Daily. (Patient taking differently: Take 20 mg by mouth 3 (Three) Times a Week. MON, WED, FRI)   1/11/2017 at 0900   • docusate sodium (COLACE) 250 MG capsule Take 250 mg by mouth daily.   1/11/2017 at 0900   • folic acid (FOLVITE) 1 MG tablet Take 1 mg by mouth daily.   1/11/2017 at 0900   • furosemide (LASIX) 80 MG tablet Take 40 mg by mouth Daily. Give 80 mg Lasix in the morning; 40 mg Lasix in the afternoon X3 days   1/12/2017 at 0530   • gabapentin (NEURONTIN) 300 MG capsule Take 300 mg by mouth 2 (two) times a day.   1/11/2017 at 2100   • glucosamine-chondroitin 500-400 MG capsule capsule Take 2 capsules by mouth daily.   1/11/2017 at 0900   • guaiFENesin (MUCINEX) 600 MG 12 hr tablet Take 600 mg by mouth 2 (Two) Times a Day.   1/2/2017 at 0900   • insulin aspart (NovoLOG) 100 UNIT/ML injection Inject 3 Units under the skin 3 (Three) Times a Day With Meals. Hold if blood sugar less than 120  12 1/11/2017 at 1600   • insulin detemir (LEVEMIR) 100 UNIT/ML injection Inject 20 Units under the skin Every Night.  12 1/11/2017 at 2100   • ipratropium-albuterol (DUO-NEB) 0.5-2.5 mg/mL nebulizer Take 3 mL by nebulization 4 (Four) Times a Day. 360 mL  1/12/2017 at 0001   • melatonin 3 MG tablet Take 6 mg by mouth Every Night.   1/11/2017  "at 2100   • metoprolol succinate XL (TOPROL-XL) 25 MG 24 hr tablet Take 25 mg by mouth daily.   1/11/2017 at 0900   • multivitamin-minerals (OCUVITE) tablet Take 1 tablet by mouth daily.   1/11/2017 at 0900   • NON FORMULARY 2LNC continuous   1/12/2017 at Unknown time   • pantoprazole (PROTONIX) 40 MG EC tablet Take 40 mg by mouth every evening.   1/11/2017 at 2100   • warfarin (COUMADIN) 6 MG tablet Take 6 mg by mouth Daily. 6 mg every Sun, Mon, Tues, Thrs, Sat  7 mg every Wed, Fri   1/11/2017 at 1600   • acetaminophen (TYLENOL) 325 MG tablet Take 2 tablets by mouth Every 4 (Four) Hours As Needed for mild pain (1-3).  0 1/9/2017 at 1200   • cholecalciferol (VITAMIN D3) 59541 UNITS capsule Take 50,000 Units by mouth Every 30 (Thirty) Days.   Unknown at Unknown time   • furosemide (LASIX) 40 MG tablet Take 1 tablet by mouth 2 (Two) Times a Day.      • HYDROcodone-acetaminophen (NORCO) 5-325 MG per tablet Take 1 tablet by mouth Every 6 (Six) Hours As Needed for moderate pain (4-6).  0 1/11/2017 at 2300   • melatonin 5 MG tablet tablet Take 5 mg by mouth every night.   1/1/2017 at 2100   • polyethylene glycol (MIRALAX) packet Take 17 g by mouth daily.   Unknown at Unknown time   • predniSONE (DELTASONE) 5 MG tablet Take 5 mg by mouth 2 (Two) Times a Day.   1/2/2017 at 0900   • simvastatin (ZOCOR) 40 MG tablet Take 40 mg by mouth every night.   1/1/2017 at 2100     Allergies:  Codeine; Demerol [meperidine]; and Propoxyphene    REVIEW OF SYSTEMS:  Please see the above history of present illness for pertinent positives and negatives.  The remainder of the patient's systems have been reviewed and are negative.     Vital Signs  Temp:  [98.8 °F (37.1 °C)-99.7 °F (37.6 °C)] 99.7 °F (37.6 °C)  Heart Rate:  [] 97  Resp:  [14-24] 20  BP: ()/(65-99) 142/99    Flowsheet Rows         First Filed Value    Admission Height  65\" (165.1 cm) Documented at 01/12/2017 0548    Admission Weight  235 lb (107 kg) Documented at " 01/12/2017 0548           Physical Exam:    PHYSICAL EXAMINATION:    VITAL SIGNS: As per Nurse's notes    GENERAL APPEARANCE: The patient is a well developed, well nourished, morbidly obese  female, in distress with complaints of shortness of breath, but no acute pain. Patient is on BiPAP.    HEENT: Normocephalic, atraumatic. PERRL. The sclerae anicteric and conjunctivae pink and moist.      NECK: Supple and symmetric. No masses. No thyromegaly. No tenderness. Trachea central. No carotid bruits. Hard to look for JVD.    LYMPH NODES: No palpable lymphadenopathy.    SKIN: Warm, dry and intact. No rash or lesions or wounds or patechiae.    CHEST: Normal AP diameter and normal contour without any kyphoscoliosis.    LUNGS: Bilateral rales and rhonchi bilaterally. laterally. No wheezes/rales/crackles/rubs.    CARDIOVASCULAR: Irregularly irregular. S1, S2 normal without murmur/gallop/rub. No S3, S4.     ABDOMEN: Obese, Soft, non-tender, non-distended. No masses. No rebound/guarding.     EXTREMITIES:  No evidence of cyanosis, clubbing, but 2-3+ edema.  Negative Homans sign bilaterally.     MUSCULOSKELETAL: On BiPAP. No evidence of redness, swelling, warmth or pain of the joints of the extremities. Muscle strength and tone normal.    PSYCHIATRY: Responds appropriately to questions. No evidence of acute anxiety, depression, panic attacks or hallucinations.    MENTAL STATUS EXAMINATION: Neatly dressed, conscious and alert. On BiPAP.     NEUROLOGIC: Hard to examine at this time.               Results Review:    I reviewed the patient's new clinical results.  Lab Results (most recent)     Procedure Component Value Units Date/Time    Blood Culture [[id number]] Collected:  01/12/17 0055    Specimen:  Blood from Arm, Right Updated:  01/12/17 0603    CBC Auto Differential [[id number]]  (Abnormal) Collected:  01/12/17 0558    Specimen:  Blood Updated:  01/12/17 0608     WBC 8.43 10*3/mm3      RBC 4.31 10*6/mm3      Hemoglobin  12.1 g/dL      Hematocrit 38.7 %      MCV 89.8 fL      MCH 28.1 pg      MCHC 31.3 g/dL      RDW 13.8 %      RDW-SD 45.7 fl      MPV 9.8 fL      Platelets 234 10*3/mm3      Neutrophil % 82.2 (H) %      Lymphocyte % 9.8 (L) %      Monocyte % 5.2 %      Eosinophil % 1.7 %      Basophil % 0.2 %      Immature Grans % 0.9 (H) %      Neutrophils, Absolute 6.92 10*3/mm3      Lymphocytes, Absolute 0.83 10*3/mm3      Monocytes, Absolute 0.44 10*3/mm3      Eosinophils, Absolute 0.14 10*3/mm3      Basophils, Absolute 0.02 10*3/mm3      Immature Grans, Absolute 0.08 (H) 10*3/mm3      nRBC 0.0 /100 WBC     CBC & Differential [[id number]] Collected:  01/12/17 0558    Specimen:  Blood Updated:  01/12/17 0608    Narrative:       The following orders were created for panel order CBC & Differential.  Procedure                               Abnormality         Status                     ---------                               -----------         ------                     CBC Auto Differential[[id number]]         Abnormal            Final result                 Please view results for these tests on the individual orders.    Lactic Acid, Plasma [[id number]]  (Normal) Collected:  01/12/17 0558    Specimen:  Blood Updated:  01/12/17 0618     Lactate 0.7 mmol/L     Comprehensive Metabolic Panel [[id number]]  (Abnormal) Collected:  01/12/17 0557    Specimen:  Blood Updated:  01/12/17 0622     Glucose 98 mg/dL      BUN 19 mg/dL      Creatinine 1.04 (H) mg/dL      Sodium 146 (H) mmol/L      Potassium 4.0 mmol/L      Chloride 104 mmol/L      CO2 29.4 (H) mmol/L      Calcium 9.7 mg/dL      Total Protein 5.9 (L) g/dL      Albumin 2.80 (L) g/dL      ALT (SGPT) 22 U/L      AST (SGOT) 19 U/L      Alkaline Phosphatase 115 U/L      Total Bilirubin 0.4 mg/dL      eGFR Non African Amer 51 (L) mL/min/1.73      Globulin 3.1 gm/dL      A/G Ratio 0.9 g/dL      BUN/Creatinine Ratio 18.3      Anion Gap 12.6 mmol/L     Narrative:       The MDRD GFR formula is only  valid for adults with stable renal function between ages 18 and 70.    Influenza Antigen [[id number]]  (Abnormal) Collected:  01/12/17 0558    Specimen:  Swab from Nasopharynx Updated:  01/12/17 0624     Influenza A Ag, EIA Positive (A)      Influenza B Ag, EIA Negative     Troponin [[id number]]  (Normal) Collected:  01/12/17 0611    Specimen:  Blood Updated:  01/12/17 0630     Troponin T <0.010 ng/mL     Narrative:       Troponin T Reference Ranges:  Less than 0.03 ng/mL:    Negative for AMI  0.03 to 0.09 ng/mL:      Indeterminant for AMI  Greater than 0.09 ng/mL: Positive for AMI    BNP [[id number]]  (Abnormal) Collected:  01/12/17 0611    Specimen:  Blood Updated:  01/12/17 0659     proBNP 7334.0 (H) pg/mL     Narrative:       Among patients with dyspnea, NT-proBNP is highly sensitive for the detection of acute congestive heart failure. In addition NT-proBNP of <300 pg/ml effectively rules out acute congestive heart failure with 99% negative predictive value.    Blood Gas, Arterial [[id number]]  (Abnormal) Collected:  01/12/17 0738    Specimen:  Arterial Blood Updated:  01/12/17 0739     Site Arterial: left radial      Prashant's Test Positive      pH, Arterial 7.379 pH units      pCO2, Arterial 57.2 (H) mm Hg      pO2, Arterial 76.8 (L) mm Hg      HCO3, Arterial 33.0 (H) mmol/L      Base Excess, Arterial 6.6 (H) mmol/L      O2 Saturation Calculated 95.0 %      Hemoglobin, Blood Gas 10.4 (L) g/dL      Barometric Pressure for Blood Gas 748 mmHg      Modality BiPAP      FIO2 40 %      Set [facility] Resp Rate 14      Rate 22 Breaths/minute      IPAP 12      EPAP 6      Pulse Ox 95 %     Protime-INR [[id number]]  (Abnormal) Collected:  01/12/17 0557    Specimen:  Blood Updated:  01/12/17 0812     Protime 26.9 (H) Seconds      INR 2.43 (H)     Narrative:       Therapeutic Ranges for INR: 2.0-3.0 (PT 20-30)                              2.5-3.5 (PT 25-34)    Urinalysis With / Culture If Indicated [[id number]]  (Abnormal) Collected:   01/12/17 0827    Specimen:  Urine from Urine, Clean Catch Updated:  01/12/17 0846     Color, UA Yellow      Appearance, UA Slightly Cloudy (A)      pH, UA 5.5      Specific Gravity, UA 1.010      Glucose, UA Negative      Ketones, UA Negative      Bilirubin, UA Negative      Blood, UA Negative      Protein, UA Negative      Leuk Esterase, UA Negative      Nitrite, UA Negative      Urobilinogen, UA 0.2 E.U./dL     Narrative:       Urine microscopic not indicated.          Imaging Results (most recent)     Procedure Component Value Units Date/Time    XR Chest 1 View [[id number]] Collected:  01/12/17 0725     Updated:  01/12/17 0729    Narrative:       Chest x-ray     INDICATION:  Patient arrives via EMS from her nursing home for  evaluation of worsening dyspnea this evening. Apparently she has a long  history of chronic respiratory failure with congestive heart failure  complications as well as a recent pneumonia. She was just in the  hospital about 1 week ago for some similar symptoms but also a UTI. She  has an indwelling PICC line through which she is currently receiving  Rocephin daily. The nursing home staff tell us that the patient  developed some worsening shortness of breath symptoms as the evening  progressed. They gave her the usual nebulized treatments which often  resolve her breathing symptoms but this time they did not seem to  benefit much.     FINDINGS: AP upright view of the chest is compared with 01/03/2017. The  heart remains quite enlarged. There are small bilateral pleural  effusions, left greater than right. There is new infiltrate at the right  lung base concerning for pneumonia. There is consolidation behind the  heart at the left base which may reflect atelectasis or pneumonia. Right  arm approach PICC remains in the SVC. No pneumothorax.       Impression:       Stable cardiomegaly. Bilateral effusions left greater than  right. New infiltrate in the right base concerning for pneumonia. There  is  infiltrate or atelectasis in the left base as well.     This report was finalized on 1/12/2017 7:27 AM by Dr. Gerardo Brito MD.           reviewed    ECG/EMG Results (most recent)     Procedure Component Value Units Date/Time    ECG 12 Lead [[id number]] Collected:  01/12/17 0641     Updated:  01/12/17 0643        reviewed    Assessment/Plan       DIFFERENTIAL DIAGNOSES CONSIDERED FOR CHIEF COMPLAINT:        The following clinical entities were considered in differential diagnosis. The list includes commonly encountered practical probabilities and rare esoteric diagnoses worked out through systemic diagnostic methods used to identify the presence of a disease entity where multiple diagnoses are possible. The decision is reached through either of the following methods: 1) direct confirmatory testing, or 2) a process of elimination, or 3) at least a process of obtaining information that shrinks the “probabilities” of candidate conditions to negligible levels, by using clinical evidence and thus implementing aspects of the hypothetico-deductive method.        DIFFERENTIAL DIAGNOSIS OF SHORTNESS OF BREATH    Differential Diagnosis of Dyspnea    A) Cardiac:   • Congestive heart failure (right, left or biventricular)   • Coronary artery disease   • Myocardial infarction (recent or past history)   • Cardiomyopathy   • Valvular dysfunction   • Left ventricular hypertrophy   • Asymmetric septal hypertrophy   • Pericarditis   • Arrhythmias     B) Pulmonary:   • COPD   • Asthma   • Restrictive lung disorders   • Hereditary lung disorders   • Pneumothorax     C) Mixed cardiac or pulmonary:   • COPD with pulmonary hypertension and cor pulmonale   • Deconditioning   • Chronic pulmonary emboli   • Trauma     D) Noncardiac or nonpulmonary:   • Metabolic conditions (e.g., acidosis)   • Pain   • Neuromuscular disorders   • Otorhinolaryngeal disorders       E) Functional:    •    • Anxiety  • Panic disorders  • Hyperventilation               ASSESSMENT AND PLAN:       SUMMARY:    ?   PROPHYLAXIS:   -Oxygen Saturation: As per Nurses' notes.   -DVT Prophylaxis: On oral coumadin  -Gastritis Prophylaxis: Pantoprazole    -Constipation Prophylaxis: colace 100 mg po BID   -Immunizations: N/A  -Intake and Output Orders: As per order sheet   -Davidson Catheter: Not indicated at this time  -Smoking/ Nicotine issues: N/A      PAIN MANAGEMENT:  -Pain Management: as per order sheet   -Miscellaneous Medications: Tylenol 650 mg 1 po q4-6 hours for headache or temp >100 degrees     ACTIVITIES:  -Bathroom Privileges: May use bathroom   -Activity: Encouraged to sit up in side chair for 2-4 hours BID   -PT/OT: N/A      NUTRITION AND FLUIDS:  -Diet/ Nutrition: NPO    -Fluid Status/Electrolytes: D5W 1 L 75 mL/Hr      SOCIAL ISSUES:   -MRSA SCREEN: As per institutional protocol   -Behavioral/ Agitation Issues: NONE   -Social Issues: Lives at [facility].   -Occupational Issues: Patient is Retired.   -Code Status: Prior Code on admission   -Disposition: TBD     THERAPEUTIC:   ALLERGIES: as per admission H&P   ANTIBIOTICS: On Inj. Meropenem and Inj. Vanco and oral Tamiflu   INSULIN THERAPY: Low dose insulin coverage as ordered   CHEST PAIN: NTG 0.4 MG s/l at onset of chest pain; Repeat q5 min x 2 PRN   NEBULIZER TREATMENT: DuoNeb 3 ml via nebulizer q4-6 hrs PRN for shortness of breath and/or wheezing   ANXIETY: Xanax 0.5 mg po BID for anxiety   DEPRESSION: On Celexa    INSOMNIA: On Melatonin              PRIMARY DIAGNOSES:    1) Acute on Chronic Respiratory Failure: Patient is on BiPAP right now     2) Bibasilar Pneumonia: Will treat with Inj. Vancomycin and Inj. Meropenem    3) Influenza A Positive: On Tamiflu    4) On BiPAP: Breathing and ABG improved    5) Recent UTI: Was being treated with Inj. Rocephin as an outpatient but it is d/cd    6) Chronic Diastolic CHF: Patient has been on oral Lasix    7) HTN: last BP reading is 142/99. Has been on Metoprolol    8)  Dyslipidemia: Hx noted    9) Obstructive Sleep Apnea: Hx noted    10) Morbid Obesity: Noted    11) DVT Prophylaxis: On oral coumadin    12) Vitamin D deficiency: On replacement  ?     SECONDARY DIAGNOSES:  ?     DJD, Anemia        SURGICAL DIAGNOSES:        S/P Bilateral cataract surgery, S/P closed reduction and external fixation Rt femur, S/P Herniorrhaphy              PLAN:    Labs and diagnostic tests reviewed: Trop <0.010, proBNP 7,334, Gluc 98, Na 146, K 4.0, CO2 29.4, AG 12.6, Creat 1.04, Venous Lactate 0.7, PT 26.9, INR 2.43, WBC 8.43, Hb 12.1, Plt 234, 82.2 N, 9.8L    Diagnostic tests reviewed:    CXR  IMPRESSION:  Stable cardiomegaly. Bilateral effusions left greater than  right. New infiltrate in the right base concerning for pneumonia. There  is infiltrate or atelectasis in the left base as well.      This report was finalized on 1/12/2017 7:27 AM by Dr. Gerardo Brito MD.        Patient is clinically and hemodynamically stable    Will be seen by Pulmonary and Cardiology     Any new recommendations: As per Pulmonary    New Labs ordered: CBC, CMP and lactic acid in AM; Serial Troponins and PT/INR    New diagnostic tests ordered: As per Pulmonary    Any changes in medications: N/A    To continue current management and supportive care    Pain management issues: Norco PO    Discharge planning issues: Patient will go back to [facility] when ready for discharge    Will follow patient closely    Nothing new to add for right now        I could not discuss the patients findings and my recommendations with patient as she is on BiPAP.     Trevor Diallo MD  01/12/17  10:48 AM          Trevor Diallo M.D., FACP  Internal Medicine/ Hospitalist        Time:       EMR Dragon/Transcription disclaimer:      Much of this encounter note is an electronic transcription/translation of spoken language to printed text. The electronic translation of spoken language may permit erroneous, or at times, nonsensical words or  phrases to be inadvertently transcribed; Although I have reviewed the note for such errors, some may still exist.

## 2017-01-12 NOTE — PLAN OF CARE
Problem: Patient Care Overview (Adult)  Goal: Plan of Care Review  Outcome: Ongoing (interventions implemented as appropriate)    01/12/17 1143   Coping/Psychosocial Response Interventions   Plan Of Care Reviewed With patient   Patient Care Overview   Progress progress towards functional goals is fair   Outcome Evaluation   Outcome Summary/Follow up Plan Mrs Villanueva is a 81 year old pt admitted to ICU from [place] and put on bipap, 4liters o2 and 40%,sats are 93% upon admission. pulmonary here to see pt and Dr. Diallo see orders. Vancomycin to be started. picc noted r upper arm, flushes well. restiing comfortably , denies pain         Problem: Respiratory Insufficiency (Adult)  Goal: Identify Related Risk Factors and Signs and Symptoms  Outcome: Ongoing (interventions implemented as appropriate)  Goal: Effective Ventilation  Outcome: Ongoing (interventions implemented as appropriate)    Problem: Fall Risk (Adult)  Goal: Identify Related Risk Factors and Signs and Symptoms  Outcome: Ongoing (interventions implemented as appropriate)  Goal: Absence of Falls  Outcome: Ongoing (interventions implemented as appropriate)

## 2017-01-12 NOTE — ED PROVIDER NOTES
Subjective     History provided by:  EMS personnel and nursing home  History limited by:  Acuity of condition and age    History of Present Illness    Chief complaint: Difficulties breathing    Location: Lungs    Quality/Severity:  Severe    Timing/Duration: Onset overnight    Modifying Factors: None    Narrative: Patient arrives via EMS from her nursing home for evaluation of worsening dyspnea this evening.  Apparently she has a long history of chronic respiratory failure with congestive heart failure complications as well as a recent pneumonia.  She was just in the hospital about 1 week ago for some similar symptoms but also a UTI.  She has an indwelling PICC line through which she is currently receiving Rocephin daily.  The nursing home staff tell us that the patient developed some worsening shortness of breath symptoms as the evening progressed.  They gave her the usual nebulized treatments which often resolve her breathing symptoms but this time they did not seem to benefit much.  We're told that they also gave her 80 mg of Lasix orally as they believed she was having some congestive heart failure problems.  When her symptoms began to progress to more worrisome level, they called EMS for further assistance.  Patient denies any chest pain.  She does say she has had a cough that is only minimally productive recently.  She is unsure about any fevers.  The remainder of the history is difficult to obtain from the patient independently due to her age and clinical condition.    Associated Symptoms: As above    Review of Systems   Constitutional: Positive for fatigue. Negative for diaphoresis.   HENT: Positive for congestion. Negative for drooling.    Eyes: Negative for pain and visual disturbance.   Respiratory: Positive for cough, chest tightness, shortness of breath and wheezing.    Cardiovascular: Positive for leg swelling. Negative for chest pain.   Gastrointestinal: Negative for abdominal pain and vomiting.    Genitourinary: Negative for dysuria, hematuria and urgency.   Musculoskeletal: Positive for myalgias. Negative for neck pain.   Skin: Negative for color change and rash.   Neurological: Negative for syncope and headaches.   Psychiatric/Behavioral: Negative for agitation and behavioral problems.   All other systems reviewed and are negative.      Past Medical History   Diagnosis Date   • Anemia    • Arthritis    • Chronic diastolic (congestive) heart failure    • DVT (deep venous thrombosis)    • Dyslipidemia    • Gout    • HTN (hypertension)    • Hypertension    • Lymphedema    • Morbid obesity    • Obstructive sleep apnea of adult      untreated   • Permanent atrial fibrillation    • Respiratory failure, acute      hypoxic       Allergies   Allergen Reactions   • Codeine Nausea And Vomiting   • Demerol [Meperidine]      Dizzy reaction   • Propoxyphene      Dizzy reaction       Past Surgical History   Procedure Laterality Date   • Hernia repair     • Eye surgery     • Femur fracture surgery Right      closed reduction external fixation       Family History   Problem Relation Age of Onset   • Cancer Mother    • Heart disease Father    • Diabetes Father    • COPD Brother    • Heart disease Brother        Social History     Social History   • Marital status:      Spouse name: N/A   • Number of children: N/A   • Years of education: N/A     Social History Main Topics   • Smoking status: Never Smoker   • Smokeless tobacco: None   • Alcohol use No   • Drug use: None   • Sexual activity: Not Currently     Other Topics Concern   • None     Social History Narrative           Objective   Physical Exam   Constitutional: She is oriented to person, place, and time. She appears well-developed and well-nourished. She appears distressed (significant respiratory distress is evident).   HENT:   Head: Normocephalic and atraumatic.   Eyes: EOM are normal. Pupils are equal, round, and reactive to light. Right eye exhibits no  discharge. Left eye exhibits no discharge.   Neck: Normal range of motion. Neck supple. No tracheal deviation present.   Cardiovascular: Normal rate and regular rhythm.    Pulmonary/Chest: She is in respiratory distress. She has wheezes. She has rales.   Congested airway sounds are bilateral and diffuse.  The left base is significantly diminished.  There are scattered wheezes, rales, and rhonchi evident bilaterally.  Patient demonstrates increased work of breathing with tachypneic rate of 24 breaths per minute   Abdominal: Soft. She exhibits no mass. There is no tenderness. There is no rebound and no guarding.   Musculoskeletal: Normal range of motion. She exhibits no edema or deformity.   Neurological: She is alert and oriented to person, place, and time. No cranial nerve deficit.   Skin: Skin is warm and dry. No rash noted. She is not diaphoretic. No erythema.   Psychiatric: She has a normal mood and affect. Her behavior is normal. Judgment and thought content normal.   Nursing note and vitals reviewed.          EKG           EKG time/Interp time: 0641/0643  Rhythm/Rate: Atrial fibrillation, 89 bpm  P waves and TX: Not detectable  QRS, axis: 66 ms, normal axis   ST and T waves: No acute ischemic changes evident     Independently interpreted by me contemporaneously with treatment    Results for orders placed or performed during the hospital encounter of 01/12/17   Influenza Antigen   Result Value Ref Range    Influenza A Ag, EIA Positive (A) Negative    Influenza B Ag, EIA Negative Negative   Comprehensive Metabolic Panel   Result Value Ref Range    Glucose 98 65 - 99 mg/dL    BUN 19 8 - 23 mg/dL    Creatinine 1.04 (H) 0.57 - 1.00 mg/dL    Sodium 146 (H) 136 - 145 mmol/L    Potassium 4.0 3.5 - 5.2 mmol/L    Chloride 104 98 - 107 mmol/L    CO2 29.4 (H) 22.0 - 29.0 mmol/L    Calcium 9.7 8.8 - 10.5 mg/dL    Total Protein 5.9 (L) 6.0 - 8.5 g/dL    Albumin 2.80 (L) 3.50 - 5.20 g/dL    ALT (SGPT) 22 5 - 33 U/L    AST  (SGOT) 19 5 - 32 U/L    Alkaline Phosphatase 115 40 - 129 U/L    Total Bilirubin 0.4 0.2 - 1.2 mg/dL    eGFR Non African Amer 51 (L) >60 mL/min/1.73    Globulin 3.1 gm/dL    A/G Ratio 0.9 g/dL    BUN/Creatinine Ratio 18.3 7.0 - 25.0    Anion Gap 12.6 mmol/L   Lactic Acid, Plasma   Result Value Ref Range    Lactate 0.7 0.5 - 2.0 mmol/L   CBC Auto Differential   Result Value Ref Range    WBC 8.43 4.80 - 10.80 10*3/mm3    RBC 4.31 4.20 - 5.40 10*6/mm3    Hemoglobin 12.1 12.0 - 16.0 g/dL    Hematocrit 38.7 37.0 - 47.0 %    MCV 89.8 81.0 - 99.0 fL    MCH 28.1 27.0 - 31.0 pg    MCHC 31.3 31.0 - 37.0 g/dL    RDW 13.8 11.5 - 14.5 %    RDW-SD 45.7 37.0 - 54.0 fl    MPV 9.8 7.4 - 10.4 fL    Platelets 234 140 - 500 10*3/mm3    Neutrophil % 82.2 (H) 45.0 - 70.0 %    Lymphocyte % 9.8 (L) 20.0 - 45.0 %    Monocyte % 5.2 3.0 - 8.0 %    Eosinophil % 1.7 0.0 - 4.0 %    Basophil % 0.2 0.0 - 2.0 %    Immature Grans % 0.9 (H) 0.0 - 0.5 %    Neutrophils, Absolute 6.92 1.50 - 8.30 10*3/mm3    Lymphocytes, Absolute 0.83 0.60 - 4.80 10*3/mm3    Monocytes, Absolute 0.44 0.00 - 1.00 10*3/mm3    Eosinophils, Absolute 0.14 0.10 - 0.30 10*3/mm3    Basophils, Absolute 0.02 0.00 - 0.20 10*3/mm3    Immature Grans, Absolute 0.08 (H) 0.00 - 0.03 10*3/mm3    nRBC 0.0 0.0 - 0.0 /100 WBC   BNP   Result Value Ref Range    proBNP 7334.0 (H) 5.0 - 450.0 pg/mL   Troponin   Result Value Ref Range    Troponin T <0.010 0.000 - 0.030 ng/mL           RADIOLOGY        Study: Chest x-ray    Findings: Abnormal findings bilaterally.  Left lower lobe consolidation appears essentially unchanged from recent exam with probable infiltrate and effusion.  Right lung fields show diffuse interstitial changes likely reflecting pulmonary edema or possibly infiltrative development as well.    Interpreted Contemporaneously by myself, independently viewed by me            Procedures         ED Course  ED Course   Comment By Time   Patient arrived with obvious signs of  respiratory failure requiring immediate assistance.  I called for BiPAP therapy immediately and this seemed to be benefiting the patient very quickly.  Her work of breathing gradually decreased and she became much more comfortable.  Her heart rate is stabilizing in normal range now and her oxygenation seems to be improving also.  Labs reviewed by me as well as x-ray.  Appears that her respiratory distress is multifactorial, likely with component of CHF and pneumonia, probably viral.  Will start on Tamiflu therapy now and then to admit to ICU for close monitoring.  No diuresis given at this time because nursing home reports giving 80 mg Lasix orally just before patient transported to our facility Andrey Cazares MD 01/12 3213                  MDM  Number of Diagnoses or Management Options  Acute on chronic respiratory failure, unspecified whether with hypoxia or hypercapnia:   Congestive heart failure, unspecified congestive heart failure chronicity, unspecified congestive heart failure type:   Influenza A:   Diagnosis management comments: My differential diagnosis for dyspnea includes but is not limited to:  Asthma, COPD, pneumonia, pulmonary embolus, acute respiratory distress syndrome, pneumothorax, pleural effusion, pulmonary fibrosis, congestive heart failure, myocardial infarction, DKA, uremia, acidosis, sepsis, anemia, drug related, hyperventilation, CNS disease       Amount and/or Complexity of Data Reviewed  Clinical lab tests: reviewed and ordered  Tests in the radiology section of CPT®: ordered and reviewed  Decide to obtain previous medical records or to obtain history from someone other than the patient: yes  Obtain history from someone other than the patient: yes (EMS)  Review and summarize past medical records: yes  Discuss the patient with other providers: yes (Dr. Diallo)  Independent visualization of images, tracings, or specimens: yes    Risk of Complications, Morbidity, and/or Mortality  Presenting  problems: high  Diagnostic procedures: moderate  Management options: high    Critical Care  Total time providing critical care: 30-74 minutes    Patient Progress  Patient progress: improved      Final diagnoses:   Acute on chronic respiratory failure, unspecified whether with hypoxia or hypercapnia   Influenza A   Congestive heart failure, unspecified congestive heart failure chronicity, unspecified congestive heart failure type            Andrey Cazares MD  01/12/17 0723       Andrey Cazares MD  01/12/17 0725

## 2017-01-12 NOTE — ED NOTES
Patient awake upon arrival sitting at 90 degrees on stretcher with c/o SOA.  Audible crackles noted.  Oxygen 4L/NC in use.     Rose Vega RN  01/12/17 0603

## 2017-01-12 NOTE — CONSULTS
Patient Name: Alexandria Villanueva  :1935  81 y.o.    Date of Admission: 2017  Date of Consultation:  17  Encounter Provider: Luis Fernando Stevens III, MD  Place of Service: [facility] CARDIOLOGY  Referring Provider: Trevor Diallo MD  Patient Care Team:  Gerardo Williamson MD as PCP - General  Gerardo Williamson MD as PCP - Family Medicine      Chief complaint: Acute/chronic respiratory distress, chronic atrial fibrillation, history of chronic diastolic CHF    History of Present Illness:  Ms. Villanueva is an 80 yo patient of Dr. Davey who was admitted with shortness of breath.  She was recently in the hospital with pneumonia and a UTI.    She has a history of chronic atrial fibrillation.  She has a history of chronic diastolic CHF.  Her most recent echocardiogram showed an ejection fraction 45%.  She had severe pulmonary hypertension.  Calculated pulmonary arterial pressure of 76 mmHg consistent with severe pulmonary hypertension. She has chronic lower extremity edema and has lymphedema.  She has a history of morbid obesity as well as obstructive sleep apnea.  Apparently for day or so she had progressively worsening respiratory distress.  She has had a cough.  It's nonproductive.  At home, as her respiratory distress worsen, they gave her nebulizer but that did not improve her breathing.  They then gave her 80 mg of Lasix IV which likewise did not seem to help.  EMS was then called and brought her to the emergency room.  Currently she is somewhat responsive but confused.  She denies any chest pain.  She denies any sensation of palpitations or heart racing.  She denies chills or fevers.     She has permanent atrial fibrillation; her heart rate has been under appropriate control.    She just had a CT scan performed:  FINDINGS:  The heart is enlarged. The main pulmonary arteries are dilated  suggesting pulmonary hypertension. The right main PA measures 3.8 cm,  and the left main  PA measures 3.4 cm. There is no pericardial effusion.  There is a small right pleural effusion. There is a trace amount of left  pleural fluid. Note is made of multiple potential left breast nodules.  Outpatient evaluation with mammogram is recommended if this has not been  performed recently at an outside facility. There is no adenopathy. There  is Some mucous plugging noted in left lower lobe bronchi. There is  alveolar consolidation in both lower lobes which may simply reflect some  atelectasis. Underlying pneumonia is not excluded the basis of this  exam. Continuation through the upper abdomen reveals diffuse fat  stranding around the pancreas suggesting acute pancreatitis. This is  incompletely visualized. Gallbladder contains numerous stones. There is  soft tissue swelling in the left lateral lower chest wall which may  reflect hematoma. Correlate with physical exam findings. A right arm  approach PICC line has its tip in the SVC in good position.           Impression:         1. These findings are highly suspicious for acute pancreatitis. The  pancreas is incompletely visualized. Correlation with laboratory data  recommended.  2. Soft tissue density in the left lateral lower chest wall may reflect  a hematoma. Correlate with physical exam findings.  3. Cardiomegaly.  4. Enlarged central pulmonary arteries suggesting pulmonary arterial  hypertension.  5. Small right pleural effusion with a trace left effusion. There is  some mucous plugging in the lower lobe bronchi on the left. There is  some alveolar consolidation in both lower lobes. Considerations include  atelectasis and/or pneumonia.  6. Cholelithiasis.  7. Potential left breast nodules. Outpatient evaluation with mammogram  recommended if this is not been performed recently at an outside  facility.                     Past Medical History   Diagnosis Date   • Anemia    • Arthritis    • Atrial fibrillation    • CHF (congestive heart failure)    • Chronic  diastolic (congestive) heart failure    • Chronic kidney disease    • DVT (deep venous thrombosis)    • Dyslipidemia    • Gout    • HTN (hypertension)    • Hypertension    • Lymphedema    • Morbid obesity    • Obstructive sleep apnea of adult      untreated   • Permanent atrial fibrillation    • Respiratory failure, acute      hypoxic       Past Surgical History   Procedure Laterality Date   • Hernia repair     • Eye surgery     • Femur fracture surgery Right      closed reduction external fixation   • Cataract extraction       both eyes 4 years ago   • Fracture surgery           Prior to Admission medications    Medication Sig Start Date End Date Taking? Authorizing Provider   bisacodyl (DULCOLAX) 5 MG EC tablet Insert 10 mg into the rectum Daily As Needed for constipation.   Yes Historical Provider, MD   cefTRIAXone (ROCEPHIN) 1 G injection Infuse 1 g into a venous catheter Daily for 8 days. 1/8/17 1/16/17 Yes Glendy Gonsalez MD   citalopram (CeleXA) 20 MG tablet Take 1 tablet by mouth Daily.  Patient taking differently: Take 20 mg by mouth 3 (Three) Times a Week. MON, WED, FRI 10/2/16  Yes Alice Garcia DO   docusate sodium (COLACE) 250 MG capsule Take 250 mg by mouth daily.   Yes Historical Provider, MD   folic acid (FOLVITE) 1 MG tablet Take 1 mg by mouth daily.   Yes Historical Provider, MD   furosemide (LASIX) 80 MG tablet Take 40 mg by mouth Daily. Give 80 mg Lasix in the morning; 40 mg Lasix in the afternoon X3 days 1/12/17 1/14/17 Yes Historical Provider, MD   gabapentin (NEURONTIN) 300 MG capsule Take 300 mg by mouth 2 (two) times a day.   Yes Historical Provider, MD   glucosamine-chondroitin 500-400 MG capsule capsule Take 2 capsules by mouth daily.   Yes Historical Provider, MD   guaiFENesin (MUCINEX) 600 MG 12 hr tablet Take 600 mg by mouth 2 (Two) Times a Day.   Yes Historical Provider, MD   insulin aspart (NovoLOG) 100 UNIT/ML injection Inject 3 Units under the skin 3 (Three) Times  a Day With Meals. Hold if blood sugar less than 120 10/2/16  Yes Alice Garcia DO   insulin detemir (LEVEMIR) 100 UNIT/ML injection Inject 20 Units under the skin Every Night. 10/2/16  Yes Alice Garcia DO   ipratropium-albuterol (DUO-NEB) 0.5-2.5 mg/mL nebulizer Take 3 mL by nebulization 4 (Four) Times a Day. 1/8/17  Yes Glendy Gonsalez MD   melatonin 3 MG tablet Take 6 mg by mouth Every Night.   Yes Historical Provider, MD   metoprolol succinate XL (TOPROL-XL) 25 MG 24 hr tablet Take 25 mg by mouth daily.   Yes Historical Provider, MD   multivitamin-minerals (OCUVITE) tablet Take 1 tablet by mouth daily.   Yes Historical Provider, MD   NON FORMULARY 2LNC continuous   Yes Historical Provider, MD   pantoprazole (PROTONIX) 40 MG EC tablet Take 40 mg by mouth every evening.   Yes Historical Provider, MD   warfarin (COUMADIN) 6 MG tablet Take 6 mg by mouth Daily. 6 mg every Sun, Mon, Tues, Thrs, Sat  7 mg every Wed, Fri   Yes Historical Provider, MD   acetaminophen (TYLENOL) 325 MG tablet Take 2 tablets by mouth Every 4 (Four) Hours As Needed for mild pain (1-3). 10/2/16   Alice Garcia DO   cholecalciferol (VITAMIN D3) 30149 UNITS capsule Take 50,000 Units by mouth Every 30 (Thirty) Days.    Historical Provider, MD   furosemide (LASIX) 40 MG tablet Take 1 tablet by mouth 2 (Two) Times a Day. 1/8/17   Glendy Gonsalez MD   HYDROcodone-acetaminophen (NORCO) 5-325 MG per tablet Take 1 tablet by mouth Every 6 (Six) Hours As Needed for moderate pain (4-6). 1/8/17   Glendy Gonsalez MD   melatonin 5 MG tablet tablet Take 5 mg by mouth every night.    Historical Provider, MD   polyethylene glycol (MIRALAX) packet Take 17 g by mouth daily.    Historical Provider, MD   predniSONE (DELTASONE) 5 MG tablet Take 5 mg by mouth 2 (Two) Times a Day.    Historical Provider, MD   simvastatin (ZOCOR) 40 MG tablet Take 40 mg by mouth every night.    Historical Provider, MD        Allergies   Allergen Reactions   • Codeine Nausea And Vomiting   • Demerol [Meperidine]      Dizzy reaction   • Propoxyphene      Dizzy reaction       Social History     Social History   • Marital status:      Spouse name: N/A   • Number of children: N/A   • Years of education: N/A     Social History Main Topics   • Smoking status: Never Smoker   • Smokeless tobacco: None   • Alcohol use No   • Drug use: No   • Sexual activity: Not Currently     Other Topics Concern   • None     Social History Narrative       Family History   Problem Relation Age of Onset   • Cancer Mother    • Heart disease Father    • Diabetes Father    • COPD Brother    • Heart disease Brother        REVIEW OF SYSTEMS:   All systems reviewed.  Pertinent positives identified in HPI.  All other systems are negative.      Objective:     Vitals:    01/12/17 1255 01/12/17 1330 01/12/17 1511 01/12/17 1537   BP:  140/90  98/68   BP Location:  Left arm  Left arm   Patient Position:  Lying  Lying   Pulse:  78 82 80   Resp:  20 21 20   Temp:  97 °F (36.1 °C)  98.1 °F (36.7 °C)   TempSrc:  Axillary  Axillary   SpO2: 97% 94% 95% 95%   Weight:       Height:         Body mass index is 37.96 kg/(m^2).    General Appearance:    Ill-appearing   Head:    Normocephalic, without obvious abnormality, atraumatic   Eyes:            Lids and lashes normal, conjunctivae and sclerae normal, no   icterus, no pallor, corneas clear, PERRLA   Ears:    Ears appear intact with no abnormalities noted   Throat:   No oral lesions, no thrush, oral mucosa moist   Neck:   No adenopathy, supple, trachea midline, no thyromegaly, no   carotid bruit, no JVD   Back:     No kyphosis present, no scoliosis present, no skin lesions, erythema or scars, no tenderness to percussion or palpation, range of motion normal   Lungs:     worse breath sounds with diffuse wheezing     Heart:    irregular rhythm and normal rate, normal S1 and S2, 1/6 HSM, no gallop, no rub, no click   Chest  Wall:    No abnormalities observed   Abdomen:     Normal bowel sounds, no masses, no organomegaly, soft        non-tender, non-distended, no guarding, no rebound  tenderness   Extremities:   Moves all extremities well, no cyanosis, no redness, chronic LE edema   Pulses:   Pulses palpable and equal bilaterally. Normal radial, carotid, femoral, dorsalis pedis and posterior tibial pulses bilaterally. Normal abdominal aorta   Skin:  Psychiatric:   No bleeding, bruising or rash    Lethargic   Lab Review:       Results from last 7 days  Lab Units 01/12/17  0557   SODIUM mmol/L 146*   POTASSIUM mmol/L 4.0   CHLORIDE mmol/L 104   TOTAL CO2 mmol/L 29.4*   BUN mg/dL 19   CREATININE mg/dL 1.04*   CALCIUM mg/dL 9.7   BILIRUBIN mg/dL 0.4   ALK PHOS U/L 115   ALT (SGPT) U/L 22   AST (SGOT) U/L 19   GLUCOSE mg/dL 98       Results from last 7 days  Lab Units 01/12/17  1149 01/12/17  0611   TROPONIN T ng/mL <0.010 <0.010       Results from last 7 days  Lab Units 01/12/17  0558   WBC 10*3/mm3 8.43   HEMOGLOBIN g/dL 12.1   HEMATOCRIT % 38.7   PLATELETS 10*3/mm3 234       Results from last 7 days  Lab Units 01/12/17  1149 01/12/17  0557 01/08/17  1123   INR  2.49* 2.43* 6.44*                       I personally viewed and interpreted the patient's EKG/Telemetry data.        Assessment and Plan:       Principal Problem:    Influenza A with respiratory manifestations  Active Problems:    Chronic diastolic (congestive) heart failure    Permanent atrial fibrillation    Acute on chronic respiratory failure    Chronic anticoagulation    Lymphedema    Morbid obesity    Obstructive sleep apnea of adult    Pulmonary HTN    CT scan is felt to indicate severe pulmonary hypertension.  She has a history of severe pulmonary hypertension along with chronic diastolic CHF and permanent atrial fibrillation.  I will repeat an echocardiogram.  Primary issue at this point appears to be her acute influenza A infection with acute on chronic respiratory  failure.    CT scan is also felt to indicate pancreatitis that is acute-amylase and lipase have been ordered but are pending at the time of this dictation.    Luis Fernando Stevens III, MD  01/12/17  4:07 PM

## 2017-01-12 NOTE — ED NOTES
[facility] sent a [place] [facility] DNR Order, though patient has verbalized she would want to be intubated if it was deemed neccesary.     Rose Vega RN  01/12/17 0707

## 2017-01-12 NOTE — CONSULTS
Group: [facility]         CONSULT NOTE    Patient Identification:  Alexandria Villanueva  81 y.o.  female  1935  [id number]            Requesting physician: dr kaur    Reason for Consultation:  aCute respiratory failure    CC:     History of Present Illness:  81-year-old female recently discharged from the hospital after being treated for UTI and pneumonia.patient currently on BiPAP unable to give any meaningful history.history per chart Patient arrives via EMS from her nursing home for evaluation of worsening dyspnea this evening. Apparently she has a long history of chronic respiratory failure with congestive heart failure complications as well as a recent pneumonia. She was just in the hospital  for some similar symptoms but also a UTI. She has an indwelling PICC line through which she is currently receiving Rocephin daily. The nursing home staff tell us that the patient developed some worsening shortness of breath symptoms as the evening progressed. They gave her the usual nebulized treatments which often resolve her breathing symptoms but this time they did not seem to benefit much.  they also gave her 80 mg of Lasix orally as they believed she was having some congestive heart failure problems. When her symptoms began to progress to more worrisome level, they called EMS for further assistance. Patient denies any chest pain. She does say she has had a cough that is only minimally productive recently. She is unsure about any fevers. The remainder of the history is difficult to obtain from the patient independently due to her age and clinical condition  Review of Systems:  Review of Systems  Limited due to patient's present condition  Past Medical History:  Past Medical History   Diagnosis Date   • Anemia    • Arthritis    • Chronic diastolic (congestive) heart failure    • DVT (deep venous thrombosis)    • Dyslipidemia    • Gout    • HTN (hypertension)    • Hypertension    • Lymphedema    • Morbid  obesity    • Obstructive sleep apnea of adult      untreated   • Permanent atrial fibrillation    • Respiratory failure, acute      hypoxic       Past Surgical History:  Past Surgical History   Procedure Laterality Date   • Hernia repair     • Eye surgery     • Femur fracture surgery Right      closed reduction external fixation        Home Meds:  Prescriptions Prior to Admission   Medication Sig Dispense Refill Last Dose   • cefTRIAXone (ROCEPHIN) 1 G injection Infuse 1 g into a venous catheter Daily for 8 days.  0 1/11/2017 at 0900   • citalopram (CeleXA) 20 MG tablet Take 1 tablet by mouth Daily. (Patient taking differently: Take 20 mg by mouth 3 (Three) Times a Week. MON, WED, FRI)   1/11/2017 at 0900   • docusate sodium (COLACE) 250 MG capsule Take 250 mg by mouth daily.   1/11/2017 at 0900   • folic acid (FOLVITE) 1 MG tablet Take 1 mg by mouth daily.   1/11/2017 at 0900   • furosemide (LASIX) 80 MG tablet Take 40 mg by mouth Daily. Give 80 mg Lasix in the morning; 40 mg Lasix in the afternoon X3 days   1/12/2017 at 0530   • gabapentin (NEURONTIN) 300 MG capsule Take 300 mg by mouth 2 (two) times a day.   1/11/2017 at 2100   • glucosamine-chondroitin 500-400 MG capsule capsule Take 2 capsules by mouth daily.   1/11/2017 at 0900   • guaiFENesin (MUCINEX) 600 MG 12 hr tablet Take 600 mg by mouth 2 (Two) Times a Day.   1/2/2017 at 0900   • insulin aspart (NovoLOG) 100 UNIT/ML injection Inject 3 Units under the skin 3 (Three) Times a Day With Meals. Hold if blood sugar less than 120  12 1/11/2017 at 1600   • insulin detemir (LEVEMIR) 100 UNIT/ML injection Inject 20 Units under the skin Every Night.  12 1/11/2017 at 2100   • ipratropium-albuterol (DUO-NEB) 0.5-2.5 mg/mL nebulizer Take 3 mL by nebulization 4 (Four) Times a Day. 360 mL  1/12/2017 at 0001   • melatonin 3 MG tablet Take 6 mg by mouth Every Night.   1/11/2017 at 2100   • metoprolol succinate XL (TOPROL-XL) 25 MG 24 hr tablet Take 25 mg by mouth daily.    1/11/2017 at 0900   • multivitamin-minerals (OCUVITE) tablet Take 1 tablet by mouth daily.   1/11/2017 at 0900   • NON FORMULARY 2LNC continuous   1/12/2017 at Unknown time   • pantoprazole (PROTONIX) 40 MG EC tablet Take 40 mg by mouth every evening.   1/11/2017 at 2100   • warfarin (COUMADIN) 6 MG tablet Take 6 mg by mouth Daily. 6 mg every Sun, Mon, Tues, Thrs, Sat  7 mg every Wed, Fri   1/11/2017 at 1600   • acetaminophen (TYLENOL) 325 MG tablet Take 2 tablets by mouth Every 4 (Four) Hours As Needed for mild pain (1-3).  0 1/9/2017 at 1200   • cholecalciferol (VITAMIN D3) 34523 UNITS capsule Take 50,000 Units by mouth Every 30 (Thirty) Days.   Unknown at Unknown time   • furosemide (LASIX) 40 MG tablet Take 1 tablet by mouth 2 (Two) Times a Day.      • HYDROcodone-acetaminophen (NORCO) 5-325 MG per tablet Take 1 tablet by mouth Every 6 (Six) Hours As Needed for moderate pain (4-6).  0 1/11/2017 at 2300   • melatonin 5 MG tablet tablet Take 5 mg by mouth every night.   1/1/2017 at 2100   • polyethylene glycol (MIRALAX) packet Take 17 g by mouth daily.   Unknown at Unknown time   • predniSONE (DELTASONE) 5 MG tablet Take 5 mg by mouth 2 (Two) Times a Day.   1/2/2017 at 0900   • simvastatin (ZOCOR) 40 MG tablet Take 40 mg by mouth every night.   1/1/2017 at 2100       Allergies:  Allergies   Allergen Reactions   • Codeine Nausea And Vomiting   • Demerol [Meperidine]      Dizzy reaction   • Propoxyphene      Dizzy reaction       Social History:   Social History     Social History   • Marital status:      Spouse name: N/A   • Number of children: N/A   • Years of education: N/A     Occupational History   • Not on file.     Social History Main Topics   • Smoking status: Never Smoker   • Smokeless tobacco: Not on file   • Alcohol use No   • Drug use: Not on file   • Sexual activity: Not Currently     Other Topics Concern   • Not on file     Social History Narrative       Family History:  Family History   Problem  "Relation Age of Onset   • Cancer Mother    • Heart disease Father    • Diabetes Father    • COPD Brother    • Heart disease Brother        Physical Exam:  Visit Vitals   • /99 (BP Location: Left arm, Patient Position: Lying)   • Pulse 97   • Temp 99.7 °F (37.6 °C) (Rectal)   • Resp 20   • Ht 65\" (165.1 cm)   • Wt 228 lb 2 oz (103 kg)   • SpO2 96%   • BMI 37.96 kg/m2    Body mass index is 37.96 kg/(m^2). 96% 228 lb 2 oz (103 kg)  Physical Exam  Currently on BiPAP I switched her settings to 16/8 with tidal volumes around 490  Neck is supple no bruit no adenopathy  Chest diminished breath sounds diffuse bilateral wheezing and crackles on the bases  CVS regular rate and rhythm no murmurs or gallop  Abdomen is obese nontender bowel sounds positive  Extremities chronic 2+ edema no sinuses or clubbing  CNS moving all extremities or Prinzide and follows simple commands  No hallucination or delusion or joint deformities or skin rashes  Lab Review:   LABS:  Lab Results   Component Value Date    CALCIUM 9.7 01/12/2017    PHOS 3.2 06/08/2016     Results from last 7 days  Lab Units 01/12/17  0558 01/12/17  0557 01/08/17  0934 01/06/17  0551   SODIUM mmol/L  --  146* 135* 145   POTASSIUM mmol/L  --  4.0 3.9 4.5   CHLORIDE mmol/L  --  104 101 109*   TOTAL CO2 mmol/L  --  29.4* 21.6* 25.6   BUN mg/dL  --  19 18 26*   CREATININE mg/dL  --  1.04* 0.96 1.34*   GLUCOSE mg/dL  --  98 127* 95   CALCIUM mg/dL  --  9.7 8.6* 9.6   WBC 10*3/mm3 8.43  --   --  10.31   HEMOGLOBIN g/dL 12.1  --   --  12.0   PLATELETS 10*3/mm3 234  --   --  104*   ALT (SGPT) U/L  --  22 38* 95*   AST (SGOT) U/L  --  19 10 17       Results from last 7 days  Lab Units 01/12/17  0557 01/08/17  1123 01/06/17  0551   INR  2.43* 6.44* 3.16*     Lab Results   Component Value Date    TROPONINT <0.010 01/12/2017     Lab Results   Component Value Date    TSH 1.340 09/27/2016     Estimated Creatinine Clearance: 51.4 mL/min (by C-G formula based on Cr of " 1.04).     Imaging: I personally visualized the images of scans/x-rays performed within last 3 days.      Assessment:  Acute on chronic hypoxemic/hypercapnic respiratory failure  healthcare associated pneumonia  Possible CHF  Influenza  Recent UTI  History of KIERA   multiple medical problems    Recommendations:  Continue with BiPAP support BiPAP was adjusted  Empiric antibiotics Merrem and vancomycin  tamiflu for 5 days  De-escalate antibiotics based on cultures  Solu-Medrol  Volume status is difficult to assess.  We'll discontinue IV fluids.  May need diuretics  Continue supportive care  Prognosis guarded    Critical care 35 mins    Jordan Almodovar MD  1/12/2017  11:05 AM    EMR Dragon/Transcription disclaimer:     Much of this encounter note is an electronic transcription/translation of spoken language to printed text. The electronic translation of spoken language may permit erroneous, or at times, nonsensical words or phrases to be inadvertently transcribed; Although I have reviewed the note for such errors, some may still exist.

## 2017-01-12 NOTE — IP AVS SNAPSHOT
TATI WATTS   Facility: [facility] ([place])   SA: [facility]    MRN:  [id number]   PT#:     Report:  [id number] - Summary of Care Document           Basic Information     Date Of Birth Sex Race Ethnicity Preferred Language Preferred Written Language    1935 Female White or  Not  or  English English      Allergies as of 2017  Reviewed On: 2017 By: Luis Fernando Stevens III, MD       Noted Reaction Type Reactions    Codeine 2016    Nausea And Vomiting    Demerol [Meperidine] 2016        Dizzy reaction    Propoxyphene 2016        Dizzy reaction      Current Medications Are     acetaminophen (TYLENOL) 325 MG tablet Take 2 tablets by mouth Every 4 (Four) Hours As Needed for mild pain (1-3).    bisacodyl (DULCOLAX) 5 MG EC tablet Insert 10 mg into the rectum Daily As Needed for constipation.    cefepime 1 g in 100 mL NS Infuse 1 g into a venous catheter Every 12 (Twelve) Hours for 8 days. Indications: Pneumonia    cefTRIAXone (ROCEPHIN) 1 G injection () Infuse 1 g into a venous catheter Daily for 8 days.    cholecalciferol (VITAMIN D3) 91814 UNITS capsule Take 50,000 Units by mouth Every 30 (Thirty) Days.    citalopram (CeleXA) 20 MG tablet Take 1 tablet by mouth Daily.    docusate sodium (COLACE) 250 MG capsule Take 250 mg by mouth daily.    folic acid (FOLVITE) 1 MG tablet Take 1 mg by mouth daily.    furosemide (LASIX) 40 MG tablet Take 1 tablet by mouth 2 (Two) Times a Day.    furosemide (LASIX) 40 MG tablet Take 1 tablet by mouth 2 (Two) Times a Day.    gabapentin (NEURONTIN) 300 MG capsule Take 300 mg by mouth 2 (two) times a day.    glucosamine-chondroitin 500-400 MG capsule capsule Take 2 capsules by mouth daily.    guaiFENesin (MUCINEX) 600 MG 12 hr tablet Take 600 mg by mouth 2 (Two) Times a Day.    HYDROcodone-acetaminophen (NORCO) 5-325 MG per tablet Take 1 tablet by mouth Every 6 (Six) Hours As Needed for moderate pain  (4-6).    insulin aspart (NovoLOG) 100 UNIT/ML injection Inject 3 Units under the skin 3 (Three) Times a Day With Meals. Hold if blood sugar less than 120    insulin detemir (LEVEMIR) 100 UNIT/ML injection Inject 20 Units under the skin Every Night.    ipratropium-albuterol (DUO-NEB) 0.5-2.5 mg/mL nebulizer Take 3 mL by nebulization 4 (Four) Times a Day.    melatonin 3 MG tablet Take 6 mg by mouth Every Night.    melatonin 5 MG tablet tablet Take 5 mg by mouth every night.    metoprolol succinate XL (TOPROL-XL) 25 MG 24 hr tablet Take 25 mg by mouth daily.    multivitamin-minerals (OCUVITE) tablet Take 1 tablet by mouth daily.    NON FORMULARY 2LNC continuous    oseltamivir (TAMIFLU) 75 MG capsule Take 1 capsule by mouth Every 12 (Twelve) Hours for 1 dose.    pantoprazole (PROTONIX) 40 MG EC tablet Take 40 mg by mouth every evening.    polyethylene glycol (MIRALAX) packet Take 17 g by mouth daily.    predniSONE (DELTASONE) 10 MG tablet Take 1 tablet by mouth Daily With Breakfast.    predniSONE (DELTASONE) 5 MG tablet Take 5 mg by mouth 2 (Two) Times a Day.    sennosides-docusate sodium (SENOKOT-S) 8.6-50 MG tablet Take 2 tablets by mouth 2 (Two) Times a Day.    simvastatin (ZOCOR) 40 MG tablet Take 40 mg by mouth every night.    warfarin (COUMADIN) 5 MG tablet i po qhs    warfarin (COUMADIN) 6 MG tablet Take 6 mg by mouth Daily. 6 mg every Sun, Mon, Tues, Thrs, Sat  7 mg every Wed, Fri    cefdinir (OMNICEF) 300 MG capsule (Discontinued) Take 1 capsule by mouth Every 12 (Twelve) Hours for 14 doses. Indications: Pneumonia      Current Immunizations     No immunizations on file.      Problem List as of 1/17/2017  Date Reviewed: 1/4/2017             Codes Priority Class Noted - Resolved    RESOLVED: Pneumonia ICD-10-CM: J18.9  ICD-9-CM: 486   6/8/2016 - 1/3/2017    RESOLVED: CHF (congestive heart failure) ICD-10-CM: I50.9  ICD-9-CM: 428.0   Unknown - 6/9/2016                    Hyperglycemia ICD-10-CM: R73.9  ICD-9-CM:  790.29   9/27/2016 - Present    Acute hyperglycemia ICD-10-CM: R73.9  ICD-9-CM: 790.29   9/28/2016 - Present    Sepsis due to urinary tract infection ICD-10-CM: A41.9, N39.0  ICD-9-CM: 038.9, 995.91, 599.0   1/2/2017 - Present    Chronic diastolic (congestive) heart failure ICD-10-CM: I50.32  ICD-9-CM: 428.32, 428.0   Unknown - Present    Permanent atrial fibrillation ICD-10-CM: I48.2  ICD-9-CM: 427.31   Unknown - Present    Acute renal failure ICD-10-CM: N17.9  ICD-9-CM: 584.9   1/3/2017 - Present    Acute on chronic respiratory failure ICD-10-CM: J96.20  ICD-9-CM: 518.84   1/12/2017 - Present    * (Principal)Influenza A with respiratory manifestations ICD-10-CM: J10.1  ICD-9-CM: 487.1   1/12/2017 - Present    Chronic anticoagulation (Chronic) ICD-10-CM: Z79.01  ICD-9-CM: V58.61   1/12/2017 - Present    Lymphedema ICD-10-CM: I89.0  ICD-9-CM: 457.1   Unknown - Present    Morbid obesity ICD-10-CM: E66.01  ICD-9-CM: 278.01   Unknown - Present    Obstructive sleep apnea of adult ICD-10-CM: G47.33  ICD-9-CM: 327.23   Unknown - Present    Overview Signed 1/12/2017  4:33 PM by Luis Fernando Stevens III, MD     untreated         Pulmonary HTN ICD-10-CM: I27.2  ICD-9-CM: 416.8   Unknown - Present      Diagnoses        Codes Comments    Acute on chronic respiratory failure, unspecified whether with hypoxia or hypercapnia    -  Primary ICD-10-CM: J96.20     Influenza A     ICD-10-CM: J10.1  ICD-9-CM: 487.1     Congestive heart failure, unspecified congestive heart failure chronicity, unspecified congestive heart failure type     ICD-10-CM: I50.9  ICD-9-CM: 428.0       Lab and Imaging Results     Procedure Component Value Units Date/Time    POC Glucose Fingerstick [[id number]]  (Abnormal) Collected:  01/17/17 1122    Specimen:  Blood Updated:  01/17/17 1138     Glucose 171 (H) mg/dL     Narrative:       Meter: [id number] : [id number] Davian BETANCOURT Validator    POC Glucose Fingerstick [[id number]]  (Normal) Collected:  01/17/17 0721     Specimen:  Blood Updated:  01/17/17 0757     Glucose 125 mg/dL     Narrative:       Meter: [id number] : [id number] Davian BETANCOURT Validator    Blood Culture [[id number]]  (Normal) Collected:  01/12/17 0055    Specimen:  Blood from Arm, Right Updated:  01/17/17 0701     Blood Culture No growth at 5 days     Protime-INR [[id number]]  (Abnormal) Collected:  01/17/17 0607    Specimen:  Blood Updated:  01/17/17 0640     Protime 19.9 (H) Seconds      INR 1.67 (H)     Narrative:       Therapeutic Ranges for INR: 2.0-3.0 (PT 20-30)                              2.5-3.5 (PT 25-34)    POC Glucose Fingerstick [[id number]]  (Abnormal) Collected:  01/16/17 2003    Specimen:  Blood Updated:  01/16/17 2010     Glucose 271 (H) mg/dL     Narrative:       Meter: [id number] : [id number] Carlos Roe    POC Glucose Fingerstick [[id number]]  (Normal) Collected:  01/16/17 1713    Specimen:  Blood Updated:  01/16/17 1724     Glucose 112 mg/dL     Narrative:       Meter: [id number] : [id number] Angelique Rabago RN    Blood Culture [[id number]]  (Normal) Collected:  01/12/17 1149    Specimen:  Blood from Arm, Right Updated:  01/16/17 1301     Blood Culture No growth at 4 days     POC Glucose Fingerstick [[id number]]  (Abnormal) Collected:  01/16/17 1151    Specimen:  Blood Updated:  01/16/17 1203     Glucose 205 (H) mg/dL     Narrative:       Meter: [id number] : [id number] Angelique Raabgo RN    MRSA Screen [[id number]]  (Normal) Collected:  01/14/17 2017    Specimen:  Swab from Nares Updated:  01/16/17 0903     MRSA SCREEN CX        No Methicillin Resistant Staphylococcus aureus isolated    POC Glucose Fingerstick [[id number]]  (Normal) Collected:  01/16/17 0832    Specimen:  Blood Updated:  01/16/17 0839     Glucose 116 mg/dL     Narrative:       Meter: [id number] : [id number] Angelique Rabago RN    Blood Gas, Arterial [[id number]]  (Abnormal) Collected:  01/16/17 0552    Specimen:  Arterial Blood Updated:  01/16/17 0558     Site  Arterial: right radial      Prashant's Test Positive      pH, Arterial 7.461 (H) pH units      pCO2, Arterial 49.5 (H) mm Hg      pO2, Arterial 69.1 (L) mm Hg      HCO3, Arterial 34.5 (H) mmol/L      Base Excess, Arterial 9.3 (H) mmol/L      O2 Saturation Calculated 94.3 %      Hemoglobin, Blood Gas 12.5 g/dL      Barometric Pressure for Blood Gas 755 mmHg      Modality Cannula      Flow Rate 2 lpm     Protime-INR [[id number]]  (Abnormal) Collected:  01/16/17 0319    Specimen:  Blood Updated:  01/16/17 0521     Protime 31.4 (H) Seconds      INR 2.95 (H)     Narrative:       Therapeutic Ranges for INR: 2.0-3.0 (PT 20-30)                              2.5-3.5 (PT 25-34)    POC Glucose Fingerstick [[id number]]  (Abnormal) Collected:  01/15/17 2021    Specimen:  Blood Updated:  01/15/17 2028     Glucose 257 (H) mg/dL     Narrative:       Meter: [id number] : [id number] Abdifatah Lockwood    POC Glucose Fingerstick [[id number]]  (Abnormal) Collected:  01/15/17 1659    Specimen:  Blood Updated:  01/15/17 1710     Glucose 193 (H) mg/dL     Narrative:       Meter: [id number] : [id number] Carlos Roe    Vancomycin, Random [[id number]] Collected:  01/15/17 1236    Specimen:  Blood Updated:  01/15/17 1335     Vancomycin Random 23.00 mcg/mL     POC Glucose Fingerstick [[id number]]  (Abnormal) Collected:  01/15/17 1221    Specimen:  Blood Updated:  01/15/17 1227     Glucose 229 (H) mg/dL     Narrative:       Meter: [id number] : [id number] Carlos Roe    BNP [[id number]]  (Abnormal) Collected:  01/15/17 0551    Specimen:  Blood Updated:  01/15/17 0956     proBNP 3939.0 (H) pg/mL     Narrative:       Among patients with dyspnea, NT-proBNP is highly sensitive for the detection of acute congestive heart failure. In addition NT-proBNP of <300 pg/ml effectively rules out acute congestive heart failure with 99% negative predictive value.    POC Glucose Fingerstick [[id number]]  (Abnormal) Collected:  01/15/17 0746     Specimen:  Blood Updated:  01/15/17 0757     Glucose 141 (H) mg/dL     Narrative:       Meter: [id number] : [id number] Valentin Chang    Protime-INR [[id number]]  (Abnormal) Collected:  01/15/17 0550    Specimen:  Blood Updated:  01/15/17 0630     Protime 37.1 (H) Seconds      INR 3.64 (H)     Narrative:       Therapeutic Ranges for INR: 2.0-3.0 (PT 20-30)                              2.5-3.5 (PT 25-34)    Basic Metabolic Panel [[id number]]  (Abnormal) Collected:  01/15/17 0550    Specimen:  Blood Updated:  01/15/17 0624     Glucose 153 (H) mg/dL      BUN 29 (H) mg/dL      Creatinine 0.93 mg/dL      Sodium 146 (H) mmol/L      Potassium 3.8 mmol/L      Chloride 102 mmol/L      CO2 36.3 (H) mmol/L      Calcium 9.4 mg/dL      eGFR Non African Amer 58 (L) mL/min/1.73      BUN/Creatinine Ratio 31.2 (H)      Anion Gap 7.7 mmol/L     Narrative:       The MDRD GFR formula is only valid for adults with stable renal function between ages 18 and 70.    CBC & Differential [[id number]] Collected:  01/15/17 0550    Specimen:  Blood Updated:  01/15/17 0605    Narrative:       The following orders were created for panel order CBC & Differential.  Procedure                               Abnormality         Status                     ---------                               -----------         ------                     CBC Auto Differential[[id number]]         Abnormal            Final result                 Please view results for these tests on the individual orders.    CBC Auto Differential [[id number]]  (Abnormal) Collected:  01/15/17 0550    Specimen:  Blood Updated:  01/15/17 0605     WBC 5.69 10*3/mm3      RBC 4.06 (L) 10*6/mm3      Hemoglobin 11.4 (L) g/dL      Hematocrit 35.5 (L) %      MCV 87.4 fL      MCH 28.1 pg      MCHC 32.1 g/dL      RDW 13.2 %      RDW-SD 41.9 fl      MPV 9.5 fL      Platelets 231 10*3/mm3      Neutrophil % 82.4 (H) %      Lymphocyte % 11.8 (L) %      Monocyte % 4.7 %      Eosinophil % 0.0 %      Basophil %  0.2 %      Immature Grans % 0.9 (H) %      Neutrophils, Absolute 4.69 10*3/mm3      Lymphocytes, Absolute 0.67 10*3/mm3      Monocytes, Absolute 0.27 10*3/mm3      Eosinophils, Absolute 0.00 (L) 10*3/mm3      Basophils, Absolute 0.01 10*3/mm3      Immature Grans, Absolute 0.05 (H) 10*3/mm3      nRBC 0.0 /100 WBC     POC Glucose Fingerstick [[id number]]  (Abnormal) Collected:  01/14/17 2017    Specimen:  Blood Updated:  01/14/17 2032     Glucose 277 (H) mg/dL     Narrative:       Meter: [id number] : [id number] Davian Bee RN Validator    POC Glucose Fingerstick [[id number]]  (Abnormal) Collected:  01/14/17 1634    Specimen:  Blood Updated:  01/14/17 1643     Glucose 216 (H) mg/dL     Narrative:       Meter: [id number] : [id number] Sherif Schofield    POC Glucose Fingerstick [[id number]]  (Abnormal) Collected:  01/14/17 1133    Specimen:  Blood Updated:  01/14/17 1150     Glucose 242 (H) mg/dL     Narrative:       Meter: [id number] : [id number] Sherif Schofield    POC Glucose Fingerstick [[id number]]  (Abnormal) Collected:  01/14/17 0736    Specimen:  Blood Updated:  01/14/17 0742     Glucose 143 (H) mg/dL     Narrative:       Meter: [id number] : [id number] Sherif Schofield    Protime-INR [[id number]]  (Abnormal) Collected:  01/14/17 0535    Specimen:  Blood Updated:  01/14/17 0630     Protime 52.8 (H) Seconds      INR 5.65 (C)     Narrative:       Therapeutic Ranges for INR: 2.0-3.0 (PT 20-30)                              2.5-3.5 (PT 25-34)    BNP [[id number]]  (Abnormal) Collected:  01/14/17 0535    Specimen:  Blood Updated:  01/14/17 0612     proBNP 4718.0 (H) pg/mL     Narrative:       Among patients with dyspnea, NT-proBNP is highly sensitive for the detection of acute congestive heart failure. In addition NT-proBNP of <300 pg/ml effectively rules out acute congestive heart failure with 99% negative predictive value.    Basic Metabolic Panel [[id number]]  (Abnormal) Collected:  01/14/17 2920    Specimen:   Blood Updated:  01/14/17 0602     Glucose 149 (H) mg/dL      BUN 22 mg/dL      Creatinine 0.91 mg/dL      Sodium 143 mmol/L      Potassium 4.1 mmol/L      Chloride 101 mmol/L      CO2 34.7 (H) mmol/L      Calcium 9.4 mg/dL      eGFR Non African Amer 59 (L) mL/min/1.73      BUN/Creatinine Ratio 24.2      Anion Gap 7.3 mmol/L     Narrative:       The MDRD GFR formula is only valid for adults with stable renal function between ages 18 and 70.    CBC & Differential [[id number]] Collected:  01/14/17 0535    Specimen:  Blood Updated:  01/14/17 0549    Narrative:       The following orders were created for panel order CBC & Differential.  Procedure                               Abnormality         Status                     ---------                               -----------         ------                     CBC Auto Differential[[id number]]         Abnormal            Final result                 Please view results for these tests on the individual orders.    CBC Auto Differential [[id number]]  (Abnormal) Collected:  01/14/17 0535    Specimen:  Blood Updated:  01/14/17 0549     WBC 4.78 (L) 10*3/mm3      RBC 3.79 (L) 10*6/mm3      Hemoglobin 10.6 (L) g/dL      Hematocrit 33.3 (L) %      MCV 87.9 fL      MCH 28.0 pg      MCHC 31.8 g/dL      RDW 13.4 %      RDW-SD 43.0 fl      MPV 9.5 fL      Platelets 239 10*3/mm3      Neutrophil % 84.6 (H) %      Lymphocyte % 11.1 (L) %      Monocyte % 3.3 %      Eosinophil % 0.0 %      Basophil % 0.2 %      Immature Grans % 0.8 (H) %      Neutrophils, Absolute 4.04 10*3/mm3      Lymphocytes, Absolute 0.53 (L) 10*3/mm3      Monocytes, Absolute 0.16 10*3/mm3      Eosinophils, Absolute 0.00 (L) 10*3/mm3      Basophils, Absolute 0.01 10*3/mm3      Immature Grans, Absolute 0.04 (H) 10*3/mm3      nRBC 0.0 /100 WBC     POC Glucose Fingerstick [[id number]]  (Abnormal) Collected:  01/13/17 2036    Specimen:  Blood Updated:  01/13/17 2101     Glucose 272 (H) mg/dL     Narrative:       Meter: [id number]  : [id number] Davian Bee RN Validator    POC Glucose Fingerstick [[id number]]  (Abnormal) Collected:  01/13/17 1716    Specimen:  Blood Updated:  01/13/17 1725     Glucose 247 (H) mg/dL     Narrative:       Meter: [id number] : [id number] Vinicio Oliva RN    POC Glucose Fingerstick [[id number]]  (Abnormal) Collected:  01/13/17 1201    Specimen:  Blood Updated:  01/13/17 1207     Glucose 187 (H) mg/dL     Narrative:       Meter: [id number] : [id number] Vinicio Oliva RN    POC Glucose Fingerstick [[id number]]  (Abnormal) Collected:  01/13/17 0742    Specimen:  Blood Updated:  01/13/17 0754     Glucose 201 (H) mg/dL     Narrative:       Meter: [id number] : [id number] Davian BETANCOURT Validator    Comprehensive Metabolic Panel [[id number]]  (Abnormal) Collected:  01/13/17 0605    Specimen:  Blood Updated:  01/13/17 0650     Glucose 206 (H) mg/dL      BUN 19 mg/dL      Creatinine 0.88 mg/dL      Sodium 141 mmol/L      Potassium 4.1 mmol/L      Chloride 101 mmol/L      CO2 32.4 (H) mmol/L      Calcium 9.3 mg/dL      Total Protein 5.3 (L) g/dL      Albumin 2.40 (L) g/dL      ALT (SGPT) 16 U/L      AST (SGOT) 14 U/L      Alkaline Phosphatase 92 U/L      Total Bilirubin 0.3 mg/dL      eGFR Non African Amer 62 mL/min/1.73      Globulin 2.9 gm/dL      A/G Ratio 0.8 g/dL      BUN/Creatinine Ratio 21.6      Anion Gap 7.6 mmol/L     Narrative:       The MDRD GFR formula is only valid for adults with stable renal function between ages 18 and 70.    Lactic Acid, Plasma [[id number]]  (Normal) Collected:  01/13/17 0605    Specimen:  Blood Updated:  01/13/17 0636     Lactate 0.7 mmol/L     Protime-INR [[id number]]  (Abnormal) Collected:  01/13/17 0605    Specimen:  Blood Updated:  01/13/17 0636     Protime 37.2 (H) Seconds      INR 3.65 (H)     Narrative:       Therapeutic Ranges for INR: 2.0-3.0 (PT 20-30)                              2.5-3.5 (PT 25-34)    CBC & Differential [[id number]] Collected:  01/13/17 0605    Specimen:  Blood  Updated:  01/13/17 0620    Narrative:       The following orders were created for panel order CBC & Differential.  Procedure                               Abnormality         Status                     ---------                               -----------         ------                     CBC Auto Differential[[id number]]         Abnormal            Final result                 Please view results for these tests on the individual orders.    CBC Auto Differential [[id number]]  (Abnormal) Collected:  01/13/17 0605    Specimen:  Blood Updated:  01/13/17 0620     WBC 3.98 (L) 10*3/mm3      RBC 3.67 (L) 10*6/mm3      Hemoglobin 10.5 (L) g/dL      Hematocrit 33.9 (L) %      MCV 92.4 fL      MCH 28.6 pg      MCHC 31.0 g/dL      RDW 13.6 %      RDW-SD 46.4 fl      MPV 9.6 fL      Platelets 194 10*3/mm3      Neutrophil % 85.8 (H) %      Lymphocyte % 10.6 (L) %      Monocyte % 2.3 (L) %      Eosinophil % 0.0 %      Basophil % 0.3 %      Immature Grans % 1.0 (H) %      Neutrophils, Absolute 3.42 10*3/mm3      Lymphocytes, Absolute 0.42 (L) 10*3/mm3      Monocytes, Absolute 0.09 10*3/mm3      Eosinophils, Absolute 0.00 (L) 10*3/mm3      Basophils, Absolute 0.01 10*3/mm3      Immature Grans, Absolute 0.04 (H) 10*3/mm3      nRBC 0.0 /100 WBC     Troponin [[id number]]  (Normal) Collected:  01/12/17 2306    Specimen:  Blood Updated:  01/13/17 0003     Troponin T <0.010 ng/mL     Narrative:       Troponin T Reference Ranges:  Less than 0.03 ng/mL:    Negative for AMI  0.03 to 0.09 ng/mL:      Indeterminant for AMI  Greater than 0.09 ng/mL: Positive for AMI    POC Glucose Fingerstick [[id number]]  (Abnormal) Collected:  01/12/17 2058    Specimen:  Blood Updated:  01/12/17 2110     Glucose 174 (H) mg/dL     Narrative:       Meter: [id number] : [id number] Starr Kim RN Validator    Troponin [[id number]]  (Normal) Collected:  01/12/17 1755    Specimen:  Blood Updated:  01/12/17 1814     Troponin T <0.010 ng/mL     Narrative:        Troponin T Reference Ranges:  Less than 0.03 ng/mL:    Negative for AMI  0.03 to 0.09 ng/mL:      Indeterminant for AMI  Greater than 0.09 ng/mL: Positive for AMI    POC Glucose Fingerstick [[id number]]  (Normal) Collected:  01/12/17 1731    Specimen:  Blood Updated:  01/12/17 1740     Glucose 114 mg/dL     Narrative:       Meter: [id number] : [id number] Sydnee Huertas RN    Amylase [[id number]]  (Normal) Collected:  01/12/17 1646    Specimen:  Blood Updated:  01/12/17 1658     Amylase 55 U/L     Lipase [[id number]]  (Normal) Collected:  01/12/17 1646    Specimen:  Blood Updated:  01/12/17 1658     Lipase 22 U/L     CT Chest Without Contrast [[id number]] Collected:  01/12/17 1541     Updated:  01/12/17 1554    Narrative:       CHEST CT     HISTORY:  Shortness of air with cough, congestion, and flulike symptoms for the  last 3 days. Former smoker. Abnormal chest x-ray today showing a  potential right lung base pneumonia.     TECHNIQUE:  Axial images were obtained through the chest without contrast.  Multiplanar reformats were obtained. No comparison chest CT. Radiation  dose reduction techniques were utilized, including automated exposure  control and exposure modulation based on body size.     FINDINGS:  The heart is enlarged. The main pulmonary arteries are dilated  suggesting pulmonary hypertension. The right main PA measures 3.8 cm,  and the left main PA measures 3.4 cm. There is no pericardial effusion.  There is a small right pleural effusion. There is a trace amount of left  pleural fluid. Note is made of multiple potential left breast nodules.  Outpatient evaluation with mammogram is recommended if this has not been  performed recently at an outside facility. There is no adenopathy. There  is Some mucous plugging noted in left lower lobe bronchi. There is  alveolar consolidation in both lower lobes which may simply reflect some  atelectasis. Underlying pneumonia is not excluded the basis of this  exam. Continuation  through the upper abdomen reveals diffuse fat  stranding around the pancreas suggesting acute pancreatitis. This is  incompletely visualized. Gallbladder contains numerous stones. There is  soft tissue swelling in the left lateral lower chest wall which may  reflect hematoma. Correlate with physical exam findings. A right arm  approach PICC line has its tip in the SVC in good position.       Impression:       1. These findings are highly suspicious for acute pancreatitis. The  pancreas is incompletely visualized. Correlation with laboratory data  recommended.  2. Soft tissue density in the left lateral lower chest wall may reflect  a hematoma. Correlate with physical exam findings.  3. Cardiomegaly.  4. Enlarged central pulmonary arteries suggesting pulmonary arterial  hypertension.  5. Small right pleural effusion with a trace left effusion. There is  some mucous plugging in the lower lobe bronchi on the left. There is  some alveolar consolidation in both lower lobes. Considerations include  atelectasis and/or pneumonia.  6. Cholelithiasis.  7. Potential left breast nodules. Outpatient evaluation with mammogram  recommended if this is not been performed recently at an outside  facility.        Stat final copy of this report was sent to the ordering physician's  office immediately following this dictation with telephone notification.     This report was finalized on 1/12/2017 3:51 PM by Dr. Gerardo Brito MD.       Protime-INR [[id number]]  (Abnormal) Collected:  01/12/17 1149    Specimen:  Blood Updated:  01/12/17 1321     Protime 27.4 (H) Seconds      INR 2.49 (H)     Narrative:       Therapeutic Ranges for INR: 2.0-3.0 (PT 20-30)                              2.5-3.5 (PT 25-34)    Troponin [[id number]]  (Normal) Collected:  01/12/17 1149    Specimen:  Blood Updated:  01/12/17 1242     Troponin T <0.010 ng/mL     Narrative:       Troponin T Reference Ranges:  Less than 0.03 ng/mL:    Negative for AMI  0.03 to 0.09  ng/mL:      Indeterminant for AMI  Greater than 0.09 ng/mL: Positive for AMI    Blood Gas, Arterial [[id number]]  (Abnormal) Collected:  01/12/17 1210    Specimen:  Arterial Blood Updated:  01/12/17 1219     Site Arterial: right radial      Prashant's Test Positive      pH, Arterial 7.395 pH units      pCO2, Arterial 58.5 (H) mm Hg      pO2, Arterial 69.3 (L) mm Hg      HCO3, Arterial 35.0 (H) mmol/L      Base Excess, Arterial 8.2 (H) mmol/L      O2 Saturation Calculated 94.2 %      Hemoglobin, Blood Gas 13.3 g/dL      Barometric Pressure for Blood Gas 749 mmHg      Modality BiPAP      FIO2 40 %      Set Mech Resp Rate 16      IPAP 14      EPAP 8     POC Glucose Fingerstick [[id number]]  (Normal) Collected:  01/12/17 1206    Specimen:  Blood Updated:  01/12/17 1214     Glucose 106 mg/dL     Narrative:       Meter: [id number] : [id number] Valorie Nayak    Urinalysis With / Culture If Indicated [[id number]]  (Abnormal) Collected:  01/12/17 0827    Specimen:  Urine from Urine, Clean Catch Updated:  01/12/17 0846     Color, UA Yellow      Appearance, UA Slightly Cloudy (A)      pH, UA 5.5      Specific Gravity, UA 1.010      Glucose, UA Negative      Ketones, UA Negative      Bilirubin, UA Negative      Blood, UA Negative      Protein, UA Negative      Leuk Esterase, UA Negative      Nitrite, UA Negative      Urobilinogen, UA 0.2 E.U./dL     Narrative:       Urine microscopic not indicated.    Protime-INR [[id number]]  (Abnormal) Collected:  01/12/17 0557    Specimen:  Blood Updated:  01/12/17 0812     Protime 26.9 (H) Seconds      INR 2.43 (H)     Narrative:       Therapeutic Ranges for INR: 2.0-3.0 (PT 20-30)                              2.5-3.5 (PT 25-34)    Blood Gas, Arterial [[id number]]  (Abnormal) Collected:  01/12/17 0738    Specimen:  Arterial Blood Updated:  01/12/17 0739     Site Arterial: left radial      Prashant's Test Positive      pH, Arterial 7.379 pH units      pCO2, Arterial 57.2 (H) mm Hg      pO2, Arterial  76.8 (L) mm Hg      HCO3, Arterial 33.0 (H) mmol/L      Base Excess, Arterial 6.6 (H) mmol/L      O2 Saturation Calculated 95.0 %      Hemoglobin, Blood Gas 10.4 (L) g/dL      Barometric Pressure for Blood Gas 748 mmHg      Modality BiPAP      FIO2 40 %      Set [facility] Resp Rate 14      Rate 22 Breaths/minute      IPAP 12      EPAP 6      Pulse Ox 95 %     XR Chest 1 View [[id number]] Collected:  01/12/17 0725     Updated:  01/12/17 0729    Narrative:       Chest x-ray     INDICATION:  Patient arrives via EMS from her nursing home for  evaluation of worsening dyspnea this evening. Apparently she has a long  history of chronic respiratory failure with congestive heart failure  complications as well as a recent pneumonia. She was just in the  hospital about 1 week ago for some similar symptoms but also a UTI. She  has an indwelling PICC line through which she is currently receiving  Rocephin daily. The nursing home staff tell us that the patient  developed some worsening shortness of breath symptoms as the evening  progressed. They gave her the usual nebulized treatments which often  resolve her breathing symptoms but this time they did not seem to  benefit much.     FINDINGS: AP upright view of the chest is compared with 01/03/2017. The  heart remains quite enlarged. There are small bilateral pleural  effusions, left greater than right. There is new infiltrate at the right  lung base concerning for pneumonia. There is consolidation behind the  heart at the left base which may reflect atelectasis or pneumonia. Right  arm approach PICC remains in the SVC. No pneumothorax.       Impression:       Stable cardiomegaly. Bilateral effusions left greater than  right. New infiltrate in the right base concerning for pneumonia. There  is infiltrate or atelectasis in the left base as well.     This report was finalized on 1/12/2017 7:27 AM by Dr. Gerardo Brito MD.       BNP [[id number]]  (Abnormal) Collected:  01/12/17 0611     Specimen:  Blood Updated:  01/12/17 0659     proBNP 7334.0 (H) pg/mL     Narrative:       Among patients with dyspnea, NT-proBNP is highly sensitive for the detection of acute congestive heart failure. In addition NT-proBNP of <300 pg/ml effectively rules out acute congestive heart failure with 99% negative predictive value.    Troponin [[id number]]  (Normal) Collected:  01/12/17 0611    Specimen:  Blood Updated:  01/12/17 0630     Troponin T <0.010 ng/mL     Narrative:       Troponin T Reference Ranges:  Less than 0.03 ng/mL:    Negative for AMI  0.03 to 0.09 ng/mL:      Indeterminant for AMI  Greater than 0.09 ng/mL: Positive for AMI    Influenza Antigen [[id number]]  (Abnormal) Collected:  01/12/17 0558    Specimen:  Swab from Nasopharynx Updated:  01/12/17 0624     Influenza A Ag, EIA Positive (A)      Influenza B Ag, EIA Negative     Comprehensive Metabolic Panel [[id number]]  (Abnormal) Collected:  01/12/17 0557    Specimen:  Blood Updated:  01/12/17 0622     Glucose 98 mg/dL      BUN 19 mg/dL      Creatinine 1.04 (H) mg/dL      Sodium 146 (H) mmol/L      Potassium 4.0 mmol/L      Chloride 104 mmol/L      CO2 29.4 (H) mmol/L      Calcium 9.7 mg/dL      Total Protein 5.9 (L) g/dL      Albumin 2.80 (L) g/dL      ALT (SGPT) 22 U/L      AST (SGOT) 19 U/L      Alkaline Phosphatase 115 U/L      Total Bilirubin 0.4 mg/dL      eGFR Non African Amer 51 (L) mL/min/1.73      Globulin 3.1 gm/dL      A/G Ratio 0.9 g/dL      BUN/Creatinine Ratio 18.3      Anion Gap 12.6 mmol/L     Narrative:       The MDRD GFR formula is only valid for adults with stable renal function between ages 18 and 70.    Lactic Acid, Plasma [[id number]]  (Normal) Collected:  01/12/17 0558    Specimen:  Blood Updated:  01/12/17 0618     Lactate 0.7 mmol/L     CBC & Differential [[id number]] Collected:  01/12/17 0558    Specimen:  Blood Updated:  01/12/17 0608    Narrative:       The following orders were created for panel order CBC & Differential.  Procedure    "                            Abnormality         Status                     ---------                               -----------         ------                     CBC Auto Differential[[id number]]         Abnormal            Final result                 Please view results for these tests on the individual orders.    CBC Auto Differential [[id number]]  (Abnormal) Collected:  01/12/17 0558    Specimen:  Blood Updated:  01/12/17 0608     WBC 8.43 10*3/mm3      RBC 4.31 10*6/mm3      Hemoglobin 12.1 g/dL      Hematocrit 38.7 %      MCV 89.8 fL      MCH 28.1 pg      MCHC 31.3 g/dL      RDW 13.8 %      RDW-SD 45.7 fl      MPV 9.8 fL      Platelets 234 10*3/mm3      Neutrophil % 82.2 (H) %      Lymphocyte % 9.8 (L) %      Monocyte % 5.2 %      Eosinophil % 1.7 %      Basophil % 0.2 %      Immature Grans % 0.9 (H) %      Neutrophils, Absolute 6.92 10*3/mm3      Lymphocytes, Absolute 0.83 10*3/mm3      Monocytes, Absolute 0.44 10*3/mm3      Eosinophils, Absolute 0.14 10*3/mm3      Basophils, Absolute 0.02 10*3/mm3      Immature Grans, Absolute 0.08 (H) 10*3/mm3      nRBC 0.0 /100 WBC       Vitals     BP Pulse Temp Resp Ht Wt    130/70 70 97.9 °F (36.6 °C) (Oral) 18 65\" (165.1 cm) 225 lb 6 oz (102 kg)    SpO2 BMI             90% 37.5 kg/m2       Vitals History      Social History     Category History    Smoking Tobacco Use Never Smoker    Smokeless Tobacco Use Unknown    Tobacco Comment     Alcohol Use No    Drug Use No    Sexual Activity Not Currently    ADL Not Asked      Patient Care Team        Relationship Specialty Notifications Start End    Gerardo Williamson MD PCP - General   11/23/15     Gerardo Williamson MD PCP - Family Medicine   11/29/15        Instructions for After Discharge        Follow-up Information     Follow up with [facility] .    Specialties:  Skilled Nursing Facility, Intermediate Care Facility    Contact information:    [address] [place] [id number] [phone]      Activity Instructions     " As tolerated           Diet Instructions     Mechanical soft consistent carb thin liquids

## 2017-01-12 NOTE — IP AVS SNAPSHOT
INTER-FACILITY TRANSFER   AFTER VISIT SUMMARY             Alexandria Villanueva            Summary of Your Hospitalization        About Your Hospitalization     You were admitted on:  January 12, 2017 You last received care in the:  [facility] ICU      Reason for Hospitalization     Your primary diagnosis was:  Flu    Your diagnoses also included:  Acute And Chronic Respiratory Failure, Heart Failure, Atrial Fibrillation (Irregular Heartbeat), Long Term Use Of Blood Thinners, Lymphedema, Severe Obesity, Obstructive Sleep Apnea Of Adult, Pulmonary Hypertension      Care Providers     Provider Service Role Specialty    Trevor Diallo MD Internal Medicine Attending Provider Internal Medicine    Jordan Almodovar MD Pulmonology Consulting Physician  Pulmonary Disease      Your Allergies  Date Reviewed: 1/12/2017    Allergen Reactions    Codeine Nausea And Vomiting         Demerol (Meperidine) Not Noted    Dizzy reaction         Propoxyphene Not Noted    Dizzy reaction      Pending Labs     Order Current Status    Blood Culture Preliminary result       Medications    Based on the information you provided to us as well as any changes during this visit, the following is your updated medication list.  This is subject to change based on the care your receive at the receiving facility.  You should receive a new list once you are discharged.      If you have any questions or concerns, contact your primary care physician's office.             Your Medications      START taking these medications     cefepime 1 g in 100 mL NS   Infuse 1 g into a venous catheter Every 12 (Twelve) Hours for 8 days. Indications: Pneumonia   Last time this was given:  1/17/2017  1:53 AM           oseltamivir 75 MG capsule   Take 1 capsule by mouth Every 12 (Twelve) Hours for 1 dose.   Last time this was given:  1/16/2017  8:20 PM   Commonly known as:  TAMIFLU           sennosides-docusate sodium 8.6-50 MG tablet   Take 2 tablets by  mouth 2 (Two) Times a Day.   Last time this was given:  1/17/2017  8:10 AM   Commonly known as:  SENOKOT-S             CHANGE how you take these medications     citalopram 20 MG tablet   Take 1 tablet by mouth Daily.   Last time this was given:  1/17/2017  8:10 AM   According to our records, you may have been taking this medication differently.   Commonly known as:  CeleXA   What changed:    - when to take this  - additional instructions           furosemide 40 MG tablet   Take 1 tablet by mouth 2 (Two) Times a Day.   Last time this was given:  1/17/2017  8:10 AM   Commonly known as:  LASIX   What changed:  Another medication with the same name was removed. Continue taking this medication, and follow the directions you see here.           melatonin 3 MG tablet   Take 6 mg by mouth Every Night.   Last time this was given:  1/16/2017  8:20 PM   What changed:  Another medication with the same name was removed. Continue taking this medication, and follow the directions you see here.           predniSONE 10 MG tablet   Take 1 tablet by mouth Daily With Breakfast.   Commonly known as:  DELTASONE   What changed:    - medication strength  - how much to take  - when to take this           warfarin 5 MG tablet   i po qhs   Last time this was given:  1/12/2017  5:42 PM   Commonly known as:  COUMADIN   What changed:  You were already taking a medication with the same name, and this prescription was added. Make sure you understand how and when to take each.           warfarin 6 MG tablet   Take 6 mg by mouth Daily. 6 mg every Sun, Mon, Tues, Thrs, Sat 7 mg every Wed, Fri   Last time this was given:  1/12/2017  5:42 PM   Commonly known as:  COUMADIN   What changed:  Another medication with the same name was added. Make sure you understand how and when to take each.             CONTINUE taking these medications     acetaminophen 325 MG tablet   Take 2 tablets by mouth Every 4 (Four) Hours As Needed for mild pain (1-3).   Commonly  known as:  TYLENOL           bisacodyl 5 MG EC tablet   Insert 10 mg into the rectum Daily As Needed for constipation.   Commonly known as:  DULCOLAX           cholecalciferol 50294 UNITS capsule   Take 50,000 Units by mouth Every 30 (Thirty) Days.   Last time this was given:  1/12/2017 12:40 PM   Commonly known as:  VITAMIN D3           folic acid 1 MG tablet   Take 1 mg by mouth daily.   Last time this was given:  1/17/2017  8:10 AM   Commonly known as:  FOLVITE           gabapentin 300 MG capsule   Take 300 mg by mouth 2 (two) times a day.   Last time this was given:  1/17/2017  8:10 AM   Commonly known as:  NEURONTIN           glucosamine-chondroitin 500-400 MG capsule capsule   Take 2 capsules by mouth daily.           HYDROcodone-acetaminophen 5-325 MG per tablet   Take 1 tablet by mouth Every 6 (Six) Hours As Needed for moderate pain (4-6).   Commonly known as:  NORCO           insulin aspart 100 UNIT/ML injection   Inject 3 Units under the skin 3 (Three) Times a Day With Meals. Hold if blood sugar less than 120   Last time this was given:  1/16/2017  8:20 PM   Commonly known as:  novoLOG           insulin detemir 100 UNIT/ML injection   Inject 20 Units under the skin Every Night.   Last time this was given:  1/16/2017  8:20 PM   Commonly known as:  LEVEMIR           ipratropium-albuterol 0.5-2.5 mg/mL nebulizer   Take 3 mL by nebulization 4 (Four) Times a Day.   Last time this was given:  1/17/2017  7:28 AM   Commonly known as:  DUO-NEB           metoprolol succinate XL 25 MG 24 hr tablet   Take 25 mg by mouth daily.   Last time this was given:  1/17/2017  8:10 AM   Commonly known as:  TOPROL-XL           multivitamin-minerals tablet   Take 1 tablet by mouth daily.   Last time this was given:  1/17/2017  8:10 AM           NON FORMULARY   2LNC continuous           pantoprazole 40 MG EC tablet   Take 40 mg by mouth every evening.   Last time this was given:  1/16/2017  5:21 PM   Commonly known as:  PROTONIX            polyethylene glycol packet   Take 17 g by mouth daily.   Last time this was given:  1/15/2017  9:30 AM   Commonly known as:  MIRALAX             STOP taking these medications     cefTRIAXone 1 G injection   Commonly known as:  ROCEPHIN           docusate sodium 250 MG capsule   Commonly known as:  COLACE           guaiFENesin 600 MG 12 hr tablet   Commonly known as:  MUCINEX           simvastatin 40 MG tablet   Commonly known as:  ZOCOR                Where to Get Your Medications      These medications were sent to [facility] [place], [place] - [address] - [phone]  - [phone] FX  [address], [place] [address]     Phone:  [phone]     furosemide 40 MG tablet    oseltamivir 75 MG capsule    predniSONE 10 MG tablet    sennosides-docusate sodium 8.6-50 MG tablet         Information about where to get these medications is not yet available     ! Ask your nurse or doctor about these medications     cefepime 1 g in 100 mL NS    warfarin 5 MG tablet                Additional Instructions    Information is subject to change based on the care your receive at the receiving facility.  If you have any questions or concerns, contact your primary care physician's office.          Activity Instructions     As tolerated           Diet Instructions     Mechanical soft consistent carb thin liquids            Follow-ups for After Discharge        Follow-up Information     Follow up with [facility] .    Specialties:  Skilled Nursing Facility, Intermediate Care Facility    Contact information:    [address] [address] [phone]      Information on Heart Failure     You have been diagnosed with heart failure during your hospitalization. Review to the following to reduce your risk of heart failure:  Discharge Medications  Taking heart failure medications as prescribed is one of the most vital aspects of managing heart failure. It is important to know the  names of your medications, how they work, how much to take, and when to take them. You should take your medications at the same time every day. Do not stop your prescribed medications or begin taking over-the-counter or herbal medications without first speaking with your physician.  Activity Level  Exercise will help control your weight, blood pressure, and stress. Controlling these things will help keep your heart healthy. Try to do activities that raise your heart rate. Aim for at least 2½ hours of moderate exercise a week. One way to do this is to be active at least 10 minutes 3 times a day, 5 days a week. Talk to your doctor before starting an exercise program.  Diet  Lower your cholesterol. If you have high cholesterol, follow your doctor's advice for lowering it. Eating a heart-healthy diet-such as the TLC diet -exercising, and quitting smoking will help keep your cholesterol low.  Weight Monitoring  Through proper diet and moderate exercise, patients should maintain a weight determined by their physician. For heart failure patients, daily weight monitoring is crucial. Your record should be shared with your health care provider at your appointments. Call your doctor if you experience a weight gain of 2-3 pounds or more per day over a 2-day period or 5 pounds in 1 week.  Symptoms  Common heart failure symptoms include:  Chest pain  Fatigue and weakness  Rapid or irregular heartbeat  Shortness of breath when you exert yourself or when you lie down  Reduced ability to exercise  Persistent cough or wheezing with white or pink blood-tinged phlegm  Swelling in your abdomen, legs, ankles and feet  Difficulty concentrating or decreased alertness.    If any of these symptoms suddenly become worse or you develop a new sign or symptom, it may mean that existing heart failure is getting worse or not responding to treatment. Contact your doctor promptly.        [employer] Signup Information     [facility] allows  you to send messages to your doctor, view your test results, renew your prescriptions, schedule appointments, and more. To sign up, go to [email or web address] and click on the Sign Up Now link in the New User? box. Enter your [employer] Activation Code exactly as it appears below along with the last four digits of your Social Security Number and your Date of Birth () to complete the sign-up process. If you do not sign up before the expiration date, you must request a new code.    [employer] Activation Code: [email or web address]  Expires: 2017  2:43 PM    If you have questions, you can email [email or web address] or call [phone] to talk to our [employer] staff. Remember, [employer] is NOT to be used for urgent needs. For medical emergencies, dial 911.                     Patient Signature:  ____________________________________________________________    Date:  ____________________________________________________________        More Information      Influenza, Adult  Influenza (flu) is an infection in the mouth, nose, and throat (respiratory tract) caused by a virus. The flu can make you feel very ill. Influenza spreads easily from person to person (contagious).   HOME CARE   · Only take medicines as told by your doctor.  · Use a cool mist humidifier to make breathing easier.  · Get plenty of rest until your fever goes away. This usually takes 3 to 4 days.  · Drink enough fluids to keep your pee (urine) clear or pale yellow.  · Cover your mouth and nose when you cough or sneeze.  · Wash your hands well to avoid spreading the flu.  · Stay home from work or school until your fever has been gone for at least 1 full day.  · Get a flu shot every year.  GET HELP RIGHT AWAY IF:   · You have trouble breathing or feel short of breath.  · Your skin or nails turn blue.  · You have severe neck pain or stiffness.  · You have a severe headache, facial pain, or earache.  · Your fever gets worse or keeps coming back.  · You  feel sick to your stomach (nauseous), throw up (vomit), or have watery poop (diarrhea).  · You have chest pain.  · You have a deep cough that gets worse, or you cough up more thick spit (mucus).  MAKE SURE YOU:   · Understand these instructions.  · Will watch your condition.  · Will get help right away if you are not doing well or get worse.     This information is not intended to replace advice given to you by your health care provider. Make sure you discuss any questions you have with your health care provider.     Document Released: 09/26/2009 Document Revised: 01/08/2016 Document Reviewed: 03/18/2013  [employer] Patient Education ©2016 [employer]

## 2017-01-13 ENCOUNTER — APPOINTMENT (OUTPATIENT)
Dept: CARDIOLOGY | Facility: HOSPITAL | Age: 82
End: 2017-01-13
Attending: INTERNAL MEDICINE

## 2017-01-13 LAB
ALBUMIN SERPL-MCNC: 2.4 G/DL (ref 3.5–5.2)
ALBUMIN/GLOB SERPL: 0.8 G/DL
ALP SERPL-CCNC: 92 U/L (ref 40–129)
ALT SERPL W P-5'-P-CCNC: 16 U/L (ref 5–33)
ANION GAP SERPL CALCULATED.3IONS-SCNC: 7.6 MMOL/L
AST SERPL-CCNC: 14 U/L (ref 5–32)
BASOPHILS # BLD AUTO: 0.01 10*3/MM3 (ref 0–0.2)
BASOPHILS NFR BLD AUTO: 0.3 % (ref 0–2)
BH CV ECHO MEAS - ACS: 1.6 CM
BH CV ECHO MEAS - AO MAX PG (FULL): 12 MMHG
BH CV ECHO MEAS - AO MAX PG: 12.1 MMHG
BH CV ECHO MEAS - AO MEAN PG (FULL): 3.1 MMHG
BH CV ECHO MEAS - AO MEAN PG: 6.1 MMHG
BH CV ECHO MEAS - AO ROOT AREA (BSA CORRECTED): 1.4
BH CV ECHO MEAS - AO ROOT AREA: 6.5 CM^2
BH CV ECHO MEAS - AO ROOT DIAM: 2.9 CM
BH CV ECHO MEAS - AO V2 MAX: 175 CM/SEC
BH CV ECHO MEAS - AO V2 MEAN: 116 CM/SEC
BH CV ECHO MEAS - AO V2 VTI: 34.1 CM
BH CV ECHO MEAS - AVA(I,A): 1.6 CM^2
BH CV ECHO MEAS - AVA(I,D): 1.6 CM^2
BH CV ECHO MEAS - AVA(V,A): 1.5 CM^2
BH CV ECHO MEAS - AVA(V,D): 1.5 CM^2
BH CV ECHO MEAS - BSA(HAYCOCK): 2.2 M^2
BH CV ECHO MEAS - BSA: 2.1 M^2
BH CV ECHO MEAS - BZI_BMI: 38.1 KILOGRAMS/M^2
BH CV ECHO MEAS - BZI_METRIC_HEIGHT: 165.1 CM
BH CV ECHO MEAS - BZI_METRIC_WEIGHT: 103.9 KG
BH CV ECHO MEAS - CONTRAST EF (2CH): 47.5 ML/M^2
BH CV ECHO MEAS - CONTRAST EF 4CH: 61.8 ML/M^2
BH CV ECHO MEAS - EDV(CUBED): 89.6 ML
BH CV ECHO MEAS - EDV(MOD-SP2): 59 ML
BH CV ECHO MEAS - EDV(MOD-SP4): 68 ML
BH CV ECHO MEAS - EDV(TEICH): 91.2 ML
BH CV ECHO MEAS - EF(CUBED): 64.7 %
BH CV ECHO MEAS - EF(MOD-SP2): 47.5 %
BH CV ECHO MEAS - EF(MOD-SP4): 61.8 %
BH CV ECHO MEAS - EF(TEICH): 56.4 %
BH CV ECHO MEAS - ESV(CUBED): 31.6 ML
BH CV ECHO MEAS - ESV(MOD-SP2): 31 ML
BH CV ECHO MEAS - ESV(MOD-SP4): 26 ML
BH CV ECHO MEAS - ESV(TEICH): 39.8 ML
BH CV ECHO MEAS - FS: 29.3 %
BH CV ECHO MEAS - IVS/LVPW: 0.92
BH CV ECHO MEAS - IVSD: 0.76 CM
BH CV ECHO MEAS - LA DIMENSION: 4.5 CM
BH CV ECHO MEAS - LA/AO: 1.6
BH CV ECHO MEAS - LAT PEAK E' VEL: 7 CM/SEC
BH CV ECHO MEAS - LV DIASTOLIC VOL/BSA (35-75): 32.5 ML/M^2
BH CV ECHO MEAS - LV MASS(C)D: 112.4 GRAMS
BH CV ECHO MEAS - LV MASS(C)DI: 53.7 GRAMS/M^2
BH CV ECHO MEAS - LV MAX PG: 5.8 MMHG
BH CV ECHO MEAS - LV MEAN PG: 3 MMHG
BH CV ECHO MEAS - LV SYSTOLIC VOL/BSA (12-30): 12.4 ML/M^2
BH CV ECHO MEAS - LV V1 MAX: 120.9 CM/SEC
BH CV ECHO MEAS - LV V1 MEAN: 80 CM/SEC
BH CV ECHO MEAS - LV V1 VTI: 25.4 CM
BH CV ECHO MEAS - LVIDD: 4.5 CM
BH CV ECHO MEAS - LVIDS: 3.2 CM
BH CV ECHO MEAS - LVLD AP2: 6.9 CM
BH CV ECHO MEAS - LVLD AP4: 7 CM
BH CV ECHO MEAS - LVLS AP2: 6.2 CM
BH CV ECHO MEAS - LVLS AP4: 5.9 CM
BH CV ECHO MEAS - LVOT AREA (M): 2.3 CM^2
BH CV ECHO MEAS - LVOT AREA: 2.2 CM^2
BH CV ECHO MEAS - LVOT DIAM: 1.7 CM
BH CV ECHO MEAS - LVPWD: 0.83 CM
BH CV ECHO MEAS - MED PEAK E' VEL: 8 CM/SEC
BH CV ECHO MEAS - MR MAX PG: 85 MMHG
BH CV ECHO MEAS - MR MAX VEL: 461.1 CM/SEC
BH CV ECHO MEAS - MV DEC SLOPE: 785.9 CM/SEC^2
BH CV ECHO MEAS - MV DEC TIME: 0.22 SEC
BH CV ECHO MEAS - MV E MAX VEL: 183.9 CM/SEC
BH CV ECHO MEAS - MV MAX PG: 20.6 MMHG
BH CV ECHO MEAS - MV MEAN PG: 7.1 MMHG
BH CV ECHO MEAS - MV P1/2T MAX VEL: 184.5 CM/SEC
BH CV ECHO MEAS - MV P1/2T: 68.8 MSEC
BH CV ECHO MEAS - MV V2 MAX: 226.7 CM/SEC
BH CV ECHO MEAS - MV V2 MEAN: 116.4 CM/SEC
BH CV ECHO MEAS - MV V2 VTI: 36.1 CM
BH CV ECHO MEAS - MVA P1/2T LCG: 1.2 CM^2
BH CV ECHO MEAS - MVA(P1/2T): 3.2 CM^2
BH CV ECHO MEAS - MVA(VTI): 1.6 CM^2
BH CV ECHO MEAS - PA ACC SLOPE: 1483 CM/SEC^2
BH CV ECHO MEAS - PA ACC TIME: 0.09 SEC
BH CV ECHO MEAS - PA MAX PG (FULL): 4.1 MMHG
BH CV ECHO MEAS - PA MAX PG: 7.2 MMHG
BH CV ECHO MEAS - PA MEAN PG (FULL): 1.9 MMHG
BH CV ECHO MEAS - PA MEAN PG: 3.2 MMHG
BH CV ECHO MEAS - PA PR(ACCEL): 37.8 MMHG
BH CV ECHO MEAS - PA V2 MAX: 134.4 CM/SEC
BH CV ECHO MEAS - PA V2 MEAN: 83.3 CM/SEC
BH CV ECHO MEAS - PA V2 VTI: 22.3 CM
BH CV ECHO MEAS - PI END-D VEL: 233.5 CM/SEC
BH CV ECHO MEAS - PULM DIAS VEL: 79 CM/SEC
BH CV ECHO MEAS - PULM S/D: 0.41
BH CV ECHO MEAS - PULM SYS VEL: 32.6 CM/SEC
BH CV ECHO MEAS - PVA(I,A): 3.5 CM^2
BH CV ECHO MEAS - PVA(I,D): 3.5 CM^2
BH CV ECHO MEAS - PVA(V,A): 3.3 CM^2
BH CV ECHO MEAS - PVA(V,D): 3.3 CM^2
BH CV ECHO MEAS - QP/QS: 1.4
BH CV ECHO MEAS - RAP SYSTOLE: 8 MMHG
BH CV ECHO MEAS - RV MAX PG: 3.1 MMHG
BH CV ECHO MEAS - RV MEAN PG: 1.3 MMHG
BH CV ECHO MEAS - RV V1 MAX: 88.4 CM/SEC
BH CV ECHO MEAS - RV V1 MEAN: 52.9 CM/SEC
BH CV ECHO MEAS - RV V1 VTI: 15.7 CM
BH CV ECHO MEAS - RVOT AREA: 5 CM^2
BH CV ECHO MEAS - RVOT DIAM: 2.5 CM
BH CV ECHO MEAS - RVSP: 75 MMHG
BH CV ECHO MEAS - SI(AO): 105.9 ML/M^2
BH CV ECHO MEAS - SI(CUBED): 27.7 ML/M^2
BH CV ECHO MEAS - SI(LVOT): 26.8 ML/M^2
BH CV ECHO MEAS - SI(MOD-SP2): 13.4 ML/M^2
BH CV ECHO MEAS - SI(MOD-SP4): 20 ML/M^2
BH CV ECHO MEAS - SI(TEICH): 24.6 ML/M^2
BH CV ECHO MEAS - SV(AO): 221.9 ML
BH CV ECHO MEAS - SV(CUBED): 58 ML
BH CV ECHO MEAS - SV(LVOT): 56.1 ML
BH CV ECHO MEAS - SV(MOD-SP2): 28 ML
BH CV ECHO MEAS - SV(MOD-SP4): 42 ML
BH CV ECHO MEAS - SV(RVOT): 78.6 ML
BH CV ECHO MEAS - SV(TEICH): 51.5 ML
BH CV ECHO MEAS - TAPSE (>1.6): 1.7 CM2
BH CV ECHO MEAS - TR MAX VEL: 410 CM/SEC
BH CV XLRA - RV BASE: 4.6 CM
BH CV XLRA - RV LENGTH: 7.1 CM
BH CV XLRA - RV MID: 3.3 CM
BH CV XLRA - TDI S': 12 CM/SEC
BILIRUB SERPL-MCNC: 0.3 MG/DL (ref 0.2–1.2)
BUN BLD-MCNC: 19 MG/DL (ref 8–23)
BUN/CREAT SERPL: 21.6 (ref 7–25)
CALCIUM SPEC-SCNC: 9.3 MG/DL (ref 8.8–10.5)
CHLORIDE SERPL-SCNC: 101 MMOL/L (ref 98–107)
CO2 SERPL-SCNC: 32.4 MMOL/L (ref 22–29)
CREAT BLD-MCNC: 0.88 MG/DL (ref 0.57–1)
D-LACTATE SERPL-SCNC: 0.7 MMOL/L (ref 0.5–2)
DEPRECATED RDW RBC AUTO: 46.4 FL (ref 37–54)
E/E' RATIO: 25
EOSINOPHIL # BLD AUTO: 0 10*3/MM3 (ref 0.1–0.3)
EOSINOPHIL NFR BLD AUTO: 0 % (ref 0–4)
ERYTHROCYTE [DISTWIDTH] IN BLOOD BY AUTOMATED COUNT: 13.6 % (ref 11.5–14.5)
GFR SERPL CREATININE-BSD FRML MDRD: 62 ML/MIN/1.73
GLOBULIN UR ELPH-MCNC: 2.9 GM/DL
GLUCOSE BLD-MCNC: 206 MG/DL (ref 65–99)
GLUCOSE BLDC GLUCOMTR-MCNC: 187 MG/DL (ref 70–130)
GLUCOSE BLDC GLUCOMTR-MCNC: 201 MG/DL (ref 70–130)
GLUCOSE BLDC GLUCOMTR-MCNC: 247 MG/DL (ref 70–130)
GLUCOSE BLDC GLUCOMTR-MCNC: 272 MG/DL (ref 70–130)
HCT VFR BLD AUTO: 33.9 % (ref 37–47)
HGB BLD-MCNC: 10.5 G/DL (ref 12–16)
IMM GRANULOCYTES # BLD: 0.04 10*3/MM3 (ref 0–0.03)
IMM GRANULOCYTES NFR BLD: 1 % (ref 0–0.5)
INR PPP: 3.65 (ref 0.9–1.1)
LEFT ATRIUM VOLUME INDEX: 44 ML/M2
LEFT ATRIUM VOLUME: 87 CM3
LV EF 2D ECHO EST: 62 %
LYMPHOCYTES # BLD AUTO: 0.42 10*3/MM3 (ref 0.6–4.8)
LYMPHOCYTES NFR BLD AUTO: 10.6 % (ref 20–45)
MCH RBC QN AUTO: 28.6 PG (ref 27–31)
MCHC RBC AUTO-ENTMCNC: 31 G/DL (ref 31–37)
MCV RBC AUTO: 92.4 FL (ref 81–99)
MONOCYTES # BLD AUTO: 0.09 10*3/MM3 (ref 0–1)
MONOCYTES NFR BLD AUTO: 2.3 % (ref 3–8)
NEUTROPHILS # BLD AUTO: 3.42 10*3/MM3 (ref 1.5–8.3)
NEUTROPHILS NFR BLD AUTO: 85.8 % (ref 45–70)
NRBC BLD MANUAL-RTO: 0 /100 WBC (ref 0–0)
PLATELET # BLD AUTO: 194 10*3/MM3 (ref 140–500)
PMV BLD AUTO: 9.6 FL (ref 7.4–10.4)
POTASSIUM BLD-SCNC: 4.1 MMOL/L (ref 3.5–5.2)
PROT SERPL-MCNC: 5.3 G/DL (ref 6–8.5)
PROTHROMBIN TIME: 37.2 SECONDS (ref 12.1–15)
RBC # BLD AUTO: 3.67 10*6/MM3 (ref 4.2–5.4)
SODIUM BLD-SCNC: 141 MMOL/L (ref 136–145)
TROPONIN T SERPL-MCNC: <0.01 NG/ML (ref 0–0.03)
WBC NRBC COR # BLD: 3.98 10*3/MM3 (ref 4.8–10.8)

## 2017-01-13 PROCEDURE — 80053 COMPREHEN METABOLIC PANEL: CPT | Performed by: INTERNAL MEDICINE

## 2017-01-13 PROCEDURE — 25010000002 METHYLPREDNISOLONE PER 40 MG: Performed by: INTERNAL MEDICINE

## 2017-01-13 PROCEDURE — 83605 ASSAY OF LACTIC ACID: CPT | Performed by: INTERNAL MEDICINE

## 2017-01-13 PROCEDURE — 25010000002 VANCOMYCIN PER 500 MG: Performed by: INTERNAL MEDICINE

## 2017-01-13 PROCEDURE — 85610 PROTHROMBIN TIME: CPT | Performed by: INTERNAL MEDICINE

## 2017-01-13 PROCEDURE — 94640 AIRWAY INHALATION TREATMENT: CPT

## 2017-01-13 PROCEDURE — 25010000002 CEFEPIME PER 500 MG: Performed by: INTERNAL MEDICINE

## 2017-01-13 PROCEDURE — 93010 ELECTROCARDIOGRAM REPORT: CPT | Performed by: INTERNAL MEDICINE

## 2017-01-13 PROCEDURE — 63710000001 INSULIN DETEMIR PER 5 UNITS: Performed by: INTERNAL MEDICINE

## 2017-01-13 PROCEDURE — 94799 UNLISTED PULMONARY SVC/PX: CPT

## 2017-01-13 PROCEDURE — 25010000002 MEROPENEM PER 100 MG: Performed by: INTERNAL MEDICINE

## 2017-01-13 PROCEDURE — 93306 TTE W/DOPPLER COMPLETE: CPT

## 2017-01-13 PROCEDURE — 99231 SBSQ HOSP IP/OBS SF/LOW 25: CPT | Performed by: HOSPITALIST

## 2017-01-13 PROCEDURE — 25010000002 FUROSEMIDE PER 20 MG: Performed by: INTERNAL MEDICINE

## 2017-01-13 PROCEDURE — 93306 TTE W/DOPPLER COMPLETE: CPT | Performed by: INTERNAL MEDICINE

## 2017-01-13 PROCEDURE — 63710000001 INSULIN ASPART PER 5 UNITS: Performed by: INTERNAL MEDICINE

## 2017-01-13 PROCEDURE — 82962 GLUCOSE BLOOD TEST: CPT

## 2017-01-13 PROCEDURE — 94660 CPAP INITIATION&MGMT: CPT

## 2017-01-13 PROCEDURE — 99232 SBSQ HOSP IP/OBS MODERATE 35: CPT | Performed by: INTERNAL MEDICINE

## 2017-01-13 PROCEDURE — 25010000002 VANCOMYCIN 750 MG RECONSTITUTED SOLUTION 1 EACH VIAL: Performed by: INTERNAL MEDICINE

## 2017-01-13 PROCEDURE — 93005 ELECTROCARDIOGRAM TRACING: CPT | Performed by: INTERNAL MEDICINE

## 2017-01-13 PROCEDURE — 85025 COMPLETE CBC W/AUTO DIFF WBC: CPT | Performed by: INTERNAL MEDICINE

## 2017-01-13 RX ORDER — FUROSEMIDE 10 MG/ML
40 INJECTION INTRAMUSCULAR; INTRAVENOUS ONCE
Status: COMPLETED | OUTPATIENT
Start: 2017-01-13 | End: 2017-01-13

## 2017-01-13 RX ORDER — CEFEPIME 1 G/50ML
1 INJECTION, SOLUTION INTRAVENOUS EVERY 8 HOURS SCHEDULED
Status: DISCONTINUED | OUTPATIENT
Start: 2017-01-13 | End: 2017-01-14 | Stop reason: RX

## 2017-01-13 RX ORDER — FUROSEMIDE 40 MG/1
40 TABLET ORAL 2 TIMES DAILY
Status: DISCONTINUED | OUTPATIENT
Start: 2017-01-13 | End: 2017-01-17 | Stop reason: HOSPADM

## 2017-01-13 RX ADMIN — MEROPENEM 500 MG: 500 INJECTION, POWDER, FOR SOLUTION INTRAVENOUS at 09:00

## 2017-01-13 RX ADMIN — IPRATROPIUM BROMIDE AND ALBUTEROL SULFATE 3 ML: .5; 3 SOLUTION RESPIRATORY (INHALATION) at 16:12

## 2017-01-13 RX ADMIN — DEXTROSE MONOHYDRATE 100 ML/HR: 50 INJECTION, SOLUTION INTRAVENOUS at 02:21

## 2017-01-13 RX ADMIN — GABAPENTIN 300 MG: 300 CAPSULE ORAL at 09:10

## 2017-01-13 RX ADMIN — FOLIC ACID 1 MG: 1 TABLET ORAL at 09:10

## 2017-01-13 RX ADMIN — IPRATROPIUM BROMIDE AND ALBUTEROL SULFATE 3 ML: .5; 3 SOLUTION RESPIRATORY (INHALATION) at 20:03

## 2017-01-13 RX ADMIN — PANTOPRAZOLE SODIUM 40 MG: 40 TABLET, DELAYED RELEASE ORAL at 18:03

## 2017-01-13 RX ADMIN — INSULIN ASPART 3 UNITS: 100 INJECTION, SOLUTION INTRAVENOUS; SUBCUTANEOUS at 18:04

## 2017-01-13 RX ADMIN — FUROSEMIDE 40 MG: 40 TABLET ORAL at 18:04

## 2017-01-13 RX ADMIN — FUROSEMIDE 40 MG: 40 TABLET ORAL at 09:10

## 2017-01-13 RX ADMIN — CITALOPRAM HYDROBROMIDE 20 MG: 20 TABLET ORAL at 09:10

## 2017-01-13 RX ADMIN — DOCUSATE SODIUM 100 MG: 100 CAPSULE, LIQUID FILLED ORAL at 18:03

## 2017-01-13 RX ADMIN — CEFEPIME 1 G: 1 INJECTION, SOLUTION INTRAVENOUS at 18:04

## 2017-01-13 RX ADMIN — INSULIN ASPART 2 UNITS: 100 INJECTION, SOLUTION INTRAVENOUS; SUBCUTANEOUS at 12:29

## 2017-01-13 RX ADMIN — INSULIN ASPART 3 UNITS: 100 INJECTION, SOLUTION INTRAVENOUS; SUBCUTANEOUS at 09:00

## 2017-01-13 RX ADMIN — OSELTAMIVIR PHOSPHATE 75 MG: 75 CAPSULE ORAL at 20:40

## 2017-01-13 RX ADMIN — POLYETHYLENE GLYCOL (3350) 17 G: 17 POWDER, FOR SOLUTION ORAL at 09:10

## 2017-01-13 RX ADMIN — METHYLPREDNISOLONE SODIUM SUCCINATE 40 MG: 40 INJECTION, POWDER, FOR SOLUTION INTRAMUSCULAR; INTRAVENOUS at 20:40

## 2017-01-13 RX ADMIN — OSELTAMIVIR PHOSPHATE 75 MG: 75 CAPSULE ORAL at 09:10

## 2017-01-13 RX ADMIN — FUROSEMIDE 40 MG: 10 INJECTION, SOLUTION INTRAMUSCULAR; INTRAVENOUS at 09:10

## 2017-01-13 RX ADMIN — DOCUSATE SODIUM 100 MG: 100 CAPSULE, LIQUID FILLED ORAL at 09:10

## 2017-01-13 RX ADMIN — METHYLPREDNISOLONE SODIUM SUCCINATE 40 MG: 40 INJECTION, POWDER, FOR SOLUTION INTRAMUSCULAR; INTRAVENOUS at 12:04

## 2017-01-13 RX ADMIN — METHYLPREDNISOLONE SODIUM SUCCINATE 40 MG: 40 INJECTION, POWDER, FOR SOLUTION INTRAMUSCULAR; INTRAVENOUS at 02:23

## 2017-01-13 RX ADMIN — GABAPENTIN 300 MG: 300 CAPSULE ORAL at 18:03

## 2017-01-13 RX ADMIN — CEFEPIME 1 G: 1 INJECTION, SOLUTION INTRAVENOUS at 11:06

## 2017-01-13 RX ADMIN — INSULIN ASPART 4 UNITS: 100 INJECTION, SOLUTION INTRAVENOUS; SUBCUTANEOUS at 20:52

## 2017-01-13 RX ADMIN — MULTIPLE VITAMINS W/ MINERALS TAB 1 TABLET: TAB at 09:10

## 2017-01-13 RX ADMIN — METOPROLOL SUCCINATE 25 MG: 25 TABLET, EXTENDED RELEASE ORAL at 09:10

## 2017-01-13 RX ADMIN — VANCOMYCIN HYDROCHLORIDE 1750 MG: 1 INJECTION, POWDER, LYOPHILIZED, FOR SOLUTION INTRAVENOUS at 12:04

## 2017-01-13 RX ADMIN — INSULIN DETEMIR 20 UNITS: 100 INJECTION, SOLUTION SUBCUTANEOUS at 20:52

## 2017-01-13 RX ADMIN — Medication 6 MG: at 20:40

## 2017-01-13 RX ADMIN — IPRATROPIUM BROMIDE AND ALBUTEROL SULFATE 3 ML: .5; 3 SOLUTION RESPIRATORY (INHALATION) at 12:10

## 2017-01-13 RX ADMIN — IPRATROPIUM BROMIDE AND ALBUTEROL SULFATE 3 ML: .5; 3 SOLUTION RESPIRATORY (INHALATION) at 07:51

## 2017-01-13 NOTE — PROGRESS NOTES
"Hospitalist Team      Patient Care Team:  Gerardo Williamson MD as PCP - General  Gerardo Williamson MD as PCP - Family Medicine     Chief Complaint:  Shortness of Air    Patient is alert and oriented and asking appropriate questions.  Her son-in-law is in the room and I wrote notes and answered questions.  Patient has pneumonia and flu.  There is evidence of CHF acute as well.  She is tired and weak but much more alert than she has been.  The shortness of air is still present but improved.  She is off of bipap now and on 4 L oxygen NC.  May need bipap at night so I have asked them to leave machine in room.      History taken from: patient      Objective    Vital Signs  Temp:  [97.4 °F (36.3 °C)-99 °F (37.2 °C)] 97.4 °F (36.3 °C)  Heart Rate:  [72-96] 81  Resp:  [16-22] 20  BP: (103-143)/(62-84) 138/84  Body mass index is 38.11 kg/(m^2).  Pulse oximetry on 4 L NC is 95%.  However while we were talking the oxygen saturation dropped to 87%.    Flowsheet Rows         First Filed Value    Admission Height  65\" (165.1 cm) Documented at 01/12/2017 0548    Admission Weight  235 lb (107 kg) Documented at 01/12/2017 0548          Physical Exam:    Physical Exam   Constitutional: Patient appears obese and weak and in no acute distress .  She was asking appropriate questions and seemed herself tonight.  HEENT:   Head: Normocephalic and atraumatic.   Eyes:  Pupils are equal, round, and reactive to light. EOM are intact. Sclera are anicteric and non-injected.  Mouth and Throat: Patient has moist mucous membranes. Oropharynx is clear of any erythema or exudate.     Neck: Neck supple. No JVD present. No thyromegaly present. No lymphadenopathy present.  Cardiovascular: Regular rate, regular rhythm, S1 normal and S2 normal.  Exam reveals no gallop and no friction rub.  No murmur heard.  Pulmonary/Chest: Lungs reveal rhonchi throughout and wheezing to auscultation bilaterally. No respiratory distress however.   Abdominal: Soft. Bowel " sounds are normal. No distension and no mass. There is no hepatosplenomegaly. There is no tenderness.   Musculoskeletal: Normal Muscle tone  Extremities: Edema is difficult to ascertain in this patient secondary to her extremities always looking edematous. Pulses are palpable in all 4 extremities.  Neurological: Patient is alert and oriented to person, place, and time. Cranial nerves II-XII are grossly intact with no focal deficits.  Skin: Skin is warm. She has bruising on her hands but that is better than the last time I saw her.   Nails show no clubbing.  They are painted with a psychedelic bright blue!  She says the aides painted them at [employer].  No cyanosis or erythema noted.         Results Review:     I reviewed the patient's new clinical results.    Lab Results (last 24 hours)     Procedure Component Value Units Date/Time    Troponin [[id number]]  (Normal) Collected:  01/12/17 1755    Specimen:  Blood Updated:  01/12/17 1814     Troponin T <0.010 ng/mL     Narrative:       Troponin T Reference Ranges:  Less than 0.03 ng/mL:    Negative for AMI  0.03 to 0.09 ng/mL:      Indeterminant for AMI  Greater than 0.09 ng/mL: Positive for AMI    POC Glucose Fingerstick [[id number]]  (Abnormal) Collected:  01/12/17 2058    Specimen:  Blood Updated:  01/12/17 2110     Glucose 174 (H) mg/dL     Narrative:       Meter: [id number] : [id number] Starr Kim RN Validator    Troponin [[id number]]  (Normal) Collected:  01/12/17 2306    Specimen:  Blood Updated:  01/13/17 0003     Troponin T <0.010 ng/mL     Narrative:       Troponin T Reference Ranges:  Less than 0.03 ng/mL:    Negative for AMI  0.03 to 0.09 ng/mL:      Indeterminant for AMI  Greater than 0.09 ng/mL: Positive for AMI    CBC & Differential [[id number]] Collected:  01/13/17 0605    Specimen:  Blood Updated:  01/13/17 0620    Narrative:       The following orders were created for panel order CBC & Differential.  Procedure                               Abnormality          Status                     ---------                               -----------         ------                     CBC Auto Differential[[id number]]         Abnormal            Final result                 Please view results for these tests on the individual orders.    CBC Auto Differential [[id number]]  (Abnormal) Collected:  01/13/17 0605    Specimen:  Blood Updated:  01/13/17 0620     WBC 3.98 (L) 10*3/mm3      RBC 3.67 (L) 10*6/mm3      Hemoglobin 10.5 (L) g/dL      Hematocrit 33.9 (L) %      MCV 92.4 fL      MCH 28.6 pg      MCHC 31.0 g/dL      RDW 13.6 %      RDW-SD 46.4 fl      MPV 9.6 fL      Platelets 194 10*3/mm3      Neutrophil % 85.8 (H) %      Lymphocyte % 10.6 (L) %      Monocyte % 2.3 (L) %      Eosinophil % 0.0 %      Basophil % 0.3 %      Immature Grans % 1.0 (H) %      Neutrophils, Absolute 3.42 10*3/mm3      Lymphocytes, Absolute 0.42 (L) 10*3/mm3      Monocytes, Absolute 0.09 10*3/mm3      Eosinophils, Absolute 0.00 (L) 10*3/mm3      Basophils, Absolute 0.01 10*3/mm3      Immature Grans, Absolute 0.04 (H) 10*3/mm3      nRBC 0.0 /100 WBC     Protime-INR [[id number]]  (Abnormal) Collected:  01/13/17 0605    Specimen:  Blood Updated:  01/13/17 0636     Protime 37.2 (H) Seconds      INR 3.65 (H)     Narrative:       Therapeutic Ranges for INR: 2.0-3.0 (PT 20-30)                              2.5-3.5 (PT 25-34)    Lactic Acid, Plasma [[id number]]  (Normal) Collected:  01/13/17 0605    Specimen:  Blood Updated:  01/13/17 0636     Lactate 0.7 mmol/L     Comprehensive Metabolic Panel [[id number]]  (Abnormal) Collected:  01/13/17 0605    Specimen:  Blood Updated:  01/13/17 0650     Glucose 206 (H) mg/dL      BUN 19 mg/dL      Creatinine 0.88 mg/dL      Sodium 141 mmol/L      Potassium 4.1 mmol/L      Chloride 101 mmol/L      CO2 32.4 (H) mmol/L      Calcium 9.3 mg/dL      Total Protein 5.3 (L) g/dL      Albumin 2.40 (L) g/dL      ALT (SGPT) 16 U/L      AST (SGOT) 14 U/L      Alkaline Phosphatase 92 U/L       Total Bilirubin 0.3 mg/dL      eGFR Non African Amer 62 mL/min/1.73      Globulin 2.9 gm/dL      A/G Ratio 0.8 g/dL      BUN/Creatinine Ratio 21.6      Anion Gap 7.6 mmol/L     Narrative:       The MDRD GFR formula is only valid for adults with stable renal function between ages 18 and 70.    Blood Culture [[id number]]  (Normal) Collected:  01/12/17 0055    Specimen:  Blood from Arm, Right Updated:  01/13/17 0701     Blood Culture No growth at 24 hours     POC Glucose Fingerstick [[id number]]  (Abnormal) Collected:  01/13/17 0742    Specimen:  Blood Updated:  01/13/17 0754     Glucose 201 (H) mg/dL     Narrative:       Meter: [id number] : [id number] Davian BETANCOURT Validator    POC Glucose Fingerstick [[id number]]  (Abnormal) Collected:  01/13/17 1201    Specimen:  Blood Updated:  01/13/17 1207     Glucose 187 (H) mg/dL     Narrative:       Meter: [id number] : [id number] Vinicio Oliva RN    Blood Culture [[id number]]  (Normal) Collected:  01/12/17 1149    Specimen:  Blood from Arm, Right Updated:  01/13/17 1301     Blood Culture No growth at 24 hours     POC Glucose Fingerstick [[id number]]  (Abnormal) Collected:  01/13/17 1716    Specimen:  Blood Updated:  01/13/17 1725     Glucose 247 (H) mg/dL     Narrative:       Meter: [id number] : [id number] Vinicio Oliva RN          Imaging Results (last 24 hours)     ** No results found for the last 24 hours. **          Xray not reviewed personally by physician.      ECG not reviewed personally by physician  ECG/EMG Results (most recent)     Procedure Component Value Units Date/Time    ECG 12 Lead [[id number]] Collected:  01/12/17 0641     Updated:  01/12/17 1205    Narrative:       RR Interval= 674 ms  NV Interval= 172 ms  QRSD Interval= 66 ms  QT Interval= 368 ms  QTc Interval= 448 ms  Heart Rate= 89 ms  P Axis= 0 deg  QRS Axis= 107 deg  T Wave Axis= 60 deg  I: 40 Axis= 66 deg  T: 40 Axis= 141 deg  ST Axis= 142 deg  ATRIAL FIBRILLATION  POOR R WAVE PROGRESSION  NO  SIGNIFICANT CHANGE FROM PREVIOUS ECG  Electronically Signed by:  Luis Fernando Stevesn MD 12-Jan-2017 12:04:12  Date and Time of Study: 2017-01-12 06:41:18    Adult Transthoracic Echo Complete [[id number]] Collected:  01/13/17 1356     BSA 2.1 m^2 Updated:  01/13/17 1513     IVSd 0.76 cm      LVIDd 4.5 cm      LVIDs 3.2 cm      LVPWd 0.83 cm      IVS/LVPW 0.92      FS 29.3 %      EDV(Teich) 91.2 ml      ESV(Teich) 39.8 ml      EF(Teich) 56.4 %      EDV(cubed) 89.6 ml      ESV(cubed) 31.6 ml      EF(cubed) 64.7 %      LV mass(C)d 112.4 grams      LV mass(C)dI 53.7 grams/m^2      SV(Teich) 51.5 ml      SI(Teich) 24.6 ml/m^2      SV(cubed) 58.0 ml      SI(cubed) 27.7 ml/m^2      Ao root diam 2.9 cm      Ao root area 6.5 cm^2      ACS 1.6 cm      LA dimension 4.5 cm      LA/Ao 1.6      LVOT diam 1.7 cm      LVOT area 2.2 cm^2      LVOT area(traced) 2.3 cm^2      RVOT diam 2.5 cm      RVOT area 5.0 cm^2      LVLd ap4 7.0 cm      EDV(MOD-sp4) 68.0 ml      LVLs ap4 5.9 cm      ESV(MOD-sp4) 26.0 ml      EF(MOD-sp4) 61.8 %      LVLd ap2 6.9 cm      EDV(MOD-sp2) 59.0 ml      LVLs ap2 6.2 cm      ESV(MOD-sp2) 31.0 ml      EF(MOD-sp2) 47.5 %      SV(MOD-sp4) 42.0 ml      SI(MOD-sp4) 20.0 ml/m^2      SV(MOD-sp2) 28.0 ml      SI(MOD-sp2) 13.4 ml/m^2      Ao root area (BSA corrected) 1.4      Ao root area (BSA corrected) 47.5 ml/m^2      CONTRAST EF 4CH 61.8 ml/m^2      LV Diastolic corrected for BSA 32.5 ml/m^2      LV Systolic corrected for BSA 12.4 ml/m^2      MV E max becky 183.9 cm/sec      MV V2 max 226.7 cm/sec      MV max PG 20.6 mmHg      MV V2 mean 116.4 cm/sec      MV mean PG 7.1 mmHg      MV V2 VTI 36.1 cm      MVA(VTI) 1.6 cm^2      MV P1/2t max becky 184.5 cm/sec      MV P1/2t 68.8 msec      MVA(P1/2t) 3.2 cm^2      MV dec slope 785.9 cm/sec^2      MV dec time 0.22 sec      Ao pk becky 175.0 cm/sec      Ao max PG 12.1 mmHg      Ao max PG (full) 12.0 mmHg      Ao V2 mean 116.0 cm/sec      Ao mean PG 6.1 mmHg      Ao mean PG (full)  3.1 mmHg      Ao V2 VTI 34.1 cm      LILLIAM(I,A) 1.6 cm^2      LILLIAM(I,D) 1.6 cm^2      LILLIAM(V,A) 1.5 cm^2      LILLIAM(V,D) 1.5 cm^2      LV V1 max PG 5.8 mmHg      LV V1 mean PG 3.0 mmHg      LV V1 max 120.9 cm/sec      LV V1 mean 80.0 cm/sec      LV V1 VTI 25.4 cm      MR max koffi 461.1 cm/sec      MR max PG 85.0 mmHg      SV(Ao) 221.9 ml      SI(Ao) 105.9 ml/m^2      SV(LVOT) 56.1 ml      SV(RVOT) 78.6 ml      SI(LVOT) 26.8 ml/m^2      PA V2 max 134.4 cm/sec      PA max PG 7.2 mmHg      PA max PG (full) 4.1 mmHg      PA V2 mean 83.3 cm/sec      PA mean PG 3.2 mmHg      PA mean PG (full) 1.9 mmHg      PA V2 VTI 22.3 cm      PVA(I,A) 3.5 cm^2       CV ECHO MARKUS - PVA(I,D) 3.5 cm^2       CV ECHO MRAKUS - PVA(V,A) 3.3 cm^2       CV ECHO MARKUS - PVA(V,D) 3.3 cm^2      PA acc slope 1483 cm/sec^2      PA acc time 0.09 sec      PI end-d koffi 233.5 cm/sec      RV V1 max PG 3.1 mmHg      RV V1 mean PG 1.3 mmHg      RV V1 max 88.4 cm/sec      RV V1 mean 52.9 cm/sec      RV V1 VTI 15.7 cm      TR max koffi 410 cm/sec      RVSP(TR) 75 mmHg      RAP systole 8.0 mmHg      PA pr(Accel) 37.8 mmHg      Pulm Sys Koffi 32.6 cm/sec      Pulm Farfan Koffi 79.0 cm/sec      Pulm S/D 0.41      Qp/Qs 1.4      MVA P1/2T LCG 1.2 cm^2       CV ECHO MARKUS - BZI_BMI 38.1 kilograms/m^2       CV ECHO MARKUS - BSA([facility]) 2.2 m^2       CV ECHO MARKUS - BZI_METRIC_WEIGHT 103.9 kg       CV ECHO MARKUS - BZI_METRIC_HEIGHT 165.1 cm      TDI S' 12.00 cm/sec      RV Base 4.60 cm      RV Length 7.10 cm      RV Mid 3.30 cm      LA volume 87.0 cm3      E/E' ratio 25.0      LA Volume Index 44.0 mL/m2      Lat Peak E' Koffi 7.0 cm/sec      Med Peak E' Koffi 8.00 cm/sec      TAPSE (>1.6) 1.70 cm2      Echo EF Estimated 62 %     Narrative:       · All left ventricular wall segments contract normally.  · Left ventricular function is normal. Estimated EF = 62%.  · Mild mitral valve regurgitation is present  · Left atrial cavity size is severely dilated.  · Mild to moderate  tricuspid valve regurgitation is present.  · Elevated left atrial pressure.       ECG 12 Lead [[id number]] Collected:  01/13/17 0641     Updated:  01/13/17 1734    Narrative:       RR Interval= 759 ms  VA Interval= ms  QRSD Interval= 72 ms  QT Interval= 396 ms  QTc Interval= 455 ms  Heart Rate= 79 ms  P Axis= deg  QRS Axis= 110 deg  T Wave Axis= 68 deg  I: 40 Axis= 85 deg  T: 40 Axis= 152 deg  ST Axis= 15 deg  ATRIAL FIBRILLATION, V-RATE 58-94  RIGHT AXIS DEVIATION  BORDERLINE T ABNORMALITIES, ANT-LAT LEADS  NO SIGNIFICANT CHANGE FROM PREVIOUS ECG  Electronically Signed by:  Luis Fernando Stevens MD 13-Jan-2017 17:32:38  Date and Time of Study: 2017-01-13 06:41:06          Medication Review:   I have reviewed the patient's current medication list    Current Facility-Administered Medications:   •  acetaminophen (TYLENOL) tablet 650 mg, 650 mg, Oral, Q4H PRN, Trevor Diallo MD  •  cefepime (MAXIPIME) IVPB 1 g, 1 g, Intravenous, Q8H, Trevor Diallo MD, 1 g at 01/13/17 1106  •  cholecalciferol (VITAMIN D3) capsule 50,000 Units, 50,000 Units, Oral, Q30 Days, Trevor Diallo MD, 50,000 Units at 01/12/17 1240  •  citalopram (CeleXA) tablet 20 mg, 20 mg, Oral, Daily, Trevor Diallo MD, 20 mg at 01/13/17 0910  •  dextrose (D50W) solution 25 g, 25 g, Intravenous, PRN, Trevor Diallo MD  •  dextrose (GLUTOSE) oral gel 15 g, 15 g, Oral, PRN, Trevor Diallo MD  •  docusate sodium (COLACE) capsule 100 mg, 100 mg, Oral, BID, Trevor Diallo MD, 100 mg at 01/13/17 0910  •  folic acid (FOLVITE) tablet 1 mg, 1 mg, Oral, Daily, Trevor Diallo MD, 1 mg at 01/13/17 0910  •  furosemide (LASIX) tablet 40 mg, 40 mg, Oral, BID, Nika Mack MD, 40 mg at 01/13/17 0910  •  gabapentin (NEURONTIN) capsule 300 mg, 300 mg, Oral, BID, Trevor Diallo MD, 300 mg at 01/13/17 0910  •  glucagon (GLUCAGEN) injection 1 mg, 1 mg, Subcutaneous, Once PRN, Trevor Diallo MD  •  HYDROcodone-acetaminophen (NORCO) 5-325 MG per tablet 1 tablet, 1  tablet, Oral, Q6H PRN, Trevor Diallo MD  •  insulin aspart (novoLOG) injection 0-7 Units, 0-7 Units, Subcutaneous, 4x Daily AC & at Bedtime, Trevor Diallo MD, 2 Units at 01/13/17 1229  •  insulin detemir (LEVEMIR) injection 20 Units, 20 Units, Subcutaneous, Nightly, Trevor Diallo MD, 20 Units at 01/12/17 2108  •  ipratropium-albuterol (DUO-NEB) nebulizer solution 3 mL, 3 mL, Nebulization, 4x Daily - RT, Trevor Diallo MD, 3 mL at 01/13/17 1612  •  melatonin tablet 6 mg, 6 mg, Oral, Nightly, Trevor Diallo MD, 6 mg at 01/12/17 2100  •  methylPREDNISolone sodium succinate (SOLU-Medrol) injection 40 mg, 40 mg, Intravenous, Q8H, Jordan Almodovar MD, 40 mg at 01/13/17 1204  •  metoprolol succinate XL (TOPROL-XL) 24 hr tablet 25 mg, 25 mg, Oral, Daily, Trevor Diallo MD, 25 mg at 01/13/17 0910  •  multivitamin with minerals 1 tablet, 1 tablet, Oral, Daily, Trevor Diallo MD, 1 tablet at 01/13/17 0910  •  oseltamivir (TAMIFLU) capsule 75 mg, 75 mg, Oral, Q12H, Trevor Diallo MD, 75 mg at 01/13/17 0910  •  pantoprazole (PROTONIX) EC tablet 40 mg, 40 mg, Oral, Q PM, Trevor Diallo MD, 40 mg at 01/12/17 1742  •  Pharmacy to dose vancomycin, , Does not apply, Continuous PRN, Jordan Amlodovar MD, 0 mg at 01/12/17 1240  •  polyethylene glycol (MIRALAX) powder 17 g, 17 g, Oral, Daily, Trevor Diallo MD, 17 g at 01/13/17 0910  •  Insert peripheral IV, , , Once **AND** sodium chloride 0.9 % flush 10 mL, 10 mL, Intravenous, PRN, Andrey Cazares MD  •  vancomycin (VANCOCIN) 1,750 mg in sodium chloride 0.9 % 500 mL IVPB, 1,750 mg, Intravenous, Q24H, Trevor Diallo MD, Last Rate: 0 mL/hr at 01/12/17 1556, 1,750 mg at 01/13/17 1204      Assessment/Plan     1) Acute on Chronic Respiratory Failure hypoxic and hypercapnic: Patient is on BiPAP right now      2) Bibasilar Pneumonia Determined to be health care associated by pulmonary consultant.: On  Vancomycin and cefepime.  Procalcitonin is 14.71     3) Influenza A Positive: On  Tamiflu     4) Not On BiPAP Now:  Patient is now on oxygen at 4 L NC.  She will probably need to go back to nursing home on nocturnal oxygen at least.  Will leave bipap in the room at the bedside tonight in case she worsens again.     5) Recent UTI: Was being treated with Inj. Rocephin as an outpatient but it is d/cd     6) Chronic Diastolic CHF: Patient has been on oral Lasix     7) HTN: last BP reading is 142/99. Has been on Metoprolol     8) Dyslipidemia: Hx noted     9) Obstructive Sleep Apnea: Hx noted     10) Morbid Obesity: Noted.  I have discussed testing for sleep apnea and she did not wish to do this in the past.  I strongly suspect it would be positive.     11) DVT Prophylaxis: On oral coumadin/ INR is 3.65/ warfarin discontinued/  Will monitor INR     12) Vitamin D deficiency: On replacement    13) Depression:  Treating with Celexa    14) Chronic peripheral neuropathy/ Diabetic:  On neurontin    15) Gerd:  On protonix    16) Immobilization secondary to poorly healed fracture from years ago.  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  Hold the coumadin.  Antibiotics were changed.  Bipap weaned but leave bipap machine in room in case of worsening again over night. Labs are ordered: CBC, BNP, BMP,         Alice Garcia DO  01/13/17  5:51 PM      Time: 25 min

## 2017-01-13 NOTE — PLAN OF CARE
Problem: Patient Care Overview (Adult)  Goal: Plan of Care Review  Outcome: Ongoing (interventions implemented as appropriate)  Goal: Adult Individualization and Mutuality  Outcome: Ongoing (interventions implemented as appropriate)    Problem: Respiratory Insufficiency (Adult)  Goal: Identify Related Risk Factors and Signs and Symptoms  Outcome: Ongoing (interventions implemented as appropriate)  Goal: Acid/Base Balance  Outcome: Ongoing (interventions implemented as appropriate)  Goal: Effective Ventilation  Outcome: Ongoing (interventions implemented as appropriate)

## 2017-01-13 NOTE — PLAN OF CARE
Problem: Inpatient Physical Therapy  Goal: Transfer Training Goal 1 STG- PT  Outcome: Ongoing (interventions implemented as appropriate)    01/13/17 1316   Transfer Training PT STG   Transfer Training PT STG, Date Established 01/13/17   Transfer Training PT STG, Time to Achieve 3 days   Transfer Training PT STG, Activity Type bed to chair /chair to bed   Transfer Training PT STG, [place] Level minimum assist (75% patient effort)   Transfer Training PT STG, Assist Device other (see comments)  (sliding board transfer to w/c)

## 2017-01-13 NOTE — PROGRESS NOTES
"[facility]ULMON[facility]         Dr Ortega Sayied   LOS: 1 day   Patient Care Team:  Gerardo Williamson MD as PCP - General  Gerardo Williamson MD as PCP - Family Medicine    Chief Complaint: Influenza A with HCAP chronic diastolic CHF    Interval History: Still on BiPAP currently.  More awake more alert and feels better.  IV fluids ongoing.    REVIEW OF SYSTEMS:   Limited due to BiPAP.    Ventilator/Non-Invasive Ventilation Settings     Start     Ordered    01/12/17 1102  . BIPAP; IPAP: 16; EPAP: 8  Until Discontinued     Question Answer Comment   . BIPAP    IPAP 16    EPAP 8        01/12/17 1101    01/12/17 0607  . BIPAP; IPAP: 12; EPAP: 6  Until Discontinued,   Status:  Canceled     Question Answer Comment   . BIPAP    IPAP 12    EPAP 6        01/12/17 0607            Vital Signs  Temp:  [97 °F (36.1 °C)-99 °F (37.2 °C)] 98.3 °F (36.8 °C)  Heart Rate:  [] 72  Resp:  [16-26] 20  BP: ()/(53-99) 115/68    Intake/Output Summary (Last 24 hours) at 01/13/17 0906  Last data filed at 01/13/17 0320   Gross per 24 hour   Intake    350 ml   Output      0 ml   Net    350 ml     Flowsheet Rows         First Filed Value    Admission Height  65\" (165.1 cm) Documented at 01/12/2017 0548    Admission Weight  235 lb (107 kg) Documented at 01/12/2017 0548          Physical Exam:   General Appearance:    on BiPAP tolerating well.     Lungs:     diminished breath sounds rhonchi on the bases.      Heart:    Regular rhythm and normal rate, normal S1 and S2, no            murmur, no gallop, no rub, no click   Chest Wall:    No abnormalities observed   Abdomen:     Normal bowel sounds, no masses, no organomegaly, soft        non-tender, non-distended, no guarding, no rebound                tenderness   Extremities:   Moves all extremities well, 2+ pitting edema edema, no cyanosis, no             redness     Results Review:          Results from last 7 days  Lab Units 01/13/17  0605 01/12/17  0557 01/08/17  0934   SODIUM " mmol/L 141 146* 135*   POTASSIUM mmol/L 4.1 4.0 3.9   CHLORIDE mmol/L 101 104 101   TOTAL CO2 mmol/L 32.4* 29.4* 21.6*   BUN mg/dL 19 19 18   CREATININE mg/dL 0.88 1.04* 0.96   GLUCOSE mg/dL 206* 98 127*   CALCIUM mg/dL 9.3 9.7 8.6*       Results from last 7 days  Lab Units 01/12/17  2306 01/12/17  1755 01/12/17  1149   TROPONIN T ng/mL <0.010 <0.010 <0.010       Results from last 7 days  Lab Units 01/13/17  0605 01/12/17  0558   WBC 10*3/mm3 3.98* 8.43   HEMOGLOBIN g/dL 10.5* 12.1   HEMATOCRIT % 33.9* 38.7   PLATELETS 10*3/mm3 194 234       Results from last 7 days  Lab Units 01/13/17  0605 01/12/17  1149 01/12/17  0557   INR  3.65* 2.49* 2.43*                   Results from last 7 days  Lab Units 01/12/17  1210   PH, ARTERIAL pH units 7.395   PO2 ART mm Hg 69.3*   PCO2, ARTERIAL mm Hg 58.5*   HCO3 ART mmol/L 35.0*       I reviewed the patient's new clinical results.  I personally viewed and interpreted the patient's CXR        Medication Review:     cholecalciferol 50,000 Units Oral Q30 Days   citalopram 20 mg Oral Daily   docusate sodium 100 mg Oral BID   folic acid 1 mg Oral Daily   furosemide 40 mg Intravenous Once   furosemide 40 mg Oral Daily   furosemide 40 mg Oral BID   gabapentin 300 mg Oral BID   insulin aspart 0-7 Units Subcutaneous 4x Daily AC & at Bedtime   insulin detemir 20 Units Subcutaneous Nightly   ipratropium-albuterol 3 mL Nebulization 4x Daily - RT   melatonin 6 mg Oral Nightly   meropenem 500 mg Intravenous Q8H   methylPREDNISolone sodium succinate 40 mg Intravenous Q8H   metoprolol succinate XL 25 mg Oral Daily   multivitamin with minerals 1 tablet Oral Daily   oseltamivir 75 mg Oral Q12H   pantoprazole 40 mg Oral Q PM   polyethylene glycol 17 g Oral Daily   vancomycin 1,750 mg Intravenous Q24H         dextrose 100 mL/hr Last Rate: Stopped (01/13/17 0849)   Pharmacy to dose vancomycin         ASSESSMENT:   Acute on chronic hypoxemic/hypercapnic respiratory failure  healthcare associated  pneumonia  Possible CHF  Influenza  Recent UTI  History of KIERA  multiple medical problems       PLAN:  Discontinue IV fluids  De-escalate antibiotics based on previous cultures to cefepime and continue vancomycin until cultures are back  Diuresis per cardiology  Wean off BiPAP as tolerated  Diet  Physical therapy  Clinically improved          Jordan Almodovar MD  01/13/17  9:06 AM

## 2017-01-13 NOTE — PLAN OF CARE
Problem: Respiratory Insufficiency (Adult)  Intervention: Provide Oxygenation/Ventilation/Perfusion Support    01/13/17 0405   Positioning   Head Of Bed (HOB) Position HOB at 30-45 degrees   Respiratory Interventions   Airway/Ventilation Management airway patency maintained;pulmonary hygiene promoted

## 2017-01-13 NOTE — PROGRESS NOTES
Acute Care - Physical Therapy Initial Evaluation  [facility]     Patient Name: Alexandria Villanueva  : 1935  MRN: [id number]  Today's Date: 2017   Onset of Illness/Injury or Date of Surgery Date: 17  Date of Referral to PT: 17  Referring Physician: Nishi      Admit Date: 2017     Visit Dx:    ICD-10-CM ICD-9-CM   1. Acute on chronic respiratory failure, unspecified whether with hypoxia or hypercapnia J96.20    2. Influenza A J10.1 487.1   3. Congestive heart failure, unspecified congestive heart failure chronicity, unspecified congestive heart failure type I50.9 428.0     Patient Active Problem List   Diagnosis   • Hyperglycemia   • Acute hyperglycemia   • Sepsis due to urinary tract infection   • Chronic diastolic (congestive) heart failure   • Permanent atrial fibrillation   • Acute renal failure   • Acute on chronic respiratory failure   • Influenza A with respiratory manifestations   • Chronic anticoagulation   • Lymphedema   • Morbid obesity   • Obstructive sleep apnea of adult   • Pulmonary HTN     Past Medical History   Diagnosis Date   • Anemia    • Arthritis    • Chronic diastolic (congestive) heart failure    • Chronic kidney disease    • DVT (deep venous thrombosis)    • Dyslipidemia    • Gout    • Hypertension    • Lymphedema    • Morbid obesity    • Obstructive sleep apnea of adult      untreated   • Permanent atrial fibrillation    • Pulmonary HTN    • Respiratory failure, acute      hypoxic     Past Surgical History   Procedure Laterality Date   • Hernia repair     • Eye surgery     • Femur fracture surgery Right      closed reduction external fixation   • Cataract extraction       both eyes 4 years ago   • Fracture surgery            PT ASSESSMENT (last 72 hours)      PT Evaluation       17 1210 17 1135    Rehab Evaluation    Document Type  evaluation  -    Subjective Information  complains of;fatigue  -LH    Patient Effort, Rehab Treatment  fair  -     Symptoms Noted Comment  Fatigue.  Patient agrees to sit up in bed, declines to attempt transfer to w/c today, but reports she will try tomorrow  -    General Information    Patient Profile Review  yes  -    Onset of Illness/Injury or Date of Surgery Date  01/12/17  -    Referring Physician  Sayied  -    General Observations  patient supine in bed with pillow under each arm and under legs, bilateral LE SCD's, on O2 nasal cannula.  Nurse present  -    Pertinent History Of Current Problem  Patient is resident at [facility] since October 2015.  Admitted with recent increase in shortness of breath, diagnosed with Influenza, respiratory failure, CHF.  Patient reports she has been nonambulatory since September 2015 after fall resulting in fractured right femur - reports she has not placed weight on this leg since that fall.  Per patient, staff at [facility] either use mechanical lift or assist patient with sliding board transfer to w/c.  -    Precautions/Limitations  fall precautions  -    Prior Level of Function  w/c or scooter  -    Equipment Currently Used at Home  lift device;wheelchair  -    Plans/Goals Discussed With  patient;agreed upon  -    Risks Reviewed  patient:;increased discomfort;change in vital signs  -    Benefits Reviewed  patient:;improve function;increase strength;decrease risk of DVT;improve skin integrity  -    Barriers to Rehab  previous functional deficit  -    Living Environment    Lives With  facility resident  -    Living Arrangements  extended care facility  -    Home Accessibility  no concerns  -    Stair Railings at Home  none  -    Clinical Impression    Date of Referral to PT  01/13/17  -    PT Diagnosis  Impaired functional mobility, generalized weakness  -    Patient/Family Goals Statement  feel better  -    Criteria for Skilled Therapeutic Interventions Met  yes   need to assess slideboard transfers to ensure at baseline   -    Rehab  Potential  fair, will monitor progress closely  -    Predicted Duration of Therapy Intervention (days/wks)  3 weeks  -    Pain Assessment    Pain Assessment  No/denies pain  -    ROM (Range of Motion)    General ROM Detail  AROM bilateral shoulders limited 50%, AAROM bilateral shoulder flexion WFL.  AROM bilateral elbows WFL.  Patient resistant to move LE's despite cues/encouragement - AAROM hip/knee flexion and extension limited 25% due to soft tissue bulk  -    MMT (Manual Muscle Testing)    General MMT Assessment Detail  difficult to assess due to limited patient effort/motivation.  Based on limited AROM against gravity, bilateral UE/LE's grossly 2/5   -    Muscle Tone Assessment    Muscle Tone Assessment Bilateral Upper Extremities  -PB     Bilateral Upper Extremities Muscle Tone Assessment mildly decreased tone  -     Bed Mobility, Assessment/Treatment    Bed Mobility, Roll Left, [place]  minimum assist (75% patient effort)  -LH    Bed Mobility, Roll Right, [place]  minimum assist (75% patient effort)  -    Bed Mob, Supine to Sit, [place]  minimum assist (75% patient effort);contact guard assist  -    Bed Mobility, Comment  patient declined to turn body to allow legs to bend over edge of bed, fearful of falling.  Sat in long sit on bed with supervision for sitting balance x 5 minutes with UE support (hands on bed) .  Per patient, she has assist of 1 for getting in/out of bed at [facility].  -    Transfer Assessment/Treatment    Transfer, Comment  Patient declined to attempt sliding board transfer to w/c today.   Per patient, she has assist of 1 at [facility] when performing sliding board transfers.  -    Gait Assessment/Treatment    Gait, Comment  deferred - patient is nonambulatory at baseline.  uses w/c for mobility  -    Positioning and Restraints    Pre-Treatment Position  in bed  -    Post Treatment Position  bed  -    In Bed  supine;call light within reach;encouraged  to call for assist;with nsg;SCD pump applied;RUE elevated;LUE elevated;heels elevated  -      01/13/17 0810 01/12/17 1221    Living Environment    Lives With  facility resident  -LB    Living Arrangements  extended care facility  -LB    Home Accessibility  no concerns  -LB    Stair Railings at Home  none  -LB    Type of Financial/Environmental Concern  none  -LB    Transportation Available  none  -LB    Living Environment Comment  RESIDENT AT THE [facility]  -LB    Muscle Tone Assessment    Muscle Tone Assessment Bilateral Upper Extremities  -PB     Bilateral Upper Extremities Muscle Tone Assessment mildly decreased tone  -PB       01/12/17 1150 01/12/17 1045    General Information    Equipment Currently Used at Home respiratory supplies;oxygen;bath bench;wheelchair  -LB     Muscle Tone Assessment    Muscle Tone Assessment  Bilateral Upper Extremities;Bilateral Lower Extremities  -LB    Bilateral Upper Extremities Muscle Tone Assessment  mildly decreased tone  -LB    Bilateral Lower Extremities Muscle Tone Assessment  severely decreased tone   pt is chairbound  -LB      User Key  (r) = Recorded By, (t) = Taken By, (c) = Cosigned By    Initials Name Provider Type     Tala Wood PT Physical Therapist    DIANA Mooney RN Registered Nurse    LB Kiya Randhawa RN Registered Nurse          Physical Therapy Education     Title: PT OT SLP Therapies (Done)     Topic: Physical Therapy (Done)     Point: Mobility training (Done)    Learning Progress Summary    Learner Readiness Method Response Comment Documented by Status   Patient Acceptance E VU educated on PT plan of care, need to assess transfers to ensure patient at baseline level of function  01/13/17 1313 Done                      User Key     Initials Effective Dates Name Provider Type Discipline     08/11/15 -  Tala Wood PT Physical Therapist PT                PT Recommendation and Plan  Anticipated Discharge Disposition: extended care  facility  Planned Therapy Interventions: transfer training, patient/family education  PT Frequency: daily, 5 times/wk  Plan of Care Review  Plan Of Care Reviewed With: patient  Progress: progress towards functional goals is fair  Outcome Summary/Follow up Plan: PT: evaluation completed.  Patient sat in long sit on bed with min A to transfer.  Declined attempts at transferring to w/c today.  patient is a resident from [place] and reports she has been nonambulatory since 2015, uses a mechanical lift or sliding board transfer with assist of 1 to get in w/c.  Plan to see tomorrow to assess transfer to w/c to ensure patient is at baseline function.          IP PT Goals       01/13/17 1316          Transfer Training PT STG    Transfer Training PT STG, Date Established 01/13/17  -      Transfer Training PT STG, Time to Achieve 3 days  -      Transfer Training PT STG, Activity Type bed to chair /chair to bed  -      Transfer Training PT STG, [place] Level minimum assist (75% patient effort)  -      Transfer Training PT STG, Assist Device other (see comments)   sliding board transfer to w/c  -        User Key  (r) = Recorded By, (t) = Taken By, (c) = Cosigned By    Initials Name Provider Type     Tala Wood, PT Physical Therapist                Outcome Measures       01/13/17 1300          How much help from another person do you currently need...    Turning from your back to your side while in flat bed without using bedrails? 2  -LH      Moving from lying on back to sitting on the side of a flat bed without bedrails? 2  -LH      Moving to and from a bed to a chair (including a wheelchair)? 2  -LH      Standing up from a chair using your arms (e.g., wheelchair, bedside chair)? 1  -LH      Climbing 3-5 steps with a railing? 1  -LH      To walk in hospital room? 1  -LH      AM-PAC 6 Clicks Score 9  -      Functional Assessment    Outcome Measure Options AM-PAC 6 Clicks Basic Mobility (PT)  -         User Key  (r) = Recorded By, (t) = Taken By, (c) = Cosigned By    Initials Name Provider Type     Tala Wood PT Physical Therapist           Time Calculation:         PT Charges       01/13/17 1317          Time Calculation    Start Time 1135  -      Stop Time 1200  -      Time Calculation (min) 25 min  -      PT Received On 01/13/17  -      PT - Next Appointment 01/14/17  -        User Key  (r) = Recorded By, (t) = Taken By, (c) = Cosigned By    Initials Name Provider Type     Tala Wood PT Physical Therapist              PT G-Codes  Outcome Measure Options: AM-PAC 6 Clicks Basic Mobility (PT)      Tala Wood PT  1/13/2017

## 2017-01-13 NOTE — PROGRESS NOTES
LOS: 1 day   Patient Care Team:  Gerardo Williamson MD as PCP - General  Gerardo Williamson MD as PCP - Family Medicine    Chief Complaint:  Pna, flu and pleural effusions     Interval History:   Still dyspnea and unable to cough up anything.  Is only on insulin due to steroids, no h/o DM.  No chest pain, nausea of dizziness.  Takes bid lasix at home for chf and edema.     Objective   Vital Signs  Temp:  [97 °F (36.1 °C)-99.7 °F (37.6 °C)] 98.3 °F (36.8 °C)  Heart Rate:  [] 72  Resp:  [16-26] 20  BP: ()/(53-99) 115/68    Intake/Output Summary (Last 24 hours) at 01/13/17 0755  Last data filed at 01/13/17 0320   Gross per 24 hour   Intake    350 ml   Output      0 ml   Net    350 ml       Comfortable NAD   conjunctiva clear  Neck supple, no JVD or thyromegaly appreciated  S1/S2 irregular no m/r/g  Lungs exp wheezes throughout with decreased bases and mid on left.   Abdomen S/NT/ND (+) BS, no HSM appreciated  Extremities warm, no clubbing, cyanosis, 3+ nonpitting edema, edema also in hands.   No visible or palpable skin lesions  A/Ox4, mood and affect appropriate    Results Review:        Results from last 7 days  Lab Units 01/13/17  0605 01/12/17  0557 01/08/17  0934   SODIUM mmol/L 141 146* 135*   POTASSIUM mmol/L 4.1 4.0 3.9   CHLORIDE mmol/L 101 104 101   TOTAL CO2 mmol/L 32.4* 29.4* 21.6*   BUN mg/dL 19 19 18   CREATININE mg/dL 0.88 1.04* 0.96   GLUCOSE mg/dL 206* 98 127*   CALCIUM mg/dL 9.3 9.7 8.6*       Results from last 7 days  Lab Units 01/12/17  2306 01/12/17  1755 01/12/17  1149   TROPONIN T ng/mL <0.010 <0.010 <0.010       Results from last 7 days  Lab Units 01/13/17  0605 01/12/17  0558   WBC 10*3/mm3 3.98* 8.43   HEMOGLOBIN g/dL 10.5* 12.1   HEMATOCRIT % 33.9* 38.7   PLATELETS 10*3/mm3 194 234       Results from last 7 days  Lab Units 01/13/17  0605 01/12/17  1149 01/12/17  0557   INR  3.65* 2.49* 2.43*                   I reviewed the patient's new clinical results.  I personally viewed and  interpreted the patient's EKG/Telemetry data a fib        Medication Review:     cholecalciferol 50,000 Units Oral Q30 Days   citalopram 20 mg Oral Daily   docusate sodium 100 mg Oral BID   folic acid 1 mg Oral Daily   furosemide 40 mg Oral Daily   gabapentin 300 mg Oral BID   insulin aspart 0-7 Units Subcutaneous 4x Daily AC & at Bedtime   insulin detemir 20 Units Subcutaneous Nightly   ipratropium-albuterol 3 mL Nebulization 4x Daily - RT   melatonin 6 mg Oral Nightly   meropenem 500 mg Intravenous Q8H   methylPREDNISolone sodium succinate 40 mg Intravenous Q8H   metoprolol succinate XL 25 mg Oral Daily   multivitamin with minerals 1 tablet Oral Daily   oseltamivir 75 mg Oral Q12H   pantoprazole 40 mg Oral Q PM   polyethylene glycol 17 g Oral Daily   vancomycin 1,750 mg Intravenous Q24H   warfarin 6 mg Oral Daily         dextrose 100 mL/hr Last Rate: 100 mL/hr (01/13/17 0221)   Pharmacy to dose vancomycin         Assessment/Plan     Principal Problem:    Influenza A with respiratory manifestations  Active Problems:    Chronic diastolic (congestive) heart failure    Permanent atrial fibrillation    Acute on chronic respiratory failure    Chronic anticoagulation    Lymphedema    Morbid obesity    Obstructive sleep apnea of adult    Pulmonary HTN    1. Acute on chronic resp failure. On bipap  2. Bibasilar PNA - on abx  3. Flu A  4. Chronic diastolic chf - likely acute decompensation due to pna, weight is down. Continue lasix and give dose of iv today.   5. Severe pulmonary HTN  6. B[place]on - on bipap  7. Obesity  8. HTN  9. HLD  10. Chronic a fib - on warfarin but inr high today, will hold.  11. On insulin due to steroids.   12. Chronic lymphedema    cxr shows moderate to large left pleural effusion with infiltrate. Echo pending    Nika Mack MD  01/13/17  7:55 AM

## 2017-01-13 NOTE — PROGRESS NOTES
"Adult Nutrition  Assessment/PES    Patient Name:  Alexandria Villanueva  YOB: 1935  MRN: [id number]  Admit Date:  1/12/2017    Assessment Date:  1/13/2017        Reason for Assessment       01/13/17 1815    Reason for Assessment    Cardiac Other (comment)   history of CHF    Endocrine Hyperglycemia    Infectious Disease Other (comment)   influenza    Pulmonary/Critical Care A/C respiratory failure              Nutrition/Diet History       01/13/17 1815    Nutrition/Diet History    Patient Reported Diet/Restrictions/Preferences other (see comments)   at nursing home does not get salt, uses a sugar substitute, limits added sugars    Factors Affecting Nutritional Intake --   some trouble chewing but does not want foods ground            Anthropometrics       01/13/17 1829    Anthropometrics    Height 165.1 cm (65\")    RD Documented Current Weight  104 kg (229 lb)    RD Documented Weight on Admission 107 kg (235 lb)    Anthropometrics (Special Considerations)    RD Calculated BMI (kg/m2) 38.1    Ideal Body Weight (IBW)    Ideal Body Weight (IBW), Female 57.69    Body Mass Index (BMI)    BMI Grade 35 - 39.9 - obesity - grade II            Labs/Tests/Procedures/Meds       01/13/17 1830    Labs/Tests/Procedures/Meds    Labs/Tests Review Reviewed    Medication Review Reviewed, pertinent;Diuretic;Steroid              Estimated/Assessed Needs       01/13/17 1830    Calculation Measurements    Weight Used For Calculations 104 kg (229 lb)    Height Used for Calculations 1.651 m (5' 5\")    Estimated/Assessed Energy Needs    Energy Need Method [place]-St Jeor    Age 81    RMR ([place]-St. Jeor Equation) 1509.62    Activity Factors ([place] St Jeor)  --   1.25 for critical care    Total estimated needs ([place] St[place] Jeor) 1,886    Estimated/Assessed Protein Needs    Weight Used for Protein Calculation 104 kg (229 lb)    Protein (gm/kg) 0.8    0.8 Gm Protein (gm) 83.1    Estimated/Assessed Fluid Needs    Fluid Need Method " Other (comment)   1,400-1,900 ml per day based upon evidenced based care guidelines for heart failure            Nutrition Prescription Ordered       01/13/17 1842    Nutrition Prescription PO    Current PO Diet Soft Texture    Texture Whole foods    Common Modifiers Consistent Carbohydrate            Evaluation of Received Nutrient/Fluid Intake       01/13/17 1830    Evaluation of Received Nutrient/Fluid Intake    Nutrition Delivered Fluid Evaluation    Fluid Intake Evaluation    % Fluid Needs --   insufficient data    PO Evaluation    % PO Intake --   insufficent data              Problem/Interventions:        Problem 1       01/13/17 1843    Nutrition Diagnoses Problem 1    Problem 1 Biting/Chewing Difficulty    Signs/Symptoms (evidenced by) Report/Observation                    Intervention Goal       01/13/17 1843    Intervention Goal    PO Establish PO;PO intake (%)    PO Intake % 75 %   or greater and meet estimated fluid needs            Nutrition Intervention       01/13/17 1844    Nutrition Intervention    RD/Tech Action Advise alternate selection;Interview for preference;Encourage intake;Follow Tx progress            Nutrition Prescription       01/13/17 1838    Nutrition Prescription PO    PO Prescription Begin/change diet   add low sodium due to history of CHF-spoke with Dr. Garcia            Education/Evaluation       01/13/17 1844    Education    Education Education not appropriate at this time    Monitor/Evaluation    Monitor I&O;PO intake;Pertinent labs;Weight;Skin status;Food/activity diary        Comments:  No other recommendations at present.    Electronically signed by:  Heydi Flores RD  01/13/17 6:44 PM

## 2017-01-13 NOTE — PLAN OF CARE
Problem: Patient Care Overview (Adult)  Goal: Plan of Care Review  Outcome: Ongoing (interventions implemented as appropriate)    01/13/17 1314   Coping/Psychosocial Response Interventions   Plan Of Care Reviewed With patient   Patient Care Overview   Progress progress towards functional goals is fair   Outcome Evaluation   Outcome Summary/Follow up Plan PT: evaluation completed. Patient sat in long sit on bed with min A to transfer. Declined attempts at transferring to w/c today. patient is a resident from [place] and reports she has been nonambulatory since 2015, uses a mechanical lift or sliding board transfer with assist of 1 to get in w/c. Plan to see tomorrow to assess transfer to w/c to ensure patient is at baseline function.

## 2017-01-13 NOTE — PAYOR COMM NOTE
"Tati Villanueva (81 y.o. Female)     Date of Birth Social Security Number Address Home Phone MRN    1935  [address] [place] [address] [phone] [id number]    Restoration Marital Status          None        Admission Date Admission Type Admitting Provider Attending Provider Department, Room/Bed    1/12/17 Emergency Yoel, MD Yoel Wick, Trevor DYER MD [facility] ICU, ICU6/1    Discharge Date Discharge Disposition Discharge Destination                      Attending Provider: Trevor Diallo MD     Allergies:  Codeine, Demerol [Meperidine], Propoxyphene    Isolation:  None   Infection:  None   Code Status:  Prior    Ht:  65\" (165.1 cm)   Wt:  229 lb (104 kg)    Admission Cmt:  None   Principal Problem:  Influenza A with respiratory manifestations [J10.1]                 Active Insurance as of 1/12/2017     Primary Coverage     Payor Plan Insurance Group Employer/Plan Group    HUMANA MEDICARE REPL HUMANA MEDICARE REPL [id number]     Payor Plan Address Payor Plan Phone Number Effective From Effective To    [address] [address] [phone] 1/1/2013     [place] [address]       Subscriber Name Subscriber Birth Date Member ID       TATI VILLANUEVA 1935 [id number]           Secondary Coverage     Payor Plan Insurance Group Employer/Plan Group    [place]      Payor Plan Address Payor Plan Phone Number Effective From Effective To    [address] [phone] 6/8/2016     [place] [address]       Subscriber Name Subscriber Birth Date Member ID       TATI VILLANUEVA 1935 [id number]                 Emergency Contacts     :     JEAN HANNA RN              PHONE [phone]           [phone]    INPATIENT ADMISSION    PATIENT WITH RESPIRATORY DISTRESS;  PLACED ON BIPAP          (Rel.) Home Phone Work Phone Mobile Phone    KrishnaPilarAvani (Sister) [phone] -- --            Insurance Information          "       HUMANA MEDICARE REPL/HUMANA MEDICARE REPL Phone: [phone]    Subscriber: Alexandria Villanueva Subscriber#: [id number]    Group#: [id number] Precert#:         [place] Phone: [phone]    Subscriber: Alexandria Villanueva Subscriber#: [id number]    Group#:  Precert#:           Problem List           Codes Noted - Resolved       Hospital    Acute on chronic respiratory failure ICD-10-CM: J96.20  ICD-9-CM: 518.84 1/12/2017 - Present    * (Principal)Influenza A with respiratory manifestations ICD-10-CM: J10.1  ICD-9-CM: 487.1 1/12/2017 - Present    Chronic anticoagulation (Chronic) ICD-10-CM: Z79.01  ICD-9-CM: V58.61 1/12/2017 - Present    Lymphedema ICD-10-CM: I89.0  ICD-9-CM: 457.1 Unknown - Present    Morbid obesity ICD-10-CM: E66.01  ICD-9-CM: 278.01 Unknown - Present    Obstructive sleep apnea of adult ICD-10-CM: G47.33  ICD-9-CM: 327.23 Unknown - Present    Pulmonary HTN ICD-10-CM: I27.2  ICD-9-CM: 416.8 Unknown - Present    Chronic diastolic (congestive) heart failure ICD-10-CM: I50.32  ICD-9-CM: 428.32, 428.0 Unknown - Present    Permanent atrial fibrillation ICD-10-CM: I48.2  ICD-9-CM: 427.31 Unknown - Present       Non-Hospital    Acute renal failure ICD-10-CM: N17.9  ICD-9-CM: 584.9 1/3/2017 - Present    Sepsis due to urinary tract infection ICD-10-CM: A41.9, N39.0  ICD-9-CM: 038.9, 995.91, 599.0 1/2/2017 - Present    Acute hyperglycemia ICD-10-CM: R73.9  ICD-9-CM: 790.29 9/28/2016 - Present    Hyperglycemia ICD-10-CM: R73.9  ICD-9-CM: 790.29 9/27/2016 - Present             History & Physical      Trevor Diallo MD at 1/12/2017 10:47 AM                   HOSPITALIST SERVICES     @ [facility]                Hospitalist Team    ADMISSION HISTORY AND PHYSICAL    PATIENT:      Patient Care Team:  Gerardo Williamson MD as PCP - General  Gerardo Williamson MD as PCP - Family Medicine    CHIEF COMPLAINT:     Moderately severe shortness of breath that developed  overnight    HISTORY OF PRESENT ILLNESS:      Ms. Alexandria Villanueva is an 81 year old morbidly obese  female who has hx of COPD, CHF, Chronic Atrial Fib who arrived via EMS from [facility] for evaluation of worsening dyspnea. Apparently she has a long history of chronic respiratory failure with congestive heart failure complications as well as a recent pneumonia. She was just in the hospital about 1 week ago for some similar symptoms but also a UTI. She has an indwelling PICC line through which she is currently receiving Rocephin daily. As per nursing home staff the patient developed some worsening shortness of breath symptoms without any benefit from her the usual nebulized treatments. She was also given 80 mg of Lasix orally as she was felt to be in CHF. When her symptoms began to progress to more worrisome level, they called EMS for further assistance. Patient denies any chest pain. She does say she has had a cough that is only minimally productive recently. She is unsure about any fevers. The remainder of the history is difficult to obtain from the patient independently due to her age and clinical condition. Patient is on BiPAP at this time and it is difficult to obtain much hx from her.          Past Medical History   Diagnosis Date   • Anemia    • Arthritis    • Chronic diastolic (congestive) heart failure    • DVT (deep venous thrombosis)    • Dyslipidemia    • Gout    • HTN (hypertension)    • Hypertension    • Lymphedema    • Morbid obesity    • Obstructive sleep apnea of adult      untreated   • Permanent atrial fibrillation    • Respiratory failure, acute      hypoxic     Past Surgical History   Procedure Laterality Date   • Hernia repair     • Eye surgery     • Femur fracture surgery Right      closed reduction external fixation     Family History   Problem Relation Age of Onset   • Cancer Mother    • Heart disease Father    • Diabetes Father    • COPD Brother    • Heart disease Brother      Social  History   Substance Use Topics   • Smoking status: Never Smoker   • Smokeless tobacco: None   • Alcohol use No     Prescriptions Prior to Admission   Medication Sig Dispense Refill Last Dose   • cefTRIAXone (ROCEPHIN) 1 G injection Infuse 1 g into a venous catheter Daily for 8 days.  0 1/11/2017 at 0900   • citalopram (CeleXA) 20 MG tablet Take 1 tablet by mouth Daily. (Patient taking differently: Take 20 mg by mouth 3 (Three) Times a Week. MON, WED, FRI)   1/11/2017 at 0900   • docusate sodium (COLACE) 250 MG capsule Take 250 mg by mouth daily.   1/11/2017 at 0900   • folic acid (FOLVITE) 1 MG tablet Take 1 mg by mouth daily.   1/11/2017 at 0900   • furosemide (LASIX) 80 MG tablet Take 40 mg by mouth Daily. Give 80 mg Lasix in the morning; 40 mg Lasix in the afternoon X3 days   1/12/2017 at 0530   • gabapentin (NEURONTIN) 300 MG capsule Take 300 mg by mouth 2 (two) times a day.   1/11/2017 at 2100   • glucosamine-chondroitin 500-400 MG capsule capsule Take 2 capsules by mouth daily.   1/11/2017 at 0900   • guaiFENesin (MUCINEX) 600 MG 12 hr tablet Take 600 mg by mouth 2 (Two) Times a Day.   1/2/2017 at 0900   • insulin aspart (NovoLOG) 100 UNIT/ML injection Inject 3 Units under the skin 3 (Three) Times a Day With Meals. Hold if blood sugar less than 120  12 1/11/2017 at 1600   • insulin detemir (LEVEMIR) 100 UNIT/ML injection Inject 20 Units under the skin Every Night.  12 1/11/2017 at 2100   • ipratropium-albuterol (DUO-NEB) 0.5-2.5 mg/mL nebulizer Take 3 mL by nebulization 4 (Four) Times a Day. 360 mL  1/12/2017 at 0001   • melatonin 3 MG tablet Take 6 mg by mouth Every Night.   1/11/2017 at 2100   • metoprolol succinate XL (TOPROL-XL) 25 MG 24 hr tablet Take 25 mg by mouth daily.   1/11/2017 at 0900   • multivitamin-minerals (OCUVITE) tablet Take 1 tablet by mouth daily.   1/11/2017 at 0900   • NON FORMULARY 2LNC continuous   1/12/2017 at Unknown time   • pantoprazole (PROTONIX) 40 MG EC tablet Take 40 mg by  "mouth every evening.   1/11/2017 at 2100   • warfarin (COUMADIN) 6 MG tablet Take 6 mg by mouth Daily. 6 mg every Sun, Mon, Tues, Thrs, Sat  7 mg every Wed, Fri 1/11/2017 at 1600   • acetaminophen (TYLENOL) 325 MG tablet Take 2 tablets by mouth Every 4 (Four) Hours As Needed for mild pain (1-3).  0 1/9/2017 at 1200   • cholecalciferol (VITAMIN D3) 92719 UNITS capsule Take 50,000 Units by mouth Every 30 (Thirty) Days.   Unknown at Unknown time   • furosemide (LASIX) 40 MG tablet Take 1 tablet by mouth 2 (Two) Times a Day.      • HYDROcodone-acetaminophen (NORCO) 5-325 MG per tablet Take 1 tablet by mouth Every 6 (Six) Hours As Needed for moderate pain (4-6).  0 1/11/2017 at 2300   • melatonin 5 MG tablet tablet Take 5 mg by mouth every night.   1/1/2017 at 2100   • polyethylene glycol (MIRALAX) packet Take 17 g by mouth daily.   Unknown at Unknown time   • predniSONE (DELTASONE) 5 MG tablet Take 5 mg by mouth 2 (Two) Times a Day.   1/2/2017 at 0900   • simvastatin (ZOCOR) 40 MG tablet Take 40 mg by mouth every night.   1/1/2017 at 2100     Allergies:  Codeine; Demerol [meperidine]; and Propoxyphene    REVIEW OF SYSTEMS:  Please see the above history of present illness for pertinent positives and negatives.  The remainder of the patient's systems have been reviewed and are negative.     Vital Signs  Temp:  [98.8 °F (37.1 °C)-99.7 °F (37.6 °C)] 99.7 °F (37.6 °C)  Heart Rate:  [] 97  Resp:  [14-24] 20  BP: ()/(65-99) 142/99    Flowsheet Rows         First Filed Value    Admission Height  65\" (165.1 cm) Documented at 01/12/2017 0548    Admission Weight  235 lb (107 kg) Documented at 01/12/2017 0548           Physical Exam:    PHYSICAL EXAMINATION:    VITAL SIGNS: As per Nurse's notes    GENERAL APPEARANCE: The patient is a well developed, well nourished, morbidly obese  female, in distress with complaints of shortness of breath, but no acute pain. Patient is on BiPAP.    HEENT: Normocephalic, " atraumatic. PERRL. The sclerae anicteric and conjunctivae pink and moist.      NECK: Supple and symmetric. No masses. No thyromegaly. No tenderness. Trachea central. No carotid bruits. Hard to look for JVD.    LYMPH NODES: No palpable lymphadenopathy.    SKIN: Warm, dry and intact. No rash or lesions or wounds or patechiae.    CHEST: Normal AP diameter and normal contour without any kyphoscoliosis.    LUNGS: Bilateral rales and rhonchi bilaterally. laterally. No wheezes/rales/crackles/rubs.    CARDIOVASCULAR: Irregularly irregular. S1, S2 normal without murmur/gallop/rub. No S3, S4.     ABDOMEN: Obese, Soft, non-tender, non-distended. No masses. No rebound/guarding.     EXTREMITIES:  No evidence of cyanosis, clubbing, but 2-3+ edema.  Negative Homans sign bilaterally.     MUSCULOSKELETAL: On BiPAP. No evidence of redness, swelling, warmth or pain of the joints of the extremities. Muscle strength and tone normal.    PSYCHIATRY: Responds appropriately to questions. No evidence of acute anxiety, depression, panic attacks or hallucinations.    MENTAL STATUS EXAMINATION: Neatly dressed, conscious and alert. On BiPAP.     NEUROLOGIC: Hard to examine at this time.               Results Review:    I reviewed the patient's new clinical results.  Lab Results (most recent)     Procedure Component Value Units Date/Time    Blood Culture [[id number]] Collected:  01/12/17 0055    Specimen:  Blood from Arm, Right Updated:  01/12/17 0603    CBC Auto Differential [[id number]]  (Abnormal) Collected:  01/12/17 0558    Specimen:  Blood Updated:  01/12/17 0608     WBC 8.43 10*3/mm3      RBC 4.31 10*6/mm3      Hemoglobin 12.1 g/dL      Hematocrit 38.7 %      MCV 89.8 fL      MCH 28.1 pg      MCHC 31.3 g/dL      RDW 13.8 %      RDW-SD 45.7 fl      MPV 9.8 fL      Platelets 234 10*3/mm3      Neutrophil % 82.2 (H) %      Lymphocyte % 9.8 (L) %      Monocyte % 5.2 %      Eosinophil % 1.7 %      Basophil % 0.2 %      Immature Grans % 0.9 (H) %       Neutrophils, Absolute 6.92 10*3/mm3      Lymphocytes, Absolute 0.83 10*3/mm3      Monocytes, Absolute 0.44 10*3/mm3      Eosinophils, Absolute 0.14 10*3/mm3      Basophils, Absolute 0.02 10*3/mm3      Immature Grans, Absolute 0.08 (H) 10*3/mm3      nRBC 0.0 /100 WBC     CBC & Differential [[id number]] Collected:  01/12/17 0558    Specimen:  Blood Updated:  01/12/17 0608    Narrative:       The following orders were created for panel order CBC & Differential.  Procedure                               Abnormality         Status                     ---------                               -----------         ------                     CBC Auto Differential[[id number]]         Abnormal            Final result                 Please view results for these tests on the individual orders.    Lactic Acid, Plasma [[id number]]  (Normal) Collected:  01/12/17 0558    Specimen:  Blood Updated:  01/12/17 0618     Lactate 0.7 mmol/L     Comprehensive Metabolic Panel [[id number]]  (Abnormal) Collected:  01/12/17 0557    Specimen:  Blood Updated:  01/12/17 0622     Glucose 98 mg/dL      BUN 19 mg/dL      Creatinine 1.04 (H) mg/dL      Sodium 146 (H) mmol/L      Potassium 4.0 mmol/L      Chloride 104 mmol/L      CO2 29.4 (H) mmol/L      Calcium 9.7 mg/dL      Total Protein 5.9 (L) g/dL      Albumin 2.80 (L) g/dL      ALT (SGPT) 22 U/L      AST (SGOT) 19 U/L      Alkaline Phosphatase 115 U/L      Total Bilirubin 0.4 mg/dL      eGFR Non African Amer 51 (L) mL/min/1.73      Globulin 3.1 gm/dL      A/G Ratio 0.9 g/dL      BUN/Creatinine Ratio 18.3      Anion Gap 12.6 mmol/L     Narrative:       The MDRD GFR formula is only valid for adults with stable renal function between ages 18 and 70.    Influenza Antigen [[id number]]  (Abnormal) Collected:  01/12/17 0558    Specimen:  Swab from Nasopharynx Updated:  01/12/17 0624     Influenza A Ag, EIA Positive (A)      Influenza B Ag, EIA Negative     Troponin [[id number]]  (Normal) Collected:  01/12/17 0611     Specimen:  Blood Updated:  01/12/17 0630     Troponin T <0.010 ng/mL     Narrative:       Troponin T Reference Ranges:  Less than 0.03 ng/mL:    Negative for AMI  0.03 to 0.09 ng/mL:      Indeterminant for AMI  Greater than 0.09 ng/mL: Positive for AMI    BNP [[id number]]  (Abnormal) Collected:  01/12/17 0611    Specimen:  Blood Updated:  01/12/17 0659     proBNP 7334.0 (H) pg/mL     Narrative:       Among patients with dyspnea, NT-proBNP is highly sensitive for the detection of acute congestive heart failure. In addition NT-proBNP of <300 pg/ml effectively rules out acute congestive heart failure with 99% negative predictive value.    Blood Gas, Arterial [[id number]]  (Abnormal) Collected:  01/12/17 0738    Specimen:  Arterial Blood Updated:  01/12/17 0739     Site Arterial: left radial      Prashant's Test Positive      pH, Arterial 7.379 pH units      pCO2, Arterial 57.2 (H) mm Hg      pO2, Arterial 76.8 (L) mm Hg      HCO3, Arterial 33.0 (H) mmol/L      Base Excess, Arterial 6.6 (H) mmol/L      O2 Saturation Calculated 95.0 %      Hemoglobin, Blood Gas 10.4 (L) g/dL      Barometric Pressure for Blood Gas 748 mmHg      Modality BiPAP      FIO2 40 %      Set Mech Resp Rate 14      Rate 22 Breaths/minute      IPAP 12      EPAP 6      Pulse Ox 95 %     Protime-INR [[id number]]  (Abnormal) Collected:  01/12/17 0557    Specimen:  Blood Updated:  01/12/17 0812     Protime 26.9 (H) Seconds      INR 2.43 (H)     Narrative:       Therapeutic Ranges for INR: 2.0-3.0 (PT 20-30)                              2.5-3.5 (PT 25-34)    Urinalysis With / Culture If Indicated [[id number]]  (Abnormal) Collected:  01/12/17 0827    Specimen:  Urine from Urine, Clean Catch Updated:  01/12/17 0846     Color, UA Yellow      Appearance, UA Slightly Cloudy (A)      pH, UA 5.5      Specific Gravity, UA 1.010      Glucose, UA Negative      Ketones, UA Negative      Bilirubin, UA Negative      Blood, UA Negative      Protein, UA Negative      Leuk  Esterase, UA Negative      Nitrite, UA Negative      Urobilinogen, UA 0.2 E.U./dL     Narrative:       Urine microscopic not indicated.          Imaging Results (most recent)     Procedure Component Value Units Date/Time    XR Chest 1 View [[id number]] Collected:  01/12/17 0725     Updated:  01/12/17 0729    Narrative:       Chest x-ray     INDICATION:  Patient arrives via EMS from her nursing home for  evaluation of worsening dyspnea this evening. Apparently she has a long  history of chronic respiratory failure with congestive heart failure  complications as well as a recent pneumonia. She was just in the  hospital about 1 week ago for some similar symptoms but also a UTI. She  has an indwelling PICC line through which she is currently receiving  Rocephin daily. The nursing home staff tell us that the patient  developed some worsening shortness of breath symptoms as the evening  progressed. They gave her the usual nebulized treatments which often  resolve her breathing symptoms but this time they did not seem to  benefit much.     FINDINGS: AP upright view of the chest is compared with 01/03/2017. The  heart remains quite enlarged. There are small bilateral pleural  effusions, left greater than right. There is new infiltrate at the right  lung base concerning for pneumonia. There is consolidation behind the  heart at the left base which may reflect atelectasis or pneumonia. Right  arm approach PICC remains in the SVC. No pneumothorax.       Impression:       Stable cardiomegaly. Bilateral effusions left greater than  right. New infiltrate in the right base concerning for pneumonia. There  is infiltrate or atelectasis in the left base as well.     This report was finalized on 1/12/2017 7:27 AM by Dr. Gerardo Brito MD.           reviewed    ECG/EMG Results (most recent)     Procedure Component Value Units Date/Time    ECG 12 Lead [[id number]] Collected:  01/12/17 0641     Updated:  01/12/17 0643         reviewed    Assessment/Plan       DIFFERENTIAL DIAGNOSES CONSIDERED FOR CHIEF COMPLAINT:        The following clinical entities were considered in differential diagnosis. The list includes commonly encountered practical probabilities and rare esoteric diagnoses worked out through systemic diagnostic methods used to identify the presence of a disease entity where multiple diagnoses are possible. The decision is reached through either of the following methods: 1) direct confirmatory testing, or 2) a process of elimination, or 3) at least a process of obtaining information that shrinks the “probabilities” of candidate conditions to negligible levels, by using clinical evidence and thus implementing aspects of the hypothetico-deductive method.        DIFFERENTIAL DIAGNOSIS OF SHORTNESS OF BREATH    Differential Diagnosis of Dyspnea    A) Cardiac:   • Congestive heart failure (right, left or biventricular)   • Coronary artery disease   • Myocardial infarction (recent or past history)   • Cardiomyopathy   • Valvular dysfunction   • Left ventricular hypertrophy   • Asymmetric septal hypertrophy   • Pericarditis   • Arrhythmias     B) Pulmonary:   • COPD   • Asthma   • Restrictive lung disorders   • Hereditary lung disorders   • Pneumothorax     C) Mixed cardiac or pulmonary:   • COPD with pulmonary hypertension and cor pulmonale   • Deconditioning   • Chronic pulmonary emboli   • Trauma     D) Noncardiac or nonpulmonary:   • Metabolic conditions (e.g., acidosis)   • Pain   • Neuromuscular disorders   • Otorhinolaryngeal disorders       E) Functional:    •    • Anxiety  • Panic disorders  • Hyperventilation              ASSESSMENT AND PLAN:       SUMMARY:    ?   PROPHYLAXIS:   -Oxygen Saturation: As per Nurses' notes.   -DVT Prophylaxis: On oral coumadin  -Gastritis Prophylaxis: Pantoprazole    -Constipation Prophylaxis: colace 100 mg po BID   -Immunizations: N/A  -Intake and Output Orders: As per order sheet   -Lety  Catheter: Not indicated at this time  -Smoking/ Nicotine issues: N/A      PAIN MANAGEMENT:  -Pain Management: as per order sheet   -Miscellaneous Medications: Tylenol 650 mg 1 po q4-6 hours for headache or temp >100 degrees     ACTIVITIES:  -Bathroom Privileges: May use bathroom   -Activity: Encouraged to sit up in side chair for 2-4 hours BID   -PT/OT: N/A      NUTRITION AND FLUIDS:  -Diet/ Nutrition: NPO    -Fluid Status/Electrolytes: D5W 1 L 75 mL/Hr      SOCIAL ISSUES:   -MRSA SCREEN: As per institutional protocol   -Behavioral/ Agitation Issues: NONE   -Social Issues: Lives at [facility].   -Occupational Issues: Patient is Retired.   -Code Status: Prior Code on admission   -Disposition: TBD     THERAPEUTIC:   ALLERGIES: as per admission H&P   ANTIBIOTICS: On Inj. Meropenem and Inj. Vanco and oral Tamiflu   INSULIN THERAPY: Low dose insulin coverage as ordered   CHEST PAIN: NTG 0.4 MG s/l at onset of chest pain; Repeat q5 min x 2 PRN   NEBULIZER TREATMENT: DuoNeb 3 ml via nebulizer q4-6 hrs PRN for shortness of breath and/or wheezing   ANXIETY: Xanax 0.5 mg po BID for anxiety   DEPRESSION: On Celexa    INSOMNIA: On Melatonin              PRIMARY DIAGNOSES:    1) Acute on Chronic Respiratory Failure: Patient is on BiPAP right now     2) Bibasilar Pneumonia: Will treat with Inj. Vancomycin and Inj. Meropenem    3) Influenza A Positive: On Tamiflu    4) On BiPAP: Breathing and ABG improved    5) Recent UTI: Was being treated with Inj. Rocephin as an outpatient but it is d/cd    6) Chronic Diastolic CHF: Patient has been on oral Lasix    7) HTN: last BP reading is 142/99. Has been on Metoprolol    8) Dyslipidemia: Hx noted    9) Obstructive Sleep Apnea: Hx noted    10) Morbid Obesity: Noted    11) DVT Prophylaxis: On oral coumadin    12) Vitamin D deficiency: On replacement  ?     SECONDARY DIAGNOSES:  ?     DJD, Anemia        SURGICAL DIAGNOSES:        S/P Bilateral cataract surgery, S/P closed reduction and  external fixation Rt femur, S/P Herniorrhaphy              PLAN:    Labs and diagnostic tests reviewed: Trop <0.010, proBNP 7,334, Gluc 98, Na 146, K 4.0, CO2 29.4, AG 12.6, Creat 1.04, Venous Lactate 0.7, PT 26.9, INR 2.43, WBC 8.43, Hb 12.1, Plt 234, 82.2 N, 9.8L    Diagnostic tests reviewed:    CXR  IMPRESSION:  Stable cardiomegaly. Bilateral effusions left greater than  right. New infiltrate in the right base concerning for pneumonia. There  is infiltrate or atelectasis in the left base as well.      This report was finalized on 1/12/2017 7:27 AM by Dr. Gerardo Brito MD.        Patient is clinically and hemodynamically stable    Will be seen by Pulmonary and Cardiology     Any new recommendations: As per Pulmonary    New Labs ordered: CBC, CMP and lactic acid in AM; Serial Troponins and PT/INR    New diagnostic tests ordered: As per Pulmonary    Any changes in medications: N/A    To continue current management and supportive care    Pain management issues: Norco PO    Discharge planning issues: Patient will go back to [facility] when ready for discharge    Will follow patient closely    Nothing new to add for right now        I could not discuss the patients findings and my recommendations with patient as she is on BiPAP.     Trevor Diallo MD  01/12/17  10:48 AM          Trevor Diallo M.D., [facility]  Internal Medicine/ Hospitalist        Time:       EMR Dragon/Transcription disclaimer:      Much of this encounter note is an electronic transcription/translation of spoken language to printed text. The electronic translation of spoken language may permit erroneous, or at times, nonsensical words or phrases to be inadvertently transcribed; Although I have reviewed the note for such errors, some may still exist.      Electronically signed by Trevor Diallo MD at 1/12/2017 11:55 AM           Emergency Department Notes      Rose Vega, RN at 1/12/2017  5:45 AM          Patient awake upon arrival sitting at 90  "degrees on stretcher with c/o SOA.  Audible crackles noted.  Oxygen 4L/NC in use.     Rose Vega RN  01/12/17 0603       Electronically signed by Rose Vega RN at 1/12/2017  6:03 AM      Rose Vega RN at 1/12/2017  6:10 AM          Dr. Cazares in to speak to patient regarding her Code Status.  When asked if she would want to be intubated, patient states, \"yes.\"  Patient's paperwork has her marked as a DNR.  Paperwork requested from the [facility].     Rose Vega RN  01/12/17 0706       Electronically signed by Rose Vega RN at 1/12/2017  7:06 AM      Rose Vega RN at 1/12/2017  6:30 AM          Patient's nurse at the [facility] the patient's morning Lasix dose of 80 mg po was given at 0530, prior to her transfer to this hospital.     Rose Vega RN  01/12/17 0708       Electronically signed by Rose Vega RN at 1/12/2017  7:08 AM      Rose Vega RN at 1/12/2017  6:30 AM          [facility] sent a [facility] DNR Order, though patient has verbalized she would want to be intubated if it was deemed neccesary.     Rose Vega RN  01/12/17 0707       Electronically signed by Rose Vega RN at 1/12/2017  7:07 AM      Andrey Cazares MD at 1/12/2017  6:46 AM          Subjective     History provided by:  EMS personnel and nursing home  History limited by:  Acuity of condition and age    History of Present Illness    Chief complaint: Difficulties breathing    Location: Lungs    Quality/Severity:  Severe    Timing/Duration: Onset overnight    Modifying Factors: None    Narrative: Patient arrives via EMS from her nursing home for evaluation of worsening dyspnea this evening.  Apparently she has a long history of chronic respiratory failure with congestive heart failure complications as well as a recent pneumonia.  She was just in the hospital about 1 week ago for some similar symptoms but also a UTI.  She has an indwelling PICC line through which " she is currently receiving Rocephin daily.  The nursing home staff tell us that the patient developed some worsening shortness of breath symptoms as the evening progressed.  They gave her the usual nebulized treatments which often resolve her breathing symptoms but this time they did not seem to benefit much.  We're told that they also gave her 80 mg of Lasix orally as they believed she was having some congestive heart failure problems.  When her symptoms began to progress to more worrisome level, they called EMS for further assistance.  Patient denies any chest pain.  She does say she has had a cough that is only minimally productive recently.  She is unsure about any fevers.  The remainder of the history is difficult to obtain from the patient independently due to her age and clinical condition.    Associated Symptoms: As above    Review of Systems   Constitutional: Positive for fatigue. Negative for diaphoresis.   HENT: Positive for congestion. Negative for drooling.    Eyes: Negative for pain and visual disturbance.   Respiratory: Positive for cough, chest tightness, shortness of breath and wheezing.    Cardiovascular: Positive for leg swelling. Negative for chest pain.   Gastrointestinal: Negative for abdominal pain and vomiting.   Genitourinary: Negative for dysuria, hematuria and urgency.   Musculoskeletal: Positive for myalgias. Negative for neck pain.   Skin: Negative for color change and rash.   Neurological: Negative for syncope and headaches.   Psychiatric/Behavioral: Negative for agitation and behavioral problems. other systems reviewed and are negative.      Past Medical History   Diagnosis Date   • Anemia    • Arthritis    • Chronic diastolic (congestive) heart failure    • DVT (deep venous thrombosis)    • Dyslipidemia    • Gout    • HTN (hypertension)    • Hypertension    • Lymphedema    • Morbid obesity    • Obstructive sleep apnea of adult      untreated   • Permanent atrial fibrillation    •  Respiratory failure, acute      hypoxic       Allergies   Allergen Reactions   • Codeine Nausea And Vomiting   • Demerol [Meperidine]      Dizzy reaction   • Propoxyphene      Dizzy reaction       Past Surgical History   Procedure Laterality Date   • Hernia repair     • Eye surgery     • Femur fracture surgery Right      closed reduction external fixation       Family History   Problem Relation Age of Onset   • Cancer Mother    • Heart disease Father    • Diabetes Father    • COPD Brother    • Heart disease Brother        Social History     Social History   • Marital status:      Spouse name: N/A   • Number of children: N/A   • Years of education: N/A     Social History Main Topics   • Smoking status: Never Smoker   • Smokeless tobacco: None   • Alcohol use No   • Drug use: None   • Sexual activity: Not Currently     Other Topics Concern   • None     Social History Narrative           Objective   Physical Exam   Constitutional: She is oriented to person, place, and time. She appears well-developed and well-nourished. She appears distressed (significant respiratory distress is evident).   HENT:   Head: Normocephalic and atraumatic.   Eyes: EOM are normal. Pupils are equal, round, and reactive to light. Right eye exhibits no discharge. Left eye exhibits no discharge.   Neck: Normal range of motion. Neck supple. No tracheal deviation present.   Cardiovascular: Normal rate and regular rhythm.    Pulmonary/Chest: She is in respiratory distress. She has wheezes. She has rales.   Congested airway sounds are bilateral and diffuse.  The left base is significantly diminished.  There are scattered wheezes, rales, and rhonchi evident bilaterally.  Patient demonstrates increased work of breathing with tachypneic rate of 24 breaths per minute   Abdominal: Soft. She exhibits no mass. There is no tenderness. There is no rebound and no guarding.   Musculoskeletal: Normal range of motion. She exhibits no edema or deformity.    Neurological: She is alert and oriented to person, place, and time. No cranial nerve deficit.   Skin: Skin is warm and dry. No rash noted. She is not diaphoretic. No erythema.   Psychiatric: She has a normal mood and affect. Her behavior is normal. Judgment and thought content normal. note and vitals reviewed.          EKG           EKG time/Interp time: 0641/0643  Rhythm/Rate: Atrial fibrillation, 89 bpm  P waves and CT: Not detectable  QRS, axis: 66 ms, normal axis   ST and T waves: No acute ischemic changes evident     Independently interpreted by me contemporaneously with treatment    Results for orders placed or performed during the hospital encounter of 01/12/17   Influenza Antigen   Result Value Ref Range    Influenza A Ag, EIA Positive (A) Negative    Influenza B Ag, EIA Negative Negative   Comprehensive Metabolic Panel   Result Value Ref Range    Glucose 98 65 - 99 mg/dL    BUN 19 8 - 23 mg/dL    Creatinine 1.04 (H) 0.57 - 1.00 mg/dL    Sodium 146 (H) 136 - 145 mmol/L    Potassium 4.0 3.5 - 5.2 mmol/L    Chloride 104 98 - 107 mmol/L    CO2 29.4 (H) 22.0 - 29.0 mmol/L    Calcium 9.7 8.8 - 10.5 mg/dL    Total Protein 5.9 (L) 6.0 - 8.5 g/dL    Albumin 2.80 (L) 3.50 - 5.20 g/dL    ALT (SGPT) 22 5 - 33 U/L    AST (SGOT) 19 5 - 32 U/L    Alkaline Phosphatase 115 40 - 129 U/L    Total Bilirubin 0.4 0.2 - 1.2 mg/dL    eGFR Non African Amer 51 (L) >60 mL/min/1.73    Globulin 3.1 gm/dL    A/G Ratio 0.9 g/dL    BUN/Creatinine Ratio 18.3 7.0 - 25.0    Anion Gap 12.6 mmol/L   Lactic Acid, Plasma   Result Value Ref Range    Lactate 0.7 0.5 - 2.0 mmol/L   CBC Auto Differential   Result Value Ref Range    WBC 8.43 4.80 - 10.80 10*3/mm3    RBC 4.31 4.20 - 5.40 10*6/mm3    Hemoglobin 12.1 12.0 - 16.0 g/dL    Hematocrit 38.7 37.0 - 47.0 %    MCV 89.8 81.0 - 99.0 fL    MCH 28.1 27.0 - 31.0 pg    MCHC 31.3 31.0 - 37.0 g/dL    RDW 13.8 11.5 - 14.5 %    RDW-SD 45.7 37.0 - 54.0 fl    MPV 9.8 7.4 - 10.4 fL    Platelets 234 140 -  500 10*3/mm3    Neutrophil % 82.2 (H) 45.0 - 70.0 %    Lymphocyte % 9.8 (L) 20.0 - 45.0 %    Monocyte % 5.2 3.0 - 8.0 %    Eosinophil % 1.7 0.0 - 4.0 %    Basophil % 0.2 0.0 - 2.0 %    Immature Grans % 0.9 (H) 0.0 - 0.5 %    Neutrophils, Absolute 6.92 1.50 - 8.30 10*3/mm3    Lymphocytes, Absolute 0.83 0.60 - 4.80 10*3/mm3    Monocytes, Absolute 0.44 0.00 - 1.00 10*3/mm3    Eosinophils, Absolute 0.14 0.10 - 0.30 10*3/mm3    Basophils, Absolute 0.02 0.00 - 0.20 10*3/mm3    Immature Grans, Absolute 0.08 (H) 0.00 - 0.03 10*3/mm3    nRBC 0.0 0.0 - 0.0 /100 WBC   BNP   Result Value Ref Range    proBNP 7334.0 (H) 5.0 - 450.0 pg/mL   Troponin   Result Value Ref Range    Troponin T <0.010 0.000 - 0.030 ng/mL           RADIOLOGY        Study: Chest x-ray    Findings: Abnormal findings bilaterally.  Left lower lobe consolidation appears essentially unchanged from recent exam with probable infiltrate and effusion.  Right lung fields show diffuse interstitial changes likely reflecting pulmonary edema or possibly infiltrative development as well.    Interpreted Contemporaneously by myself, independently viewed by me            Procedures        ED Course  ED Course   Comment By Time   Patient arrived with obvious signs of respiratory failure requiring immediate assistance.  I called for BiPAP therapy immediately and this seemed to be benefiting the patient very quickly.  Her work of breathing gradually decreased and she became much more comfortable.  Her heart rate is stabilizing in normal range now and her oxygenation seems to be improving also.  Labs reviewed by me as well as x-ray.  Appears that her respiratory distress is multifactorial, likely with component of CHF and pneumonia, probably viral.  Will start on Tamiflu therapy now and then to admit to ICU for close monitoring.  No diuresis given at this time because nursing home reports giving 80 mg Lasix orally just before patient transported to our facility Andrey Cazares,  MD 01/12 0725                  [facility]  Number of Diagnoses or Management Options  Acute on chronic respiratory failure, unspecified whether with hypoxia or hypercapnia:   Congestive heart failure, unspecified congestive heart failure chronicity, unspecified congestive heart failure type:   Influenza A:   Diagnosis management comments: My differential diagnosis for dyspnea includes but is not limited to:  Asthma, COPD, pneumonia, pulmonary embolus, acute respiratory distress syndrome, pneumothorax, pleural effusion, pulmonary fibrosis, congestive heart failure, myocardial infarction, DKA, uremia, acidosis, sepsis, anemia, drug related, hyperventilation, CNS disease       Amount and/or Complexity of Data Reviewed  Clinical lab tests: reviewed and ordered  Tests in the radiology section of CPT®:  ordered and reviewed  Decide to obtain previous medical records or to obtain history from someone other than the patient: yes  Obtain history from someone other than the patient: yes (EMS)  Review and summarize past medical records: yes  Discuss the patient with other providers: yes (Dr. Diallo)  Independent visualization of images, tracings, or specimens: yes    Risk of Complications, Morbidity, and/or Mortality  Presenting problems: high  Diagnostic procedures: moderate  Management options: high    Critical Care  Total time providing critical care: 30-74 minutes    Patient Progress  Patient progress: improved      Final diagnoses:   Acute on chronic respiratory failure, unspecified whether with hypoxia or hypercapnia   Influenza A   Congestive heart failure, unspecified congestive heart failure chronicity, unspecified congestive heart failure type           Andrey Cazares MD  01/12/17 0723      Andrey Cazares MD  01/12/17 0725       Electronically signed by Andrey Cazares MD at 1/12/2017  7:25 AM      Karen Brennan RN at 1/12/2017  7:41 AM          Pt has a ready ICU bed.  Lab here to draw a blood culture.     Karen Brennan  RN  01/12/17 0742       Electronically signed by Karen Brennan RN at 1/12/2017  7:42 AM        Vital Signs (last 24 hours)       01/12 0700  -  01/13 0659 01/13 0700  -  01/13 1307   Most Recent    Temp (°F) 97 -  99.7       98.3 (36.8)    Heart Rate 73 -  104    72 -  76     76    Resp 16 -  26      20     20    BP 98/68 -  143/84       115/68    SpO2 (%) 93 -  97    95 -  97     97          Intake & Output (last day)       01/12 0701 - 01/13 0700 01/13 0701 - 01/14 0700    IV Piggyback 350     Total Intake(mL/kg) 350 (3.4)     Net +350            Unmeasured Urine Occurrence 3 x 2 x        Lines, Drains & Airways    Active LDAs     Name:   Placement date:   Placement time:   Site:   Days:    PICC Line - Single Lumen 01/03/17 1140 basilic vein (medial side of arm), right 5 Fr  01/03/17    1140      10         Inactive LDAs     Name:   Placement date:   Placement time:   Removal date:   Removal time:   Site:   Days:    [REMOVED] PICC Line - Double Lumen 01/03/17 1140 basilic vein (medial side of arm), right 5 Fr;length (specify)  01/03/17    1140    01/12/17 Port not accessed upon arrival to ED  0545      8                Hospital Medications (all)       Dose Frequency Start End    acetaminophen (TYLENOL) tablet 650 mg 650 mg Every 4 Hours PRN 1/12/2017     Sig - Route: Take 2 tablets by mouth Every 4 (Four) Hours As Needed for mild pain (1-3). - Oral    cefepime (MAXIPIME) IVPB 1 g 1 g Every 8 Hours Scheduled 1/13/2017     Sig - Route: Infuse 50 mL into a venous catheter Every 8 (Eight) Hours. - Intravenous    Cosign for Ordering: Required by Trevor Diallo MD    cholecalciferol (VITAMIN D3) capsule 50,000 Units 50,000 Units Every 30 Days 1/12/2017     Sig - Route: Take 1 capsule by mouth Every 30 (Thirty) Days. - Oral    citalopram (CeleXA) tablet 20 mg 20 mg Daily 1/12/2017     Sig - Route: Take 1 tablet by mouth Daily. - Oral    dextrose (D50W) solution 25 g 25 g As Needed 1/12/2017     Sig - Route: Infuse 50  mL into a venous catheter As Needed for low blood sugar. - Intravenous    dextrose (GLUTOSE) oral gel 15 g 15 g As Needed 1/12/2017     Sig - Route: Take 15 g by mouth As Needed for low blood sugar. - Oral    docusate sodium (COLACE) capsule 100 mg 100 mg 2 Times Daily 1/12/2017     Sig - Route: Take 1 capsule by mouth 2 (Two) Times a Day. - Oral    folic acid (FOLVITE) tablet 1 mg 1 mg Daily 1/12/2017     Sig - Route: Take 1 tablet by mouth Daily. - Oral    furosemide (LASIX) injection 40 mg 40 mg Once 1/13/2017 1/13/2017    Sig - Route: Infuse 4 mL into a venous catheter 1 (One) Time. - Intravenous    furosemide (LASIX) tablet 40 mg 40 mg 2 Times Daily 1/13/2017     Sig - Route: Take 1 tablet by mouth 2 (Two) Times a Day. - Oral    gabapentin (NEURONTIN) capsule 300 mg 300 mg 2 Times Daily 1/12/2017     Sig - Route: Take 1 capsule by mouth 2 (Two) Times a Day. - Oral    glucagon (GLUCAGEN) injection 1 mg 1 mg Once As Needed 1/12/2017     Sig - Route: Inject 1 mg under the skin 1 (One) Time As Needed (hypoglycemia). - Subcutaneous    HYDROcodone-acetaminophen (NORCO) 5-325 MG per tablet 1 tablet 1 tablet Every 6 Hours PRN 1/12/2017     Sig - Route: Take 1 tablet by mouth Every 6 (Six) Hours As Needed for moderate pain (4-6). - Oral    insulin aspart (novoLOG) injection 0-7 Units 0-7 Units 4 Times Daily Before Meals & Nightly 1/12/2017     Sig - Route: Inject 0-7 Units under the skin 4 (Four) Times a Day Before Meals & at Bedtime. - Subcutaneous    insulin detemir (LEVEMIR) injection 20 Units 20 Units Nightly 1/12/2017     Sig - Route: Inject 20 Units under the skin Every Night. - Subcutaneous    ipratropium-albuterol (DUO-NEB) nebulizer solution 3 mL 3 mL 4 Times Daily - RT 1/12/2017     Sig - Route: Take 3 mL by nebulization 4 (Four) Times a Day. - Nebulization    melatonin tablet 6 mg 6 mg Nightly 1/12/2017     Sig - Route: Take 2 tablets by mouth Every Night. - Oral    methylPREDNISolone sodium succinate  "(SOLU-Medrol) injection 40 mg 40 mg Every 8 Hours 1/12/2017     Sig - Route: Infuse 1 mL into a venous catheter Every 8 (Eight) Hours. - Intravenous    metoprolol succinate XL (TOPROL-XL) 24 hr tablet 25 mg 25 mg Daily 1/12/2017     Sig - Route: Take 1 tablet by mouth Daily. - Oral    multivitamin with minerals 1 tablet 1 tablet Daily 1/12/2017     Sig - Route: Take 1 tablet by mouth Daily. - Oral    oseltamivir (TAMIFLU) capsule 75 mg 75 mg Once 1/12/2017 1/12/2017    Sig - Route: Take 1 capsule by mouth 1 (One) Time. - Oral    oseltamivir (TAMIFLU) capsule 75 mg 75 mg Every 12 Hours Scheduled 1/12/2017 1/17/2017    Sig - Route: Take 1 capsule by mouth Every 12 (Twelve) Hours. - Oral    pantoprazole (PROTONIX) EC tablet 40 mg 40 mg Every Evening 1/12/2017     Sig - Route: Take 1 tablet by mouth Every Evening. - Oral    Pharmacy to dose vancomycin  Continuous PRN 1/12/2017     Sig - Route: Continuous As Needed for consult. - Does not apply    polyethylene glycol (MIRALAX) powder 17 g 17 g Daily 1/12/2017     Sig - Route: Take 17 g by mouth Daily. - Oral    sodium chloride 0.9 % flush 10 mL 10 mL As Needed 1/12/2017     Sig - Route: Infuse 10 mL into a venous catheter As Needed for line care. - Intravenous    Linked Group 1:  \"And\" Linked Group Details        sodium chloride 0.9 % infusion  - ADS Override Pull   1/12/2017 1/13/2017    Notes to Pharmacy: Created by cabinet override    vancomycin (VANCOCIN) 1,750 mg in sodium chloride 0.9 % 500 mL IVPB 1,750 mg Every 24 Hours 1/12/2017     Sig - Route: Infuse 1,750 mg into a venous catheter Daily. - Intravenous    acetaminophen (TYLENOL) tablet 650 mg (Discontinued) 650 mg Every 6 Hours PRN 1/12/2017 1/12/2017    Sig - Route: Take 2 tablets by mouth Every 6 (Six) Hours As Needed for mild pain (1-3). - Oral    cefepime 1 g in 100 mL NS (Discontinued) 1 g Every 8 Hours 1/13/2017 1/13/2017    Sig - Route: Infuse 1 g into a venous catheter Every 8 (Eight) Hours. - " Intravenous    Reason for Discontinue: Duplicate order    cefTRIAXone (ROCEPHIN) injection 1 g (Discontinued) 1 g Daily 1/12/2017 1/12/2017    Sig - Route: Infuse 1 g into a venous catheter Daily. - Intravenous    dextrose (D5W) 5 % infusion (Discontinued) 75 mL/hr Continuous 1/12/2017 1/12/2017    Sig - Route: Infuse 75 mL/hr into a venous catheter Continuous. - Intravenous    dextrose (D5W) 5 % infusion (Discontinued) 100 mL/hr Continuous 1/12/2017 1/13/2017    Sig - Route: Infuse 100 mL/hr into a venous catheter Continuous. - Intravenous    docusate sodium (COLACE) capsule 250 mg (Discontinued) 250 mg Daily 1/12/2017 1/12/2017    Sig - Route: Take 1 capsule by mouth Daily. - Oral    furosemide (LASIX) tablet 40 mg (Discontinued) 40 mg Daily 1/12/2017 1/13/2017    Sig - Route: Take 1 tablet by mouth Daily. - Oral    Reason for Discontinue: Duplicate order    insulin aspart (novoLOG) injection 3 Units (Discontinued) 3 Units 3 Times Daily With Meals 1/12/2017 1/12/2017    Sig - Route: Inject 3 Units under the skin 3 (Three) Times a Day With Meals. - Subcutaneous    meropenem (MERREM) IVPB 500 mg in 100 mL NS (Discontinued) 500 mg Every 8 Hours 1/12/2017 1/13/2017    Sig - Route: Infuse 500 mg into a venous catheter Every 8 (Eight) Hours. - Intravenous    sodium chloride 0.9 % bolus 3,210 mL (Discontinued) 30 mL/kg × 107 kg Once 1/12/2017 1/12/2017    Sig - Route: Infuse 3,210 mL into a venous catheter 1 (One) Time. - Intravenous    sodium chloride 0.9 % infusion (Discontinued) 30 mL/hr Continuous 1/12/2017 1/12/2017    Sig - Route: Infuse 30 mL/hr into a venous catheter Continuous. - Intravenous    warfarin (COUMADIN) tablet 6 mg (Discontinued) 6 mg Daily Warfarin 1/12/2017 1/13/2017    Sig - Route: Take 2 tablets by mouth Daily. - Oral          Blood Administration Record     None        Lab Results (last 24 hours)     Procedure Component Value Units Date/Time    Protime-INR [[id number]]  (Abnormal) Collected:   01/12/17 1149    Specimen:  Blood Updated:  01/12/17 1321     Protime 27.4 (H) Seconds      INR 2.49 (H)     Narrative:       Therapeutic Ranges for INR: 2.0-3.0 (PT 20-30)                              2.5-3.5 (PT 25-34)    Amylase [[id number]]  (Normal) Collected:  01/12/17 1646    Specimen:  Blood Updated:  01/12/17 1658     Amylase 55 U/L     Lipase [[id number]]  (Normal) Collected:  01/12/17 1646    Specimen:  Blood Updated:  01/12/17 1658     Lipase 22 U/L     POC Glucose Fingerstick [[id number]]  (Normal) Collected:  01/12/17 1731    Specimen:  Blood Updated:  01/12/17 1740     Glucose 114 mg/dL     Narrative:       Meter: [id number] : [id number] Sydnee Huertas RN    Troponin [[id number]]  (Normal) Collected:  01/12/17 1755    Specimen:  Blood Updated:  01/12/17 1814     Troponin T <0.010 ng/mL     Narrative:       Troponin T Reference Ranges:  Less than 0.03 ng/mL:    Negative for AMI  0.03 to 0.09 ng/mL:      Indeterminant for AMI  Greater than 0.09 ng/mL: Positive for AMI    POC Glucose Fingerstick [[id number]]  (Abnormal) Collected:  01/12/17 2058    Specimen:  Blood Updated:  01/12/17 2110     Glucose 174 (H) mg/dL     Narrative:       Meter: [id number] : [id number] Starr Kim RN Validator    Troponin [[id number]]  (Normal) Collected:  01/12/17 2306    Specimen:  Blood Updated:  01/13/17 0003     Troponin T <0.010 ng/mL     Narrative:       Troponin T Reference Ranges:  Less than 0.03 ng/mL:    Negative for AMI  0.03 to 0.09 ng/mL:      Indeterminant for AMI  Greater than 0.09 ng/mL: Positive for AMI    CBC & Differential [[id number]] Collected:  01/13/17 0605    Specimen:  Blood Updated:  01/13/17 0620    Narrative:       The following orders were created for panel order CBC & Differential.  Procedure                               Abnormality         Status                     ---------                               -----------         ------                     CBC Auto Differential[[id number]]         Abnormal             Final result                 Please view results for these tests on the individual orders.    CBC Auto Differential [[id number]]  (Abnormal) Collected:  01/13/17 0605    Specimen:  Blood Updated:  01/13/17 0620     WBC 3.98 (L) 10*3/mm3      RBC 3.67 (L) 10*6/mm3      Hemoglobin 10.5 (L) g/dL      Hematocrit 33.9 (L) %      MCV 92.4 fL      MCH 28.6 pg      MCHC 31.0 g/dL      RDW 13.6 %      RDW-SD 46.4 fl      MPV 9.6 fL      Platelets 194 10*3/mm3      Neutrophil % 85.8 (H) %      Lymphocyte % 10.6 (L) %      Monocyte % 2.3 (L) %      Eosinophil % 0.0 %      Basophil % 0.3 %      Immature Grans % 1.0 (H) %      Neutrophils, Absolute 3.42 10*3/mm3      Lymphocytes, Absolute 0.42 (L) 10*3/mm3      Monocytes, Absolute 0.09 10*3/mm3      Eosinophils, Absolute 0.00 (L) 10*3/mm3      Basophils, Absolute 0.01 10*3/mm3      Immature Grans, Absolute 0.04 (H) 10*3/mm3      nRBC 0.0 /100 WBC     Protime-INR [[id number]]  (Abnormal) Collected:  01/13/17 0605    Specimen:  Blood Updated:  01/13/17 0636     Protime 37.2 (H) Seconds      INR 3.65 (H)     Narrative:       Therapeutic Ranges for INR: 2.0-3.0 (PT 20-30)                              2.5-3.5 (PT 25-34)    Lactic Acid, Plasma [[id number]]  (Normal) Collected:  01/13/17 0605    Specimen:  Blood Updated:  01/13/17 0636     Lactate 0.7 mmol/L     Comprehensive Metabolic Panel [[id number]]  (Abnormal) Collected:  01/13/17 0605    Specimen:  Blood Updated:  01/13/17 0650     Glucose 206 (H) mg/dL      BUN 19 mg/dL      Creatinine 0.88 mg/dL      Sodium 141 mmol/L      Potassium 4.1 mmol/L      Chloride 101 mmol/L      CO2 32.4 (H) mmol/L      Calcium 9.3 mg/dL      Total Protein 5.3 (L) g/dL      Albumin 2.40 (L) g/dL      ALT (SGPT) 16 U/L      AST (SGOT) 14 U/L      Alkaline Phosphatase 92 U/L      Total Bilirubin 0.3 mg/dL      eGFR Non African Amer 62 mL/min/1.73      Globulin 2.9 gm/dL      A/G Ratio 0.8 g/dL      BUN/Creatinine Ratio 21.6      Anion Gap 7.6 mmol/L      Narrative:       The MDRD GFR formula is only valid for adults with stable renal function between ages 18 and 70.    Blood Culture [[id number]]  (Normal) Collected:  01/12/17 0055    Specimen:  Blood from Arm, Right Updated:  01/13/17 0701     Blood Culture No growth at 24 hours     POC Glucose Fingerstick [[id number]]  (Abnormal) Collected:  01/13/17 0742    Specimen:  Blood Updated:  01/13/17 0754     Glucose 201 (H) mg/dL     Narrative:       Meter: [id number] : [id number] Davian Dunne PCA Validator    POC Glucose Fingerstick [[id number]]  (Abnormal) Collected:  01/13/17 1201    Specimen:  Blood Updated:  01/13/17 1207     Glucose 187 (H) mg/dL     Narrative:       Meter: [id number] : [id number] Vinicio Hazel RN    Blood Culture [[id number]]  (Normal) Collected:  01/12/17 1149    Specimen:  Blood from Arm, Right Updated:  01/13/17 1301     Blood Culture No growth at 24 hours           Imaging Results (last 24 hours)     Procedure Component Value Units Date/Time    CT Chest Without Contrast [[id number]] Collected:  01/12/17 1541     Updated:  01/12/17 1554    Narrative:       CHEST CT     HISTORY:  Shortness of air with cough, congestion, and flulike symptoms for the  last 3 days. Former smoker. Abnormal chest x-ray today showing a  potential right lung base pneumonia.     TECHNIQUE:  Axial images were obtained through the chest without contrast.  Multiplanar reformats were obtained. No comparison chest CT. Radiation  dose reduction techniques were utilized, including automated exposure  control and exposure modulation based on body size.     FINDINGS:  The heart is enlarged. The main pulmonary arteries are dilated  suggesting pulmonary hypertension. The right main PA measures 3.8 cm,  and the left main PA measures 3.4 cm. There is no pericardial effusion.  There is a small right pleural effusion. There is a trace amount of left  pleural fluid. Note is made of multiple potential left breast nodules.  Outpatient  evaluation with mammogram is recommended if this has not been  performed recently at an outside facility. There is no adenopathy. There  is Some mucous plugging noted in left lower lobe bronchi. There is  alveolar consolidation in both lower lobes which may simply reflect some  atelectasis. Underlying pneumonia is not excluded the basis of this  exam. Continuation through the upper abdomen reveals diffuse fat  stranding around the pancreas suggesting acute pancreatitis. This is  incompletely visualized. Gallbladder contains numerous stones. There is  soft tissue swelling in the left lateral lower chest wall which may  reflect hematoma. Correlate with physical exam findings. A right arm  approach PICC line has its tip in the SVC in good position.       Impression:       1. These findings are highly suspicious for acute pancreatitis. The  pancreas is incompletely visualized. Correlation with laboratory data  recommended.  2. Soft tissue density in the left lateral lower chest wall may reflect  a hematoma. Correlate with physical exam findings.  3. Cardiomegaly.  4. Enlarged central pulmonary arteries suggesting pulmonary arterial  hypertension.  5. Small right pleural effusion with a trace left effusion. There is  some mucous plugging in the lower lobe bronchi on the left. There is  some alveolar consolidation in both lower lobes. Considerations include  atelectasis and/or pneumonia.  6. Cholelithiasis.  7. Potential left breast nodules. Outpatient evaluation with mammogram  recommended if this is not been performed recently at an outside  facility.        Stat final copy of this report was sent to the ordering physician's  office immediately following this dictation with telephone notification.     This report was finalized on 1/12/2017 3:51 PM by Dr. Gerardo Brito MD.             ECG/EMG Results (last 24 hours)     Procedure Component Value Units Date/Time    ECG 12 Lead [[id number]] Collected:  01/13/17 0641      Updated:  01/13/17 0644    Narrative:       RR Interval= 759 ms  WA Interval= ms  QRSD Interval= 72 ms  QT Interval= 396 ms  QTc Interval= 455 ms  Heart Rate= 79 ms  P Axis= deg  QRS Axis= 110 deg  T Wave Axis= 68 deg  I: 40 Axis= 85 deg  T: 40 Axis= 152 deg  ST Axis= 15 deg  ATRIAL FIBRILLATION, V-RATE 58-94  RIGHT AXIS DEVIATION  BORDERLINE T ABNORMALITIES, ANT-LAT LEADS  Electronically Signed by:  Date and Time of Study: 2017-01-13 06:41:06          Diet Orders (all)     Start     Ordered    01/13/17 1239  Diet Clear Liquid  Diet Effective Now      01/13/17 1238    01/12/17 0916  NPO Diet  Diet Effective Now,   Status:  Canceled      01/12/17 0918          Consult Orders (last 72 hours) (72h ago through future)    Start     Ordered    01/12/17 1223  Inpatient Consult to Case Management   Once     Provider:  (Not yet assigned)    01/12/17 1223    01/12/17 1223  Inpatient Consult to Spiritual Care  Once     Provider:  (Not yet assigned)    01/12/17 1223    01/12/17 1140  Inpatient Consult to Cardiology  Once     Specialty:  Cardiology  Provider:  Luis Fernando Stevens III, MD    01/12/17 1141    01/12/17 0938  Inpatient Consult to Pulmonology  Once     Specialty:  Pulmonary Disease  Provider:  Jordan Almodovar MD    01/12/17 0939          Ventilator/Non-Invasive Ventilation Settings     Start     Ordered    01/12/17 1102  . BIPAP; IPAP: 16; EPAP: 8  Until Discontinued     Question Answer Comment   . BIPAP    IPAP 16    EPAP 8        01/12/17 1101    01/12/17 0607  . BIPAP; IPAP: 12; EPAP: 6  Until Discontinued,   Status:  Canceled     Question Answer Comment   . BIPAP    IPAP 12    EPAP 6        01/12/17 0607          Operative/Procedure Notes (last 72 hours) (Notes from 1/10/2017  1:08 PM through 1/13/2017  1:08 PM)     No notes of this type exist for this encounter.           Physician Progress Notes (last 72 hours) (Notes from 1/10/2017  1:08 PM through 1/13/2017  1:08 PM)      Nika Mack MD at  1/13/2017  7:55 AM  Version 1 of 1            LOS: 1 day   Patient Care Team:  Gerardo Williamson MD as PCP - General  Gerardo Williamson MD as PCP - Family Medicine    Chief Complaint:  Pna, flu and pleural effusions     Interval History: dyspnea and unable to cough up anything.  Is only on insulin due to steroids, no h/o DM.  No chest pain, nausea of dizziness.  Takes bid lasix at home for chf and edema.     Objective   Vital Signs  Temp:  [97 °F (36.1 °C)-99.7 °F (37.6 °C)] 98.3 °F (36.8 °C)  Heart Rate:  [] 72  Resp:  [16-26] 20  BP: ()/(53-99) 115/68    Intake/Output Summary (Last 24 hours) at 01/13/17 0755  Last data filed at 01/13/17 0320   Gross per 24 hour   Intake    350 ml   Output      0 ml   Net    350 ml       Comfortable NAD   conjunctiva clear  Neck supple, no JVD or thyromegaly appreciated  S1/S2 irregular no m/r/g  Lungs exp wheezes throughout with decreased bases and mid on left.   Abdomen S/NT/ND (+) BS, no HSM appreciated  Extremities warm, no clubbing, cyanosis, 3+ nonpitting edema, edema also in hands.   No visible or palpable skin lesions  A/Ox4, mood and affect appropriate    Results Review:        Results from last 7 days  Lab Units 01/13/17  0605 01/12/17  0557 01/08/17  0934   SODIUM mmol/L 141 146* 135*   POTASSIUM mmol/L 4.1 4.0 3.9   CHLORIDE mmol/L 101 104 101   TOTAL CO2 mmol/L 32.4* 29.4* 21.6*   BUN mg/dL 19 19 18   CREATININE mg/dL 0.88 1.04* 0.96   GLUCOSE mg/dL 206* 98 127*   CALCIUM mg/dL 9.3 9.7 8.6*       Results from last 7 days  Lab Units 01/12/17  2306 01/12/17  1755 01/12/17  1149   TROPONIN T ng/mL <0.010 <0.010 <0.010       Results from last 7 days  Lab Units 01/13/17  0605 01/12/17  0558   WBC 10*3/mm3 3.98* 8.43   HEMOGLOBIN g/dL 10.5* 12.1   HEMATOCRIT % 33.9* 38.7   PLATELETS 10*3/mm3 194 234       Results from last 7 days  Lab Units 01/13/17  0605 01/12/17  1149 01/12/17  0557   INR  3.65* 2.49* 2.43*                   I reviewed the patient's new clinical  results.  I personally viewed and interpreted the patient's EKG/Telemetry data a fib        Medication Review:     cholecalciferol 50,000 Units Oral Q30 Days   citalopram 20 mg Oral Daily   docusate sodium 100 mg Oral BID   folic acid 1 mg Oral Daily   furosemide 40 mg Oral Daily   gabapentin 300 mg Oral BID   insulin aspart 0-7 Units Subcutaneous 4x Daily AC & at Bedtime   insulin detemir 20 Units Subcutaneous Nightly   ipratropium-albuterol 3 mL Nebulization 4x Daily - RT   melatonin 6 mg Oral Nightly   meropenem 500 mg Intravenous Q8H   methylPREDNISolone sodium succinate 40 mg Intravenous Q8H   metoprolol succinate XL 25 mg Oral Daily   multivitamin with minerals 1 tablet Oral Daily   oseltamivir 75 mg Oral Q12H   pantoprazole 40 mg Oral Q PM   polyethylene glycol 17 g Oral Daily   vancomycin 1,750 mg Intravenous Q24H   warfarin 6 mg Oral Daily         dextrose 100 mL/hr Last Rate: 100 mL/hr (01/13/17 0221)   Pharmacy to dose vancomycin         Assessment&#47;Plan     Principal Problem:    Influenza A with respiratory manifestations  Active Problems:    Chronic diastolic (congestive) heart failure    Permanent atrial fibrillation    Acute on chronic respiratory failure    Chronic anticoagulation    Lymphedema    Morbid obesity    Obstructive sleep apnea of adult    Pulmonary HTN    1. Acute on chronic resp failure. On bipap  2. Bibasilar PNA - on abx  3. Flu A  4. Chronic diastolic chf - likely acute decompensation due to pna, weight is down. Continue lasix and give dose of iv today.   5. Severe pulmonary HTN  6. Brendon - on bipap  7. Obesity  8. HTN  9. HLD  10. Chronic a fib - on warfarin but inr high today, will hold.  11. On insulin due to steroids.   12. Chronic lymphedema    cxr shows moderate to large left pleural effusion with infiltrate. Echo pending    Nika Mack MD  01/13/17  7:55 AM       Electronically signed by Nika Mack MD at 1/13/2017  8:10 AM      Jordan Almodovar MD at  "1/13/2017  9:06 AM  Version 1 of 1               [facility]         Dr Jordan Almodovar   LOS: 1 day   Patient Care Team:  Gerardo Williamson MD as PCP - General  Gerardo Williamson MD as PCP - Family Medicine    Chief Complaint: Influenza A with HCAP chronic diastolic CHF    Interval History: Still on BiPAP currently.  More awake more alert and feels better.  IV fluids ongoing.    REVIEW OF SYSTEMS:   Limited due to BiPAP.    Ventilator/Non-Invasive Ventilation Settings     Start     Ordered    01/12/17 1102  . BIPAP; IPAP: 16; EPAP: 8  Until Discontinued     Question Answer Comment   . BIPAP    IPAP 16    EPAP 8        01/12/17 1101    01/12/17 0607  . BIPAP; IPAP: 12; EPAP: 6  Until Discontinued,   Status:  Canceled     Question Answer Comment   . BIPAP    IPAP 12    EPAP 6        01/12/17 0607            Vital Signs  Temp:  [97 °F (36.1 °C)-99 °F (37.2 °C)] 98.3 °F (36.8 °C)  Heart Rate:  [] 72  Resp:  [16-26] 20  BP: ()/(53-99) 115/68    Intake/Output Summary (Last 24 hours) at 01/13/17 0906  Last data filed at 01/13/17 0320   Gross per 24 hour   Intake    350 ml   Output      0 ml   Net    350 ml     Flowsheet Rows         First Filed Value    Admission Height  65\" (165.1 cm) Documented at 01/12/2017 0548    Admission Weight  235 lb (107 kg) Documented at 01/12/2017 0548          Physical Exam:   General Appearance:    on BiPAP tolerating well.     Lungs:     diminished breath sounds rhonchi on the bases.      Heart:    Regular rhythm and normal rate, normal S1 and S2, no            murmur, no gallop, no rub, no click   Chest Wall:    No abnormalities observed   Abdomen:     Normal bowel sounds, no masses, no organomegaly, soft        non-tender, non-distended, no guarding, no rebound                tenderness   Extremities:   Moves all extremities well, 2+ pitting edema edema, no cyanosis, no             redness     Results Review:          Results from last 7 days  Lab Units 01/13/17  0605 " 01/12/17  0557 01/08/17  0934   SODIUM mmol/L 141 146* 135*   POTASSIUM mmol/L 4.1 4.0 3.9   CHLORIDE mmol/L 101 104 101   TOTAL CO2 mmol/L 32.4* 29.4* 21.6*   BUN mg/dL 19 19 18   CREATININE mg/dL 0.88 1.04* 0.96   GLUCOSE mg/dL 206* 98 127*   CALCIUM mg/dL 9.3 9.7 8.6*       Results from last 7 days  Lab Units 01/12/17  2306 01/12/17  1755 01/12/17  1149   TROPONIN T ng/mL <0.010 <0.010 <0.010       Results from last 7 days  Lab Units 01/13/17  0605 01/12/17  0558   WBC 10*3/mm3 3.98* 8.43   HEMOGLOBIN g/dL 10.5* 12.1   HEMATOCRIT % 33.9* 38.7   PLATELETS 10*3/mm3 194 234       Results from last 7 days  Lab Units 01/13/17  0605 01/12/17  1149 01/12/17  0557   INR  3.65* 2.49* 2.43*                   Results from last 7 days  Lab Units 01/12/17  1210   PH, ARTERIAL pH units 7.395   PO2 ART mm Hg 69.3*   PCO2, ARTERIAL mm Hg 58.5*   HCO3 ART mmol/L 35.0*       I reviewed the patient's new clinical results.  I personally viewed and interpreted the patient's CXR        Medication Review:     cholecalciferol 50,000 Units Oral Q30 Days   citalopram 20 mg Oral Daily   docusate sodium 100 mg Oral BID   folic acid 1 mg Oral Daily   furosemide 40 mg Intravenous Once   furosemide 40 mg Oral Daily   furosemide 40 mg Oral BID   gabapentin 300 mg Oral BID   insulin aspart 0-7 Units Subcutaneous 4x Daily AC & at Bedtime   insulin detemir 20 Units Subcutaneous Nightly   ipratropium-albuterol 3 mL Nebulization 4x Daily - RT   melatonin 6 mg Oral Nightly   meropenem 500 mg Intravenous Q8H   methylPREDNISolone sodium succinate 40 mg Intravenous Q8H   metoprolol succinate XL 25 mg Oral Daily   multivitamin with minerals 1 tablet Oral Daily   oseltamivir 75 mg Oral Q12H   pantoprazole 40 mg Oral Q PM   polyethylene glycol 17 g Oral Daily   vancomycin 1,750 mg Intravenous Q24H         dextrose 100 mL/hr Last Rate: Stopped (01/13/17 0849)   Pharmacy to dose vancomycin         ASSESSMENT:   Acute on chronic hypoxemic/hypercapnic  respiratory failure  healthcare associated pneumonia  Possible CHF  Influenza  Recent UTI  History of KIERA  multiple medical problems       PLAN:  Discontinue IV fluids  De-escalate antibiotics based on previous cultures to cefepime and continue vancomycin until cultures are back  Diuresis per cardiology  Wean off BiPAP as tolerated  Diet  Physical therapy  Clinically improved          Jordan Almodovar MD  01/13/17  9:06 AM             Electronically signed by Jordan Almodovar MD at 1/13/2017  9:15 AM           Consult Notes (last 72 hours) (Notes from 1/10/2017  1:08 PM through 1/13/2017  1:08 PM)      Jordan Almodovar MD at 1/12/2017 11:03 AM  Version 1 of 1     Consult Orders:    1. Inpatient Consult to Pulmonology [[id number]] ordered by Trevor Kaur MD at 01/12/17 0939                Group: [facility]         CONSULT NOTE    Patient Identification:  Alexandria Villanueva  81 y.o.  female  1935  [id number]            Requesting physician: dr kaur    Reason for Consultation:  aCute respiratory failure    CC:     History of Present Illness:  81-year-old female recently discharged from the hospital after being treated for UTI and pneumonia.patient currently on BiPAP unable to give any meaningful history.history per chart Patient arrives via EMS from her nursing home for evaluation of worsening dyspnea this evening. Apparently she has a long history of chronic respiratory failure with congestive heart failure complications as well as a recent pneumonia. She was just in the hospital  for some similar symptoms but also a UTI. She has an indwelling PICC line through which she is currently receiving Rocephin daily. The nursing home staff tell us that the patient developed some worsening shortness of breath symptoms as the evening progressed. They gave her the usual nebulized treatments which often resolve her breathing symptoms but this time they did not seem to benefit much.  they also gave her 80 mg of Lasix  orally as they believed she was having some congestive heart failure problems. When her symptoms began to progress to more worrisome level, they called EMS for further assistance. Patient denies any chest pain. She does say she has had a cough that is only minimally productive recently. She is unsure about any fevers. The remainder of the history is difficult to obtain from the patient independently due to her age and clinical condition  Review of Systems:  Review of Systems  Limited due to patient's present condition  Past Medical History:  Past Medical History   Diagnosis Date   • Anemia    • Arthritis    • Chronic diastolic (congestive) heart failure    • DVT (deep venous thrombosis)    • Dyslipidemia    • Gout    • HTN (hypertension)    • Hypertension    • Lymphedema    • Morbid obesity    • Obstructive sleep apnea of adult      untreated   • Permanent atrial fibrillation    • Respiratory failure, acute      hypoxic       Past Surgical History:  Past Surgical History   Procedure Laterality Date   • Hernia repair     • Eye surgery     • Femur fracture surgery Right      closed reduction external fixation        Home Meds:  Prescriptions Prior to Admission   Medication Sig Dispense Refill Last Dose   • cefTRIAXone (ROCEPHIN) 1 G injection Infuse 1 g into a venous catheter Daily for 8 days.  0 1/11/2017 at 0900   • citalopram (CeleXA) 20 MG tablet Take 1 tablet by mouth Daily. (Patient taking differently: Take 20 mg by mouth 3 (Three) Times a Week. MON, WED, FRI)   1/11/2017 at 0900   • docusate sodium (COLACE) 250 MG capsule Take 250 mg by mouth daily.   1/11/2017 at 0900   • folic acid (FOLVITE) 1 MG tablet Take 1 mg by mouth daily.   1/11/2017 at 0900   • furosemide (LASIX) 80 MG tablet Take 40 mg by mouth Daily. Give 80 mg Lasix in the morning; 40 mg Lasix in the afternoon X3 days   1/12/2017 at 0530   • gabapentin (NEURONTIN) 300 MG capsule Take 300 mg by mouth 2 (two) times a day.   1/11/2017 at 2100   •  glucosamine-chondroitin 500-400 MG capsule capsule Take 2 capsules by mouth daily.   1/11/2017 at 0900   • guaiFENesin (MUCINEX) 600 MG 12 hr tablet Take 600 mg by mouth 2 (Two) Times a Day.   1/2/2017 at 0900   • insulin aspart (NovoLOG) 100 UNIT/ML injection Inject 3 Units under the skin 3 (Three) Times a Day With Meals. Hold if blood sugar less than 120  12 1/11/2017 at 1600   • insulin detemir (LEVEMIR) 100 UNIT/ML injection Inject 20 Units under the skin Every Night.  12 1/11/2017 at 2100   • ipratropium-albuterol (DUO-NEB) 0.5-2.5 mg/mL nebulizer Take 3 mL by nebulization 4 (Four) Times a Day. 360 mL  1/12/2017 at 0001   • melatonin 3 MG tablet Take 6 mg by mouth Every Night.   1/11/2017 at 2100   • metoprolol succinate XL (TOPROL-XL) 25 MG 24 hr tablet Take 25 mg by mouth daily.   1/11/2017 at 0900   • multivitamin-minerals (OCUVITE) tablet Take 1 tablet by mouth daily.   1/11/2017 at 0900   • NON FORMULARY 2LNC continuous   1/12/2017 at Unknown time   • pantoprazole (PROTONIX) 40 MG EC tablet Take 40 mg by mouth every evening.   1/11/2017 at 2100   • warfarin (COUMADIN) 6 MG tablet Take 6 mg by mouth Daily. 6 mg every Sun, Mon, Tues, Thrs, Sat  7 mg every Wed, Fri   1/11/2017 at 1600   • acetaminophen (TYLENOL) 325 MG tablet Take 2 tablets by mouth Every 4 (Four) Hours As Needed for mild pain (1-3).  0 1/9/2017 at 1200   • cholecalciferol (VITAMIN D3) 68221 UNITS capsule Take 50,000 Units by mouth Every 30 (Thirty) Days.   Unknown at Unknown time   • furosemide (LASIX) 40 MG tablet Take 1 tablet by mouth 2 (Two) Times a Day.      • HYDROcodone-acetaminophen (NORCO) 5-325 MG per tablet Take 1 tablet by mouth Every 6 (Six) Hours As Needed for moderate pain (4-6).  0 1/11/2017 at 2300   • melatonin 5 MG tablet tablet Take 5 mg by mouth every night.   1/1/2017 at 2100   • polyethylene glycol (MIRALAX) packet Take 17 g by mouth daily.   Unknown at Unknown time   • predniSONE (DELTASONE) 5 MG tablet Take 5 mg by  "mouth 2 (Two) Times a Day.   1/2/2017 at 0900   • simvastatin (ZOCOR) 40 MG tablet Take 40 mg by mouth every night.   1/1/2017 at 2100       Allergies:  Allergies   Allergen Reactions   • Codeine Nausea And Vomiting   • Demerol [Meperidine]      Dizzy reaction   • Propoxyphene      Dizzy reaction       Social History:   Social History     Social History   • Marital status:      Spouse name: N/A   • Number of children: N/A   • Years of education: N/A     Occupational History   • Not on file.     Social History Main Topics   • Smoking status: Never Smoker   • Smokeless tobacco: Not on file   • Alcohol use No   • Drug use: Not on file   • Sexual activity: Not Currently     Other Topics Concern   • Not on file     Social History Narrative       Family History:  Family History   Problem Relation Age of Onset   • Cancer Mother    • Heart disease Father    • Diabetes Father    • COPD Brother    • Heart disease Brother        Physical Exam:  Visit Vitals   • /99 (BP Location: Left arm, Patient Position: Lying)   • Pulse 97   • Temp 99.7 °F (37.6 °C) (Rectal)   • Resp 20   • Ht 65\" (165.1 cm)   • Wt 228 lb 2 oz (103 kg)   • SpO2 96%   • BMI 37.96 kg/m2    Body mass index is 37.96 kg/(m^2). 96% 228 lb 2 oz (103 kg)  Physical Exam  Currently on BiPAP I switched her settings to 16/8 with tidal volumes around 490  Neck is supple no bruit no adenopathy  Chest diminished breath sounds diffuse bilateral wheezing and crackles on the bases  CVS regular rate and rhythm no murmurs or gallop  Abdomen is obese nontender bowel sounds positive  Extremities chronic 2+ edema no sinuses or clubbing  CNS moving all extremities or Prinzide and follows simple commands  No hallucination or delusion or joint deformities or skin rashes  Lab Review:   LABS:  Lab Results   Component Value Date    CALCIUM 9.7 01/12/2017    PHOS 3.2 06/08/2016     Results from last 7 days  Lab Units 01/12/17  0558 01/12/17  0557 01/08/17  0934 " 01/06/17  0551   SODIUM mmol/L  --  146* 135* 145   POTASSIUM mmol/L  --  4.0 3.9 4.5   CHLORIDE mmol/L  --  104 101 109*   TOTAL CO2 mmol/L  --  29.4* 21.6* 25.6   BUN mg/dL  --  19 18 26*   CREATININE mg/dL  --  1.04* 0.96 1.34*   GLUCOSE mg/dL  --  98 127* 95   CALCIUM mg/dL  --  9.7 8.6* 9.6   WBC 10*3/mm3 8.43  --   --  10.31   HEMOGLOBIN g/dL 12.1  --   --  12.0   PLATELETS 10*3/mm3 234  --   --  104*   ALT (SGPT) U/L  --  22 38* 95*   AST (SGOT) U/L  --  19 10 17       Results from last 7 days  Lab Units 01/12/17  0557 01/08/17  1123 01/06/17  0551   INR  2.43* 6.44* 3.16*     Lab Results   Component Value Date    TROPONINT <0.010 01/12/2017     Lab Results   Component Value Date    TSH 1.340 09/27/2016     Estimated Creatinine Clearance: 51.4 mL/min (by C-G formula based on Cr of 1.04).     Imaging: I personally visualized the images of scans/x-rays performed within last 3 days.      Assessment:  Acute on chronic hypoxemic/hypercapnic respiratory failure  healthcare associated pneumonia  Possible CHF  Influenza  Recent UTI  History of KIERA   multiple medical problems    Recommendations:  Continue with BiPAP support BiPAP was adjusted  Empiric antibiotics Merrem and vancomycin  tamiflu for 5 days  De-escalate antibiotics based on cultures  Solu-Medrol  Volume status is difficult to assess.  We'll discontinue IV fluids.  May need diuretics  Continue supportive care  Prognosis guarded    Critical care 35 mins    Jordan Almodovar MD  1/12/2017  11:05 AM    EMR Dragon/Transcription disclaimer:     Much of this encounter note is an electronic transcription/translation of spoken language to printed text. The electronic translation of spoken language may permit erroneous, or at times, nonsensical words or phrases to be inadvertently transcribed; Although I have reviewed the note for such errors, some may still exist.      Electronically signed by Jordan Almodovar MD at 1/12/2017 11:14 AM      Luis Fernando Stevens III, MD at  2017  4:07 PM  Version 1 of 1     Consult Orders:    1. Inpatient Consult to Cardiology [[id number]] ordered by Trevor Diallo MD at 17 1141                    Patient Name: Alexandria Villanueva  :1935  81 y.o.    Date of Admission: 2017  Date of Consultation:  17  Encounter Provider: Luis Fernando Stevens III, MD  Place of Service: [facility] CARDIOLOGY  Referring Provider: Trevor Diallo MD  Patient Care Team:  Gerardo Williamson MD as PCP - General  Gerardo Williamson MD as PCP - Family Medicine      Chief complaint: Acute/chronic respiratory distress, chronic atrial fibrillation, history of chronic diastolic CHF    History of Present Illness:  Ms. Villanueva is an 82 yo patient of Dr. Killian's who was admitted with shortness of breath.  She was recently in the hospital with pneumonia and a UTI.    She has a history of chronic atrial fibrillation.  She has a history of chronic diastolic CHF.  Her most recent echocardiogram showed an ejection fraction 45%.  She had severe pulmonary hypertension.  Calculated pulmonary arterial pressure of 76 mmHg consistent with severe pulmonary hypertension. She has chronic lower extremity edema and has lymphedema.  She has a history of morbid obesity as well as obstructive sleep apnea.  Apparently for day or so she had progressively worsening respiratory distress.  She has had a cough.  It's nonproductive.  At home, as her respiratory distress worsen, they gave her nebulizer but that did not improve her breathing.  They then gave her 80 mg of Lasix IV which likewise did not seem to help.  EMS was then called and brought her to the emergency room.  Currently she is somewhat responsive but confused.  She denies any chest pain.  She denies any sensation of palpitations or heart racing.  She denies chills or fevers.     She has permanent atrial fibrillation; her heart rate has been under appropriate control.    She just had a CT scan  performed:  FINDINGS:  The heart is enlarged. The main pulmonary arteries are dilated  suggesting pulmonary hypertension. The right main PA measures 3.8 cm,  and the left main PA measures 3.4 cm. There is no pericardial effusion.  There is a small right pleural effusion. There is a trace amount of left  pleural fluid. Note is made of multiple potential left breast nodules.  Outpatient evaluation with mammogram is recommended if this has not been  performed recently at an outside facility. There is no adenopathy. There  is Some mucous plugging noted in left lower lobe bronchi. There is  alveolar consolidation in both lower lobes which may simply reflect some  atelectasis. Underlying pneumonia is not excluded the basis of this  exam. Continuation through the upper abdomen reveals diffuse fat  stranding around the pancreas suggesting acute pancreatitis. This is  incompletely visualized. Gallbladder contains numerous stones. There is  soft tissue swelling in the left lateral lower chest wall which may  reflect hematoma. Correlate with physical exam findings. A right arm  approach PICC line has its tip in the SVC in good position.           Impression:         1. These findings are highly suspicious for acute pancreatitis. The  pancreas is incompletely visualized. Correlation with laboratory data  recommended.  2. Soft tissue density in the left lateral lower chest wall may reflect  a hematoma. Correlate with physical exam findings.  3. Cardiomegaly.  4. Enlarged central pulmonary arteries suggesting pulmonary arterial  hypertension.  5. Small right pleural effusion with a trace left effusion. There is  some mucous plugging in the lower lobe bronchi on the left. There is  some alveolar consolidation in both lower lobes. Considerations include  atelectasis and/or pneumonia.  6. Cholelithiasis.  7. Potential left breast nodules. Outpatient evaluation with mammogram  recommended if this is not been performed recently at an  outside  facility.                     Past Medical History   Diagnosis Date   • Anemia    • Arthritis    • Atrial fibrillation    • CHF (congestive heart failure)    • Chronic diastolic (congestive) heart failure    • Chronic kidney disease    • DVT (deep venous thrombosis)    • Dyslipidemia    • Gout    • HTN (hypertension)    • Hypertension    • Lymphedema    • Morbid obesity    • Obstructive sleep apnea of adult      untreated   • Permanent atrial fibrillation    • Respiratory failure, acute      hypoxic       Past Surgical History   Procedure Laterality Date   • Hernia repair     • Eye surgery     • Femur fracture surgery Right      closed reduction external fixation   • Cataract extraction       both eyes 4 years ago   • Fracture surgery           Prior to Admission medications    Medication Sig Start Date End Date Taking? Authorizing Provider   bisacodyl (DULCOLAX) 5 MG EC tablet Insert 10 mg into the rectum Daily As Needed for constipation.   Yes Historical Provider, MD   cefTRIAXone (ROCEPHIN) 1 G injection Infuse 1 g into a venous catheter Daily for 8 days. 1/8/17 1/16/17 Yes Glendy Gonsalez MD   citalopram (CeleXA) 20 MG tablet Take 1 tablet by mouth Daily.  Patient taking differently: Take 20 mg by mouth 3 (Three) Times a Week. MON, WED, FRI 10/2/16  Yes Alice Garcia DO   docusate sodium (COLACE) 250 MG capsule Take 250 mg by mouth daily.   Yes Historical Provider, MD   folic acid (FOLVITE) 1 MG tablet Take 1 mg by mouth daily.   Yes Historical Provider, MD   furosemide (LASIX) 80 MG tablet Take 40 mg by mouth Daily. Give 80 mg Lasix in the morning; 40 mg Lasix in the afternoon X3 days 1/12/17 1/14/17 Yes Historical Provider, MD   gabapentin (NEURONTIN) 300 MG capsule Take 300 mg by mouth 2 (two) times a day.   Yes Historical Provider, MD   glucosamine-chondroitin 500-400 MG capsule capsule Take 2 capsules by mouth daily.   Yes Historical Provider, MD   guaiFENesin (MUCINEX) 600 MG 12  hr tablet Take 600 mg by mouth 2 (Two) Times a Day.   Yes Historical Provider, MD   insulin aspart (NovoLOG) 100 UNIT/ML injection Inject 3 Units under the skin 3 (Three) Times a Day With Meals. Hold if blood sugar less than 120 10/2/16  Yes Alice Garcia DO   insulin detemir (LEVEMIR) 100 UNIT/ML injection Inject 20 Units under the skin Every Night. 10/2/16  Yes Alice Garcia DO   ipratropium-albuterol (DUO-NEB) 0.5-2.5 mg/mL nebulizer Take 3 mL by nebulization 4 (Four) Times a Day. 1/8/17  Yes Glendy Gonsalez MD   melatonin 3 MG tablet Take 6 mg by mouth Every Night.   Yes Historical Provider, MD   metoprolol succinate XL (TOPROL-XL) 25 MG 24 hr tablet Take 25 mg by mouth daily.   Yes Historical Provider, MD   multivitamin-minerals (OCUVITE) tablet Take 1 tablet by mouth daily.   Yes Historical Provider, MD   NON FORMULARY 2LNC continuous   Yes Historical Provider, MD   pantoprazole (PROTONIX) 40 MG EC tablet Take 40 mg by mouth every evening.   Yes Historical Provider, MD   warfarin (COUMADIN) 6 MG tablet Take 6 mg by mouth Daily. 6 mg every Sun, Mon, Tues, Thrs, Sat  7 mg every Wed, Fri   Yes Historical Provider, MD   acetaminophen (TYLENOL) 325 MG tablet Take 2 tablets by mouth Every 4 (Four) Hours As Needed for mild pain (1-3). 10/2/16   Alice Garcia DO   cholecalciferol (VITAMIN D3) 03297 UNITS capsule Take 50,000 Units by mouth Every 30 (Thirty) Days.    Historical Provider, MD   furosemide (LASIX) 40 MG tablet Take 1 tablet by mouth 2 (Two) Times a Day. 1/8/17   Glendy Gonsalez MD   HYDROcodone-acetaminophen (NORCO) 5-325 MG per tablet Take 1 tablet by mouth Every 6 (Six) Hours As Needed for moderate pain (4-6). 1/8/17   Glendy Gonsalez MD   melatonin 5 MG tablet tablet Take 5 mg by mouth every night.    Historical Provider, MD   polyethylene glycol (MIRALAX) packet Take 17 g by mouth daily.    Historical Provider, MD   predniSONE (DELTASONE) 5 MG  tablet Take 5 mg by mouth 2 (Two) Times a Day.    Historical Provider, MD   simvastatin (ZOCOR) 40 MG tablet Take 40 mg by mouth every night.    Historical Provider, MD       Allergies   Allergen Reactions   • Codeine Nausea And Vomiting   • Demerol [Meperidine]      Dizzy reaction   • Propoxyphene      Dizzy reaction       Social History     Social History   • Marital status:      Spouse name: N/A   • Number of children: N/A   • Years of education: N/A     Social History Main Topics   • Smoking status: Never Smoker   • Smokeless tobacco: None   • Alcohol use No   • Drug use: No   • Sexual activity: Not Currently     Other Topics Concern   • None     Social History Narrative       Family History   Problem Relation Age of Onset   • Cancer Mother    • Heart disease Father    • Diabetes Father    • COPD Brother    • Heart disease Brother        REVIEW OF SYSTEMS:   All systems reviewed.  Pertinent positives identified in HPI.  All other systems are negative.      Objective:     Vitals:    01/12/17 1255 01/12/17 1330 01/12/17 1511 01/12/17 1537   BP:  140/90  98/68   BP Location:  Left arm  Left arm   Patient Position:  Lying  Lying   Pulse:  78 82 80   Resp:  20 21 20   Temp:  97 °F (36.1 °C)  98.1 °F (36.7 °C)   TempSrc:  Axillary  Axillary   SpO2: 97% 94% 95% 95%   Weight:       Height:         Body mass index is 37.96 kg/(m^2).    General Appearance:    Ill-appearing   Head:    Normocephalic, without obvious abnormality, atraumatic   Eyes:            Lids and lashes normal, conjunctivae and sclerae normal, no   icterus, no pallor, corneas clear, PERRLA   Ears:    Ears appear intact with no abnormalities noted   Throat:   No oral lesions, no thrush, oral mucosa moist   Neck:   No adenopathy, supple, trachea midline, no thyromegaly, no   carotid bruit, no JVD   Back:     No kyphosis present, no scoliosis present, no skin lesions, erythema or scars, no tenderness to percussion or palpation, range of motion  normal   Lungs:     worse breath sounds with diffuse wheezing     Heart:    irregular rhythm and normal rate, normal S1 and S2, 1/6 HSM, no gallop, no rub, no click   Chest Wall:    No abnormalities observed   Abdomen:     Normal bowel sounds, no masses, no organomegaly, soft        non-tender, non-distended, no guarding, no rebound  tenderness   Extremities:   Moves all extremities well, no cyanosis, no redness, chronic LE edema   Pulses:   Pulses palpable and equal bilaterally. Normal radial, carotid, femoral, dorsalis pedis and posterior tibial pulses bilaterally. Normal abdominal aorta   Skin:  Psychiatric:   No bleeding, bruising or rash    Lethargic   Lab Review:       Results from last 7 days  Lab Units 01/12/17  0557   SODIUM mmol/L 146*   POTASSIUM mmol/L 4.0   CHLORIDE mmol/L 104   TOTAL CO2 mmol/L 29.4*   BUN mg/dL 19   CREATININE mg/dL 1.04*   CALCIUM mg/dL 9.7   BILIRUBIN mg/dL 0.4   ALK PHOS U/L 115   ALT (SGPT) U/L 22   AST (SGOT) U/L 19   GLUCOSE mg/dL 98       Results from last 7 days  Lab Units 01/12/17  1149 01/12/17  0611   TROPONIN T ng/mL <0.010 <0.010       Results from last 7 days  Lab Units 01/12/17  0558   WBC 10*3/mm3 8.43   HEMOGLOBIN g/dL 12.1   HEMATOCRIT % 38.7   PLATELETS 10*3/mm3 234       Results from last 7 days  Lab Units 01/12/17  1149 01/12/17  0557 01/08/17  1123   INR  2.49* 2.43* 6.44*                       I personally viewed and interpreted the patient's EKG/Telemetry data.        Assessment and Plan:       Principal Problem:    Influenza A with respiratory manifestations  Active Problems:    Chronic diastolic (congestive) heart failure    Permanent atrial fibrillation    Acute on chronic respiratory failure    Chronic anticoagulation    Lymphedema    Morbid obesity    Obstructive sleep apnea of adult    Pulmonary HTN    CT scan is felt to indicate severe pulmonary hypertension.  She has a history of severe pulmonary hypertension along with chronic diastolic CHF and  permanent atrial fibrillation.  I will repeat an echocardiogram.  Primary issue at this point appears to be her acute influenza A infection with acute on chronic respiratory failure.    CT scan is also felt to indicate pancreatitis that is acute-amylase and lipase have been ordered but are pending at the time of this dictation.    Luis Fernando Stevens III, MD  01/12/17  4:07 PM                 Electronically signed by Luis Fernando Stevens III, MD at 1/12/2017  4:34 PM        Nutrition Notes (last 72 hours) (Notes from 1/10/2017  1:08 PM through 1/13/2017  1:08 PM)     No notes of this type exist for this encounter.        Physical Therapy Notes (last 72 hours) (Notes from 1/10/2017  1:08 PM through 1/13/2017  1:08 PM)     No notes of this type exist for this encounter.        Occupational Therapy Notes (last 72 hours) (Notes from 1/10/2017  1:08 PM through 1/13/2017  1:08 PM)     No notes of this type exist for this encounter.        Speech Language Pathology Notes (last 72 hours) (Notes from 1/10/2017  1:08 PM through 1/13/2017  1:08 PM)     No notes of this type exist for this encounter.           Respiratory Therapy Notes (last 72 hours) (Notes from 1/10/2017  1:08 PM through 1/13/2017  1:08 PM)      Dorothy Chance, DALTON at 1/13/2017  4:06 AM  Version 1 of 1         Problem: Respiratory Insufficiency (Adult)  Intervention: Provide Oxygenation/Ventilation/Perfusion Support    01/13/17 0405   Positioning   Head Of Bed (HOB) Position HOB at 30-45 degrees   Respiratory Interventions   Airway/Ventilation Management airway patency maintained;pulmonary hygiene promoted                Electronically signed by Dorothy Chance, DALTON at 1/13/2017  4:06 AM

## 2017-01-14 LAB
ANION GAP SERPL CALCULATED.3IONS-SCNC: 7.3 MMOL/L
BASOPHILS # BLD AUTO: 0.01 10*3/MM3 (ref 0–0.2)
BASOPHILS NFR BLD AUTO: 0.2 % (ref 0–2)
BUN BLD-MCNC: 22 MG/DL (ref 8–23)
BUN/CREAT SERPL: 24.2 (ref 7–25)
CALCIUM SPEC-SCNC: 9.4 MG/DL (ref 8.8–10.5)
CHLORIDE SERPL-SCNC: 101 MMOL/L (ref 98–107)
CO2 SERPL-SCNC: 34.7 MMOL/L (ref 22–29)
CREAT BLD-MCNC: 0.91 MG/DL (ref 0.57–1)
DEPRECATED RDW RBC AUTO: 43 FL (ref 37–54)
EOSINOPHIL # BLD AUTO: 0 10*3/MM3 (ref 0.1–0.3)
EOSINOPHIL NFR BLD AUTO: 0 % (ref 0–4)
ERYTHROCYTE [DISTWIDTH] IN BLOOD BY AUTOMATED COUNT: 13.4 % (ref 11.5–14.5)
GFR SERPL CREATININE-BSD FRML MDRD: 59 ML/MIN/1.73
GLUCOSE BLD-MCNC: 149 MG/DL (ref 65–99)
GLUCOSE BLDC GLUCOMTR-MCNC: 143 MG/DL (ref 70–130)
GLUCOSE BLDC GLUCOMTR-MCNC: 216 MG/DL (ref 70–130)
GLUCOSE BLDC GLUCOMTR-MCNC: 242 MG/DL (ref 70–130)
GLUCOSE BLDC GLUCOMTR-MCNC: 277 MG/DL (ref 70–130)
HCT VFR BLD AUTO: 33.3 % (ref 37–47)
HGB BLD-MCNC: 10.6 G/DL (ref 12–16)
IMM GRANULOCYTES # BLD: 0.04 10*3/MM3 (ref 0–0.03)
IMM GRANULOCYTES NFR BLD: 0.8 % (ref 0–0.5)
INR PPP: 5.65 (ref 0.9–1.1)
LYMPHOCYTES # BLD AUTO: 0.53 10*3/MM3 (ref 0.6–4.8)
LYMPHOCYTES NFR BLD AUTO: 11.1 % (ref 20–45)
MCH RBC QN AUTO: 28 PG (ref 27–31)
MCHC RBC AUTO-ENTMCNC: 31.8 G/DL (ref 31–37)
MCV RBC AUTO: 87.9 FL (ref 81–99)
MONOCYTES # BLD AUTO: 0.16 10*3/MM3 (ref 0–1)
MONOCYTES NFR BLD AUTO: 3.3 % (ref 3–8)
NEUTROPHILS # BLD AUTO: 4.04 10*3/MM3 (ref 1.5–8.3)
NEUTROPHILS NFR BLD AUTO: 84.6 % (ref 45–70)
NRBC BLD MANUAL-RTO: 0 /100 WBC (ref 0–0)
NT-PROBNP SERPL-MCNC: 4718 PG/ML (ref 5–450)
PLATELET # BLD AUTO: 239 10*3/MM3 (ref 140–500)
PMV BLD AUTO: 9.5 FL (ref 7.4–10.4)
POTASSIUM BLD-SCNC: 4.1 MMOL/L (ref 3.5–5.2)
PROTHROMBIN TIME: 52.8 SECONDS (ref 12.1–15)
RBC # BLD AUTO: 3.79 10*6/MM3 (ref 4.2–5.4)
SODIUM BLD-SCNC: 143 MMOL/L (ref 136–145)
WBC NRBC COR # BLD: 4.78 10*3/MM3 (ref 4.8–10.8)

## 2017-01-14 PROCEDURE — 97110 THERAPEUTIC EXERCISES: CPT

## 2017-01-14 PROCEDURE — 99232 SBSQ HOSP IP/OBS MODERATE 35: CPT | Performed by: INTERNAL MEDICINE

## 2017-01-14 PROCEDURE — 25010000002 METHYLPREDNISOLONE PER 40 MG: Performed by: INTERNAL MEDICINE

## 2017-01-14 PROCEDURE — 94799 UNLISTED PULMONARY SVC/PX: CPT

## 2017-01-14 PROCEDURE — 25010000002 CEFEPIME PER 500 MG: Performed by: INTERNAL MEDICINE

## 2017-01-14 PROCEDURE — 83880 ASSAY OF NATRIURETIC PEPTIDE: CPT | Performed by: HOSPITALIST

## 2017-01-14 PROCEDURE — 94640 AIRWAY INHALATION TREATMENT: CPT

## 2017-01-14 PROCEDURE — 63710000001 INSULIN ASPART PER 5 UNITS: Performed by: INTERNAL MEDICINE

## 2017-01-14 PROCEDURE — 25010000002 VANCOMYCIN PER 500 MG: Performed by: INTERNAL MEDICINE

## 2017-01-14 PROCEDURE — 87081 CULTURE SCREEN ONLY: CPT | Performed by: INTERNAL MEDICINE

## 2017-01-14 PROCEDURE — 82962 GLUCOSE BLOOD TEST: CPT

## 2017-01-14 PROCEDURE — 85610 PROTHROMBIN TIME: CPT | Performed by: HOSPITALIST

## 2017-01-14 PROCEDURE — 99231 SBSQ HOSP IP/OBS SF/LOW 25: CPT | Performed by: HOSPITALIST

## 2017-01-14 PROCEDURE — 85025 COMPLETE CBC W/AUTO DIFF WBC: CPT | Performed by: HOSPITALIST

## 2017-01-14 PROCEDURE — 80048 BASIC METABOLIC PNL TOTAL CA: CPT | Performed by: INTERNAL MEDICINE

## 2017-01-14 PROCEDURE — 25010000002 VANCOMYCIN 750 MG RECONSTITUTED SOLUTION 1 EACH VIAL: Performed by: INTERNAL MEDICINE

## 2017-01-14 PROCEDURE — 63710000001 INSULIN DETEMIR PER 5 UNITS: Performed by: INTERNAL MEDICINE

## 2017-01-14 RX ORDER — BUMETANIDE 0.25 MG/ML
1 INJECTION INTRAMUSCULAR; INTRAVENOUS ONCE
Status: COMPLETED | OUTPATIENT
Start: 2017-01-14 | End: 2017-01-14

## 2017-01-14 RX ORDER — METHYLPREDNISOLONE SODIUM SUCCINATE 40 MG/ML
20 INJECTION, POWDER, LYOPHILIZED, FOR SOLUTION INTRAMUSCULAR; INTRAVENOUS EVERY 8 HOURS
Status: DISCONTINUED | OUTPATIENT
Start: 2017-01-14 | End: 2017-01-15

## 2017-01-14 RX ADMIN — DOCUSATE SODIUM 100 MG: 100 CAPSULE, LIQUID FILLED ORAL at 17:07

## 2017-01-14 RX ADMIN — OSELTAMIVIR PHOSPHATE 75 MG: 75 CAPSULE ORAL at 08:19

## 2017-01-14 RX ADMIN — Medication 6 MG: at 20:23

## 2017-01-14 RX ADMIN — IPRATROPIUM BROMIDE AND ALBUTEROL SULFATE 3 ML: .5; 3 SOLUTION RESPIRATORY (INHALATION) at 10:56

## 2017-01-14 RX ADMIN — DOCUSATE SODIUM 100 MG: 100 CAPSULE, LIQUID FILLED ORAL at 08:19

## 2017-01-14 RX ADMIN — CEFEPIME 1 G: 1 INJECTION, SOLUTION INTRAVENOUS at 02:37

## 2017-01-14 RX ADMIN — BUMETANIDE 1 MG: 0.25 INJECTION INTRAMUSCULAR; INTRAVENOUS at 11:50

## 2017-01-14 RX ADMIN — INSULIN ASPART 3 UNITS: 100 INJECTION, SOLUTION INTRAVENOUS; SUBCUTANEOUS at 11:51

## 2017-01-14 RX ADMIN — METOPROLOL SUCCINATE 25 MG: 25 TABLET, EXTENDED RELEASE ORAL at 08:19

## 2017-01-14 RX ADMIN — METHYLPREDNISOLONE SODIUM SUCCINATE 40 MG: 40 INJECTION, POWDER, FOR SOLUTION INTRAMUSCULAR; INTRAVENOUS at 11:51

## 2017-01-14 RX ADMIN — PANTOPRAZOLE SODIUM 40 MG: 40 TABLET, DELAYED RELEASE ORAL at 16:22

## 2017-01-14 RX ADMIN — GABAPENTIN 300 MG: 300 CAPSULE ORAL at 08:19

## 2017-01-14 RX ADMIN — MULTIPLE VITAMINS W/ MINERALS TAB 1 TABLET: TAB at 08:19

## 2017-01-14 RX ADMIN — FUROSEMIDE 40 MG: 40 TABLET ORAL at 17:07

## 2017-01-14 RX ADMIN — METHYLPREDNISOLONE SODIUM SUCCINATE 40 MG: 40 INJECTION, POWDER, FOR SOLUTION INTRAMUSCULAR; INTRAVENOUS at 02:45

## 2017-01-14 RX ADMIN — IPRATROPIUM BROMIDE AND ALBUTEROL SULFATE 3 ML: .5; 3 SOLUTION RESPIRATORY (INHALATION) at 15:06

## 2017-01-14 RX ADMIN — CEFEPIME 1 G: 1 INJECTION, SOLUTION INTRAVENOUS at 10:38

## 2017-01-14 RX ADMIN — FOLIC ACID 1 MG: 1 TABLET ORAL at 08:19

## 2017-01-14 RX ADMIN — INSULIN DETEMIR 20 UNITS: 100 INJECTION, SOLUTION SUBCUTANEOUS at 20:24

## 2017-01-14 RX ADMIN — POLYETHYLENE GLYCOL (3350) 17 G: 17 POWDER, FOR SOLUTION ORAL at 08:25

## 2017-01-14 RX ADMIN — METHYLPREDNISOLONE SODIUM SUCCINATE 20 MG: 40 INJECTION, POWDER, FOR SOLUTION INTRAMUSCULAR; INTRAVENOUS at 20:21

## 2017-01-14 RX ADMIN — OSELTAMIVIR PHOSPHATE 75 MG: 75 CAPSULE ORAL at 20:23

## 2017-01-14 RX ADMIN — VANCOMYCIN HYDROCHLORIDE 1750 MG: 1 INJECTION, POWDER, LYOPHILIZED, FOR SOLUTION INTRAVENOUS at 11:51

## 2017-01-14 RX ADMIN — FUROSEMIDE 40 MG: 40 TABLET ORAL at 08:19

## 2017-01-14 RX ADMIN — INSULIN ASPART 3 UNITS: 100 INJECTION, SOLUTION INTRAVENOUS; SUBCUTANEOUS at 17:07

## 2017-01-14 RX ADMIN — INSULIN ASPART 4 UNITS: 100 INJECTION, SOLUTION INTRAVENOUS; SUBCUTANEOUS at 20:24

## 2017-01-14 RX ADMIN — CITALOPRAM HYDROBROMIDE 20 MG: 20 TABLET ORAL at 08:19

## 2017-01-14 RX ADMIN — IPRATROPIUM BROMIDE AND ALBUTEROL SULFATE 3 ML: .5; 3 SOLUTION RESPIRATORY (INHALATION) at 21:09

## 2017-01-14 RX ADMIN — GABAPENTIN 300 MG: 300 CAPSULE ORAL at 17:07

## 2017-01-14 RX ADMIN — IPRATROPIUM BROMIDE AND ALBUTEROL SULFATE 3 ML: .5; 3 SOLUTION RESPIRATORY (INHALATION) at 07:08

## 2017-01-14 NOTE — PLAN OF CARE
Problem: Patient Care Overview (Adult)  Goal: Plan of Care Review    01/14/17 1229   Coping/Psychosocial Response Interventions   Plan Of Care Reviewed With patient   Patient Care Overview   Progress improving   Outcome Evaluation   Outcome Summary/Follow up Plan PT note: Patient was able to sit up on EOB with moderate assist of 2. She tolerated sitting at EOB for 10 minutes with CGA for safety. Increased fatigue/coughing/wheezing/SOB noted with sitting up. Patient continues to fatigue very quickly with activity. Will assess sliding board transfer to w/c as patient tolerates.

## 2017-01-14 NOTE — PROGRESS NOTES
"Hospitalist Team      Patient Care Team:  Gerardo Williamson MD as PCP - General  Gerardo Williamson MD as PCP - Family Medicine        Chief Complaint:  SOA    Subjective    Interval History and ROS:     Patient States Alert and oriented and asking questions.  Able to talk longer without getting SOA  Patient Complaints: Tired and weak  Patient Denies:  Nausea and vomiting  History taken from: patient      Objective    Vital Signs  Temp:  [97.4 °F (36.3 °C)-98.5 °F (36.9 °C)] 97.4 °F (36.3 °C)  Heart Rate:  [64-89] 72  Resp:  [16-20] 16  BP: (101-138)/(62-84) 109/62  Body mass index is 38.11 kg/(m^2).  Pulse oximetry 98% on 4 L NC.    Flowsheet Rows         First Filed Value    Admission Height  65\" (165.1 cm) Documented at 01/12/2017 0548    Admission Weight  235 lb (107 kg) Documented at 01/12/2017 0548          Physical Exam:  Physical Exam   Constitutional: Patient appears well-developed and well-nourished and in no acute distress   HEENT:   Head: Normocephalic and atraumatic.   Eyes:  Pupils are equal, round, and reactive to light. EOM are intact. Sclera are anicteric and non-injected.  Mouth and Throat: Patient has moist mucous membranes. Oropharynx is clear of any erythema or exudate.     Neck: Neck supple. No JVD present. No thyromegaly present. No lymphadenopathy present.  Cardiovascular: Regular rate, regular rhythm, S1 normal and S2 normal.  Exam reveals no gallop and no friction rub.  No murmur heard.  Pulmonary/Chest: Lungs are clear to auscultation bilaterally. No respiratory distress. No wheezes. No rhonchi. No rales.   Abdominal: Soft. Bowel sounds are normal. No distension and no mass. There is no hepatosplenomegaly. There is no tenderness.   Musculoskeletal: Normal Muscle tone  Extremities: No edema. Pulses are palpable in all 4 extremities.  Neurological: Patient is alert and oriented to person, place, and time. Cranial nerves II-XII are grossly intact with no focal deficits.  Skin: Skin is warm. No " rash noted. Nails show no clubbing.  No cyanosis or erythema.         Results Review:     I reviewed the patient's new clinical results.    Lab Results (last 24 hours)     Procedure Component Value Units Date/Time    POC Glucose Fingerstick [[id number]]  (Abnormal) Collected:  01/13/17 1201    Specimen:  Blood Updated:  01/13/17 1207     Glucose 187 (H) mg/dL     Narrative:       Meter: [id number] : [id number] Vinicio Oliva RN    Blood Culture [[id number]]  (Normal) Collected:  01/12/17 1149    Specimen:  Blood from Arm, Right Updated:  01/13/17 1301     Blood Culture No growth at 24 hours     POC Glucose Fingerstick [[id number]]  (Abnormal) Collected:  01/13/17 1716    Specimen:  Blood Updated:  01/13/17 1725     Glucose 247 (H) mg/dL     Narrative:       Meter: [id number] : [id number] Vinicio Oliva RN    POC Glucose Fingerstick [[id number]]  (Abnormal) Collected:  01/13/17 2036    Specimen:  Blood Updated:  01/13/17 2101     Glucose 272 (H) mg/dL     Narrative:       Meter: [id number] : [id number] Davian Bee RN Validator    CBC & Differential [[id number]] Collected:  01/14/17 0535    Specimen:  Blood Updated:  01/14/17 0549    Narrative:       The following orders were created for panel order CBC & Differential.  Procedure                               Abnormality         Status                     ---------                               -----------         ------                     CBC Auto Differential[[id number]]         Abnormal            Final result                 Please view results for these tests on the individual orders.    CBC Auto Differential [[id number]]  (Abnormal) Collected:  01/14/17 0535    Specimen:  Blood Updated:  01/14/17 0549     WBC 4.78 (L) 10*3/mm3      RBC 3.79 (L) 10*6/mm3      Hemoglobin 10.6 (L) g/dL      Hematocrit 33.3 (L) %      MCV 87.9 fL      MCH 28.0 pg      MCHC 31.8 g/dL      RDW 13.4 %      RDW-SD 43.0 fl      MPV 9.5 fL      Platelets 239 10*3/mm3      Neutrophil % 84.6 (H)  %      Lymphocyte % 11.1 (L) %      Monocyte % 3.3 %      Eosinophil % 0.0 %      Basophil % 0.2 %      Immature Grans % 0.8 (H) %      Neutrophils, Absolute 4.04 10*3/mm3      Lymphocytes, Absolute 0.53 (L) 10*3/mm3      Monocytes, Absolute 0.16 10*3/mm3      Eosinophils, Absolute 0.00 (L) 10*3/mm3      Basophils, Absolute 0.01 10*3/mm3      Immature Grans, Absolute 0.04 (H) 10*3/mm3      nRBC 0.0 /100 WBC     Basic Metabolic Panel [[id number]]  (Abnormal) Collected:  01/14/17 0535    Specimen:  Blood Updated:  01/14/17 0602     Glucose 149 (H) mg/dL      BUN 22 mg/dL      Creatinine 0.91 mg/dL      Sodium 143 mmol/L      Potassium 4.1 mmol/L      Chloride 101 mmol/L      CO2 34.7 (H) mmol/L      Calcium 9.4 mg/dL      eGFR Non African Amer 59 (L) mL/min/1.73      BUN/Creatinine Ratio 24.2      Anion Gap 7.3 mmol/L     Narrative:       The MDRD GFR formula is only valid for adults with stable renal function between ages 18 and 70.    BNP [[id number]]  (Abnormal) Collected:  01/14/17 0535    Specimen:  Blood Updated:  01/14/17 0612     proBNP 4718.0 (H) pg/mL     Narrative:       Among patients with dyspnea, NT-proBNP is highly sensitive for the detection of acute congestive heart failure. In addition NT-proBNP of <300 pg/ml effectively rules out acute congestive heart failure with 99% negative predictive value.    Protime-INR [[id number]]  (Abnormal) Collected:  01/14/17 0535    Specimen:  Blood Updated:  01/14/17 0630     Protime 52.8 (H) Seconds      INR 5.65 (C)     Narrative:       Therapeutic Ranges for INR: 2.0-3.0 (PT 20-30)                              2.5-3.5 (PT 25-34)    Blood Culture [[id number]]  (Normal) Collected:  01/12/17 0055    Specimen:  Blood from Arm, Right Updated:  01/14/17 0701     Blood Culture No growth at 2 days     POC Glucose Fingerstick [[id number]]  (Abnormal) Collected:  01/14/17 0736    Specimen:  Blood Updated:  01/14/17 0742     Glucose 143 (H) mg/dL     Narrative:       Meter: [id number]  : [id number] Sherifkimber Schofield          Imaging Results (last 24 hours)     ** No results found for the last 24 hours. **          Xray not reviewed personally by physician.      ECG not reviewed personally by physician  ECG/EMG Results (most recent)     Procedure Component Value Units Date/Time    ECG 12 Lead [[id number]] Collected:  01/12/17 0641     Updated:  01/12/17 1205    Narrative:       RR Interval= 674 ms  MS Interval= 172 ms  QRSD Interval= 66 ms  QT Interval= 368 ms  QTc Interval= 448 ms  Heart Rate= 89 ms  P Axis= 0 deg  QRS Axis= 107 deg  T Wave Axis= 60 deg  I: 40 Axis= 66 deg  T: 40 Axis= 141 deg  ST Axis= 142 deg  ATRIAL FIBRILLATION  POOR R WAVE PROGRESSION  NO SIGNIFICANT CHANGE FROM PREVIOUS ECG  Electronically Signed by:  Luis Fernando Stevens MD 12-Jan-2017 12:04:12  Date and Time of Study: 2017-01-12 06:41:18    Adult Transthoracic Echo Complete [[id number]] Collected:  01/13/17 1356     BSA 2.1 m^2 Updated:  01/13/17 1513     IVSd 0.76 cm      LVIDd 4.5 cm      LVIDs 3.2 cm      LVPWd 0.83 cm      IVS/LVPW 0.92      FS 29.3 %      EDV(Teich) 91.2 ml      ESV(Teich) 39.8 ml      EF(Teich) 56.4 %      EDV(cubed) 89.6 ml      ESV(cubed) 31.6 ml      EF(cubed) 64.7 %      LV mass(C)d 112.4 grams      LV mass(C)dI 53.7 grams/m^2      SV(Teich) 51.5 ml      SI(Teich) 24.6 ml/m^2      SV(cubed) 58.0 ml      SI(cubed) 27.7 ml/m^2      Ao root diam 2.9 cm      Ao root area 6.5 cm^2      ACS 1.6 cm      LA dimension 4.5 cm      LA/Ao 1.6      LVOT diam 1.7 cm      LVOT area 2.2 cm^2      LVOT area(traced) 2.3 cm^2      RVOT diam 2.5 cm      RVOT area 5.0 cm^2      LVLd ap4 7.0 cm      EDV(MOD-sp4) 68.0 ml      LVLs ap4 5.9 cm      ESV(MOD-sp4) 26.0 ml      EF(MOD-sp4) 61.8 %      LVLd ap2 6.9 cm      EDV(MOD-sp2) 59.0 ml      LVLs ap2 6.2 cm      ESV(MOD-sp2) 31.0 ml      EF(MOD-sp2) 47.5 %      SV(MOD-sp4) 42.0 ml      SI(MOD-sp4) 20.0 ml/m^2      SV(MOD-sp2) 28.0 ml      SI(MOD-sp2) 13.4 ml/m^2      Ao  root area (BSA corrected) 1.4      Ao root area (BSA corrected) 47.5 ml/m^2      CONTRAST EF 4CH 61.8 ml/m^2      LV Diastolic corrected for BSA 32.5 ml/m^2      LV Systolic corrected for BSA 12.4 ml/m^2      MV E max koffi 183.9 cm/sec      MV V2 max 226.7 cm/sec      MV max PG 20.6 mmHg      MV V2 mean 116.4 cm/sec      MV mean PG 7.1 mmHg      MV V2 VTI 36.1 cm      MVA(VTI) 1.6 cm^2      MV P1/2t max koffi 184.5 cm/sec      MV P1/2t 68.8 msec      MVA(P1/2t) 3.2 cm^2      MV dec slope 785.9 cm/sec^2      MV dec time 0.22 sec      Ao pk koffi 175.0 cm/sec      Ao max PG 12.1 mmHg      Ao max PG (full) 12.0 mmHg      Ao V2 mean 116.0 cm/sec      Ao mean PG 6.1 mmHg      Ao mean PG (full) 3.1 mmHg      Ao V2 VTI 34.1 cm      LILLIAM(I,A) 1.6 cm^2      LILLIAM(I,D) 1.6 cm^2      LILLIAM(V,A) 1.5 cm^2      LILLIAM(V,D) 1.5 cm^2      LV V1 max PG 5.8 mmHg      LV V1 mean PG 3.0 mmHg      LV V1 max 120.9 cm/sec      LV V1 mean 80.0 cm/sec      LV V1 VTI 25.4 cm      MR max koffi 461.1 cm/sec      MR max PG 85.0 mmHg      SV(Ao) 221.9 ml      SI(Ao) 105.9 ml/m^2      SV(LVOT) 56.1 ml      SV(RVOT) 78.6 ml      SI(LVOT) 26.8 ml/m^2      PA V2 max 134.4 cm/sec      PA max PG 7.2 mmHg      PA max PG (full) 4.1 mmHg      PA V2 mean 83.3 cm/sec      PA mean PG 3.2 mmHg      PA mean PG (full) 1.9 mmHg      PA V2 VTI 22.3 cm      PVA(I,A) 3.5 cm^2      BH CV ECHO MARKUS - PVA(I,D) 3.5 cm^2      BH CV ECHO MARKUS - PVA(V,A) 3.3 cm^2      BH CV ECHO MARKUS - PVA(V,D) 3.3 cm^2      PA acc slope 1483 cm/sec^2      PA acc time 0.09 sec      PI end-d koffi 233.5 cm/sec      RV V1 max PG 3.1 mmHg      RV V1 mean PG 1.3 mmHg      RV V1 max 88.4 cm/sec      RV V1 mean 52.9 cm/sec      RV V1 VTI 15.7 cm      TR max koffi 410 cm/sec      RVSP(TR) 75 mmHg      RAP systole 8.0 mmHg      PA pr(Accel) 37.8 mmHg      Pulm Sys Koffi 32.6 cm/sec      Pulm Farfan Koffi 79.0 cm/sec      Pulm S/D 0.41      Qp/Qs 1.4      MVA P1/2T LCG 1.2 cm^2      BH CV ECHO MARKUS - BZI_BMI 38.1  kilograms/m^2       CV ECHO MARKUS - BSA(HAYCOCK) 2.2 m^2       CV ECHO MARKUS - BZI_METRIC_WEIGHT 103.9 kg       CV ECHO MARKUS - BZI_METRIC_HEIGHT 165.1 cm      TDI S' 12.00 cm/sec      RV Base 4.60 cm      RV Length 7.10 cm      RV Mid 3.30 cm      LA volume 87.0 cm3      E/E' ratio 25.0      LA Volume Index 44.0 mL/m2      Lat Peak E' Koffi 7.0 cm/sec      Med Peak E' Koffi 8.00 cm/sec      TAPSE (>1.6) 1.70 cm2      Echo EF Estimated 62 %     Narrative:       · All left ventricular wall segments contract normally.  · Left ventricular function is normal. Estimated EF = 62%.  · Mild mitral valve regurgitation is present  · Left atrial cavity size is severely dilated.  · Mild to moderate tricuspid valve regurgitation is present.  · Elevated left atrial pressure.       ECG 12 Lead [[id number]] Collected:  01/13/17 0641     Updated:  01/13/17 1734    Narrative:       RR Interval= 759 ms  MT Interval= ms  QRSD Interval= 72 ms  QT Interval= 396 ms  QTc Interval= 455 ms  Heart Rate= 79 ms  P Axis= deg  QRS Axis= 110 deg  T Wave Axis= 68 deg  I: 40 Axis= 85 deg  T: 40 Axis= 152 deg  ST Axis= 15 deg  ATRIAL FIBRILLATION, V-RATE 58-94  RIGHT AXIS DEVIATION  BORDERLINE T ABNORMALITIES, ANT-LAT LEADS  NO SIGNIFICANT CHANGE FROM PREVIOUS ECG  Electronically Signed by:  Luis Fernando Stevens MD 13-Jan-2017 17:32:38  Date and Time of Study: 2017-01-13 06:41:06          Medication Review:   I have reviewed the patient's current medication list    Current Facility-Administered Medications:   •  acetaminophen (TYLENOL) tablet 650 mg, 650 mg, Oral, Q4H PRN, Trevor Diallo MD  •  cefepime (MAXIPIME) IVPB 1 g, 1 g, Intravenous, Q8H, Trevor Diallo MD, 1 g at 01/14/17 0237  •  cholecalciferol (VITAMIN D3) capsule 50,000 Units, 50,000 Units, Oral, Q30 Days, Trevor Diallo MD, 50,000 Units at 01/12/17 1240  •  citalopram (CeleXA) tablet 20 mg, 20 mg, Oral, Daily, Trevor Diallo MD, 20 mg at 01/13/17 0910  •  dextrose (D50W) solution 25 g, 25  g, Intravenous, PRN, Trevor Diallo MD  •  dextrose (GLUTOSE) oral gel 15 g, 15 g, Oral, PRN, Trevor Diallo MD  •  docusate sodium (COLACE) capsule 100 mg, 100 mg, Oral, BID, Trevor Diallo MD, 100 mg at 01/13/17 1803  •  folic acid (FOLVITE) tablet 1 mg, 1 mg, Oral, Daily, Trevor Diallo MD, 1 mg at 01/13/17 0910  •  furosemide (LASIX) tablet 40 mg, 40 mg, Oral, BID, Nika Mack MD, 40 mg at 01/13/17 1804  •  gabapentin (NEURONTIN) capsule 300 mg, 300 mg, Oral, BID, Trevor Diallo MD, 300 mg at 01/13/17 1803  •  glucagon (GLUCAGEN) injection 1 mg, 1 mg, Subcutaneous, Once PRN, Trevor Diallo MD  •  HYDROcodone-acetaminophen (NORCO) 5-325 MG per tablet 1 tablet, 1 tablet, Oral, Q6H PRN, Trevor Diallo MD  •  insulin aspart (novoLOG) injection 0-7 Units, 0-7 Units, Subcutaneous, 4x Daily AC & at Bedtime, Trevor Diallo MD, 4 Units at 01/13/17 2052  •  insulin detemir (LEVEMIR) injection 20 Units, 20 Units, Subcutaneous, Nightly, Trevor Diallo MD, 20 Units at 01/13/17 2052  •  ipratropium-albuterol (DUO-NEB) nebulizer solution 3 mL, 3 mL, Nebulization, 4x Daily - RT, Trevor Diallo MD, 3 mL at 01/14/17 0708  •  melatonin tablet 6 mg, 6 mg, Oral, Nightly, Trevor Diallo MD, 6 mg at 01/13/17 2040  •  methylPREDNISolone sodium succinate (SOLU-Medrol) injection 40 mg, 40 mg, Intravenous, Q8H, Jordan Almodovar MD, 40 mg at 01/14/17 0245  •  metoprolol succinate XL (TOPROL-XL) 24 hr tablet 25 mg, 25 mg, Oral, Daily, Trevor Diallo MD, 25 mg at 01/13/17 0910  •  multivitamin with minerals 1 tablet, 1 tablet, Oral, Daily, Trevor Diallo MD, 1 tablet at 01/13/17 0910  •  oseltamivir (TAMIFLU) capsule 75 mg, 75 mg, Oral, Q12H, Trevor Diallo MD, 75 mg at 01/13/17 2040  •  pantoprazole (PROTONIX) EC tablet 40 mg, 40 mg, Oral, Q PM, Trevor Diallo MD, 40 mg at 01/13/17 1803  •  Pharmacy to dose vancomycin, , Does not apply, Continuous PRN, Jordan Almodovar MD, 0 mg at 01/12/17 1240  •  polyethylene glycol  (MIRALAX) powder 17 g, 17 g, Oral, Daily, Trevor Diallo MD, 17 g at 01/13/17 0910  •  Insert peripheral IV, , , Once **AND** sodium chloride 0.9 % flush 10 mL, 10 mL, Intravenous, PRN, Andrey Cazares MD  •  vancomycin (VANCOCIN) 1,750 mg in sodium chloride 0.9 % 500 mL IVPB, 1,750 mg, Intravenous, Q24H, Trevor Diallo MD, Last Rate: 0 mL/hr at 01/12/17 1556, 1,750 mg at 01/13/17 1204      Assessment/Plan     1) Acute on Chronic Respiratory Failure hypoxic and hypercapnic:  bipap has been on hold       2) Bibasilar Pneumonia Determined to be health care associated by pulmonary consultant.: On  Vancomycin and cefepime. Procalcitonin is 14.71.  WBC 4.78 and Hgb is 10.6      3) Influenza A Positive: On Tamiflu      4) Not On BiPAP Now: Patient is now on oxygen at 4 L NC. She will probably need to go back to nursing home on nocturnal oxygen at least. Will leave bipap in the room at the bedside tonight in case she worsens again.      5) Recent UTI: Was being treated with Inj. Rocephin as an outpatient but it is d/cd      6) Chronic Diastolic CHF: Patient has been on oral Lasix/ bnp is 4718   Echo:  1/13/2017  Interpretation Summary      · All left ventricular wall segments contract normally.  · Left ventricular function is normal. Estimated EF = 62%.  · Mild mitral valve regurgitation is present  · Left atrial cavity size is severely dilated.  · Mild to moderate tricuspid valve regurgitation is present.  · Elevated left atrial pressure.               7) HTN: last BP reading is 142/99. Has been on Metoprolol/ BMP and electrolytes ok      8) Dyslipidemia: Hx noted      9) Obstructive Sleep Apnea: Hx noted      10) Severe Obesity Class 2: Noted. I have discussed testing for sleep apnea and she did not wish to do this in the past. I strongly suspect it would be positive.      11) DVT Prophylaxis: On oral coumadin/ INR was 3.65 yesterday and today is 5.65/ There is no bleeding/ warfarin discontinued/ Will monitor INR        12) Vitamin D deficiency: On replacement     13) Depression: Treating with Celexa     14) Chronic peripheral neuropathy/ Diabetic: On neurontin     15) Gerd: On protonix     16) Immobilization secondary to poorly healed fracture from years ago. 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  I would not do a weekend discharge on this patient.    Alice Garcia DO  01/14/17  7:54 AM      Time: 20 min

## 2017-01-14 NOTE — PROGRESS NOTES
Acute Care - Physical Therapy Treatment Note   Cristel Hernandez     Patient Name: Alexandria Villanueva  : 1935  MRN: [id number]  Today's Date: 2017  Onset of Illness/Injury or Date of Surgery Date: 17  Date of Referral to PT: 17  Referring Physician: Nishi    Admit Date: 2017    Visit Dx:    ICD-10-CM ICD-9-CM   1. Acute on chronic respiratory failure, unspecified whether with hypoxia or hypercapnia J96.20    2. Influenza A J10.1 487.1   3. Congestive heart failure, unspecified congestive heart failure chronicity, unspecified congestive heart failure type I50.9 428.0     Patient Active Problem List   Diagnosis   • Hyperglycemia   • Acute hyperglycemia   • Sepsis due to urinary tract infection   • Chronic diastolic (congestive) heart failure   • Permanent atrial fibrillation   • Acute renal failure   • Acute on chronic respiratory failure   • Influenza A with respiratory manifestations   • Chronic anticoagulation   • Lymphedema   • Morbid obesity   • Obstructive sleep apnea of adult   • Pulmonary HTN               Adult Rehabilitation Note       17 1010          Rehab Assessment/Intervention    Discipline physical therapist  -LN      Document Type therapy note (daily note)  -LN      Subjective Information complains of;weakness;fatigue  -LN      Patient Effort, Rehab Treatment fair  -LN      Symptoms Noted Comment Fatigue/SOB/coughing/wheezing.  -LN      Precautions/Limitations fall precautions  -LN      Specific Treatment Considerations Patient agreed to sit at EOB for a little while.   -LN      Patient Response to Treatment Patient becomes fatigued very quickly.  -LN      Recorded by [LN] Kiya Pozo, PT      Pain Assessment    Pain Assessment No/denies pain   except for soreness left chest muscles after having ECHO.  -LN      Recorded by [LN] Kiya Pozo, PT      Bed Mobility, Assessment/Treatment    Bed Mobility, Roll Left, [place] minimum assist (75% patient  effort);moderate assist (50% patient effort)  -LN      Bed Mobility, Roll Right, [place] minimum assist (75% patient effort);moderate assist (50% patient effort)  -LN      Bed Mob, Supine to Sit, [place] moderate assist (50% patient effort);2 person assist required  -LN      Bed Mob, Sit to Supine, [place] moderate assist (50% patient effort);2 person assist required  -LN      Bed Mobility, Impairments strength decreased  -LN      Bed Mobility, Comment Patient sat at EOB for 10 minutes with CGA for safety.   -LN      Recorded by [LN] Kiya Pozo, PT      Transfer Assessment/Treatment    Transfer, Comment Did not attempt transfer to w/c today; patient became very fatigued after sitting at EOB and increased SOB/coughing/wheezing noted.   -LN      Recorded by [LN] Kiya Pozo, PT      Gait Assessment/Treatment    Gait, Comment deferred - patient is nonambulatory at baseline.  uses w/c for mobility  -LN      Recorded by [LN] Kiya Pozo, PT      Positioning and Restraints    Pre-Treatment Position in bed  -LN      Post Treatment Position bed  -LN      In Bed supine;with nsg;call light within reach;encouraged to call for assist  -LN      Recorded by [LN] Kiya Pozo, PT        User Key  (r) = Recorded By, (t) = Taken By, (c) = Cosigned By    Initials Name Effective Dates    LN Kiya Pozo, PT 06/22/16 -                 IP PT Goals       01/13/17 1316          Transfer Training PT STG    Transfer Training PT STG, Date Established 01/13/17  -LH      Transfer Training PT STG, Time to Achieve 3 days  -      Transfer Training PT STG, Activity Type bed to chair /chair to bed  -      Transfer Training PT STG, [place] Level minimum assist (75% patient effort)  -      Transfer Training PT STG, Assist Device other (see comments)   sliding board transfer to w/c  -        User Key  (r) = Recorded By, (t) = Taken By, (c) = Cosigned By    Initials Name  Provider Type     Tala Wood, PT Physical Therapist          Physical Therapy Education     Title: PT OT SLP Therapies (Done)     Topic: Physical Therapy (Done)     Point: Mobility training (Done)    Learning Progress Summary    Learner Readiness Method Response Comment Documented by Status   Patient Acceptance E VU Education provided on bed mobility and supine to sit and importance of sitting up & moving around as tolerated to help increase her strength and to help her lungs.  01/14/17 1227 Done    Acceptance E VU educated on PT plan of care, need to assess transfers to ensure patient at baseline level of function  01/13/17 1313 Done                      User Key     Initials Effective Dates Name Provider Type Discipline     08/11/15 -  Tala Wood, PT Physical Therapist PT     06/22/16 -  Kiya Pozo, PT Physical Therapist PT                    PT Recommendation and Plan  Anticipated Discharge Disposition: extended care facility  Planned Therapy Interventions: transfer training, patient/family education  PT Frequency: daily, 5 times/wk  Plan of Care Review  Plan Of Care Reviewed With: patient  Progress: improving  Outcome Summary/Follow up Plan: PT note: Patient was able to sit up on EOB with moderate assist of 2. She tolerated sitting at EOB for 10 minutes with CGA for safety. Increased fatigue/coughing/wheezing/SOB noted with sitting up. Patient continues to fatigue very quickly with activity. Will assess sliding board transfer to w/c as patient tolerates.            Outcome Measures       01/14/17 1200 01/13/17 1300       How much help from another person do you currently need...    Turning from your back to your side while in flat bed without using bedrails? 2  -LN 2  -LH     Moving from lying on back to sitting on the side of a flat bed without bedrails? 2  -LN 2  -LH     Moving to and from a bed to a chair (including a wheelchair)? 2  -LN 2  -LH     Standing up from a chair  using your arms (e.g., wheelchair, bedside chair)? 1  -LN 1  -LH     Climbing 3-5 steps with a railing? 1  -LN 1  -LH     To walk in hospital room? 1  -LN 1  -LH     AM-PAC 6 Clicks Score 9  -LN 9  -LH     Functional Assessment    Outcome Measure Options AM-PAC 6 Clicks Basic Mobility (PT)  -LN AM-PAC 6 Clicks Basic Mobility (PT)  -       User Key  (r) = Recorded By, (t) = Taken By, (c) = Cosigned By    Initials Name Provider Type     Tala Wood, PT Physical Therapist    LN Kiya Pozo, PT Physical Therapist           Time Calculation:         PT Charges       01/14/17 1234          Time Calculation    Start Time 1010  -LN      Stop Time 1040  -LN      Time Calculation (min) 30 min  -LN        User Key  (r) = Recorded By, (t) = Taken By, (c) = Cosigned By    Initials Name Provider Type     Kiya Pozo, PT Physical Therapist          Therapy Charges for Today     Code Description Service Date Service Provider Modifiers Qty    29043156473 HC PT THER PROC EA 15 MIN 1/14/2017 Kiya Pozo, PT GP 2    04077122408 HC PT THER SUPP EA 15 MIN 1/14/2017 Kiya Pozo, PT GP 2          PT G-Codes  Outcome Measure Options: AM-PAC 6 Clicks Basic Mobility (PT)    Kiya Pozo, PT  1/14/2017

## 2017-01-14 NOTE — PLAN OF CARE
Problem: Patient Care Overview (Adult)  Goal: Plan of Care Review  Outcome: Ongoing (interventions implemented as appropriate)    01/14/17 0418   Coping/Psychosocial Response Interventions   Plan Of Care Reviewed With patient   Patient Care Overview   Progress improving       Goal: Adult Individualization and Mutuality  Outcome: Ongoing (interventions implemented as appropriate)    Problem: Respiratory Insufficiency (Adult)  Goal: Acid/Base Balance  Outcome: Ongoing (interventions implemented as appropriate)  Goal: Effective Ventilation  Outcome: Ongoing (interventions implemented as appropriate)    Problem: Fall Risk (Adult)  Goal: Absence of Falls  Outcome: Ongoing (interventions implemented as appropriate)

## 2017-01-14 NOTE — PROGRESS NOTES
"[facility] PULMONARY CARE         Dr Ortega Sayied   LOS: 2 days   Patient Care Team:  Gerardo Williamson MD as PCP - General  Gerardo Williamson MD as PCP - Family Medicine    Chief Complaint: Influenza A. chronic diastolic CHF    Interval History:   On oxygen 2 L/m.  She uses 2 L at home as well.  She has not used the BiPAP yesterday.  She has it as needed.    REVIEW OF SYSTEMS:   Cardiovascular: She denies chest pain, palpitation or leg swelling  Gastrointestinal: She denies abdominal pain, diarrhea, vomiting or nausea.      Ventilator/Non-Invasive Ventilation Settings     Start     Ordered    01/12/17 1102  . BIPAP; IPAP: 16; EPAP: 8  Until Discontinued     Question Answer Comment   . BIPAP    IPAP 16    EPAP 8        01/12/17 1101    01/12/17 0607  . BIPAP; IPAP: 12; EPAP: 6  Until Discontinued,   Status:  Canceled     Question Answer Comment   . BIPAP    IPAP 12    EPAP 6        01/12/17 0607            Vital Signs  Temp:  [97.4 °F (36.3 °C)-98.5 °F (36.9 °C)] 98.5 °F (36.9 °C)  Heart Rate:  [64-82] 82  Resp:  [16-20] 16  BP: (101-137)/(62-76) 137/76    Intake/Output Summary (Last 24 hours) at 01/14/17 1814  Last data filed at 01/14/17 1803   Gross per 24 hour   Intake   1800 ml   Output      0 ml   Net   1800 ml     Flowsheet Rows         First Filed Value    Admission Height  65\" (165.1 cm) Documented at 01/12/2017 0548    Admission Weight  235 lb (107 kg) Documented at 01/12/2017 0548          Physical Exam:   General Appearance:     not in acute distress     Lungs:     diminished breath sounds  bilaterally with no audible wheezing or rhonchi     Heart:    Regular rhythm and normal rate, normal S1 and S2, no            murmur, no gallop, no rub, no click   Chest Wall:    No abnormalities observed   Abdomen:     Normal bowel sounds,soft,  non-tender, non-distended.   Extremities:   Moves all extremities well, 2+ pitting edema edema, no cyanosis, no  redness     Results Review:          Results from last 7 " days  Lab Units 01/14/17  0535 01/13/17  0605 01/12/17  0557   SODIUM mmol/L 143 141 146*   POTASSIUM mmol/L 4.1 4.1 4.0   CHLORIDE mmol/L 101 101 104   TOTAL CO2 mmol/L 34.7* 32.4* 29.4*   BUN mg/dL 22 19 19   CREATININE mg/dL 0.91 0.88 1.04*   GLUCOSE mg/dL 149* 206* 98   CALCIUM mg/dL 9.4 9.3 9.7       Results from last 7 days  Lab Units 01/12/17  2306 01/12/17  1755 01/12/17  1149   TROPONIN T ng/mL <0.010 <0.010 <0.010       Results from last 7 days  Lab Units 01/14/17  0535 01/13/17  0605 01/12/17  0558   WBC 10*3/mm3 4.78* 3.98* 8.43   HEMOGLOBIN g/dL 10.6* 10.5* 12.1   HEMATOCRIT % 33.3* 33.9* 38.7   PLATELETS 10*3/mm3 239 194 234       Results from last 7 days  Lab Units 01/14/17  0535 01/13/17  0605 01/12/17  1149   INR  5.65* 3.65* 2.49*                   Results from last 7 days  Lab Units 01/12/17  1210   PH, ARTERIAL pH units 7.395   PO2 ART mm Hg 69.3*   PCO2, ARTERIAL mm Hg 58.5*   HCO3 ART mmol/L 35.0*       I reviewed the patient's new clinical results.  I personally viewed and interpreted the patient's CXR        Medication Review:     [START ON 1/15/2017] cefepime 1 g Intravenous Q8H   cholecalciferol 50,000 Units Oral Q30 Days   citalopram 20 mg Oral Daily   docusate sodium 100 mg Oral BID   folic acid 1 mg Oral Daily   furosemide 40 mg Oral BID   gabapentin 300 mg Oral BID   insulin aspart 0-7 Units Subcutaneous 4x Daily AC & at Bedtime   insulin detemir 20 Units Subcutaneous Nightly   ipratropium-albuterol 3 mL Nebulization 4x Daily - RT   melatonin 6 mg Oral Nightly   methylPREDNISolone sodium succinate 40 mg Intravenous Q8H   metoprolol succinate XL 25 mg Oral Daily   multivitamin with minerals 1 tablet Oral Daily   oseltamivir 75 mg Oral Q12H   pantoprazole 40 mg Oral Q PM   polyethylene glycol 17 g Oral Daily   vancomycin 1,750 mg Intravenous Q24H         Pharmacy to dose vancomycin      Diagnostic data:  I personally and independently reviewed the CT of the chest performed on  1/12/17,  Pulmonary vascular congestion noted.  Moderate right pleural effusion      ASSESSMENT:   1) Acute on chronic hypoxemic/hypercapnic respiratory failure  2) Health care associated pneumonia  3) Likely diastolic CHF with exacerbation, dilated LA on echo  4) Influenza  5) Right pleural effusion with underlying altelectasis  6) pulmonary hypertension, likely group II given the dilated LA on echo   7) Recent EColi UTI  8) History of KIERA  9) Protein calorie malnutrition (Alb=2.4)   10) Hypernatremia         PLAN:  - Will titrate to wean off Steroid (currently on Solumedrol 40 Q8 day 3)  - Agree with diuresis  - Tamiflu day 3/5  - Consider weaning off/discontinuing AB therapy. MAy switch to Levaquin. Doubt pneumonia on imaging. I will check MRSA swab to help de-escalate AB            Prudencio Bianchi MD  01/14/17  6:14 PM

## 2017-01-14 NOTE — PLAN OF CARE
Problem: Patient Care Overview (Adult)  Goal: Plan of Care Review  Outcome: Ongoing (interventions implemented as appropriate)  Goal: Discharge Needs Assessment  Outcome: Ongoing (interventions implemented as appropriate)    Problem: Respiratory Insufficiency (Adult)  Goal: Identify Related Risk Factors and Signs and Symptoms  Outcome: Ongoing (interventions implemented as appropriate)  Goal: Acid/Base Balance  Outcome: Ongoing (interventions implemented as appropriate)  Goal: Effective Ventilation  Outcome: Ongoing (interventions implemented as appropriate)    Problem: Fall Risk (Adult)  Goal: Identify Related Risk Factors and Signs and Symptoms  Outcome: Ongoing (interventions implemented as appropriate)  Goal: Absence of Falls  Outcome: Ongoing (interventions implemented as appropriate)

## 2017-01-14 NOTE — PLAN OF CARE
Problem: Patient Care Overview (Adult)  Goal: Plan of Care Review  Outcome: Ongoing (interventions implemented as appropriate)    01/13/17 2006   Coping/Psychosocial Response Interventions   Plan Of Care Reviewed With patient   Patient Care Overview   Progress improving       Goal: Adult Individualization and Mutuality  Outcome: Ongoing (interventions implemented as appropriate)    Problem: Respiratory Insufficiency (Adult)  Intervention: Provide Oxygenation/Ventilation/Perfusion Support    01/13/17 2006   Positioning   Head Of Bed (HOB) Position HOB elevated   Respiratory Interventions   Airway/Ventilation Management airway patency maintained;pulmonary hygiene promoted   Safety Interventions   Medication Review/Management medications reviewed   Activity   Activity Type activity adjusted per tolerance

## 2017-01-14 NOTE — PROGRESS NOTES
Patient started on IV Vancomycin 1750mg q 24 hours on 1/12/17    Recent Labs (1/14/17):  SCr=0.91  BUN=22  Est CrCl = 59ml/min  WBC= 4.78  Platelets = 239    Random Vancomycin level due to be drawn in AM 1/15.  Pharmacy will continue to monitor.    Thank-you,  Martha Reese [facility].  01/14/17  11:23 AM.

## 2017-01-14 NOTE — PLAN OF CARE
Problem: Respiratory Insufficiency (Adult)  Intervention: Provide Oxygenation/Ventilation/Perfusion Support    01/14/17 1742   Respiratory Interventions   Airway/Ventilation Management pulmonary hygiene promoted  (Encouraged cough and deep breathing.)         Goal: Effective Ventilation  Outcome: Ongoing (interventions implemented as appropriate)    01/14/17 1742   Respiratory Insufficiency (Adult)   Effective Ventilation (Titrating O2 to baseline level as tolerated.)

## 2017-01-14 NOTE — PROGRESS NOTES
LOS: 2 days   Patient Care Team:  Gerardo Williamson MD as PCP - General  Gerardo Williamson MD as PCP - Family Medicine    Chief Complaint:  Pna, flu and pleural effusions     Interval History:   Still a little SOB. Echo yesterday:  Interpretation Summary   · All left ventricular wall segments contract normally.  · Left ventricular function is normal. Estimated EF = 62%.  · Mild mitral valve regurgitation is present  · Left atrial cavity size is severely dilated.  · Mild to moderate tricuspid valve regurgitation is present.  · Elevated left atrial pressure.         Objective   Vital Signs  Temp:  [97.4 °F (36.3 °C)-98.5 °F (36.9 °C)] 97.4 °F (36.3 °C)  Heart Rate:  [64-89] 67  Resp:  [16-20] 19  BP: (101-138)/(62-84) 109/62    Intake/Output Summary (Last 24 hours) at 01/14/17 0651  Last data filed at 01/14/17 0300   Gross per 24 hour   Intake    410 ml   Output      0 ml   Net    410 ml       Comfortable NAD   conjunctiva clear  Neck supple, no JVD or thyromegaly appreciated  S1/S2 irregular no m/r/g  Lungs exp wheezes throughout with decreased bases and mid on left.   Abdomen S/NT/ND (+) BS, no HSM appreciated  Extremities warm, no clubbing, cyanosis, 3+ nonpitting edema, edema also in hands.   No visible or palpable skin lesions  A/Ox4, mood and affect appropriate    Results Review:        Results from last 7 days  Lab Units 01/14/17  0535 01/13/17  0605 01/12/17  0557   SODIUM mmol/L 143 141 146*   POTASSIUM mmol/L 4.1 4.1 4.0   CHLORIDE mmol/L 101 101 104   TOTAL CO2 mmol/L 34.7* 32.4* 29.4*   BUN mg/dL 22 19 19   CREATININE mg/dL 0.91 0.88 1.04*   GLUCOSE mg/dL 149* 206* 98   CALCIUM mg/dL 9.4 9.3 9.7       Results from last 7 days  Lab Units 01/12/17  2306 01/12/17  1755 01/12/17  1149   TROPONIN T ng/mL <0.010 <0.010 <0.010       Results from last 7 days  Lab Units 01/14/17  0535 01/13/17  0605 01/12/17  0558   WBC 10*3/mm3 4.78* 3.98* 8.43   HEMOGLOBIN g/dL 10.6* 10.5* 12.1   HEMATOCRIT % 33.3* 33.9* 38.7    PLATELETS 10*3/mm3 239 194 234       Results from last 7 days  Lab Units 01/14/17  0535 01/13/17  0605 01/12/17  1149   INR  5.65* 3.65* 2.49*                   I reviewed the patient's new clinical results.  I personally viewed and interpreted the patient's EKG/Telemetry data a fib        Medication Review:     cefepime 1 g Intravenous Q8H   cholecalciferol 50,000 Units Oral Q30 Days   citalopram 20 mg Oral Daily   docusate sodium 100 mg Oral BID   folic acid 1 mg Oral Daily   furosemide 40 mg Oral BID   gabapentin 300 mg Oral BID   insulin aspart 0-7 Units Subcutaneous 4x Daily AC & at Bedtime   insulin detemir 20 Units Subcutaneous Nightly   ipratropium-albuterol 3 mL Nebulization 4x Daily - RT   melatonin 6 mg Oral Nightly   methylPREDNISolone sodium succinate 40 mg Intravenous Q8H   metoprolol succinate XL 25 mg Oral Daily   multivitamin with minerals 1 tablet Oral Daily   oseltamivir 75 mg Oral Q12H   pantoprazole 40 mg Oral Q PM   polyethylene glycol 17 g Oral Daily   vancomycin 1,750 mg Intravenous Q24H         Pharmacy to dose vancomycin        Assessment/Plan     Principal Problem:    Influenza A with respiratory manifestations  Active Problems:    Chronic diastolic (congestive) heart failure    Permanent atrial fibrillation    Acute on chronic respiratory failure    Chronic anticoagulation    Lymphedema    Morbid obesity    Obstructive sleep apnea of adult    Pulmonary HTN    1. Acute on chronic resp failure. On bipap  2. Bibasilar PNA - on abx  3. Flu A  4. Chronic diastolic chf - likely secondary acute decompensation due to pna, but improving. On oral diuretics- given dose of IV diuresis yesterday, will repeat today  5. Severe pulmonary HTN  6. Brendon - on bipap  7. Obesity  8. HTN  9. HLD  10. Chronic a fib - on warfarin but inr high today, will hold.  11. On insulin due to steroids.   12. Chronic lymphedema    Will follow prn tomorrow- call if issues.    Luis Fernando Stevens III, MD  01/14/17  6:51 AM

## 2017-01-15 LAB
ANION GAP SERPL CALCULATED.3IONS-SCNC: 7.7 MMOL/L
BASOPHILS # BLD AUTO: 0.01 10*3/MM3 (ref 0–0.2)
BASOPHILS NFR BLD AUTO: 0.2 % (ref 0–2)
BUN BLD-MCNC: 29 MG/DL (ref 8–23)
BUN/CREAT SERPL: 31.2 (ref 7–25)
CALCIUM SPEC-SCNC: 9.4 MG/DL (ref 8.8–10.5)
CHLORIDE SERPL-SCNC: 102 MMOL/L (ref 98–107)
CO2 SERPL-SCNC: 36.3 MMOL/L (ref 22–29)
CREAT BLD-MCNC: 0.93 MG/DL (ref 0.57–1)
DEPRECATED RDW RBC AUTO: 41.9 FL (ref 37–54)
EOSINOPHIL # BLD AUTO: 0 10*3/MM3 (ref 0.1–0.3)
EOSINOPHIL NFR BLD AUTO: 0 % (ref 0–4)
ERYTHROCYTE [DISTWIDTH] IN BLOOD BY AUTOMATED COUNT: 13.2 % (ref 11.5–14.5)
GFR SERPL CREATININE-BSD FRML MDRD: 58 ML/MIN/1.73
GLUCOSE BLD-MCNC: 153 MG/DL (ref 65–99)
GLUCOSE BLDC GLUCOMTR-MCNC: 141 MG/DL (ref 70–130)
GLUCOSE BLDC GLUCOMTR-MCNC: 193 MG/DL (ref 70–130)
GLUCOSE BLDC GLUCOMTR-MCNC: 229 MG/DL (ref 70–130)
GLUCOSE BLDC GLUCOMTR-MCNC: 257 MG/DL (ref 70–130)
HCT VFR BLD AUTO: 35.5 % (ref 37–47)
HGB BLD-MCNC: 11.4 G/DL (ref 12–16)
IMM GRANULOCYTES # BLD: 0.05 10*3/MM3 (ref 0–0.03)
IMM GRANULOCYTES NFR BLD: 0.9 % (ref 0–0.5)
INR PPP: 3.64 (ref 0.9–1.1)
LYMPHOCYTES # BLD AUTO: 0.67 10*3/MM3 (ref 0.6–4.8)
LYMPHOCYTES NFR BLD AUTO: 11.8 % (ref 20–45)
MCH RBC QN AUTO: 28.1 PG (ref 27–31)
MCHC RBC AUTO-ENTMCNC: 32.1 G/DL (ref 31–37)
MCV RBC AUTO: 87.4 FL (ref 81–99)
MONOCYTES # BLD AUTO: 0.27 10*3/MM3 (ref 0–1)
MONOCYTES NFR BLD AUTO: 4.7 % (ref 3–8)
NEUTROPHILS # BLD AUTO: 4.69 10*3/MM3 (ref 1.5–8.3)
NEUTROPHILS NFR BLD AUTO: 82.4 % (ref 45–70)
NRBC BLD MANUAL-RTO: 0 /100 WBC (ref 0–0)
NT-PROBNP SERPL-MCNC: 3939 PG/ML (ref 5–450)
PLATELET # BLD AUTO: 231 10*3/MM3 (ref 140–500)
PMV BLD AUTO: 9.5 FL (ref 7.4–10.4)
POTASSIUM BLD-SCNC: 3.8 MMOL/L (ref 3.5–5.2)
PROTHROMBIN TIME: 37.1 SECONDS (ref 12.1–15)
RBC # BLD AUTO: 4.06 10*6/MM3 (ref 4.2–5.4)
SODIUM BLD-SCNC: 146 MMOL/L (ref 136–145)
VANCOMYCIN SERPL-MCNC: 23 MCG/ML
WBC NRBC COR # BLD: 5.69 10*3/MM3 (ref 4.8–10.8)

## 2017-01-15 PROCEDURE — 25010000002 VANCOMYCIN 750 MG RECONSTITUTED SOLUTION 1 EACH VIAL: Performed by: INTERNAL MEDICINE

## 2017-01-15 PROCEDURE — 25010000002 VANCOMYCIN PER 500 MG: Performed by: INTERNAL MEDICINE

## 2017-01-15 PROCEDURE — 85610 PROTHROMBIN TIME: CPT | Performed by: INTERNAL MEDICINE

## 2017-01-15 PROCEDURE — 63710000001 INSULIN ASPART PER 5 UNITS: Performed by: INTERNAL MEDICINE

## 2017-01-15 PROCEDURE — 94640 AIRWAY INHALATION TREATMENT: CPT

## 2017-01-15 PROCEDURE — 25010000002 METHYLPREDNISOLONE PER 40 MG: Performed by: INTERNAL MEDICINE

## 2017-01-15 PROCEDURE — 25010000002 CEFEPIME PER 500 MG: Performed by: INTERNAL MEDICINE

## 2017-01-15 PROCEDURE — 94799 UNLISTED PULMONARY SVC/PX: CPT

## 2017-01-15 PROCEDURE — 99232 SBSQ HOSP IP/OBS MODERATE 35: CPT | Performed by: INTERNAL MEDICINE

## 2017-01-15 PROCEDURE — 85025 COMPLETE CBC W/AUTO DIFF WBC: CPT | Performed by: HOSPITALIST

## 2017-01-15 PROCEDURE — 83880 ASSAY OF NATRIURETIC PEPTIDE: CPT | Performed by: HOSPITALIST

## 2017-01-15 PROCEDURE — 63710000001 INSULIN DETEMIR PER 5 UNITS: Performed by: INTERNAL MEDICINE

## 2017-01-15 PROCEDURE — 80202 ASSAY OF VANCOMYCIN: CPT | Performed by: INTERNAL MEDICINE

## 2017-01-15 PROCEDURE — 80048 BASIC METABOLIC PNL TOTAL CA: CPT | Performed by: HOSPITALIST

## 2017-01-15 PROCEDURE — 82962 GLUCOSE BLOOD TEST: CPT

## 2017-01-15 RX ORDER — SENNA AND DOCUSATE SODIUM 50; 8.6 MG/1; MG/1
2 TABLET, FILM COATED ORAL 2 TIMES DAILY
Status: DISCONTINUED | OUTPATIENT
Start: 2017-01-15 | End: 2017-01-17 | Stop reason: HOSPADM

## 2017-01-15 RX ORDER — METHYLPREDNISOLONE SODIUM SUCCINATE 40 MG/ML
20 INJECTION, POWDER, LYOPHILIZED, FOR SOLUTION INTRAMUSCULAR; INTRAVENOUS EVERY 12 HOURS
Status: DISCONTINUED | OUTPATIENT
Start: 2017-01-16 | End: 2017-01-17

## 2017-01-15 RX ADMIN — METOPROLOL SUCCINATE 25 MG: 25 TABLET, EXTENDED RELEASE ORAL at 09:29

## 2017-01-15 RX ADMIN — METHYLPREDNISOLONE SODIUM SUCCINATE 20 MG: 40 INJECTION, POWDER, FOR SOLUTION INTRAMUSCULAR; INTRAVENOUS at 12:37

## 2017-01-15 RX ADMIN — INSULIN ASPART 4 UNITS: 100 INJECTION, SOLUTION INTRAVENOUS; SUBCUTANEOUS at 20:35

## 2017-01-15 RX ADMIN — VANCOMYCIN HYDROCHLORIDE 1750 MG: 1 INJECTION, POWDER, LYOPHILIZED, FOR SOLUTION INTRAVENOUS at 13:38

## 2017-01-15 RX ADMIN — METHYLPREDNISOLONE SODIUM SUCCINATE 20 MG: 40 INJECTION, POWDER, FOR SOLUTION INTRAMUSCULAR; INTRAVENOUS at 02:57

## 2017-01-15 RX ADMIN — INSULIN DETEMIR 20 UNITS: 100 INJECTION, SOLUTION SUBCUTANEOUS at 20:35

## 2017-01-15 RX ADMIN — GABAPENTIN 300 MG: 300 CAPSULE ORAL at 18:26

## 2017-01-15 RX ADMIN — INSULIN ASPART 2 UNITS: 100 INJECTION, SOLUTION INTRAVENOUS; SUBCUTANEOUS at 18:25

## 2017-01-15 RX ADMIN — DOCUSATE SODIUM 100 MG: 100 CAPSULE, LIQUID FILLED ORAL at 18:26

## 2017-01-15 RX ADMIN — FOLIC ACID 1 MG: 1 TABLET ORAL at 09:29

## 2017-01-15 RX ADMIN — POLYETHYLENE GLYCOL (3350) 17 G: 17 POWDER, FOR SOLUTION ORAL at 09:30

## 2017-01-15 RX ADMIN — IPRATROPIUM BROMIDE AND ALBUTEROL SULFATE 3 ML: .5; 3 SOLUTION RESPIRATORY (INHALATION) at 07:59

## 2017-01-15 RX ADMIN — CEFEPIME 1 G: 1 INJECTION, POWDER, FOR SOLUTION INTRAMUSCULAR; INTRAVENOUS at 18:28

## 2017-01-15 RX ADMIN — FUROSEMIDE 40 MG: 40 TABLET ORAL at 09:29

## 2017-01-15 RX ADMIN — OSELTAMIVIR PHOSPHATE 75 MG: 75 CAPSULE ORAL at 09:29

## 2017-01-15 RX ADMIN — IPRATROPIUM BROMIDE AND ALBUTEROL SULFATE 3 ML: .5; 3 SOLUTION RESPIRATORY (INHALATION) at 15:34

## 2017-01-15 RX ADMIN — GABAPENTIN 300 MG: 300 CAPSULE ORAL at 09:29

## 2017-01-15 RX ADMIN — IPRATROPIUM BROMIDE AND ALBUTEROL SULFATE 3 ML: .5; 3 SOLUTION RESPIRATORY (INHALATION) at 20:12

## 2017-01-15 RX ADMIN — DOCUSATE SODIUM 100 MG: 100 CAPSULE, LIQUID FILLED ORAL at 09:29

## 2017-01-15 RX ADMIN — FUROSEMIDE 40 MG: 40 TABLET ORAL at 18:27

## 2017-01-15 RX ADMIN — CITALOPRAM HYDROBROMIDE 20 MG: 20 TABLET ORAL at 09:29

## 2017-01-15 RX ADMIN — Medication 6 MG: at 20:35

## 2017-01-15 RX ADMIN — CEFEPIME 1 G: 1 INJECTION, POWDER, FOR SOLUTION INTRAMUSCULAR; INTRAVENOUS at 10:00

## 2017-01-15 RX ADMIN — PANTOPRAZOLE SODIUM 40 MG: 40 TABLET, DELAYED RELEASE ORAL at 18:26

## 2017-01-15 RX ADMIN — IPRATROPIUM BROMIDE AND ALBUTEROL SULFATE 3 ML: .5; 3 SOLUTION RESPIRATORY (INHALATION) at 11:46

## 2017-01-15 RX ADMIN — INSULIN ASPART 3 UNITS: 100 INJECTION, SOLUTION INTRAVENOUS; SUBCUTANEOUS at 11:30

## 2017-01-15 RX ADMIN — OSELTAMIVIR PHOSPHATE 75 MG: 75 CAPSULE ORAL at 20:35

## 2017-01-15 RX ADMIN — MULTIPLE VITAMINS W/ MINERALS TAB 1 TABLET: TAB at 09:29

## 2017-01-15 RX ADMIN — CEFEPIME 1 G: 1 INJECTION, POWDER, FOR SOLUTION INTRAMUSCULAR; INTRAVENOUS at 02:56

## 2017-01-15 NOTE — PLAN OF CARE
Problem: Patient Care Overview (Adult)  Goal: Adult Individualization and Mutuality  Outcome: Ongoing (interventions implemented as appropriate)    Problem: Respiratory Insufficiency (Adult)  Goal: Acid/Base Balance  Outcome: Ongoing (interventions implemented as appropriate)  Goal: Effective Ventilation  Outcome: Ongoing (interventions implemented as appropriate)    Problem: Fall Risk (Adult)  Goal: Absence of Falls  Outcome: Ongoing (interventions implemented as appropriate)

## 2017-01-15 NOTE — PLAN OF CARE
Problem: Patient Care Overview (Adult)  Goal: Plan of Care Review  Outcome: Ongoing (interventions implemented as appropriate)    01/14/17 2113   Coping/Psychosocial Response Interventions   Plan Of Care Reviewed With patient   Patient Care Overview   Progress improving       Goal: Adult Individualization and Mutuality  Outcome: Ongoing (interventions implemented as appropriate)    Problem: Respiratory Insufficiency (Adult)  Intervention: Provide Oxygenation/Ventilation/Perfusion Support    01/14/17 2113   Positioning   Head Of Bed (HOB) Position HOB elevated   Respiratory Interventions   Airway/Ventilation Management airway patency maintained;pulmonary hygiene promoted   Safety Interventions   Medication Review/Management medications reviewed   Activity   Activity Type activity adjusted per tolerance

## 2017-01-15 NOTE — PROGRESS NOTES
1/15/17 PHARMACY VANCOMYCIN CONSULT    Current labs:  Scr = 0.93  BUN = 29  Est Crcl = 58ml/min    Random vancomycin level = 23  Will continue daily dosing and decrease dose to 1500mg.    Pharmacy will continue to monitor.    Thank-you,  Martha Reese [facility].  01/15/17  3:45 PM.

## 2017-01-15 NOTE — PROGRESS NOTES
"[facility] PULMONARY CARE         Dr Ortega Sayied   LOS: 3 days   Patient Care Team:  Gerardo Williamson MD as PCP - General  Gerardo Williamson MD as PCP - Family Medicine    Chief Complaint: Influenza A. chronic diastolic CHF    Interval History:   On oxygen 2 L/m.  She uses 2 L at home as well.  She has not used the BiPAP for several nights.  No dyspnea at rest. She can not ambulate due to old femur fracture with malunion.     REVIEW OF SYSTEMS:   Cardiovascular: She denies chest pain, palpitation or leg swelling  Gastrointestinal: She denies abdominal pain, diarrhea, vomiting or nausea but reported constipation.       Ventilator/Non-Invasive Ventilation Settings     Start     Ordered   None      Vital Signs  Temp:  [96.9 °F (36.1 °C)-98.4 °F (36.9 °C)] 98.4 °F (36.9 °C)  Heart Rate:  [64-80] 72  Resp:  [16-20] 16  BP: (119-148)/(70-85) 132/70    Intake/Output Summary (Last 24 hours) at 01/15/17 1538  Last data filed at 01/15/17 1046   Gross per 24 hour   Intake    680 ml   Output    125 ml   Net    555 ml     Flowsheet Rows         First Filed Value    Admission Height  65\" (165.1 cm) Documented at 01/12/2017 0548    Admission Weight  235 lb (107 kg) Documented at 01/12/2017 0548          Physical Exam:   General Appearance:     not in acute distress     Lungs:     diminished breath sounds  bilaterally with no audible wheezing or rhonchi     Heart:    Regular rhythm and normal rate, normal S1 and S2, no            murmur, no gallop, no rub, no click   Chest Wall:    No abnormalities observed   Abdomen:     Normal bowel sounds,soft,  non-tender, non-distended.   Extremities:   Moves all extremities well, 2+ pitting edema edema, no cyanosis, no  redness     Results Review:          Results from last 7 days  Lab Units 01/15/17  0550 01/14/17  0535 01/13/17  0605   SODIUM mmol/L 146* 143 141   POTASSIUM mmol/L 3.8 4.1 4.1   CHLORIDE mmol/L 102 101 101   TOTAL CO2 mmol/L 36.3* 34.7* 32.4*   BUN mg/dL 29* 22 19 "   CREATININE mg/dL 0.93 0.91 0.88   GLUCOSE mg/dL 153* 149* 206*   CALCIUM mg/dL 9.4 9.4 9.3       Results from last 7 days  Lab Units 01/12/17  2306 01/12/17  1755 01/12/17  1149   TROPONIN T ng/mL <0.010 <0.010 <0.010       Results from last 7 days  Lab Units 01/15/17  0550 01/14/17  0535 01/13/17  0605   WBC 10*3/mm3 5.69 4.78* 3.98*   HEMOGLOBIN g/dL 11.4* 10.6* 10.5*   HEMATOCRIT % 35.5* 33.3* 33.9*   PLATELETS 10*3/mm3 231 239 194       Results from last 7 days  Lab Units 01/15/17  0550 01/14/17  0535 01/13/17  0605   INR  3.64* 5.65* 3.65*                   Results from last 7 days  Lab Units 01/12/17  1210   PH, ARTERIAL pH units 7.395   PO2 ART mm Hg 69.3*   PCO2, ARTERIAL mm Hg 58.5*   HCO3 ART mmol/L 35.0*       I reviewed the patient's new clinical results.  I personally viewed and interpreted the patient's CXR        Medication Review:     cefepime 1 g Intravenous Q8H   cholecalciferol 50,000 Units Oral Q30 Days   citalopram 20 mg Oral Daily   docusate sodium 100 mg Oral BID   folic acid 1 mg Oral Daily   furosemide 40 mg Oral BID   gabapentin 300 mg Oral BID   insulin aspart 0-7 Units Subcutaneous 4x Daily AC & at Bedtime   insulin detemir 20 Units Subcutaneous Nightly   ipratropium-albuterol 3 mL Nebulization 4x Daily - RT   melatonin 6 mg Oral Nightly   methylPREDNISolone sodium succinate 20 mg Intravenous Q8H   metoprolol succinate XL 25 mg Oral Daily   multivitamin with minerals 1 tablet Oral Daily   oseltamivir 75 mg Oral Q12H   pantoprazole 40 mg Oral Q PM   polyethylene glycol 17 g Oral Daily   vancomycin 1,750 mg Intravenous Q24H         Pharmacy to dose vancomycin      Diagnostic data:  I personally and independently reviewed the CT of the chest performed on 1/12/17,  Pulmonary vascular congestion noted.  Moderate right pleural effusion      ASSESSMENT:   1) Acute on chronic hypoxemic/hypercapnic respiratory failure  2) Health care associated pneumonia  3) Likely diastolic CHF with  exacerbation, dilated LA on echo  4) Influenza  5) Right pleural effusion with underlying altelectasis  6) pulmonary hypertension, likely group II given the dilated LA on echo   7) Recent EColi UTI  8) History of KIERA  9) Protein calorie malnutrition (Alb=2.4)   10) Hypernatremia, mild         PLAN:  - Will titrate to wean off Steroid (currently on Solumedrol 20 Q8 day 1)  - Agree with diuresis  - Will check ABG to assess the acid base status. Bicar is increasing and could be related to diuresis  - Needs to increase Protein intake to help with fluid mobilization during diuresis. Might benefit from nutrition consult.   - Tamiflu day 4/5  - Consider weaning off/discontinuing AB therapy. MAy switch to Levaquin. Doubt pneumonia on imaging. MRSA swab is pending.  - Chrisnomaria teresa GLEASON for constipation            Prudencio Bianchi MD  01/15/17  3:38 PM

## 2017-01-16 LAB
ARTERIAL PATENCY WRIST A: POSITIVE
ATMOSPHERIC PRESS: 755 MMHG
BASE EXCESS BLDA CALC-SCNC: 9.3 MMOL/L (ref -2.3–2.3)
BDY SITE: ABNORMAL
GAS FLOW AIRWAY: 2 LPM
GLUCOSE BLDC GLUCOMTR-MCNC: 112 MG/DL (ref 70–130)
GLUCOSE BLDC GLUCOMTR-MCNC: 116 MG/DL (ref 70–130)
GLUCOSE BLDC GLUCOMTR-MCNC: 205 MG/DL (ref 70–130)
GLUCOSE BLDC GLUCOMTR-MCNC: 271 MG/DL (ref 70–130)
HCO3 BLDA-SCNC: 34.5 MMOL/L (ref 22–26)
HGB BLDA-MCNC: 12.5 G/DL (ref 12–18)
INR PPP: 2.95 (ref 0.9–1.1)
MODALITY: ABNORMAL
MRSA SPEC QL CULT: NORMAL
PCO2 BLDA: 49.5 MM HG (ref 35–45)
PH BLDA: 7.46 PH UNITS (ref 7.35–7.45)
PO2 BLDA: 69.1 MM HG (ref 80–100)
PROTHROMBIN TIME: 31.4 SECONDS (ref 12.1–15)
SAO2 % BLDCOA: 94.3 % (ref 91–100)

## 2017-01-16 PROCEDURE — 36600 WITHDRAWAL OF ARTERIAL BLOOD: CPT | Performed by: INTERNAL MEDICINE

## 2017-01-16 PROCEDURE — 94799 UNLISTED PULMONARY SVC/PX: CPT

## 2017-01-16 PROCEDURE — 85610 PROTHROMBIN TIME: CPT | Performed by: INTERNAL MEDICINE

## 2017-01-16 PROCEDURE — 63710000001 INSULIN DETEMIR PER 5 UNITS: Performed by: INTERNAL MEDICINE

## 2017-01-16 PROCEDURE — 82962 GLUCOSE BLOOD TEST: CPT

## 2017-01-16 PROCEDURE — 82803 BLOOD GASES ANY COMBINATION: CPT | Performed by: INTERNAL MEDICINE

## 2017-01-16 PROCEDURE — 97110 THERAPEUTIC EXERCISES: CPT

## 2017-01-16 PROCEDURE — 63710000001 INSULIN ASPART PER 5 UNITS: Performed by: INTERNAL MEDICINE

## 2017-01-16 PROCEDURE — 94640 AIRWAY INHALATION TREATMENT: CPT

## 2017-01-16 PROCEDURE — 99232 SBSQ HOSP IP/OBS MODERATE 35: CPT | Performed by: INTERNAL MEDICINE

## 2017-01-16 PROCEDURE — 25010000002 METHYLPREDNISOLONE PER 40 MG: Performed by: INTERNAL MEDICINE

## 2017-01-16 PROCEDURE — 25010000002 VANCOMYCIN PER 500 MG: Performed by: INTERNAL MEDICINE

## 2017-01-16 PROCEDURE — 25010000002 CEFEPIME PER 500 MG: Performed by: INTERNAL MEDICINE

## 2017-01-16 RX ORDER — ACETAZOLAMIDE 250 MG/1
250 TABLET ORAL ONCE
Status: COMPLETED | OUTPATIENT
Start: 2017-01-16 | End: 2017-01-16

## 2017-01-16 RX ADMIN — OSELTAMIVIR PHOSPHATE 75 MG: 75 CAPSULE ORAL at 08:34

## 2017-01-16 RX ADMIN — GABAPENTIN 300 MG: 300 CAPSULE ORAL at 08:35

## 2017-01-16 RX ADMIN — FOLIC ACID 1 MG: 1 TABLET ORAL at 08:34

## 2017-01-16 RX ADMIN — METOPROLOL SUCCINATE 25 MG: 25 TABLET, EXTENDED RELEASE ORAL at 08:34

## 2017-01-16 RX ADMIN — INSULIN ASPART 4 UNITS: 100 INJECTION, SOLUTION INTRAVENOUS; SUBCUTANEOUS at 20:20

## 2017-01-16 RX ADMIN — VANCOMYCIN HYDROCHLORIDE: 500 INJECTION, POWDER, LYOPHILIZED, FOR SOLUTION INTRAVENOUS at 17:25

## 2017-01-16 RX ADMIN — GABAPENTIN 300 MG: 300 CAPSULE ORAL at 17:21

## 2017-01-16 RX ADMIN — CEFEPIME 1 G: 1 INJECTION, POWDER, FOR SOLUTION INTRAMUSCULAR; INTRAVENOUS at 02:13

## 2017-01-16 RX ADMIN — DOCUSATE SODIUM 100 MG: 100 CAPSULE, LIQUID FILLED ORAL at 17:21

## 2017-01-16 RX ADMIN — METHYLPREDNISOLONE SODIUM SUCCINATE 20 MG: 40 INJECTION, POWDER, FOR SOLUTION INTRAMUSCULAR; INTRAVENOUS at 14:32

## 2017-01-16 RX ADMIN — INSULIN DETEMIR 25 UNITS: 100 INJECTION, SOLUTION SUBCUTANEOUS at 20:20

## 2017-01-16 RX ADMIN — IPRATROPIUM BROMIDE AND ALBUTEROL SULFATE 3 ML: .5; 3 SOLUTION RESPIRATORY (INHALATION) at 11:22

## 2017-01-16 RX ADMIN — INSULIN ASPART 3 UNITS: 100 INJECTION, SOLUTION INTRAVENOUS; SUBCUTANEOUS at 14:32

## 2017-01-16 RX ADMIN — Medication 6 MG: at 20:20

## 2017-01-16 RX ADMIN — IPRATROPIUM BROMIDE AND ALBUTEROL SULFATE 3 ML: .5; 3 SOLUTION RESPIRATORY (INHALATION) at 19:45

## 2017-01-16 RX ADMIN — CEFEPIME 1 G: 1 INJECTION, POWDER, FOR SOLUTION INTRAMUSCULAR; INTRAVENOUS at 18:43

## 2017-01-16 RX ADMIN — PANTOPRAZOLE SODIUM 40 MG: 40 TABLET, DELAYED RELEASE ORAL at 17:21

## 2017-01-16 RX ADMIN — OSELTAMIVIR PHOSPHATE 75 MG: 75 CAPSULE ORAL at 20:20

## 2017-01-16 RX ADMIN — METHYLPREDNISOLONE SODIUM SUCCINATE 20 MG: 40 INJECTION, POWDER, FOR SOLUTION INTRAMUSCULAR; INTRAVENOUS at 00:02

## 2017-01-16 RX ADMIN — CEFEPIME 1 G: 1 INJECTION, POWDER, FOR SOLUTION INTRAMUSCULAR; INTRAVENOUS at 10:30

## 2017-01-16 RX ADMIN — ACETAZOLAMIDE 250 MG: 250 TABLET ORAL at 10:30

## 2017-01-16 RX ADMIN — METHYLPREDNISOLONE SODIUM SUCCINATE 20 MG: 40 INJECTION, POWDER, FOR SOLUTION INTRAMUSCULAR; INTRAVENOUS at 23:46

## 2017-01-16 RX ADMIN — CITALOPRAM HYDROBROMIDE 20 MG: 20 TABLET ORAL at 08:35

## 2017-01-16 RX ADMIN — FUROSEMIDE 40 MG: 40 TABLET ORAL at 08:34

## 2017-01-16 RX ADMIN — FUROSEMIDE 40 MG: 40 TABLET ORAL at 17:21

## 2017-01-16 RX ADMIN — IPRATROPIUM BROMIDE AND ALBUTEROL SULFATE 3 ML: .5; 3 SOLUTION RESPIRATORY (INHALATION) at 16:45

## 2017-01-16 RX ADMIN — MULTIPLE VITAMINS W/ MINERALS TAB 1 TABLET: TAB at 08:34

## 2017-01-16 RX ADMIN — IPRATROPIUM BROMIDE AND ALBUTEROL SULFATE 3 ML: .5; 3 SOLUTION RESPIRATORY (INHALATION) at 08:01

## 2017-01-16 NOTE — PLAN OF CARE
Problem: Patient Care Overview (Adult)  Goal: Plan of Care Review  Outcome: Ongoing (interventions implemented as appropriate)    01/16/17 1130   Coping/Psychosocial Response Interventions   Plan Of Care Reviewed With patient   Patient Care Overview   Progress unable to show any progress toward functional goals   Outcome Evaluation   Outcome Summary/Follow up Plan PT: patient sat edge of bed x 15 minutes today with SBA. She requires assist of 2 for bed mobility. Transfer to w/c deferred due to patient fear/anxiety as well as noted fatigue with sitting edge of bed. Will attempt one more time tomorrow to perform sliding board transfer to w/c. At this time, recommend continued use of jamie lift for safe transfers to/from chair.

## 2017-01-16 NOTE — PROGRESS NOTES
Hospitalist Team    Patient Care Team:  Gerardo Williamson MD as PCP - General  Gerardo Willaimson MD as PCP - Family Medicine      CONSULTATION: cards/pulm    CHIEF COMPLAINT: f/u  SOA, FluA, HCAP, diastolic CHF exacrebation    ADMITTING DIAGNOSES: above    SUBJECTIVE: seen today, doing better she says without cp/n/v/d/c/f/s. SOA improving slowly. Severely debilitated and bedbound with edema. Morbidly obese.    REVIEW OF SYSTEMS: Some 14-point review of systems is negative except what is mentioned in the HPI relative to the current complaint.     PAST MEDICAL HISTORY:   Past Medical History   Diagnosis Date   • Anemia    • Arthritis    • Chronic diastolic (congestive) heart failure    • Chronic kidney disease    • DVT (deep venous thrombosis)    • Dyslipidemia    • Gout    • Hypertension    • Lymphedema    • Morbid obesity    • Obstructive sleep apnea of adult      untreated   • Permanent atrial fibrillation    • Pulmonary HTN    • Respiratory failure, acute      hypoxic       ALLERGIES:   Allergies   Allergen Reactions   • Codeine Nausea And Vomiting   • Demerol [Meperidine]      Dizzy reaction   • Propoxyphene      Dizzy reaction         PAST SURGICAL HISTORY:   Past Surgical History   Procedure Laterality Date   • Hernia repair     • Eye surgery     • Femur fracture surgery Right      closed reduction external fixation   • Cataract extraction       both eyes 4 years ago   • Fracture surgery            FAMILY HISTORY:   Family History   Problem Relation Age of Onset   • Cancer Mother    • Heart disease Father    • Diabetes Father    • COPD Brother    • Heart disease Brother          SOCIAL HISTORY:   Social History     Social History   • Marital status:      Spouse name: N/A   • Number of children: N/A   • Years of education: N/A     Occupational History   • Not on file.     Social History Main Topics   • Smoking status: Never Smoker   • Smokeless tobacco: Not on file   • Alcohol use No   • Drug use: No   •  Sexual activity: Not Currently     Other Topics Concern   • Not on file     Social History Narrative       CURRENT MEDICATIONS:     Current Facility-Administered Medications:   •  acetaminophen (TYLENOL) tablet 650 mg, 650 mg, Oral, Q4H PRN, Trevor Diallo MD  •  cefepime 1 g in 100 mL NS, 1 g, Intravenous, Q8H, Trevor Diallo MD, Last Rate: 0 mL/hr at 01/15/17 1046, 1 g at 01/15/17 1828  •  cholecalciferol (VITAMIN D3) capsule 50,000 Units, 50,000 Units, Oral, Q30 Days, Trevor Diallo MD, 50,000 Units at 01/12/17 1240  •  citalopram (CeleXA) tablet 20 mg, 20 mg, Oral, Daily, Trevor Diallo MD, 20 mg at 01/15/17 0929  •  dextrose (D50W) solution 25 g, 25 g, Intravenous, PRN, Trevor Diallo MD  •  dextrose (GLUTOSE) oral gel 15 g, 15 g, Oral, PRN, Trevor Diallo MD  •  docusate sodium (COLACE) capsule 100 mg, 100 mg, Oral, BID, Trevor Diallo MD, 100 mg at 01/15/17 1826  •  folic acid (FOLVITE) tablet 1 mg, 1 mg, Oral, Daily, Trevor Diallo MD, 1 mg at 01/15/17 0929  •  furosemide (LASIX) tablet 40 mg, 40 mg, Oral, BID, Nika Mack MD, 40 mg at 01/15/17 1827  •  gabapentin (NEURONTIN) capsule 300 mg, 300 mg, Oral, BID, Trevor Diallo MD, 300 mg at 01/15/17 1826  •  glucagon (GLUCAGEN) injection 1 mg, 1 mg, Subcutaneous, Once PRN, Trevor Diallo MD  •  HYDROcodone-acetaminophen (NORCO) 5-325 MG per tablet 1 tablet, 1 tablet, Oral, Q6H PRN, Trevor Diallo MD  •  insulin aspart (novoLOG) injection 0-7 Units, 0-7 Units, Subcutaneous, 4x Daily AC & at Bedtime, Trevor Diallo MD, 4 Units at 01/15/17 2035  •  insulin detemir (LEVEMIR) injection 20 Units, 20 Units, Subcutaneous, Nightly, Trevor Diallo MD, 20 Units at 01/15/17 2035  •  ipratropium-albuterol (DUO-NEB) nebulizer solution 3 mL, 3 mL, Nebulization, 4x Daily - RT, Trevor Diallo MD, 3 mL at 01/15/17 2012  •  melatonin tablet 6 mg, 6 mg, Oral, Nightly, Trevor Diallo MD, 6 mg at 01/15/17 2035  •  [START ON 1/16/2017] methylPREDNISolone  sodium succinate (SOLU-Medrol) injection 20 mg, 20 mg, Intravenous, Q12H, Prudencio Bianchi MD  •  metoprolol succinate XL (TOPROL-XL) 24 hr tablet 25 mg, 25 mg, Oral, Daily, Trevor Diallo MD, 25 mg at 01/15/17 0929  •  multivitamin with minerals 1 tablet, 1 tablet, Oral, Daily, Trevor Diallo MD, 1 tablet at 01/15/17 0929  •  oseltamivir (TAMIFLU) capsule 75 mg, 75 mg, Oral, Q12H, Trevor Diallo MD, 75 mg at 01/15/17 2035  •  pantoprazole (PROTONIX) EC tablet 40 mg, 40 mg, Oral, Q PM, Trevor Diallo MD, 40 mg at 01/15/17 1826  •  Pharmacy to dose vancomycin, , Does not apply, Continuous PRN, Jordan Almodovar MD, 0 mg at 01/12/17 1240  •  polyethylene glycol (MIRALAX) powder 17 g, 17 g, Oral, Daily, Trevor Diallo MD, 17 g at 01/15/17 0930  •  sennosides-docusate sodium (SENOKOT-S) 8.6-50 MG tablet 2 tablet, 2 tablet, Oral, BID, Prudencio Bianchi MD, 2 tablet at 01/15/17 1800  •  Insert peripheral IV, , , Once **AND** sodium chloride 0.9 % flush 10 mL, 10 mL, Intravenous, PRN, Andrey Cazarse MD  •  [START ON 1/16/2017] vancomycin (VANCOCIN) 1,500 mg in sodium chloride 0.9 % 500 mL IVPB, , Intravenous, Q24H, Trevor Diallo MD      DIAGNOSTIC DATA:     I reviewed the patient's new clinical results.    Lab Results (last 24 hours)     Procedure Component Value Units Date/Time    CBC & Differential [[id number]] Collected:  01/15/17 0550    Specimen:  Blood Updated:  01/15/17 0605    Narrative:       The following orders were created for panel order CBC & Differential.  Procedure                               Abnormality         Status                     ---------                               -----------         ------                     CBC Auto Differential[[id number]]         Abnormal            Final result                 Please view results for these tests on the individual orders.    CBC Auto Differential [[id number]]  (Abnormal) Collected:  01/15/17 0550    Specimen:  Blood Updated:  01/15/17 0605     WBC 5.69 10*3/mm3       RBC 4.06 (L) 10*6/mm3      Hemoglobin 11.4 (L) g/dL      Hematocrit 35.5 (L) %      MCV 87.4 fL      MCH 28.1 pg      MCHC 32.1 g/dL      RDW 13.2 %      RDW-SD 41.9 fl      MPV 9.5 fL      Platelets 231 10*3/mm3      Neutrophil % 82.4 (H) %      Lymphocyte % 11.8 (L) %      Monocyte % 4.7 %      Eosinophil % 0.0 %      Basophil % 0.2 %      Immature Grans % 0.9 (H) %      Neutrophils, Absolute 4.69 10*3/mm3      Lymphocytes, Absolute 0.67 10*3/mm3      Monocytes, Absolute 0.27 10*3/mm3      Eosinophils, Absolute 0.00 (L) 10*3/mm3      Basophils, Absolute 0.01 10*3/mm3      Immature Grans, Absolute 0.05 (H) 10*3/mm3      nRBC 0.0 /100 WBC     Basic Metabolic Panel [[id number]]  (Abnormal) Collected:  01/15/17 0550    Specimen:  Blood Updated:  01/15/17 0624     Glucose 153 (H) mg/dL      BUN 29 (H) mg/dL      Creatinine 0.93 mg/dL      Sodium 146 (H) mmol/L      Potassium 3.8 mmol/L      Chloride 102 mmol/L      CO2 36.3 (H) mmol/L      Calcium 9.4 mg/dL      eGFR Non African Amer 58 (L) mL/min/1.73      BUN/Creatinine Ratio 31.2 (H)      Anion Gap 7.7 mmol/L     Narrative:       The MDRD GFR formula is only valid for adults with stable renal function between ages 18 and 70.    Protime-INR [[id number]]  (Abnormal) Collected:  01/15/17 0550    Specimen:  Blood Updated:  01/15/17 0630     Protime 37.1 (H) Seconds      INR 3.64 (H)     Narrative:       Therapeutic Ranges for INR: 2.0-3.0 (PT 20-30)                              2.5-3.5 (PT 25-34)    Blood Culture [[id number]]  (Normal) Collected:  01/12/17 0055    Specimen:  Blood from Arm, Right Updated:  01/15/17 0701     Blood Culture No growth at 3 days     POC Glucose Fingerstick [[id number]]  (Abnormal) Collected:  01/15/17 0746    Specimen:  Blood Updated:  01/15/17 0757     Glucose 141 (H) mg/dL     Narrative:       Meter: [id number] : [id number] Valentin STROUD [[id number]]  (Abnormal) Collected:  01/15/17 0551    Specimen:  Blood Updated:  01/15/17 0956      proBNP 3939.0 (H) pg/mL     Narrative:       Among patients with dyspnea, NT-proBNP is highly sensitive for the detection of acute congestive heart failure. In addition NT-proBNP of <300 pg/ml effectively rules out acute congestive heart failure with 99% negative predictive value.    MRSA Screen [[id number]]  (Normal) Collected:  01/14/17 2017    Specimen:  Swab from Nares Updated:  01/15/17 1140     MRSA SCREEN CX Culture in progress     POC Glucose Fingerstick [[id number]]  (Abnormal) Collected:  01/15/17 1221    Specimen:  Blood Updated:  01/15/17 1227     Glucose 229 (H) mg/dL     Narrative:       Meter: [id number] : [id number] [email or web address]    Blood Culture [[id number]]  (Normal) Collected:  01/12/17 1149    Specimen:  Blood from Arm, Right Updated:  01/15/17 1301     Blood Culture No growth at 3 days     Vancomycin, Random [[id number]] Collected:  01/15/17 1236    Specimen:  Blood Updated:  01/15/17 1335     Vancomycin Random 23.00 mcg/mL     POC Glucose Fingerstick [[id number]]  (Abnormal) Collected:  01/15/17 1659    Specimen:  Blood Updated:  01/15/17 1710     Glucose 193 (H) mg/dL     Narrative:       Meter: [id number] : [id number] [email or web address]    POC Glucose Fingerstick [[id number]]  (Abnormal) Collected:  01/15/17 2021    Specimen:  Blood Updated:  01/15/17 2028     Glucose 257 (H) mg/dL     Narrative:       Meter: [id number] : [id number] [facility]          Imaging Results (last 24 hours)     ** No results found for the last 24 hours. **          Xray not reviewed personally by physician.      ECG reviewed personally by physician  ECG/EMG Results (most recent)     Procedure Component Value Units Date/Time    ECG 12 Lead [[id number]] Collected:  01/12/17 0641     Updated:  01/12/17 1205    Narrative:       RR Interval= 674 ms  HI Interval= 172 ms  QRSD Interval= 66 ms  QT Interval= 368 ms  QTc Interval= 448 ms  Heart Rate= 89 ms  P Axis= 0 deg  QRS Axis= 107 deg  T Wave Axis= 60  deg  I: 40 Axis= 66 deg  T: 40 Axis= 141 deg  ST Axis= 142 deg  ATRIAL FIBRILLATION  POOR R WAVE PROGRESSION  NO SIGNIFICANT CHANGE FROM PREVIOUS ECG  Electronically Signed by:  Luis Fernando Stevens MD 12-Jan-2017 12:04:12  Date and Time of Study: 2017-01-12 06:41:18    Adult Transthoracic Echo Complete [[id number]] Collected:  01/13/17 1356     BSA 2.1 m^2 Updated:  01/13/17 1513     IVSd 0.76 cm      LVIDd 4.5 cm      LVIDs 3.2 cm      LVPWd 0.83 cm      IVS/LVPW 0.92      FS 29.3 %      EDV(Teich) 91.2 ml      ESV(Teich) 39.8 ml      EF(Teich) 56.4 %      EDV(cubed) 89.6 ml      ESV(cubed) 31.6 ml      EF(cubed) 64.7 %      LV mass(C)d 112.4 grams      LV mass(C)dI 53.7 grams/m^2      SV(Teich) 51.5 ml      SI(Teich) 24.6 ml/m^2      SV(cubed) 58.0 ml      SI(cubed) 27.7 ml/m^2      Ao root diam 2.9 cm      Ao root area 6.5 cm^2      ACS 1.6 cm      LA dimension 4.5 cm      LA/Ao 1.6      LVOT diam 1.7 cm      LVOT area 2.2 cm^2      LVOT area(traced) 2.3 cm^2      RVOT diam 2.5 cm      RVOT area 5.0 cm^2      LVLd ap4 7.0 cm      EDV(MOD-sp4) 68.0 ml      LVLs ap4 5.9 cm      ESV(MOD-sp4) 26.0 ml      EF(MOD-sp4) 61.8 %      LVLd ap2 6.9 cm      EDV(MOD-sp2) 59.0 ml      LVLs ap2 6.2 cm      ESV(MOD-sp2) 31.0 ml      EF(MOD-sp2) 47.5 %      SV(MOD-sp4) 42.0 ml      SI(MOD-sp4) 20.0 ml/m^2      SV(MOD-sp2) 28.0 ml      SI(MOD-sp2) 13.4 ml/m^2      Ao root area (BSA corrected) 1.4      Ao root area (BSA corrected) 47.5 ml/m^2      CONTRAST EF 4CH 61.8 ml/m^2      LV Diastolic corrected for BSA 32.5 ml/m^2      LV Systolic corrected for BSA 12.4 ml/m^2      MV E max becky 183.9 cm/sec      MV V2 max 226.7 cm/sec      MV max PG 20.6 mmHg      MV V2 mean 116.4 cm/sec      MV mean PG 7.1 mmHg      MV V2 VTI 36.1 cm      MVA(VTI) 1.6 cm^2      MV P1/2t max becky 184.5 cm/sec      MV P1/2t 68.8 msec      MVA(P1/2t) 3.2 cm^2      MV dec slope 785.9 cm/sec^2      MV dec time 0.22 sec      Ao pk becky 175.0 cm/sec      Ao max PG 12.1  mmHg      Ao max PG (full) 12.0 mmHg      Ao V2 mean 116.0 cm/sec      Ao mean PG 6.1 mmHg      Ao mean PG (full) 3.1 mmHg      Ao V2 VTI 34.1 cm      LILLIAM(I,A) 1.6 cm^2      LILLIAM(I,D) 1.6 cm^2      LILLIAM(V,A) 1.5 cm^2      LILILAM(V,D) 1.5 cm^2      LV V1 max PG 5.8 mmHg      LV V1 mean PG 3.0 mmHg      LV V1 max 120.9 cm/sec      LV V1 mean 80.0 cm/sec      LV V1 VTI 25.4 cm      MR max koffi 461.1 cm/sec      MR max PG 85.0 mmHg      SV(Ao) 221.9 ml      SI(Ao) 105.9 ml/m^2      SV(LVOT) 56.1 ml      SV(RVOT) 78.6 ml      SI(LVOT) 26.8 ml/m^2      PA V2 max 134.4 cm/sec      PA max PG 7.2 mmHg      PA max PG (full) 4.1 mmHg      PA V2 mean 83.3 cm/sec      PA mean PG 3.2 mmHg      PA mean PG (full) 1.9 mmHg      PA V2 VTI 22.3 cm      PVA(I,A) 3.5 cm^2       CV ECHO MARKUS - PVA(I,D) 3.5 cm^2       CV ECHO MARKUS - PVA(V,A) 3.3 cm^2       CV ECHO MARKUS - PVA(V,D) 3.3 cm^2      PA acc slope 1483 cm/sec^2      PA acc time 0.09 sec      PI end-d koffi 233.5 cm/sec      RV V1 max PG 3.1 mmHg      RV V1 mean PG 1.3 mmHg      RV V1 max 88.4 cm/sec      RV V1 mean 52.9 cm/sec      RV V1 VTI 15.7 cm      TR max koffi 410 cm/sec      RVSP(TR) 75 mmHg      RAP systole 8.0 mmHg      PA pr(Accel) 37.8 mmHg      Pulm Sys Koffi 32.6 cm/sec      Pulm Farfan Koffi 79.0 cm/sec      Pulm S/D 0.41      Qp/Qs 1.4      MVA P1/2T LCG 1.2 cm^2       CV ECHO MARKUS - BZI_BMI 38.1 kilograms/m^2       CV ECHO MARKUS - BSA(HAYCOCK) 2.2 m^2      BH CV ECHO MARKUS - BZI_METRIC_WEIGHT 103.9 kg      BH CV ECHO MARKUS - BZI_METRIC_HEIGHT 165.1 cm      TDI S' 12.00 cm/sec      RV Base 4.60 cm      RV Length 7.10 cm      RV Mid 3.30 cm      LA volume 87.0 cm3      E/E' ratio 25.0      LA Volume Index 44.0 mL/m2      Lat Peak E' Koffi 7.0 cm/sec      Med Peak E' Koffi 8.00 cm/sec      TAPSE (>1.6) 1.70 cm2      Echo EF Estimated 62 %     Narrative:       · All left ventricular wall segments contract normally.  · Left ventricular function is normal. Estimated EF = 62%.  ·  "Mild mitral valve regurgitation is present  · Left atrial cavity size is severely dilated.  · Mild to moderate tricuspid valve regurgitation is present.  · Elevated left atrial pressure.       ECG 12 Lead [[id number]] Collected:  01/13/17 0641     Updated:  01/13/17 1734    Narrative:       RR Interval= 759 ms  MN Interval= ms  QRSD Interval= 72 ms  QT Interval= 396 ms  QTc Interval= 455 ms  Heart Rate= 79 ms  P Axis= deg  QRS Axis= 110 deg  T Wave Axis= 68 deg  I: 40 Axis= 85 deg  T: 40 Axis= 152 deg  ST Axis= 15 deg  ATRIAL FIBRILLATION, V-RATE 58-94  RIGHT AXIS DEVIATION  BORDERLINE T ABNORMALITIES, ANT-LAT LEADS  NO SIGNIFICANT CHANGE FROM PREVIOUS ECG  Electronically Signed by:  Luis Fernando Stevens MD 13-Jan-2017 17:32:38  Date and Time of Study: 2017-01-13 06:41:06            PHYSICAL EXAMINATION:  VITAL SIGNS: Temp:  [96.9 °F (36.1 °C)-98.4 °F (36.9 °C)] 97.7 °F (36.5 °C)  Heart Rate:  [64-88] 82  Resp:  [16-20] 18  BP: (119-148)/(70-85) 124/71   Flowsheet Rows         First Filed Value    Admission Height  65\" (165.1 cm) Documented at 01/12/2017 0548    Admission Weight  235 lb (107 kg) Documented at 01/12/2017 0548        GENERAL: Alert and oriented x3, afebrile. Vital signs stable, appeared comfortable, nondistressed, and appears stated age. Generalized weakness. Responds to questions appropriately.   HEENT: Normocephalic, atraumatic. Pupils equal, round and reactive to light. Extraocular movements intact bilaterally. Trachea midline.  Mucous membranes are moist.   NECK: Supple. No JVD. Trachea midline, no LAD  CHEST: Clear to auscultation bilaterally anteriorly; no rale, rhonchi, or wheezes  HEART: Regular rate and rhythm. No rubs, murmurs or gallops.   ABDOMEN: Soft, nontender, nondistended. Bowel sounds are otherwise positive.   EXTREMITIES: No clubbing, cyanosis, or edema. Moves all extremities  SKIN: Warm and dry. No ecchymoses, petechiae or rashes.   MUSCULOSKELETAL: No bony abnormalities. Range of motion " normal. No crepitus. No joint swelling or erythema.   NEUROLOGIC: Cranial nerves II-XII intact bilaterally. No focal neurologic deficits. Mini-Mental status exam was deferred.   LYMPHATICS: No lymphadenopathy.     ASSESSMENT AND PLAN:      1) Acute on Chronic Respiratory Failure hypoxic and hypercapnic: Patient is off BiPAP on NC, cont supportive care for FluA, tamiflu, duonebs, pulm titrating down steroids.      2) Bibasilar Pneumonia Determined to be health care associated by pulmonary consultant.: On  Vancomycin and cefepime. Cont.      3) Influenza A Positive: On Tamiflu as above      4) CRespF: Patient is now on oxygen at 4 L NC. She will probably need to go back to nursing home on nocturnal oxygen at least      5) Recent UTI: Was being treated with Inj. Rocephin as an outpatient but it is d/cd, cont cefipime which will cover as well.      6) Chronic Diastolic CHF: Patient has been on oral Lasix, needs further volume off. Single dose extra IV lasix in AM.       7) HTN: last BP reading is 130s/70s. Has been on Metoprolol XL, cont      8) Dyslipidemia: Hx noted      9) Obstructive Sleep Apnea: Hx noted      10) Morbid Obesity: Noted. I have discussed testing for sleep apnea and she did not wish to do this in the past. I strongly suspect it would be positive.      11) DVT Prophylaxis: On oral coumadin/ INR is 3.65 2 days ago/ warfarin discontinued/ Will monitor INR, still > 3, cont to hold      12) Vitamin D deficiency: On replacement     13) Depression: Treating with Celexa     14) Chronic peripheral neuropathy/ Diabetic: On neurontin     15) Gerd: On protonix     16) Immobilization secondary to poorly healed fracture from years ago. She refused to have surgery done secondary to risk of the surgery     17) Diabetes Mellitus: increased Levemir 25U QHS and SSI, monitoring blood sugars, AC/HS 200s     18) Essential Hypertension: On metoprolol, controlled     19) AECOPD: tapering steroids per pulm.     20)  Sleeplessness: Using melatonin     21) Probably acute CHF: BNP is 7334: On lasix BID. Neg vol status daily and watch creat closely. Replace K as needed.         Godwin Price MD  01/15/17  9:29 PM      Time: 35min

## 2017-01-16 NOTE — PROGRESS NOTES
"[facility] PULMONARY CARE         Dr Prudencio Bianchi   LOS: 4 days   Patient Care Team:  Gerardo Williamson MD as PCP - General  Gerardo Williamson MD as PCP - Family Medicine    Chief Complaint: Influenza A. chronic diastolic CHF    Interval History:   On oxygen 2 L/m.  She uses 2 L at home as well.  She has not used the BiPAP for several nights.  No dyspnea at rest. She can not ambulate due to old femur fracture with malunion. She has a large BM yesterday after the laxative and with mobilization in bed.     REVIEW OF SYSTEMS:   Cardiovascular: She denies chest pain, palpitation or leg swelling  Gastrointestinal: She denies abdominal pain, diarrhea, vomiting or nausea.      Ventilator/Non-Invasive Ventilation Settings     Start     Ordered   None      Vital Signs  Temp:  [97.6 °F (36.4 °C)-98.4 °F (36.9 °C)] 97.8 °F (36.6 °C)  Heart Rate:  [57-88] 81  Resp:  [16-20] 20  BP: (106-148)/(61-87) 129/73    Intake/Output Summary (Last 24 hours) at 01/16/17 0906  Last data filed at 01/16/17 0644   Gross per 24 hour   Intake    820 ml   Output    125 ml   Net    695 ml     Flowsheet Rows         First Filed Value    Admission Height  65\" (165.1 cm) Documented at 01/12/2017 0548    Admission Weight  235 lb (107 kg) Documented at 01/12/2017 0548          Physical Exam:   General Appearance:     not in acute distress     Lungs:     diminished breath sounds  bilaterally with no audible wheezing or rhonchi     Heart:    Regular rhythm and normal rate, normal S1 and S2, no            murmur, no gallop, no rub, no click   Chest Wall:    No abnormalities observed   Abdomen:     Normal bowel sounds,soft,  non-tender, non-distended.   Extremities:   Moves all extremities well, 1+ pitting edema edema, no cyanosis, no  redness     Results Review:          Results from last 7 days  Lab Units 01/15/17  0550 01/14/17  0535 01/13/17  0605   SODIUM mmol/L 146* 143 141   POTASSIUM mmol/L 3.8 4.1 4.1   CHLORIDE mmol/L 102 101 101   TOTAL CO2 " mmol/L 36.3* 34.7* 32.4*   BUN mg/dL 29* 22 19   CREATININE mg/dL 0.93 0.91 0.88   GLUCOSE mg/dL 153* 149* 206*   CALCIUM mg/dL 9.4 9.4 9.3       Results from last 7 days  Lab Units 01/12/17  2306 01/12/17  1755 01/12/17  1149   TROPONIN T ng/mL <0.010 <0.010 <0.010       Results from last 7 days  Lab Units 01/15/17  0550 01/14/17  0535 01/13/17  0605   WBC 10*3/mm3 5.69 4.78* 3.98*   HEMOGLOBIN g/dL 11.4* 10.6* 10.5*   HEMATOCRIT % 35.5* 33.3* 33.9*   PLATELETS 10*3/mm3 231 239 194       Results from last 7 days  Lab Units 01/16/17  0319 01/15/17  0550 01/14/17  0535   INR  2.95* 3.64* 5.65*                   Results from last 7 days  Lab Units 01/16/17  0552   PH, ARTERIAL pH units 7.461*   PO2 ART mm Hg 69.1*   PCO2, ARTERIAL mm Hg 49.5*   HCO3 ART mmol/L 34.5*       I reviewed the patient's new clinical results.  I personally viewed and interpreted the patient's CXR        Medication Review:     cefepime 1 g Intravenous Q8H   cholecalciferol 50,000 Units Oral Q30 Days   citalopram 20 mg Oral Daily   docusate sodium 100 mg Oral BID   folic acid 1 mg Oral Daily   furosemide 40 mg Oral BID   gabapentin 300 mg Oral BID   insulin aspart 0-7 Units Subcutaneous 4x Daily AC & at Bedtime   insulin detemir 25 Units Subcutaneous Nightly   ipratropium-albuterol 3 mL Nebulization 4x Daily - RT   melatonin 6 mg Oral Nightly   methylPREDNISolone sodium succinate 20 mg Intravenous Q12H   metoprolol succinate XL 25 mg Oral Daily   multivitamin with minerals 1 tablet Oral Daily   oseltamivir 75 mg Oral Q12H   pantoprazole 40 mg Oral Q PM   polyethylene glycol 17 g Oral Daily   sennosides-docusate sodium 2 tablet Oral BID   IVPB builder  Intravenous Q24H         Pharmacy to dose vancomycin      Diagnostic data:  I personally and independently reviewed the CT of the chest performed on 1/12/17,  Pulmonary vascular congestion noted.  Moderate right pleural effusion      ASSESSMENT:   1) Acute on chronic hypoxemic/hypercapnic  respiratory failure  2) Health care associated pneumonia  3) Likely diastolic CHF with exacerbation, dilated LA on echo  4) Influenza  5) Right pleural effusion with underlying altelectasis  6) pulmonary hypertension, likely group II given the dilated LA on echo   7) Metabolic alcalosis  8) Protein calorie malnutrition (Alb=2.4)     Other:  9) Recent EColi UTI  10) History of KIERA  11) Hypernatremia, mild         PLAN:  - Will titrate to wean off Steroid. Reduced to Solumedrol 20 BID (Day 1)  - Agree with intermittent diuresis to allow volume re-equilibration  - Will give Diamox dose x 1 given her met alcalosis  - Needs to increase Protein intake to help with fluid mobilization during diuresis. Might benefit from nutrition consult.   - Tamiflu day 5/5  - Consider weaning off/discontinuing AB therapy. MAy switch to Levaquin. Doubt pneumonia on imaging. MRSA swab is pending.            Prudencio Bianchi MD  01/16/17  9:06 AM

## 2017-01-16 NOTE — PLAN OF CARE
Problem: Patient Care Overview (Adult)  Goal: Plan of Care Review  Outcome: Ongoing (interventions implemented as appropriate)    01/15/17 2017   Coping/Psychosocial Response Interventions   Plan Of Care Reviewed With patient   Patient Care Overview   Progress improving       Goal: Adult Individualization and Mutuality  Outcome: Ongoing (interventions implemented as appropriate)    Problem: Respiratory Insufficiency (Adult)  Intervention: Provide Oxygenation/Ventilation/Perfusion Support    01/15/17 2017   Positioning   Head Of Bed (HOB) Position HOB elevated   Respiratory Interventions   Airway/Ventilation Management airway patency maintained;pulmonary hygiene promoted   Safety Interventions   Medication Review/Management medications reviewed   Activity   Activity Type activity adjusted per tolerance

## 2017-01-16 NOTE — PLAN OF CARE
Problem: Patient Care Overview (Adult)  Goal: Plan of Care Review  Outcome: Ongoing (interventions implemented as appropriate)    01/16/17 0237   Coping/Psychosocial Response Interventions   Plan Of Care Reviewed With patient   Patient Care Overview   Progress progress toward functional goals as expected   Outcome Evaluation   Outcome Summary/Follow up Plan VSS; pt remains in droplet isolation on tamiflu with IV antibiotics; no c/o pain       Goal: Adult Individualization and Mutuality  Outcome: Ongoing (interventions implemented as appropriate)  Goal: Discharge Needs Assessment  Outcome: Ongoing (interventions implemented as appropriate)    Problem: Respiratory Insufficiency (Adult)  Goal: Identify Related Risk Factors and Signs and Symptoms  Outcome: Outcome(s) achieved Date Met:  01/16/17 01/16/17 0237   Respiratory Insufficiency   Related Risk Factors (Respiratory Insufficiency) activity intolerance;bedrest;obesity   Signs and Symptoms (Respiratory Insufficiency) abnormal breath sounds;cough (productive/ineffective);dyspnea;sleep apnea/periodic apnea       Goal: Acid/Base Balance  Outcome: Ongoing (interventions implemented as appropriate)    01/16/17 0237   Respiratory Insufficiency (Adult)   Acid/Base Balance making progress toward outcome       Goal: Effective Ventilation  Outcome: Ongoing (interventions implemented as appropriate)    01/16/17 0237   Respiratory Insufficiency (Adult)   Effective Ventilation making progress toward outcome         Problem: Fall Risk (Adult)  Goal: Identify Related Risk Factors and Signs and Symptoms  Outcome: Outcome(s) achieved Date Met:  01/16/17 01/16/17 0237   Fall Risk   Fall Risk: Related Risk Factors age-related changes;gait/mobility problems;environment unfamiliar   Fall Risk: Signs and Symptoms presence of risk factors       Goal: Absence of Falls  Outcome: Ongoing (interventions implemented as appropriate)    01/16/17 0237   Fall Risk (Adult)   Absence of Falls  making progress toward outcome

## 2017-01-16 NOTE — PROGRESS NOTES
"Adult Nutrition  Assessment/PES    Patient Name:  Alexandria Villanueva  YOB: 1935  MRN: [id number]  Admit Date:  1/12/2017    Assessment Date:  1/16/2017        Reason for Assessment       01/16/17 1306    Reason for Assessment    Reason For Assessment/Visit follow up protocol    Diagnosis --   FLU              Nutrition/Diet History       01/16/17 1307    Nutrition/Diet History    Typical Food/Fluid Intake Pt eating lunch at visit, reports eating protein at meals and used to like rocael shakes she would get sometimes. NKFA. confirms bm.             Anthropometrics       01/16/17 1307    Anthropometrics    RD Documented Current Weight  102 kg (224 lb 13.9 oz)    Anthropometrics (Special Considerations)    RD Calculated BMI (kg/m2) 37.5    Body Mass Index (BMI)    BMI Grade 35 - 39.9 - obesity - grade II            Labs/Tests/Procedures/Meds       01/16/17 1308    Labs/Tests/Procedures/Meds    Labs/Tests Review Reviewed    Medication Review Reviewed, pertinent;Antibiotic;Anticoag;Steroid;Multivitamin;Insulin;Diuretic              Estimated/Assessed Needs       01/16/17 1310    Calculation Measurements    Weight Used For Calculations 102 kg (224 lb 13.9 oz)    Height Used for Calculations 1.651 m (5' 5\")    Estimated/Assessed Energy Needs    Energy Need Method Graff-St Nolbertoor    Age 81    RMR (Graff-St. Jeor Equation) 1490.88    Estimated Kcal Range  1862 kcal ( mifflin x 1.25)    209 gm CHO/day (45% calorie needs)    Estimated/Assessed Protein Needs    Weight Used for Protein Calculation 102 kg (224 lb 13.9 oz)    Protein (gm/kg) 0.8    0.8 Gm Protein (gm) 81.6    Estimated/Assessed Fluid Needs    Fluid Need Method RDA method    RDA Method (mL)  --   1797-6426 ml hx CHF            Nutrition Prescription Ordered       01/16/17 1311    Nutrition Prescription PO    Current PO Diet Soft Texture    Texture Whole foods    Common Modifiers Consistent Carbohydrate            Evaluation of Received Nutrient/Fluid " Intake       01/16/17 1311    Evaluation of Received Nutrient/Fluid Intake    Number of Days Evaluated 3 days    Nutrition Delivered Fluid Evaluation    Fluid Intake Evaluation    Oral Fluid (mL) 506    Total Fluid Intake (mL) 506    % Fluid Needs 31%    PO Evaluation    Number of Meals 5    % PO Intake 100%              Problem/Interventions:        Problem 1       01/16/17 1312    Nutrition Diagnoses Problem 1    Problem 1 Overweight/Obesity    Etiology (related to) Medical Diagnosis    Signs/Symptoms (evidenced by) BMI                    Intervention Goal       01/16/17 1313    Intervention Goal    PO Maintain intake    PO Intake % 75 %    goal 75 to 100%            Nutrition Intervention       01/16/17 1313    Nutrition Intervention    RD/Tech Action Follow Tx progress            Nutrition Prescription       01/16/17 1313    Nutrition Prescription PO    PO Prescription Begin/change diet    Other Orders    Other Add Low Sodium restriction due to hx CHF             Education/Evaluation       01/16/17 1314    Education    Education Other (comment)   Pt eating lunch, reports on special at nusing home. will cont to follow.    Monitor/Evaluation    Monitor Per protocol;I&O;PO intake;Pertinent labs;Weight        Comments:    Pt with record of 100% all meals recorded at visit pt states eating protein each meal served.    Noted MD mentioned need for more protein, could consider Prostat mixed with beverage to provide 15 gm protein/100 kcal.    Will cont to follow and monitor.     Electronically signed by:  Marguerite Manning RD  01/16/17 1:18 PM

## 2017-01-16 NOTE — PROGRESS NOTES
Acute Care - Physical Therapy Treatment Note   Cristel Hernandez     Patient Name: Alexandria Villanueva  : 1935  MRN: [id number]  Today's Date: 2017  Onset of Illness/Injury or Date of Surgery Date: 17  Date of Referral to PT: 17  Referring Physician: Nishi    Admit Date: 2017    Visit Dx:    ICD-10-CM ICD-9-CM   1. Acute on chronic respiratory failure, unspecified whether with hypoxia or hypercapnia J96.20    2. Influenza A J10.1 487.1   3. Congestive heart failure, unspecified congestive heart failure chronicity, unspecified congestive heart failure type I50.9 428.0     Patient Active Problem List   Diagnosis   • Hyperglycemia   • Acute hyperglycemia   • Sepsis due to urinary tract infection   • Chronic diastolic (congestive) heart failure   • Permanent atrial fibrillation   • Acute renal failure   • Acute on chronic respiratory failure   • Influenza A with respiratory manifestations   • Chronic anticoagulation   • Lymphedema   • Morbid obesity   • Obstructive sleep apnea of adult   • Pulmonary HTN               Adult Rehabilitation Note       17 0910 17 1010       Rehab Assessment/Intervention    Discipline physical therapist  - physical therapist  -LN     Document Type therapy note (daily note)  - therapy note (daily note)  -LN     Subjective Information complains of;weakness;fatigue  - complains of;weakness;fatigue  -LN     Patient Effort, Rehab Treatment fair  -LH fair  -LN     Symptoms Noted Comment fatigue, shortness of breath, coughing, improved after sitting several minutes.  Patient fearful of sitting edge of bed and of attempting transfer to w/c  - Fatigue/SOB/coughing/wheezing.  -LN     Precautions/Limitations fall precautions  - fall precautions  -LN     Specific Treatment Considerations Patient agreed to sit edge of bed.  Wanted to maintain long sitting.  Required significant encouragment to rotate body to sit edge of bed with feet dangling.  Patient fearful  of falling.  - Patient agreed to sit at EOB for a little while.   -     Patient Response to Treatment  Patient becomes fatigued very quickly.  -LN     Recorded by [] Tala Wood, PT [LN] Kiya Pozo, PT     Pain Assessment    Pain Assessment No/denies pain  - No/denies pain   except for soreness left chest muscles after having ECHO.  -LN     Recorded by [] Tala Wood, PT [LN] Kiya Pozo, PT     Bed Mobility, Assessment/Treatment    Bed Mobility, Roll Left, [place] minimum assist (75% patient effort);moderate assist (50% patient effort)  - minimum assist (75% patient effort);moderate assist (50% patient effort)  -LN     Bed Mobility, Roll Right, [place] minimum assist (75% patient effort);moderate assist (50% patient effort)  - minimum assist (75% patient effort);moderate assist (50% patient effort)  -LN     Bed Mob, Supine to Sit, [place] minimum assist (75% patient effort);2 person assist required  - moderate assist (50% patient effort);2 person assist required  -LN     Bed Mob, Sit to Supine, [place] moderate assist (50% patient effort);maximum assist (25% patient effort);2 person assist required  - moderate assist (50% patient effort);2 person assist required  -LN     Bed Mobility, Impairments  strength decreased  -     Bed Mobility, Comment Patient sat edge of bed x 15 minutes, with SBA.  While seated, performed 10 reps bilateral LAQ, hip flexion - all exercises performed partial range against gravity due to LE weakness  - Patient sat at EOB for 10 minutes with CGA for safety.   -LN     Recorded by [] Tala Wood, PT [LN] Kiya Pozo, PT     Transfer Assessment/Treatment    Transfer, Comment Unsafe to attempt at this time due to patient fear as well as significant fatigue with simply sitting edge of bed  - Did not attempt transfer to w/c today; patient became very fatigued after sitting at EOB and increased  SOB/coughing/wheezing noted.   -LN     Recorded by [] Tala Wood PT [LN] Kiya Pozo PT     Gait Assessment/Treatment    Gait, Comment deferred - patient is nonambulatory at baseline.  uses w/c for mobility  -LH deferred - patient is nonambulatory at baseline.  uses w/c for mobility  -LN     Recorded by [] Tala Wood, PT [LN] Kiya Pozo PT     Positioning and Restraints    Pre-Treatment Position in bed  -LH in bed  -LN     Post Treatment Position bed  - bed  -LN     In Bed notified nsg;supine;call light within reach;encouraged to call for assist  -LH supine;with nsg;call light within reach;encouraged to call for assist  -LN     Recorded by [] Tala Wood, PT [LN] Kiya Pozo PT       User Key  (r) = Recorded By, (t) = Taken By, (c) = Cosigned By    Initials Name Effective Dates     Tala Wood, PT 08/11/15 -     LN Kiya Pozo, PT 06/22/16 -                 IP PT Goals       01/13/17 1316          Transfer Training PT STG    Transfer Training PT STG, Date Established 01/13/17  -      Transfer Training PT STG, Time to Achieve 3 days  -      Transfer Training PT STG, Activity Type bed to chair /chair to bed  -      Transfer Training PT STG, [place] Level minimum assist (75% patient effort)  -      Transfer Training PT STG, Assist Device other (see comments)   sliding board transfer to w/c  -        User Key  (r) = Recorded By, (t) = Taken By, (c) = Cosigned By    Initials Name Provider Type     Tala Wood PT Physical Therapist          Physical Therapy Education     Title: PT OT SLP Therapies (Done)     Topic: Physical Therapy (Done)     Point: Mobility training (Done)    Learning Progress Summary    Learner Readiness Method Response Comment Documented by Status   Patient Acceptance E VU Education provided on bed mobility and supine to sit and importance of sitting up & moving around as tolerated to help  increase her strength and to help her lungs.  01/14/17 1227 Done    Acceptance E VU educated on PT plan of care, need to assess transfers to ensure patient at baseline level of function  01/13/17 1313 Done                      User Key     Initials Effective Dates Name Provider Type Discipline     08/11/15 -  Tala Wood, PT Physical Therapist PT     06/22/16 -  Kiya Pozo, PT Physical Therapist PT                    PT Recommendation and Plan  Anticipated Discharge Disposition: extended care facility  Planned Therapy Interventions: transfer training, patient/family education  PT Frequency: daily, 5 times/wk  Plan of Care Review  Plan Of Care Reviewed With: patient  Progress: unable to show any progress toward functional goals  Outcome Summary/Follow up Plan: PT:  patient sat edge of bed x 15 minutes today with SBA.  She requires assist of 2 for bed mobility.  Transfer to w/c deferred due to patient fear/anxiety as well as noted fatigue with sitting edge of bed.  Will attempt one more time tomorrow to perform sliding board transfer to w/c.  At this time, recommend continued use of jamie lift for safe transfers to/from chair.          Outcome Measures       01/16/17 1100 01/14/17 1200 01/13/17 1300    How much help from another person do you currently need...    Turning from your back to your side while in flat bed without using bedrails? 2  -LH 2  -LN 2  -LH    Moving from lying on back to sitting on the side of a flat bed without bedrails? 2  - 2  -LN 2  -LH    Moving to and from a bed to a chair (including a wheelchair)? 2  - 2  -LN 2  -LH    Standing up from a chair using your arms (e.g., wheelchair, bedside chair)? 1  - 1  -LN 1  -LH    Climbing 3-5 steps with a railing? 1  - 1  -LN 1  -LH    To walk in hospital room? 1  - 1  -LN 1  -LH    AM-PAC 6 Clicks Score 9  - 9  -LN 9  -LH    Functional Assessment    Outcome Measure Options AM-PAC 6 Clicks Basic Mobility (PT)  -  AM-PAC 6 Clicks Basic Mobility (PT)  - AM-PAC 6 Clicks Basic Mobility (PT)  -      User Key  (r) = Recorded By, (t) = Taken By, (c) = Cosigned By    Initials Name Provider Type     Tala Wood, PT Physical Therapist    LN Kiya Pozo, PT Physical Therapist           Time Calculation:         PT Charges       01/16/17 1132          Time Calculation    Start Time 0910  -      Stop Time 0938  -      Time Calculation (min) 28 min  -      PT Received On 01/16/17  -      PT - Next Appointment 01/17/17  -        User Key  (r) = Recorded By, (t) = Taken By, (c) = Cosigned By    Initials Name Provider Type     Tala Wood, PT Physical Therapist          Therapy Charges for Today     Code Description Service Date Service Provider Modifiers Qty    91436125688 HC PT THER SUPP EA 15 MIN 1/16/2017 Tala Wood, PT GP 2    03670508388 HC PT THER PROC EA 15 MIN 1/16/2017 Tala Wood, PT GP 2          PT G-Codes  Outcome Measure Options: AM-PAC 6 Clicks Basic Mobility (PT)    Tala Wood, PT  1/16/2017

## 2017-01-16 NOTE — PLAN OF CARE
Problem: Patient Care Overview (Adult)  Goal: Adult Individualization and Mutuality  Outcome: Ongoing (interventions implemented as appropriate)    Problem: Fall Risk (Adult)  Goal: Absence of Falls  Outcome: Ongoing (interventions implemented as appropriate)

## 2017-01-16 NOTE — PROGRESS NOTES
LOS: 4 days   Patient Care Team:  Gerardo Williamson MD as PCP - General  Gerardo Williamson MD as PCP - Family Medicine    Chief Complaint:   flu a    Interval History:   Breathing better, no nausea, mild chest pain overnight, still has edema.  No palps, no dizziness.     Objective   Vital Signs  Temp:  [97.6 °F (36.4 °C)-98.4 °F (36.9 °C)] 97.8 °F (36.6 °C)  Heart Rate:  [57-88] 78  Resp:  [16-20] 20  BP: (106-148)/(61-87) 133/81    Intake/Output Summary (Last 24 hours) at 01/16/17 0815  Last data filed at 01/16/17 0644   Gross per 24 hour   Intake    820 ml   Output    125 ml   Net    695 ml       Comfortable NAD  Neck supple, no JVD or thyromegaly appreciated  Irregular ,no murmurs  Lungs CTA B, normal effort  Abdomen S/NT/ND (+) BS, no HSM appreciated  Extremities warm, no clubbing, cyanosis, 2+ LE edema  No visible or palpable skin lesions  A/Ox4, mood and affect appropriate    Results Review:        Results from last 7 days  Lab Units 01/15/17  0550 01/14/17  0535 01/13/17  0605   SODIUM mmol/L 146* 143 141   POTASSIUM mmol/L 3.8 4.1 4.1   CHLORIDE mmol/L 102 101 101   TOTAL CO2 mmol/L 36.3* 34.7* 32.4*   BUN mg/dL 29* 22 19   CREATININE mg/dL 0.93 0.91 0.88   GLUCOSE mg/dL 153* 149* 206*   CALCIUM mg/dL 9.4 9.4 9.3       Results from last 7 days  Lab Units 01/12/17  2306 01/12/17  1755 01/12/17  1149   TROPONIN T ng/mL <0.010 <0.010 <0.010       Results from last 7 days  Lab Units 01/15/17  0550 01/14/17  0535 01/13/17  0605   WBC 10*3/mm3 5.69 4.78* 3.98*   HEMOGLOBIN g/dL 11.4* 10.6* 10.5*   HEMATOCRIT % 35.5* 33.3* 33.9*   PLATELETS 10*3/mm3 231 239 194       Results from last 7 days  Lab Units 01/16/17  0319 01/15/17  0550 01/14/17  0535   INR  2.95* 3.64* 5.65*                   I reviewed the patient's new clinical results.  I personally viewed and interpreted the patient's EKG/Telemetry data        Medication Review:     cefepime 1 g Intravenous Q8H   cholecalciferol 50,000 Units Oral Q30 Days    citalopram 20 mg Oral Daily   docusate sodium 100 mg Oral BID   folic acid 1 mg Oral Daily   furosemide 40 mg Oral BID   gabapentin 300 mg Oral BID   insulin aspart 0-7 Units Subcutaneous 4x Daily AC & at Bedtime   insulin detemir 25 Units Subcutaneous Nightly   ipratropium-albuterol 3 mL Nebulization 4x Daily - RT   melatonin 6 mg Oral Nightly   methylPREDNISolone sodium succinate 20 mg Intravenous Q12H   metoprolol succinate XL 25 mg Oral Daily   multivitamin with minerals 1 tablet Oral Daily   oseltamivir 75 mg Oral Q12H   pantoprazole 40 mg Oral Q PM   polyethylene glycol 17 g Oral Daily   sennosides-docusate sodium 2 tablet Oral BID   IVPB builder  Intravenous Q24H         Pharmacy to dose vancomycin        Assessment/Plan     Principal Problem:    Influenza A with respiratory manifestations  Active Problems:    Chronic diastolic (congestive) heart failure    Permanent atrial fibrillation    Acute on chronic respiratory failure    Chronic anticoagulation    Lymphedema    Morbid obesity    Obstructive sleep apnea of adult    Pulmonary HTN    1. Acute on chronic resp failure. On bipap  2. Bibasilar PNA - on abx  3. Flu A  4. Chronic diastolic chf - likely secondary acute decompensation due to pna, but improving. On oral diuretics- given dose of IV diuresis yesterday, will repeat today  5. Severe pulmonary HTN  6. [place] - on bipap  7. Obesity  8. HTN  9. HLD  10. Chronic a fib - on warfarin but inr high today, will hold again and could prob restart tomorrow.   11. On insulin due to steroids.   12. Chronic lymphedema    Normal LVEF on echo 1/13/17.  Overall better, continue lasix and may need intermittent IV lasix.  prob can restart warfarin tomorrow with target inr of 2-3.    Will see as needed.     Nika Mack MD  01/16/17  8:15 AM

## 2017-01-17 VITALS
BODY MASS INDEX: 37.55 KG/M2 | RESPIRATION RATE: 18 BRPM | TEMPERATURE: 97.9 F | HEART RATE: 70 BPM | SYSTOLIC BLOOD PRESSURE: 130 MMHG | HEIGHT: 65 IN | DIASTOLIC BLOOD PRESSURE: 70 MMHG | WEIGHT: 225.38 LBS | OXYGEN SATURATION: 90 %

## 2017-01-17 LAB
BACTERIA SPEC AEROBE CULT: NORMAL
BACTERIA SPEC AEROBE CULT: NORMAL
GLUCOSE BLDC GLUCOMTR-MCNC: 125 MG/DL (ref 70–130)
GLUCOSE BLDC GLUCOMTR-MCNC: 171 MG/DL (ref 70–130)
INR PPP: 1.67 (ref 0.9–1.1)
PROTHROMBIN TIME: 19.9 SECONDS (ref 12.1–15)

## 2017-01-17 PROCEDURE — 94640 AIRWAY INHALATION TREATMENT: CPT

## 2017-01-17 PROCEDURE — 25010000002 CEFEPIME PER 500 MG: Performed by: INTERNAL MEDICINE

## 2017-01-17 PROCEDURE — 85610 PROTHROMBIN TIME: CPT | Performed by: INTERNAL MEDICINE

## 2017-01-17 PROCEDURE — 99238 HOSP IP/OBS DSCHRG MGMT 30/<: CPT | Performed by: INTERNAL MEDICINE

## 2017-01-17 PROCEDURE — 82962 GLUCOSE BLOOD TEST: CPT

## 2017-01-17 PROCEDURE — 63710000001 INSULIN ASPART PER 5 UNITS: Performed by: INTERNAL MEDICINE

## 2017-01-17 RX ORDER — PREDNISONE 10 MG/1
10 TABLET ORAL
Status: DISCONTINUED | OUTPATIENT
Start: 2017-01-17 | End: 2017-01-17 | Stop reason: HOSPADM

## 2017-01-17 RX ORDER — OSELTAMIVIR PHOSPHATE 75 MG/1
75 CAPSULE ORAL EVERY 12 HOURS SCHEDULED
Qty: 2 CAPSULE | Refills: 0 | Status: SHIPPED | OUTPATIENT
Start: 2017-01-17 | End: 2017-01-18

## 2017-01-17 RX ORDER — FUROSEMIDE 40 MG/1
40 TABLET ORAL 2 TIMES DAILY
Qty: 60 TABLET | Refills: 1 | Status: SHIPPED | OUTPATIENT
Start: 2017-01-17 | End: 2017-03-15 | Stop reason: HOSPADM

## 2017-01-17 RX ORDER — LEVOFLOXACIN 500 MG/1
500 TABLET, FILM COATED ORAL EVERY 24 HOURS
Status: DISCONTINUED | OUTPATIENT
Start: 2017-01-17 | End: 2017-01-17

## 2017-01-17 RX ORDER — CEFDINIR 300 MG/1
300 CAPSULE ORAL EVERY 12 HOURS SCHEDULED
Qty: 28 CAPSULE | Refills: 1 | Status: SHIPPED | OUTPATIENT
Start: 2017-01-17 | End: 2017-01-17 | Stop reason: HOSPADM

## 2017-01-17 RX ORDER — CEFDINIR 300 MG/1
300 CAPSULE ORAL EVERY 12 HOURS SCHEDULED
Status: DISCONTINUED | OUTPATIENT
Start: 2017-01-17 | End: 2017-01-17

## 2017-01-17 RX ORDER — WARFARIN SODIUM 5 MG/1
TABLET ORAL
Qty: 30 TABLET | Refills: 1
Start: 2017-01-17 | End: 2017-03-15 | Stop reason: HOSPADM

## 2017-01-17 RX ORDER — PREDNISONE 10 MG/1
10 TABLET ORAL
Qty: 100 TABLET | Refills: 1 | Status: SHIPPED | OUTPATIENT
Start: 2017-01-17 | End: 2017-03-15 | Stop reason: HOSPADM

## 2017-01-17 RX ORDER — WARFARIN SODIUM 5 MG/1
5 TABLET ORAL
Status: DISCONTINUED | OUTPATIENT
Start: 2017-01-17 | End: 2017-01-17 | Stop reason: HOSPADM

## 2017-01-17 RX ORDER — SENNA AND DOCUSATE SODIUM 50; 8.6 MG/1; MG/1
2 TABLET, FILM COATED ORAL 2 TIMES DAILY
Qty: 120 TABLET | Refills: 1 | Status: SHIPPED | OUTPATIENT
Start: 2017-01-17

## 2017-01-17 RX ADMIN — CITALOPRAM HYDROBROMIDE 20 MG: 20 TABLET ORAL at 08:10

## 2017-01-17 RX ADMIN — INSULIN ASPART 2 UNITS: 100 INJECTION, SOLUTION INTRAVENOUS; SUBCUTANEOUS at 14:16

## 2017-01-17 RX ADMIN — CEFEPIME 1 G: 1 INJECTION, POWDER, FOR SOLUTION INTRAMUSCULAR; INTRAVENOUS at 01:53

## 2017-01-17 RX ADMIN — FUROSEMIDE 40 MG: 40 TABLET ORAL at 08:10

## 2017-01-17 RX ADMIN — IPRATROPIUM BROMIDE AND ALBUTEROL SULFATE 3 ML: .5; 3 SOLUTION RESPIRATORY (INHALATION) at 07:28

## 2017-01-17 RX ADMIN — METOPROLOL SUCCINATE 25 MG: 25 TABLET, EXTENDED RELEASE ORAL at 08:10

## 2017-01-17 RX ADMIN — DOCUSATE SODIUM 100 MG: 100 CAPSULE, LIQUID FILLED ORAL at 08:10

## 2017-01-17 RX ADMIN — GABAPENTIN 300 MG: 300 CAPSULE ORAL at 08:10

## 2017-01-17 RX ADMIN — FOLIC ACID 1 MG: 1 TABLET ORAL at 08:10

## 2017-01-17 RX ADMIN — DOCUSATE SODIUM AND SENNOSIDES 2 TABLET: 8.6; 5 TABLET, FILM COATED ORAL at 08:10

## 2017-01-17 RX ADMIN — MULTIPLE VITAMINS W/ MINERALS TAB 1 TABLET: TAB at 08:10

## 2017-01-17 NOTE — PLAN OF CARE
Problem: Inpatient Physical Therapy  Goal: Transfer Training Goal 1 STG- PT  Outcome: Unable to achieve outcome(s) by discharge Date Met:  01/17/17 01/13/17 1316 01/17/17 1152   Transfer Training PT STG   Transfer Training PT STG, Date Established 01/13/17 --    Transfer Training PT STG, Time to Achieve 3 days --    Transfer Training PT STG, Activity Type bed to chair /chair to bed --    Transfer Training PT STG, [place] Level minimum assist (75% patient effort) --    Transfer Training PT STG, Assist Device other (see comments)  (sliding board transfer to w/c) --    Transfer Training PT STG, Date Goal Reviewed --  01/17/17   Transfer Training PT STG, Outcome --  goal not met   Transfer Training PT STG, Reason Goal Not Met --  discharged from facility

## 2017-01-17 NOTE — PROGRESS NOTES
"[facility] PULMONARY CARE         Dr Prudencio Bianchi   LOS: 5 days   Patient Care Team:  Gerardo Williamson MD as PCP - General  Gerardo Williamson MD as PCP - Family Medicine    Chief Complaint: Influenza A. chronic diastolic CHF    Interval History:   On oxygen 2 L/m.  She uses 2 L at home as well. She denies shortness of breath or cough. She's concerned about her constipation when she goes back to the nursing home.      REVIEW OF SYSTEMS:   Cardiovascular: She denies chest pain, palpitation or leg swelling  Gastrointestinal: She denies abdominal pain, diarrhea, vomiting or nausea.      Ventilator/Non-Invasive Ventilation Settings     Start     Ordered   None      Vital Signs  Temp:  [97.9 °F (36.6 °C)-98.5 °F (36.9 °C)] 97.9 °F (36.6 °C)  Heart Rate:  [62-84] 79  Resp:  [16-20] 18  BP: (104-131)/(48-78) 131/64    Intake/Output Summary (Last 24 hours) at 01/17/17 0951  Last data filed at 01/17/17 0301   Gross per 24 hour   Intake   1040 ml   Output      0 ml   Net   1040 ml     Flowsheet Rows         First Filed Value    Admission Height  65\" (165.1 cm) Documented at 01/12/2017 0548    Admission Weight  235 lb (107 kg) Documented at 01/12/2017 0548          Physical Exam:   General Appearance:     not in acute distress     Lungs:     diminished breath sounds  bilaterally with no audible wheezing or rhonchi     Heart:    Regular rhythm and normal rate, normal S1 and S2, no            murmur, no gallop, no rub, no click   Chest Wall:    No abnormalities observed   Abdomen:     Normal bowel sounds,soft,  non-tender, non-distended.   Extremities:   Moves all extremities well, 1+ pitting edema edema, no cyanosis, no  redness     Results Review:          Results from last 7 days  Lab Units 01/15/17  0550 01/14/17  0535 01/13/17  0605   SODIUM mmol/L 146* 143 141   POTASSIUM mmol/L 3.8 4.1 4.1   CHLORIDE mmol/L 102 101 101   TOTAL CO2 mmol/L 36.3* 34.7* 32.4*   BUN mg/dL 29* 22 19   CREATININE mg/dL 0.93 0.91 0.88   GLUCOSE " mg/dL 153* 149* 206*   CALCIUM mg/dL 9.4 9.4 9.3       Results from last 7 days  Lab Units 01/12/17  2306 01/12/17  1755 01/12/17  1149   TROPONIN T ng/mL <0.010 <0.010 <0.010       Results from last 7 days  Lab Units 01/15/17  0550 01/14/17  0535 01/13/17  0605   WBC 10*3/mm3 5.69 4.78* 3.98*   HEMOGLOBIN g/dL 11.4* 10.6* 10.5*   HEMATOCRIT % 35.5* 33.3* 33.9*   PLATELETS 10*3/mm3 231 239 194       Results from last 7 days  Lab Units 01/17/17  0607 01/16/17  0319 01/15/17  0550   INR  1.67* 2.95* 3.64*                   Results from last 7 days  Lab Units 01/16/17  0552   PH, ARTERIAL pH units 7.461*   PO2 ART mm Hg 69.1*   PCO2, ARTERIAL mm Hg 49.5*   HCO3 ART mmol/L 34.5*       I reviewed the patient's new clinical results.  I personally viewed and interpreted the patient's CXR        Medication Review:     cholecalciferol 50,000 Units Oral Q30 Days   citalopram 20 mg Oral Daily   docusate sodium 100 mg Oral BID   folic acid 1 mg Oral Daily   furosemide 40 mg Oral BID   gabapentin 300 mg Oral BID   insulin aspart 0-7 Units Subcutaneous 4x Daily AC & at Bedtime   insulin detemir 25 Units Subcutaneous Nightly   ipratropium-albuterol 3 mL Nebulization 4x Daily - RT   levoFLOXacin 500 mg Oral Q24H   melatonin 6 mg Oral Nightly   metoprolol succinate XL 25 mg Oral Daily   multivitamin with minerals 1 tablet Oral Daily   pantoprazole 40 mg Oral Q PM   polyethylene glycol 17 g Oral Daily   predniSONE 10 mg Oral Daily With Breakfast   sennosides-docusate sodium 2 tablet Oral BID   IVPB builder  Intravenous Q24H         Pharmacy to dose vancomycin      Diagnostic data:  I personally and independently reviewed the CT of the chest performed on 1/12/17,  Pulmonary vascular congestion noted.  Moderate right pleural effusion      ASSESSMENT:   1) Acute on chronic hypoxemic/hypercapnic respiratory failure  2) Health care associated pneumonia  3) Likely diastolic CHF with exacerbation, dilated LA on echo  4) Influenza  5) Right  pleural effusion with underlying altelectasis  6) pulmonary hypertension, likely group II given the dilated LA on echo   7) Metabolic alcalosis  8) Protein calorie malnutrition (Alb=2.4)     Other:  9) Recent EColi UTI  10) History of KIERA  11) Hypernatremia, mild         PLAN:  - Okay with Prednisone 10 mg which can be stopped in 3 days  - Continue AB for 7 days  - Agree with intermittent diuresis  - No need for BiPAP.   - Okay to discharge            Prudencio Bianchi MD  01/17/17  9:51 AM

## 2017-01-17 NOTE — THERAPY DISCHARGE NOTE
Acute Care - Physical Therapy Discharge Summary   Cristel Hernandez       Patient Name: Alexandria Villanueva  : 1935  MRN: [id number]    Today's Date: 2017  Onset of Illness/Injury or Date of Surgery Date: 17    Date of Referral to PT: 17  Referring Physician: Nishi      Admit Date: 2017      PT Recommendation and Plan    Visit Dx:    ICD-10-CM ICD-9-CM   1. Acute on chronic respiratory failure, unspecified whether with hypoxia or hypercapnia J96.20    2. Influenza A J10.1 487.1   3. Congestive heart failure, unspecified congestive heart failure chronicity, unspecified congestive heart failure type I50.9 428.0             Outcome Measures       17 1100 17 1200       How much help from another person do you currently need...    Turning from your back to your side while in flat bed without using bedrails? 2  - 2  -LN     Moving from lying on back to sitting on the side of a flat bed without bedrails? 2  - 2  -LN     Moving to and from a bed to a chair (including a wheelchair)? 2  - 2  -LN     Standing up from a chair using your arms (e.g., wheelchair, bedside chair)? 1  - 1  -LN     Climbing 3-5 steps with a railing? 1  - 1  -LN     To walk in hospital room? 1  - 1  -LN     AM-PAC 6 Clicks Score 9  - 9  -LN     Functional Assessment    Outcome Measure Options AM-PAC 6 Clicks Basic Mobility (PT)  - AM-PAC 6 Clicks Basic Mobility (PT)  -LN       User Key  (r) = Recorded By, (t) = Taken By, (c) = Cosigned By    Initials Name Provider Type     Tala Wood, PT Physical Therapist    LN Kiya Pozo, PT Physical Therapist                      IP PT Goals       17 1152 17 1316       Transfer Training PT STG    Transfer Training PT STG, Date Established  17  -     Transfer Training PT STG, Time to Achieve  3 days  -     Transfer Training PT STG, Activity Type  bed to chair /chair to bed  -     Transfer Training PT STG, [place] Level   minimum assist (75% patient effort)  -     Transfer Training PT STG, Assist Device  other (see comments)   sliding board transfer to w/c  -     Transfer Training PT STG, Date Goal Reviewed 01/17/17  -      Transfer Training PT STG, Outcome goal not met  -      Transfer Training PT STG, Reason Goal Not Met discharged from facility  -        User Key  (r) = Recorded By, (t) = Taken By, (c) = Cosigned By    Initials Name Provider Type     Tala Wood, PT Physical Therapist     Paula Vega PTA Physical Therapy Assistant        Pt has been working with PT on bed mobility and transfers on the side of the bed.  Pt reports prior to admission, was using either a sliding board or nursing was using a lift.  Pt has a previous leg injury and has not ambulated since then.   Pt has been discharged back to nursing home.        PT Discharge Summary  Anticipated Discharge Disposition: extended care facility  Reason for Discharge: Discharge from facility  Outcomes Achieved: Refer to plan of care for updates on goals achieved  Discharge Destination: Extended care facility - LTC      Paula Vega PTA   1/17/2017

## 2017-01-17 NOTE — DISCHARGE SUMMARY
HOSPITALIST SERVICES  @ [place], [place]            DISCHARGE SUMMARY          Alexandria Villanueva  1935  [id number]        Hospitalists Discharge Summary    Date of Admission: 1/12/2017  Date of Discharge:  1/17/2017    Primary Discharge Diagnoses:       1) Acute on Chronic Respiratory Failure hypoxic and hypercapnic: Patient is off BiPAP on NC, cont supportive care for FluA, tamiflu, duonebs, pulm titrating down steroids.      2) Bibasilar Pneumonia Determined to be health care associated by pulmonary consultant. On Inj. Cefepime. Will switch to Cefdinir 300 mg po BID x 7 days      3) Influenza A Positive: On Tamiflu as above      4) CRespF: Patient is now on oxygen at 4 L NC. She will probably need to go back to nursing home on nocturnal oxygen at least      5) Recent UTI: Was being treated with Inj. Rocephin as an outpatient but it is d/cd, cont cefipime which will cover as well.      6) Chronic Diastolic CHF: Patient has been on oral Lasix, needs further volume off. Single dose extra IV lasix in AM.       7) HTN: last BP reading is 130s/70s. Has been on Metoprolol XL, cont      8) Dyslipidemia: Hx noted      9) Obstructive Sleep Apnea: Hx noted      10) Morbid Obesity: Noted. I have discussed testing for sleep apnea and she did not wish to do this in the past. I strongly suspect it would be positive.      11) DVT Prophylaxis: On oral coumadin/ INR is 3.65 2 days ago/ warfarin discontinued/ Will monitor INR, still > 3, cont to hold      12) Vitamin D deficiency: On replacement      13) Depression: Treating with Celexa      14) Chronic peripheral neuropathy/ Diabetic: On neurontin      15) Gerd: On protonix      16) Immobilization secondary to poorly healed fracture from years ago. She refused to have surgery done secondary to risk of the surgery      17) Diabetes Mellitus: increased Levemir 25U QHS and SSI, monitoring blood sugars, AC/HS 200s      18) Essential Hypertension: On metoprolol,  controlled      19) AECOPD: tapering steroids per pulm.      20) Sleeplessness: Using melatonin      21) Probably acute CHF: BNP is 7334: On lasix BID. Neg vol status daily and watch creat closely. Replace K as needed.               Surgical Discharge Diagnoses:     S/P Bilateral cataract extraction, S/P Rt Femoral fracture, S/P Herniorrhaphy        PCP  Patient Care Team:  Gerardo Williamson MD as PCP - General  Gerardo Williamson MD as PCP - Family Medicine    Consults:   Consults     Date and Time Order Name Status Description    1/12/2017 1141 Inpatient Consult to Cardiology Completed     1/12/2017 0939 Inpatient Consult to Pulmonology Completed           Operations and Procedures Performed:       Xr Abdomen Flat & Upright    Result Date: 1/4/2017  Narrative: FLAT AND UPRIGHT VIEWS OF THE ABDOMEN  INDICATION: Flank pain and urinary tract infection for the past 4 days  PROCEDURE: Supine and upright views of the abdomen  COMPARISON: Portable chest from 01/03/2017  FINDINGS: Stable cardiomegaly and left basilar opacity/pleural fluid. No free air. Nonobstructed bowel gas pattern. No definite radiodense urinary system calculus but study is significantly degraded by body habitus      Impression: No clearly acute finding.  Cardiomegaly with persistent left basilar opacity/pleural fluid  This report was finalized on 1/4/2017 2:46 PM by Dr. Kei Diaz MD.      Ct Head Without Contrast    Result Date: 1/3/2017  Narrative: EXAM: NONCONTRAST CT HEAD  DATE: 01/02/2017 AT 1526  HISTORY: 81-year-old female became unresponsive around 1 PM at the nursing home. No known injury.  COMPARISON: Noncontrast CT head 06/12/2009.  FINDINGS: A few of the images are moderately degraded by motion. Allowing for this limitation, no gross acute intracranial hemorrhage, mass lesion, mass effect, or midline shift. Gray matter-white matter junction distinction appears preserved, there is no compelling CT evidence of acute or evolving infarct.  Physiologic calcifications demonstrated within the basal ganglia. No definite displaced calvarial fracture is seen. Major paranasal sinuses and mastoid air cells appear clear.      Impression: 1. This study is significantly degraded by patient motion, limiting the diagnostic quality of the study. Recommend repeat imaging, if required, when the patient is able to cooperate. 2. No gross acute intracranial findings. 3. Preliminary report was provided by Dr. Bateman on 01/02/2016 at 1609.  This report was finalized on 1/3/2017 9:46 AM by Dr. Ariadne Singer MD.      Ct Chest Without Contrast    Result Date: 1/12/2017  Narrative: CHEST CT  HISTORY: Shortness of air with cough, congestion, and flulike symptoms for the last 3 days. Former smoker. Abnormal chest x-ray today showing a potential right lung base pneumonia.  TECHNIQUE: Axial images were obtained through the chest without contrast. Multiplanar reformats were obtained. No comparison chest CT. Radiation dose reduction techniques were utilized, including automated exposure control and exposure modulation based on body size.  FINDINGS: The heart is enlarged. The main pulmonary arteries are dilated suggesting pulmonary hypertension. The right main PA measures 3.8 cm, and the left main PA measures 3.4 cm. There is no pericardial effusion. There is a small right pleural effusion. There is a trace amount of left pleural fluid. Note is made of multiple potential left breast nodules. Outpatient evaluation with mammogram is recommended if this has not been performed recently at an outside facility. There is no adenopathy. There is Some mucous plugging noted in left lower lobe bronchi. There is alveolar consolidation in both lower lobes which may simply reflect some atelectasis. Underlying pneumonia is not excluded the basis of this exam. Continuation through the upper abdomen reveals diffuse fat stranding around the pancreas suggesting acute pancreatitis. This is  incompletely visualized. Gallbladder contains numerous stones. There is soft tissue swelling in the left lateral lower chest wall which may reflect hematoma. Correlate with physical exam findings. A right arm approach PICC line has its tip in the SVC in good position.      Impression: 1. These findings are highly suspicious for acute pancreatitis. The pancreas is incompletely visualized. Correlation with laboratory data recommended. 2. Soft tissue density in the left lateral lower chest wall may reflect a hematoma. Correlate with physical exam findings. 3. Cardiomegaly. 4. Enlarged central pulmonary arteries suggesting pulmonary arterial hypertension. 5. Small right pleural effusion with a trace left effusion. There is some mucous plugging in the lower lobe bronchi on the left. There is some alveolar consolidation in both lower lobes. Considerations include atelectasis and/or pneumonia. 6. Cholelithiasis. 7. Potential left breast nodules. Outpatient evaluation with mammogram recommended if this is not been performed recently at an outside facility.   Stat final copy of this report was sent to the ordering physician's office immediately following this dictation with telephone notification.  This report was finalized on 1/12/2017 3:51 PM by Dr. Gerardo Brito MD.      Mri Thoracic Spine With & Without Contrast    Result Date: 1/7/2017  Narrative: THORACIC SPINE MRI WITH/WITHOUT CONTRAST HISTORY: Chronic back pain with recent superimposed acute onset of back pain, patient unable to bear weight, recent history of sepsis and elevated white count. TECHNIQUE: Multiplanar imaging of the thoracic spine was performed with short and long TR. 20 cc of Multihance was used. FINDINGS: There are moderately severe degenerative changes seen at all thoracic disks. At the mid to lower thoracic levels prominent posterior disk osteophyte complexes are seen accompanied by facet hypertrophy. This causes moderate canal narrowing throughout  the thoracic spine.  There is no evidence of cord compression as a result. T10-11 shows the most prominent central stenosis. The cord itself is normal in size and signal. There is no evidence of diskitis or vertebral osteomyelitis. There is no evidence of abnormal enhancement after contrast administration. No paraspinal mass lesions are noted. There is a chronic appearing healed wedge compression fracture of T12. No acute fractures are noted.     Impression:   1. There is degenerative disk disease seen throughout the thoracic spine especially at the mid to lower thoracic levels most prominent at T10-11. Degree of central canal narrowing is only moderate without evidence of cord compression. 2. No evidence of focal cord lesion. 3. No evidence of diskitis or vertebral osteomyelitis. 4. Old healed mild wedge compression fracture of T12.    Xr Chest 1 View    Result Date: 1/12/2017  Narrative: Chest x-ray  INDICATION:  Patient arrives via EMS from her nursing home for evaluation of worsening dyspnea this evening. Apparently she has a long history of chronic respiratory failure with congestive heart failure complications as well as a recent pneumonia. She was just in the hospital about 1 week ago for some similar symptoms but also a UTI. She has an indwelling PICC line through which she is currently receiving Rocephin daily. The nursing home staff tell us that the patient developed some worsening shortness of breath symptoms as the evening progressed. They gave her the usual nebulized treatments which often resolve her breathing symptoms but this time they did not seem to benefit much.  FINDINGS: AP upright view of the chest is compared with 01/03/2017. The heart remains quite enlarged. There are small bilateral pleural effusions, left greater than right. There is new infiltrate at the right lung base concerning for pneumonia. There is consolidation behind the heart at the left base which may reflect atelectasis or  pneumonia. Right arm approach PICC remains in the SVC. No pneumothorax.      Impression: Stable cardiomegaly. Bilateral effusions left greater than right. New infiltrate in the right base concerning for pneumonia. There is infiltrate or atelectasis in the left base as well.  This report was finalized on 1/12/2017 7:27 AM by Dr. Gerardo Brito MD.      Xr Chest 1 View    Result Date: 1/3/2017  Narrative: EXAM: AP portable chest  DATE: 01/03/2017 at 1200  HISTORY: PICC line placement today  COMPARISON: AP portable chest 01/02/2017.  FINDINGS: Right arm approach PICC extends into the upper SVC. No visible pneumothorax. Persistent left basilar consolidation. Suspected small left pleural effusion stable cardiac enlargement.      Impression: : 1. Right arm approach PICC tip extends to the upper SVC level. No visible pneumothorax. The remainder of FINDINGS are not appreciated change from 01/02/2017 at 1503.  This report was finalized on 1/3/2017 12:09 PM by Dr. Ariadne Singer MD.      Xr Chest 1 View    Result Date: 1/3/2017  Narrative: EXAM: AP portable chest  DATE: 01/02/2017 at 1503  HISTORY: Unresponsive 2:00 PM today.  COMPARISON: AP portable chest 09/27/2016.  FINDINGS: Stable moderate cardiac enlargement particularly involving the left heart border. Suspected small left pleural effusion with left basilar atelectasis or infiltrate. Right lung appears free of acute airspace disease.. No pneumothorax. Mild central vascular congestion is thought to be present, but no erika pulmonary edema is identified.      Impression: 1. Suspected small left pleural effusion with left basilar atelectasis or infiltrate in the retrocardiac distribution. 2. Stable cardiomegaly. 3. Central vascular congestion. No erika pulmonary edema.   4. Preliminary report was provided by Dr. Diaz on 01/02/2016 1851.  This report was finalized on 1/3/2017 10:00 AM by Dr. Ariadne Singer MD.      Mri Lumbar Spine With & Without Contrast    Result Date:  1/7/2017  Narrative: MRI OF LUMBAR SPINE WITH/WITHOUT CONTRAST HISTORY:  Acute superimposed on chronic mid and lower back pain with difficulty walking and difficulty with weight bearing on the right leg. Recent history of septic shock and elevated white count. TECHNIQUE: Multiplanar imaging of the lumbar spine was performed with short and long TR. 20 cc of Multihance was used. FINDING: There are degenerative changes at all lower thoracic and lumbar levels. At L1-2, the disk is collapsed. There is a broad based posterior disk osteophyte complex that results in only mild to moderate central stenosis. At the L2-3 level the disk is collapsed. There is a degenerative retrolisthesis of about 1 cm. This results in moderate central spinal stenosis. Facet hypertrophy is also seen. At L3-4, there is a broad based posterior disk osteophyte complex with advanced facet disease. Central stenosis is moderately severe. Foraminal stenosis is also moderately severe on both sides. At L4-5, there is broad based posterior disk bulging and osteophyte formation with advanced facet disease and severe central stenosis. Foraminal stenosis is moderate bilaterally. At L5-S1, the disk is degenerated but shows only minimal bulging. There is advanced facet hypertrophy with moderate central stenosis as a result. Foraminal stenosis at L5 S1 is mild to moderate on both sidesl The conus is normal.   The nerve roots of the cauda equina have a normal branching pattern. After contrast administration no abnormal enhancement is seen. There is no evidence of diskitis or vertebral osteomyelitis. No paraspinal abscesses are identified.     Impression:  1, Advanced multilevel lumbar degenerative disk and facet disease as described level by level. Central stenosis is most prominent at L4-5 but is also present to a significant degree at L3-4 and L5 S1. 2. No evidence of epidural abscess, diskitis or vertebral osteomyelitis. No active inflammatory process is  seen. No evidence of recent fracture.      Allergies:  is allergic to codeine; demerol [meperidine]; and propoxyphene.    [employer]  not reviewed    Discharge Medications:      cholecalciferol 50,000 Units Oral Q30 Days   citalopram 20 mg Oral Daily   docusate sodium 100 mg Oral BID   folic acid 1 mg Oral Daily   furosemide 40 mg Oral BID   gabapentin 300 mg Oral BID   insulin aspart 0-7 Units Subcutaneous 4x Daily AC & at Bedtime   insulin detemir 25 Units Subcutaneous Nightly   ipratropium-albuterol 3 mL Nebulization 4x Daily - RT   levoFLOXacin 500 mg Oral Q24H   melatonin 6 mg Oral Nightly   metoprolol succinate XL 25 mg Oral Daily   multivitamin with minerals 1 tablet Oral Daily   pantoprazole 40 mg Oral Q PM   polyethylene glycol 17 g Oral Daily   predniSONE 10 mg Oral Daily With Breakfast   sennosides-docusate sodium 2 tablet Oral BID   IVPB builder  Intravenous Q24H       History of Present Illness:    Ms. Alexandria Villanueva is an 81 year old morbidly obese  female who has hx of COPD, CHF, Chronic Atrial Fib who arrived via EMS from [facility] for evaluation of worsening dyspnea. Apparently she has a long history of chronic respiratory failure with congestive heart failure complications as well as a recent pneumonia. She was just in the hospital about 1 week ago for some similar symptoms but also a UTI. She has an indwelling PICC line through which she is currently receiving Rocephin daily. As per nursing home staff the patient developed some worsening shortness of breath symptoms without any benefit from her the usual nebulized treatments. She was also given 80 mg of Lasix orally as she was felt to be in CHF. When her symptoms began to progress to more worrisome level, they called EMS for further assistance. Patient denies any chest pain. She does say she has had a cough that is only minimally productive recently. She is unsure about any fevers. The remainder of the history is difficult to obtain from the  patient independently due to her age and clinical condition. Patient is on BiPAP at this time and it is difficult to obtain much hx from her.      Hospital Course     She was treated with On Inj. Meropenem and Inj. Vanco and oral Tamiflu.    Was seen by Dr. Mack:  1. Acute on chronic resp failure. On bipap  2. Bibasilar PNA - on abx  3. Flu A  4. Chronic diastolic chf - likely secondary acute decompensation due to pna, but improving. On oral diuretics- given dose of IV diuresis yesterday, will repeat today  5. Severe pulmonary HTN  6. Brendon - on bipap  7. Obesity  8. HTN  9. HLD  10. Chronic a fib - on warfarin but inr high today, will hold again and could prob restart tomorrow.   11. On insulin due to steroids.   12. Chronic lymphedema     Normal LVEF on echo 1/13/17. Overall better, continue lasix and may need intermittent IV lasix. prob can restart warfarin tomorrow with target inr of 2-3.     Will see as needed.      Nika Mack MD  01/16/17  8:15 AM    Was seen by Pulmonary Dr. Prudencio Valderrama             PLAN:  - Okay with Prednisone 10 mg which can be stopped in 3 days  - Continue AB for 7 days  - Agree with intermittent diuresis  - No need for BiPAP.   - Okay to discharge                 Prudencio Bianchi MD  01/17/17  9:51 AM      Patient has been doing better clinically. I will discharge her back to [facility].    Last Lab Results:   Lab Results (most recent)     Procedure Component Value Units Date/Time    CBC Auto Differential [[id number]]  (Abnormal) Collected:  01/12/17 0558    Specimen:  Blood Updated:  01/12/17 0608     WBC 8.43 10*3/mm3      RBC 4.31 10*6/mm3      Hemoglobin 12.1 g/dL      Hematocrit 38.7 %      MCV 89.8 fL      MCH 28.1 pg      MCHC 31.3 g/dL      RDW 13.8 %      RDW-SD 45.7 fl      MPV 9.8 fL      Platelets 234 10*3/mm3      Neutrophil % 82.2 (H) %      Lymphocyte % 9.8 (L) %      Monocyte % 5.2 %      Eosinophil % 1.7 %      Basophil % 0.2 %      Immature Grans % 0.9 (H) %       Neutrophils, Absolute 6.92 10*3/mm3      Lymphocytes, Absolute 0.83 10*3/mm3      Monocytes, Absolute 0.44 10*3/mm3      Eosinophils, Absolute 0.14 10*3/mm3      Basophils, Absolute 0.02 10*3/mm3      Immature Grans, Absolute 0.08 (H) 10*3/mm3      nRBC 0.0 /100 WBC     CBC & Differential [[id number]] Collected:  01/12/17 0558    Specimen:  Blood Updated:  01/12/17 0608    Narrative:       The following orders were created for panel order CBC & Differential.  Procedure                               Abnormality         Status                     ---------                               -----------         ------                     CBC Auto Differential[[id number]]         Abnormal            Final result                 Please view results for these tests on the individual orders.    Lactic Acid, Plasma [[id number]]  (Normal) Collected:  01/12/17 0558    Specimen:  Blood Updated:  01/12/17 0618     Lactate 0.7 mmol/L     Comprehensive Metabolic Panel [[id number]]  (Abnormal) Collected:  01/12/17 0557    Specimen:  Blood Updated:  01/12/17 0622     Glucose 98 mg/dL      BUN 19 mg/dL      Creatinine 1.04 (H) mg/dL      Sodium 146 (H) mmol/L      Potassium 4.0 mmol/L      Chloride 104 mmol/L      CO2 29.4 (H) mmol/L      Calcium 9.7 mg/dL      Total Protein 5.9 (L) g/dL      Albumin 2.80 (L) g/dL      ALT (SGPT) 22 U/L      AST (SGOT) 19 U/L      Alkaline Phosphatase 115 U/L      Total Bilirubin 0.4 mg/dL      eGFR Non African Amer 51 (L) mL/min/1.73      Globulin 3.1 gm/dL      A/G Ratio 0.9 g/dL      BUN/Creatinine Ratio 18.3      Anion Gap 12.6 mmol/L     Narrative:       The MDRD GFR formula is only valid for adults with stable renal function between ages 18 and 70.    Influenza Antigen [[id number]]  (Abnormal) Collected:  01/12/17 0558    Specimen:  Swab from Nasopharynx Updated:  01/12/17 0624     Influenza A Ag, EIA Positive (A)      Influenza B Ag, EIA Negative     Troponin [[id number]]  (Normal) Collected:  01/12/17  0611    Specimen:  Blood Updated:  01/12/17 0630     Troponin T <0.010 ng/mL     Narrative:       Troponin T Reference Ranges:  Less than 0.03 ng/mL:    Negative for AMI  0.03 to 0.09 ng/mL:      Indeterminant for AMI  Greater than 0.09 ng/mL: Positive for AMI    BNP [[id number]]  (Abnormal) Collected:  01/12/17 0611    Specimen:  Blood Updated:  01/12/17 0659     proBNP 7334.0 (H) pg/mL     Narrative:       Among patients with dyspnea, NT-proBNP is highly sensitive for the detection of acute congestive heart failure. In addition NT-proBNP of <300 pg/ml effectively rules out acute congestive heart failure with 99% negative predictive value.    Blood Gas, Arterial [[id number]]  (Abnormal) Collected:  01/12/17 0738    Specimen:  Arterial Blood Updated:  01/12/17 0739     Site Arterial: left radial      Prashant's Test Positive      pH, Arterial 7.379 pH units      pCO2, Arterial 57.2 (H) mm Hg      pO2, Arterial 76.8 (L) mm Hg      HCO3, Arterial 33.0 (H) mmol/L      Base Excess, Arterial 6.6 (H) mmol/L      O2 Saturation Calculated 95.0 %      Hemoglobin, Blood Gas 10.4 (L) g/dL      Barometric Pressure for Blood Gas 748 mmHg      Modality BiPAP      FIO2 40 %      Set Mech Resp Rate 14      Rate 22 Breaths/minute      IPAP 12      EPAP 6      Pulse Ox 95 %     Protime-INR [[id number]]  (Abnormal) Collected:  01/12/17 0557    Specimen:  Blood Updated:  01/12/17 0812     Protime 26.9 (H) Seconds      INR 2.43 (H)     Narrative:       Therapeutic Ranges for INR: 2.0-3.0 (PT 20-30)                              2.5-3.5 (PT 25-34)    Urinalysis With / Culture If Indicated [[id number]]  (Abnormal) Collected:  01/12/17 0827    Specimen:  Urine from Urine, Clean Catch Updated:  01/12/17 0846     Color, UA Yellow      Appearance, UA Slightly Cloudy (A)      pH, UA 5.5      Specific Gravity, UA 1.010      Glucose, UA Negative      Ketones, UA Negative      Bilirubin, UA Negative      Blood, UA Negative      Protein, UA Negative      Leuk  Esterase, UA Negative      Nitrite, UA Negative      Urobilinogen, UA 0.2 E.U./dL     Narrative:       Urine microscopic not indicated.    POC Glucose Fingerstick [[id number]]  (Normal) Collected:  01/12/17 1206    Specimen:  Blood Updated:  01/12/17 1214     Glucose 106 mg/dL     Narrative:       Meter: [id number] : [id number] Valorie Nayak    Blood Gas, Arterial [[id number]]  (Abnormal) Collected:  01/12/17 1210    Specimen:  Arterial Blood Updated:  01/12/17 1219     Site Arterial: right radial      Prashant's Test Positive      pH, Arterial 7.395 pH units      pCO2, Arterial 58.5 (H) mm Hg      pO2, Arterial 69.3 (L) mm Hg      HCO3, Arterial 35.0 (H) mmol/L      Base Excess, Arterial 8.2 (H) mmol/L      O2 Saturation Calculated 94.2 %      Hemoglobin, Blood Gas 13.3 g/dL      Barometric Pressure for Blood Gas 749 mmHg      Modality BiPAP      FIO2 40 %      Set Mech Resp Rate 16      IPAP 14      EPAP 8     Troponin [[id number]]  (Normal) Collected:  01/12/17 1149    Specimen:  Blood Updated:  01/12/17 1242     Troponin T <0.010 ng/mL     Narrative:       Troponin T Reference Ranges:  Less than 0.03 ng/mL:    Negative for AMI  0.03 to 0.09 ng/mL:      Indeterminant for AMI  Greater than 0.09 ng/mL: Positive for AMI    Protime-INR [[id number]]  (Abnormal) Collected:  01/12/17 1149    Specimen:  Blood Updated:  01/12/17 1321     Protime 27.4 (H) Seconds      INR 2.49 (H)     Narrative:       Therapeutic Ranges for INR: 2.0-3.0 (PT 20-30)                              2.5-3.5 (PT 25-34)    Amylase [[id number]]  (Normal) Collected:  01/12/17 1646    Specimen:  Blood Updated:  01/12/17 1658     Amylase 55 U/L     Lipase [[id number]]  (Normal) Collected:  01/12/17 1646    Specimen:  Blood Updated:  01/12/17 1658     Lipase 22 U/L     POC Glucose Fingerstick [[id number]]  (Normal) Collected:  01/12/17 1731    Specimen:  Blood Updated:  01/12/17 1740     Glucose 114 mg/dL     Narrative:       Meter: [id number] : [id number]  Sydnee Huertas RN    Troponin [[id number]]  (Normal) Collected:  01/12/17 1755    Specimen:  Blood Updated:  01/12/17 1814     Troponin T <0.010 ng/mL     Narrative:       Troponin T Reference Ranges:  Less than 0.03 ng/mL:    Negative for AMI  0.03 to 0.09 ng/mL:      Indeterminant for AMI  Greater than 0.09 ng/mL: Positive for AMI    POC Glucose Fingerstick [[id number]]  (Abnormal) Collected:  01/12/17 2058    Specimen:  Blood Updated:  01/12/17 2110     Glucose 174 (H) mg/dL     Narrative:       Meter: [id number] : [id number] Starr Kim RN Validator    Troponin [[id number]]  (Normal) Collected:  01/12/17 2306    Specimen:  Blood Updated:  01/13/17 0003     Troponin T <0.010 ng/mL     Narrative:       Troponin T Reference Ranges:  Less than 0.03 ng/mL:    Negative for AMI  0.03 to 0.09 ng/mL:      Indeterminant for AMI  Greater than 0.09 ng/mL: Positive for AMI    CBC & Differential [[id number]] Collected:  01/13/17 0605    Specimen:  Blood Updated:  01/13/17 0620    Narrative:       The following orders were created for panel order CBC & Differential.  Procedure                               Abnormality         Status                     ---------                               -----------         ------                     CBC Auto Differential[[id number]]         Abnormal            Final result                 Please view results for these tests on the individual orders.    CBC Auto Differential [[id number]]  (Abnormal) Collected:  01/13/17 0605    Specimen:  Blood Updated:  01/13/17 0620     WBC 3.98 (L) 10*3/mm3      RBC 3.67 (L) 10*6/mm3      Hemoglobin 10.5 (L) g/dL      Hematocrit 33.9 (L) %      MCV 92.4 fL      MCH 28.6 pg      MCHC 31.0 g/dL      RDW 13.6 %      RDW-SD 46.4 fl      MPV 9.6 fL      Platelets 194 10*3/mm3      Neutrophil % 85.8 (H) %      Lymphocyte % 10.6 (L) %      Monocyte % 2.3 (L) %      Eosinophil % 0.0 %      Basophil % 0.3 %      Immature Grans % 1.0 (H) %      Neutrophils, Absolute 3.42  10*3/mm3      Lymphocytes, Absolute 0.42 (L) 10*3/mm3      Monocytes, Absolute 0.09 10*3/mm3      Eosinophils, Absolute 0.00 (L) 10*3/mm3      Basophils, Absolute 0.01 10*3/mm3      Immature Grans, Absolute 0.04 (H) 10*3/mm3      nRBC 0.0 /100 WBC     Protime-INR [[id number]]  (Abnormal) Collected:  01/13/17 0605    Specimen:  Blood Updated:  01/13/17 0636     Protime 37.2 (H) Seconds      INR 3.65 (H)     Narrative:       Therapeutic Ranges for INR: 2.0-3.0 (PT 20-30)                              2.5-3.5 (PT 25-34)    Lactic Acid, Plasma [[id number]]  (Normal) Collected:  01/13/17 0605    Specimen:  Blood Updated:  01/13/17 0636     Lactate 0.7 mmol/L     Comprehensive Metabolic Panel [[id number]]  (Abnormal) Collected:  01/13/17 0605    Specimen:  Blood Updated:  01/13/17 0650     Glucose 206 (H) mg/dL      BUN 19 mg/dL      Creatinine 0.88 mg/dL      Sodium 141 mmol/L      Potassium 4.1 mmol/L      Chloride 101 mmol/L      CO2 32.4 (H) mmol/L      Calcium 9.3 mg/dL      Total Protein 5.3 (L) g/dL      Albumin 2.40 (L) g/dL      ALT (SGPT) 16 U/L      AST (SGOT) 14 U/L      Alkaline Phosphatase 92 U/L      Total Bilirubin 0.3 mg/dL      eGFR Non African Amer 62 mL/min/1.73      Globulin 2.9 gm/dL      A/G Ratio 0.8 g/dL      BUN/Creatinine Ratio 21.6      Anion Gap 7.6 mmol/L     Narrative:       The MDRD GFR formula is only valid for adults with stable renal function between ages 18 and 70.    POC Glucose Fingerstick [[id number]]  (Abnormal) Collected:  01/13/17 0742    Specimen:  Blood Updated:  01/13/17 0754     Glucose 201 (H) mg/dL     Narrative:       Meter: [id number] : [id number] BETANCOURT Validator    POC Glucose Fingerstick [[id number]]  (Abnormal) Collected:  01/13/17 1201    Specimen:  Blood Updated:  01/13/17 1207     Glucose 187 (H) mg/dL     Narrative:       Meter: [id number] : [id number] Vinicio Oliva RN    POC Glucose Fingerstick [[id number]]  (Abnormal) Collected:  01/13/17 1716    Specimen:   Blood Updated:  01/13/17 1725     Glucose 247 (H) mg/dL     Narrative:       Meter: [id number] : [id number] Vinicio Oliva RN    POC Glucose Fingerstick [[id number]]  (Abnormal) Collected:  01/13/17 2036    Specimen:  Blood Updated:  01/13/17 2101     Glucose 272 (H) mg/dL     Narrative:       Meter: [id number] : [id number] Davian Bee RN Validator    CBC & Differential [[id number]] Collected:  01/14/17 0535    Specimen:  Blood Updated:  01/14/17 0549    Narrative:       The following orders were created for panel order CBC & Differential.  Procedure                               Abnormality         Status                     ---------                               -----------         ------                     CBC Auto Differential[[id number]]         Abnormal            Final result                 Please view results for these tests on the individual orders.    CBC Auto Differential [[id number]]  (Abnormal) Collected:  01/14/17 0535    Specimen:  Blood Updated:  01/14/17 0549     WBC 4.78 (L) 10*3/mm3      RBC 3.79 (L) 10*6/mm3      Hemoglobin 10.6 (L) g/dL      Hematocrit 33.3 (L) %      MCV 87.9 fL      MCH 28.0 pg      MCHC 31.8 g/dL      RDW 13.4 %      RDW-SD 43.0 fl      MPV 9.5 fL      Platelets 239 10*3/mm3      Neutrophil % 84.6 (H) %      Lymphocyte % 11.1 (L) %      Monocyte % 3.3 %      Eosinophil % 0.0 %      Basophil % 0.2 %      Immature Grans % 0.8 (H) %      Neutrophils, Absolute 4.04 10*3/mm3      Lymphocytes, Absolute 0.53 (L) 10*3/mm3      Monocytes, Absolute 0.16 10*3/mm3      Eosinophils, Absolute 0.00 (L) 10*3/mm3      Basophils, Absolute 0.01 10*3/mm3      Immature Grans, Absolute 0.04 (H) 10*3/mm3      nRBC 0.0 /100 WBC     Basic Metabolic Panel [[id number]]  (Abnormal) Collected:  01/14/17 0535    Specimen:  Blood Updated:  01/14/17 0602     Glucose 149 (H) mg/dL      BUN 22 mg/dL      Creatinine 0.91 mg/dL      Sodium 143 mmol/L      Potassium 4.1 mmol/L      Chloride 101 mmol/L      CO2  34.7 (H) mmol/L      Calcium 9.4 mg/dL      eGFR Non African Amer 59 (L) mL/min/1.73      BUN/Creatinine Ratio 24.2      Anion Gap 7.3 mmol/L     Narrative:       The MDRD GFR formula is only valid for adults with stable renal function between ages 18 and 70.    BNP [[id number]]  (Abnormal) Collected:  01/14/17 0535    Specimen:  Blood Updated:  01/14/17 0612     proBNP 4718.0 (H) pg/mL     Narrative:       Among patients with dyspnea, NT-proBNP is highly sensitive for the detection of acute congestive heart failure. In addition NT-proBNP of <300 pg/ml effectively rules out acute congestive heart failure with 99% negative predictive value.    Protime-INR [[id number]]  (Abnormal) Collected:  01/14/17 0535    Specimen:  Blood Updated:  01/14/17 0630     Protime 52.8 (H) Seconds      INR 5.65 (C)     Narrative:       Therapeutic Ranges for INR: 2.0-3.0 (PT 20-30)                              2.5-3.5 (PT 25-34)    POC Glucose Fingerstick [[id number]]  (Abnormal) Collected:  01/14/17 0736    Specimen:  Blood Updated:  01/14/17 0742     Glucose 143 (H) mg/dL     Narrative:       Meter: [id number] : [id number] Sherif Schofield    POC Glucose Fingerstick [[id number]]  (Abnormal) Collected:  01/14/17 1133    Specimen:  Blood Updated:  01/14/17 1150     Glucose 242 (H) mg/dL     Narrative:       Meter: [id number] : [id number] Sherif Schofield    POC Glucose Fingerstick [[id number]]  (Abnormal) Collected:  01/14/17 1634    Specimen:  Blood Updated:  01/14/17 1643     Glucose 216 (H) mg/dL     Narrative:       Meter: [id number] : [id number] Sherif Schofield    POC Glucose Fingerstick [[id number]]  (Abnormal) Collected:  01/14/17 2017    Specimen:  Blood Updated:  01/14/17 2032     Glucose 277 (H) mg/dL     Narrative:       Meter: [id number] : [id number] Davian Bee RN Validator    CBC & Differential [[id number]] Collected:  01/15/17 0550    Specimen:  Blood Updated:  01/15/17 0605    Narrative:       The following orders  were created for panel order CBC & Differential.  Procedure                               Abnormality         Status                     ---------                               -----------         ------                     CBC Auto Differential[[id number]]         Abnormal            Final result                 Please view results for these tests on the individual orders.    CBC Auto Differential [[id number]]  (Abnormal) Collected:  01/15/17 0550    Specimen:  Blood Updated:  01/15/17 0605     WBC 5.69 10*3/mm3      RBC 4.06 (L) 10*6/mm3      Hemoglobin 11.4 (L) g/dL      Hematocrit 35.5 (L) %      MCV 87.4 fL      MCH 28.1 pg      MCHC 32.1 g/dL      RDW 13.2 %      RDW-SD 41.9 fl      MPV 9.5 fL      Platelets 231 10*3/mm3      Neutrophil % 82.4 (H) %      Lymphocyte % 11.8 (L) %      Monocyte % 4.7 %      Eosinophil % 0.0 %      Basophil % 0.2 %      Immature Grans % 0.9 (H) %      Neutrophils, Absolute 4.69 10*3/mm3      Lymphocytes, Absolute 0.67 10*3/mm3      Monocytes, Absolute 0.27 10*3/mm3      Eosinophils, Absolute 0.00 (L) 10*3/mm3      Basophils, Absolute 0.01 10*3/mm3      Immature Grans, Absolute 0.05 (H) 10*3/mm3      nRBC 0.0 /100 WBC     Basic Metabolic Panel [[id number]]  (Abnormal) Collected:  01/15/17 0550    Specimen:  Blood Updated:  01/15/17 0624     Glucose 153 (H) mg/dL      BUN 29 (H) mg/dL      Creatinine 0.93 mg/dL      Sodium 146 (H) mmol/L      Potassium 3.8 mmol/L      Chloride 102 mmol/L      CO2 36.3 (H) mmol/L      Calcium 9.4 mg/dL      eGFR Non African Amer 58 (L) mL/min/1.73      BUN/Creatinine Ratio 31.2 (H)      Anion Gap 7.7 mmol/L     Narrative:       The MDRD GFR formula is only valid for adults with stable renal function between ages 18 and 70.    Protime-INR [[id number]]  (Abnormal) Collected:  01/15/17 0550    Specimen:  Blood Updated:  01/15/17 0630     Protime 37.1 (H) Seconds      INR 3.64 (H)     Narrative:       Therapeutic Ranges for INR: 2.0-3.0 (PT 20-30)                               2.5-3.5 (PT 25-34)    POC Glucose Fingerstick [[id number]]  (Abnormal) Collected:  01/15/17 0746    Specimen:  Blood Updated:  01/15/17 0757     Glucose 141 (H) mg/dL     Narrative:       Meter: [id number] : [id number] Valentin Chang    BNP [[id number]]  (Abnormal) Collected:  01/15/17 0551    Specimen:  Blood Updated:  01/15/17 0956     proBNP 3939.0 (H) pg/mL     Narrative:       Among patients with dyspnea, NT-proBNP is highly sensitive for the detection of acute congestive heart failure. In addition NT-proBNP of <300 pg/ml effectively rules out acute congestive heart failure with 99% negative predictive value.    POC Glucose Fingerstick [[id number]]  (Abnormal) Collected:  01/15/17 1221    Specimen:  Blood Updated:  01/15/17 1227     Glucose 229 (H) mg/dL     Narrative:       Meter: [id number] : [id number] Carlos Roe    Vancomycin, Random [[id number]] Collected:  01/15/17 1236    Specimen:  Blood Updated:  01/15/17 1335     Vancomycin Random 23.00 mcg/mL     POC Glucose Fingerstick [[id number]]  (Abnormal) Collected:  01/15/17 1659    Specimen:  Blood Updated:  01/15/17 1710     Glucose 193 (H) mg/dL     Narrative:       Meter: [id number] : [id number] Carlos Roe    POC Glucose Fingerstick [[id number]]  (Abnormal) Collected:  01/15/17 2021    Specimen:  Blood Updated:  01/15/17 2028     Glucose 257 (H) mg/dL     Narrative:       Meter: [id number] : [id number] Abdifatah Lockwood    Protime-INR [[id number]]  (Abnormal) Collected:  01/16/17 0319    Specimen:  Blood Updated:  01/16/17 0521     Protime 31.4 (H) Seconds      INR 2.95 (H)     Narrative:       Therapeutic Ranges for INR: 2.0-3.0 (PT 20-30)                              2.5-3.5 (PT 25-34)    Blood Gas, Arterial [[id number]]  (Abnormal) Collected:  01/16/17 0552    Specimen:  Arterial Blood Updated:  01/16/17 0558     Site Arterial: right radial      Prashant's Test Positive      pH, Arterial 7.461 (H) pH units      pCO2,  Arterial 49.5 (H) mm Hg      pO2, Arterial 69.1 (L) mm Hg      HCO3, Arterial 34.5 (H) mmol/L      Base Excess, Arterial 9.3 (H) mmol/L      O2 Saturation Calculated 94.3 %      Hemoglobin, Blood Gas 12.5 g/dL      Barometric Pressure for Blood Gas 755 mmHg      Modality Cannula      Flow Rate 2 lpm     POC Glucose Fingerstick [[id number]]  (Normal) Collected:  01/16/17 0832    Specimen:  Blood Updated:  01/16/17 0839     Glucose 116 mg/dL     Narrative:       Meter: [id number] : [id number] Angelique Rabago RN    MRSA Screen [[id number]]  (Normal) Collected:  01/14/17 2017    Specimen:  Swab from Nares Updated:  01/16/17 0903     MRSA SCREEN CX        No Methicillin Resistant Staphylococcus aureus isolated    POC Glucose Fingerstick [[id number]]  (Abnormal) Collected:  01/16/17 1151    Specimen:  Blood Updated:  01/16/17 1203     Glucose 205 (H) mg/dL     Narrative:       Meter: [id number] : [id number] Angelique Rabago RN    Blood Culture [[id number]]  (Normal) Collected:  01/12/17 1149    Specimen:  Blood from Arm, Right Updated:  01/16/17 1301     Blood Culture No growth at 4 days     POC Glucose Fingerstick [[id number]]  (Normal) Collected:  01/16/17 1713    Specimen:  Blood Updated:  01/16/17 1724     Glucose 112 mg/dL     Narrative:       Meter: [id number] : [id number] Angelique Rabago RN    POC Glucose Fingerstick [[id number]]  (Abnormal) Collected:  01/16/17 2003    Specimen:  Blood Updated:  01/16/17 2010     Glucose 271 (H) mg/dL     Narrative:       Meter: [id number] : [id number] Carlos Roe    Protime-INR [[id number]]  (Abnormal) Collected:  01/17/17 0607    Specimen:  Blood Updated:  01/17/17 0640     Protime 19.9 (H) Seconds      INR 1.67 (H)     Narrative:       Therapeutic Ranges for INR: 2.0-3.0 (PT 20-30)                              2.5-3.5 (PT 25-34)    Blood Culture [[id number]]  (Normal) Collected:  01/12/17 0055    Specimen:  Blood from Arm, Right Updated:  01/17/17 0701     Blood Culture  No growth at 5 days     POC Glucose Fingerstick [[id number]]  (Normal) Collected:  01/17/17 0744    Specimen:  Blood Updated:  01/17/17 0757     Glucose 125 mg/dL     Narrative:       Meter: [id number] : [id number] Davian Dunne SERAFIN Validator        Imaging Results (most recent)     Procedure Component Value Units Date/Time    XR Chest 1 View [[id number]] Collected:  01/12/17 0725     Updated:  01/12/17 0729    Narrative:       Chest x-ray     INDICATION:  Patient arrives via EMS from her nursing home for  evaluation of worsening dyspnea this evening. Apparently she has a long  history of chronic respiratory failure with congestive heart failure  complications as well as a recent pneumonia. She was just in the  hospital about 1 week ago for some similar symptoms but also a UTI. She  has an indwelling PICC line through which she is currently receiving  Rocephin daily. The nursing home staff tell us that the patient  developed some worsening shortness of breath symptoms as the evening  progressed. They gave her the usual nebulized treatments which often  resolve her breathing symptoms but this time they did not seem to  benefit much.     FINDINGS: AP upright view of the chest is compared with 01/03/2017. The  heart remains quite enlarged. There are small bilateral pleural  effusions, left greater than right. There is new infiltrate at the right  lung base concerning for pneumonia. There is consolidation behind the  heart at the left base which may reflect atelectasis or pneumonia. Right  arm approach PICC remains in the SVC. No pneumothorax.       Impression:       Stable cardiomegaly. Bilateral effusions left greater than  right. New infiltrate in the right base concerning for pneumonia. There  is infiltrate or atelectasis in the left base as well.     This report was finalized on 1/12/2017 7:27 AM by Dr. Gerardo Brito MD.       CT Chest Without Contrast [[id number]] Collected:  01/12/17 1541     Updated:  01/12/17  1554    Narrative:       CHEST CT     HISTORY:  Shortness of air with cough, congestion, and flulike symptoms for the  last 3 days. Former smoker. Abnormal chest x-ray today showing a  potential right lung base pneumonia.     TECHNIQUE:  Axial images were obtained through the chest without contrast.  Multiplanar reformats were obtained. No comparison chest CT. Radiation  dose reduction techniques were utilized, including automated exposure  control and exposure modulation based on body size.     FINDINGS:  The heart is enlarged. The main pulmonary arteries are dilated  suggesting pulmonary hypertension. The right main PA measures 3.8 cm,  and the left main PA measures 3.4 cm. There is no pericardial effusion.  There is a small right pleural effusion. There is a trace amount of left  pleural fluid. Note is made of multiple potential left breast nodules.  Outpatient evaluation with mammogram is recommended if this has not been  performed recently at an outside facility. There is no adenopathy. There  is Some mucous plugging noted in left lower lobe bronchi. There is  alveolar consolidation in both lower lobes which may simply reflect some  atelectasis. Underlying pneumonia is not excluded the basis of this  exam. Continuation through the upper abdomen reveals diffuse fat  stranding around the pancreas suggesting acute pancreatitis. This is  incompletely visualized. Gallbladder contains numerous stones. There is  soft tissue swelling in the left lateral lower chest wall which may  reflect hematoma. Correlate with physical exam findings. A right arm  approach PICC line has its tip in the SVC in good position.       Impression:       1. These findings are highly suspicious for acute pancreatitis. The  pancreas is incompletely visualized. Correlation with laboratory data  recommended.  2. Soft tissue density in the left lateral lower chest wall may reflect  a hematoma. Correlate with physical exam findings.  3.  Cardiomegaly.  4. Enlarged central pulmonary arteries suggesting pulmonary arterial  hypertension.  5. Small right pleural effusion with a trace left effusion. There is  some mucous plugging in the lower lobe bronchi on the left. There is  some alveolar consolidation in both lower lobes. Considerations include  atelectasis and/or pneumonia.  6. Cholelithiasis.  7. Potential left breast nodules. Outpatient evaluation with mammogram  recommended if this is not been performed recently at an outside  facility.        Stat final copy of this report was sent to the ordering physician's  office immediately following this dictation with telephone notification.     This report was finalized on 1/12/2017 3:51 PM by Dr. Gerardo Brito MD.             PROCEDURES      Condition on Discharge:  Clinically and hemodynamically stable    Physical Exam at Discharge  Vital Signs  Temp:  [97.9 °F (36.6 °C)-98.5 °F (36.9 °C)] 97.9 °F (36.6 °C)  Heart Rate:  [62-84] 79  Resp:  [16-20] 18  BP: (104-131)/(48-78) 131/64    Physical Exam:    Alexandria Villanueva  1935  [id number]      PHYSICAL EXAMINATION:    VITAL SIGNS: As per Nurse's notes    GENERAL APPEARANCE: The patient is a well developed, well nourished,  female, in no acute distress.     HEENT: Normocephalic, atraumatic. PERRL. The sclerae anicteric and conjunctivae pink and moist.     NECK: Supple and symmetric. No thyromegaly. No tenderness. Trachea central.     LYMPH NODES: No palpable lymphadenopathy.    SKIN: Warm, dry and intact. No rash or lesions or wounds or patechiae.    LUNGS: Clear to auscultation bilaterally with no rales or rhonchi.     CARDIOVASCULAR: RRR. S1, S2 normal without murmur/gallop/rub. No S3, S4.     ABDOMEN: Obese, soft, non-tender, non-distended. No masses. No rebound/guarding.    EXTREMITIES:  No evidence of cyanosis, clubbing, but 1+ edema.     MUSCULOSKELETAL: Unremarkable. Muscle strength and tone normal.    PSYCHIATRY: Responds  appropriately to questions. No evidence of acute anxiety, depression, panic attacks or hallucinations. Patient appears sad as she does not want to go back to [facility]    MENTAL STATUS EXAMINATION: Affect and insight appropriate. Speech and behavior are normal. Patient is alert and oriented x3. Thought Process WNL.     NEUROLOGIC: Examination is grossly intact globally with no focal deficits.              Discharge Disposition  Patient is going back to [facility]    Visiting Nurse:    No     Home PT/OT:  No     Home Safety Evaluation:  No     DME  Patient already has these at the Nursing Home    Discharge Diet:           Dietary Orders            Start     Ordered    01/17/17 0828  Diet Soft Texture; Whole Foods; Consistent Carbohydrate, Cardiac  Diet Effective Now     Question Answer Comment   Diet Texture / Consistency Soft Texture    Select Texture: Whole Foods    Common Modifiers Consistent Carbohydrate    Common Modifiers Cardiac        01/17/17 0827          Activity at Discharge:  Ad aric    Pre-discharge education  N/A      Follow-up Appointments  No future appointments.      Test Results Pending at Discharge   Order Current Status    Blood Culture Preliminary result           Trevor Diallo MD  01/17/17  9:56 AM    Time: Discharge 25 min (if over 30 minutes give explanation as to why it took greater than 30 minutes)  Discharge over 30 min (if over 30 minutes give explanation as to why it took greater than 30 minutes)  Secondary to:  Coordination of home care  D/W case management  Medication reconciliation    EMR Dragon/Transcription disclaimer:      Much of this encounter note is an electronic transcription/translation of spoken language to printed text. The electronic translation of spoken language may permit erroneous, or at times, nonsensical words or phrases to be inadvertently transcribed; Although I have reviewed the note for such errors, some may still exist.

## 2017-01-17 NOTE — PLAN OF CARE
Problem: Patient Care Overview (Adult)  Goal: Plan of Care Review  Outcome: Ongoing (interventions implemented as appropriate)    01/17/17 0951   Coping/Psychosocial Response Interventions   Plan Of Care Reviewed With patient   Patient Care Overview   Progress improving   Outcome Evaluation   Outcome Summary/Follow up Plan VSS plan is to D/C today       Goal: Adult Individualization and Mutuality  Outcome: Ongoing (interventions implemented as appropriate)    Problem: Fall Risk (Adult)  Goal: Absence of Falls  Outcome: Ongoing (interventions implemented as appropriate)

## 2017-01-17 NOTE — PLAN OF CARE
Problem: Patient Care Overview (Adult)  Goal: Plan of Care Review  Outcome: Ongoing (interventions implemented as appropriate)    01/17/17 0241   Coping/Psychosocial Response Interventions   Plan Of Care Reviewed With patient   Patient Care Overview   Progress progress toward functional goals as expected   Outcome Evaluation   Outcome Summary/Follow up Plan VSS; pt remains on tamiflu in droplet isolation. remains on IV antibiotics. continues to recieve PO diuretics.        Goal: Adult Individualization and Mutuality  Outcome: Ongoing (interventions implemented as appropriate)  Goal: Discharge Needs Assessment  Outcome: Ongoing (interventions implemented as appropriate)    Problem: Respiratory Insufficiency (Adult)  Goal: Acid/Base Balance  Outcome: Ongoing (interventions implemented as appropriate)    01/17/17 0241   Respiratory Insufficiency (Adult)   Acid/Base Balance making progress toward outcome       Goal: Effective Ventilation  Outcome: Ongoing (interventions implemented as appropriate)    01/17/17 0241   Respiratory Insufficiency (Adult)   Effective Ventilation making progress toward outcome         Problem: Fall Risk (Adult)  Goal: Absence of Falls  Outcome: Ongoing (interventions implemented as appropriate)    01/17/17 0241   Fall Risk (Adult)   Absence of Falls making progress toward outcome

## 2017-01-18 NOTE — PLAN OF CARE
Problem: Patient Care Overview (Adult)  Goal: Discharge Needs Assessment  Outcome: Outcome(s) achieved Date Met:  01/18/17 01/18/17 0902   Discharge Needs Assessment   Discharge Planning Comments Spoke with Micaela at [facility], they did not get precert before patient returned to facility, so she will return intermediate level of care.

## 2017-01-25 ENCOUNTER — LAB REQUISITION (OUTPATIENT)
Dept: LAB | Facility: HOSPITAL | Age: 82
End: 2017-01-25

## 2017-01-25 DIAGNOSIS — I50.32 CHRONIC DIASTOLIC HEART FAILURE (HCC): ICD-10-CM

## 2017-01-25 LAB
ANION GAP SERPL CALCULATED.3IONS-SCNC: 13.1 MMOL/L
BUN BLD-MCNC: 27 MG/DL (ref 8–23)
BUN/CREAT SERPL: 25.2 (ref 7–25)
CALCIUM SPEC-SCNC: 9.8 MG/DL (ref 8.8–10.5)
CHLORIDE SERPL-SCNC: 100 MMOL/L (ref 98–107)
CO2 SERPL-SCNC: 29.9 MMOL/L (ref 22–29)
CREAT BLD-MCNC: 1.07 MG/DL (ref 0.57–1)
GFR SERPL CREATININE-BSD FRML MDRD: 49 ML/MIN/1.73
GLUCOSE BLD-MCNC: 264 MG/DL (ref 65–99)
NT-PROBNP SERPL-MCNC: 6665 PG/ML (ref 5–450)
POTASSIUM BLD-SCNC: 3.7 MMOL/L (ref 3.5–5.2)
SODIUM BLD-SCNC: 143 MMOL/L (ref 136–145)

## 2017-01-25 PROCEDURE — 80048 BASIC METABOLIC PNL TOTAL CA: CPT | Performed by: HOSPITALIST

## 2017-01-25 PROCEDURE — 83880 ASSAY OF NATRIURETIC PEPTIDE: CPT | Performed by: HOSPITALIST

## 2017-03-08 ENCOUNTER — APPOINTMENT (OUTPATIENT)
Dept: ULTRASOUND IMAGING | Facility: HOSPITAL | Age: 82
End: 2017-03-08

## 2017-03-08 ENCOUNTER — HOSPITAL ENCOUNTER (INPATIENT)
Facility: HOSPITAL | Age: 82
LOS: 7 days | Discharge: NURSING, REHAB OR LONG-TERM CARE | End: 2017-03-15
Attending: HOSPITALIST | Admitting: INTERNAL MEDICINE

## 2017-03-08 DIAGNOSIS — K80.50 CHOLEDOCHOLITHIASIS: Primary | ICD-10-CM

## 2017-03-08 DIAGNOSIS — E66.9 DIABETES MELLITUS TYPE 2 IN OBESE (HCC): ICD-10-CM

## 2017-03-08 DIAGNOSIS — E11.69 DIABETES MELLITUS TYPE 2 IN OBESE (HCC): ICD-10-CM

## 2017-03-08 DIAGNOSIS — K81.9 CHOLECYSTITIS: ICD-10-CM

## 2017-03-08 DIAGNOSIS — R52 PAIN: ICD-10-CM

## 2017-03-08 PROBLEM — R10.9 ABDOMINAL PAIN: Status: ACTIVE | Noted: 2017-03-08

## 2017-03-08 LAB
ALBUMIN SERPL-MCNC: 3.1 G/DL (ref 3.5–5.2)
ALBUMIN/GLOB SERPL: 1.2 G/DL
ALP SERPL-CCNC: 190 U/L (ref 40–129)
ALT SERPL W P-5'-P-CCNC: 311 U/L (ref 5–33)
AMORPH URATE CRY URNS QL MICRO: ABNORMAL /HPF
AMYLASE SERPL-CCNC: 1696 U/L (ref 28–100)
ANION GAP SERPL CALCULATED.3IONS-SCNC: 11.7 MMOL/L
ANISOCYTOSIS BLD QL: NORMAL
APTT PPP: 43.2 SECONDS (ref 24.3–38.1)
AST SERPL-CCNC: 467 U/L (ref 5–32)
BACTERIA UR QL AUTO: ABNORMAL /HPF
BASOPHILS # BLD AUTO: 0.03 10*3/MM3 (ref 0–0.2)
BASOPHILS NFR BLD AUTO: 0.2 % (ref 0–2)
BILIRUB SERPL-MCNC: 5.4 MG/DL (ref 0.2–1.2)
BILIRUB UR QL STRIP: ABNORMAL
BUN BLD-MCNC: 39 MG/DL (ref 8–23)
BUN/CREAT SERPL: 16.5 (ref 7–25)
CALCIUM SPEC-SCNC: 9.8 MG/DL (ref 8.8–10.5)
CHLORIDE SERPL-SCNC: 95 MMOL/L (ref 98–107)
CLARITY UR: ABNORMAL
CO2 SERPL-SCNC: 32.3 MMOL/L (ref 22–29)
COLOR UR: ABNORMAL
CREAT BLD-MCNC: 2.36 MG/DL (ref 0.57–1)
D-LACTATE SERPL-SCNC: 1.1 MMOL/L (ref 0.5–2)
D-LACTATE SERPL-SCNC: 2.5 MMOL/L (ref 0.5–2)
DEPRECATED RDW RBC AUTO: 51.8 FL (ref 37–54)
EOSINOPHIL # BLD AUTO: 0.01 10*3/MM3 (ref 0.1–0.3)
EOSINOPHIL NFR BLD AUTO: 0.1 % (ref 0–4)
ERYTHROCYTE [DISTWIDTH] IN BLOOD BY AUTOMATED COUNT: 15.8 % (ref 11.5–14.5)
GFR SERPL CREATININE-BSD FRML MDRD: 20 ML/MIN/1.73
GLOBULIN UR ELPH-MCNC: 2.5 GM/DL
GLUCOSE BLD-MCNC: 158 MG/DL (ref 65–99)
GLUCOSE BLDC GLUCOMTR-MCNC: 145 MG/DL (ref 70–130)
GLUCOSE BLDC GLUCOMTR-MCNC: 151 MG/DL (ref 70–130)
GLUCOSE BLDC GLUCOMTR-MCNC: 164 MG/DL (ref 70–130)
GLUCOSE UR STRIP-MCNC: ABNORMAL MG/DL
HCT VFR BLD AUTO: 33.6 % (ref 37–47)
HGB BLD-MCNC: 11 G/DL (ref 12–16)
HGB UR QL STRIP.AUTO: ABNORMAL
HYALINE CASTS UR QL AUTO: ABNORMAL /LPF
IMM GRANULOCYTES # BLD: 0.14 10*3/MM3 (ref 0–0.03)
IMM GRANULOCYTES NFR BLD: 0.9 % (ref 0–0.5)
INR PPP: 2.01 (ref 0.9–1.1)
KETONES UR QL STRIP: ABNORMAL
LEUKOCYTE ESTERASE UR QL STRIP.AUTO: ABNORMAL
LIPASE SERPL-CCNC: 2486 U/L (ref 13–60)
LYMPHOCYTES # BLD AUTO: 1.46 10*3/MM3 (ref 0.6–4.8)
LYMPHOCYTES NFR BLD AUTO: 8.9 % (ref 20–45)
MCH RBC QN AUTO: 29 PG (ref 27–31)
MCHC RBC AUTO-ENTMCNC: 32.7 G/DL (ref 31–37)
MCV RBC AUTO: 88.7 FL (ref 81–99)
MONOCYTES # BLD AUTO: 0.94 10*3/MM3 (ref 0–1)
MONOCYTES NFR BLD AUTO: 5.7 % (ref 3–8)
MUCOUS THREADS URNS QL MICRO: ABNORMAL /HPF
NEUTROPHILS # BLD AUTO: 13.82 10*3/MM3 (ref 1.5–8.3)
NEUTROPHILS NFR BLD AUTO: 84.2 % (ref 45–70)
NITRITE UR QL STRIP: NEGATIVE
NRBC BLD MANUAL-RTO: 0 /100 WBC (ref 0–0)
PH UR STRIP.AUTO: <=5 [PH] (ref 4.5–8)
PLATELET # BLD AUTO: 153 10*3/MM3 (ref 140–500)
PMV BLD AUTO: 9.6 FL (ref 7.4–10.4)
POTASSIUM BLD-SCNC: 4.6 MMOL/L (ref 3.5–5.2)
PROT SERPL-MCNC: 5.6 G/DL (ref 6–8.5)
PROT UR QL STRIP: ABNORMAL
PROTHROMBIN TIME: 23.1 SECONDS (ref 12.1–15)
RBC # BLD AUTO: 3.79 10*6/MM3 (ref 4.2–5.4)
RBC # UR: ABNORMAL /HPF
REF LAB TEST METHOD: ABNORMAL
SMALL PLATELETS BLD QL SMEAR: ADEQUATE
SODIUM BLD-SCNC: 139 MMOL/L (ref 136–145)
SP GR UR STRIP: 1.02 (ref 1–1.03)
SQUAMOUS #/AREA URNS HPF: ABNORMAL /HPF
UROBILINOGEN UR QL STRIP: ABNORMAL
WBC MORPH BLD: NORMAL
WBC NRBC COR # BLD: 16.4 10*3/MM3 (ref 4.8–10.8)
WBC UR QL AUTO: ABNORMAL /HPF

## 2017-03-08 PROCEDURE — 85610 PROTHROMBIN TIME: CPT | Performed by: INTERNAL MEDICINE

## 2017-03-08 PROCEDURE — 80053 COMPREHEN METABOLIC PANEL: CPT | Performed by: INTERNAL MEDICINE

## 2017-03-08 PROCEDURE — 87186 SC STD MICRODIL/AGAR DIL: CPT | Performed by: INTERNAL MEDICINE

## 2017-03-08 PROCEDURE — 87086 URINE CULTURE/COLONY COUNT: CPT | Performed by: INTERNAL MEDICINE

## 2017-03-08 PROCEDURE — 99222 1ST HOSP IP/OBS MODERATE 55: CPT | Performed by: INTERNAL MEDICINE

## 2017-03-08 PROCEDURE — 81001 URINALYSIS AUTO W/SCOPE: CPT | Performed by: INTERNAL MEDICINE

## 2017-03-08 PROCEDURE — 83605 ASSAY OF LACTIC ACID: CPT | Performed by: HOSPITALIST

## 2017-03-08 PROCEDURE — 94640 AIRWAY INHALATION TREATMENT: CPT

## 2017-03-08 PROCEDURE — 83690 ASSAY OF LIPASE: CPT | Performed by: INTERNAL MEDICINE

## 2017-03-08 PROCEDURE — 94799 UNLISTED PULMONARY SVC/PX: CPT

## 2017-03-08 PROCEDURE — 85025 COMPLETE CBC W/AUTO DIFF WBC: CPT | Performed by: INTERNAL MEDICINE

## 2017-03-08 PROCEDURE — 25010000002 HYDROCORTISONE SODIUM SUCCINATE 100 MG RECONSTITUTED SOLUTION: Performed by: INTERNAL MEDICINE

## 2017-03-08 PROCEDURE — 83605 ASSAY OF LACTIC ACID: CPT | Performed by: INTERNAL MEDICINE

## 2017-03-08 PROCEDURE — 85730 THROMBOPLASTIN TIME PARTIAL: CPT | Performed by: INTERNAL MEDICINE

## 2017-03-08 PROCEDURE — 76700 US EXAM ABDOM COMPLETE: CPT

## 2017-03-08 PROCEDURE — 25010000002 PIPERACILLIN SOD-TAZOBACTAM PER 1 G: Performed by: INTERNAL MEDICINE

## 2017-03-08 PROCEDURE — 82962 GLUCOSE BLOOD TEST: CPT

## 2017-03-08 PROCEDURE — 85007 BL SMEAR W/DIFF WBC COUNT: CPT | Performed by: INTERNAL MEDICINE

## 2017-03-08 PROCEDURE — 82150 ASSAY OF AMYLASE: CPT | Performed by: INTERNAL MEDICINE

## 2017-03-08 RX ORDER — DEXTROSE MONOHYDRATE 25 G/50ML
25 INJECTION, SOLUTION INTRAVENOUS
Status: DISCONTINUED | OUTPATIENT
Start: 2017-03-08 | End: 2017-03-15 | Stop reason: HOSPADM

## 2017-03-08 RX ORDER — FOLIC ACID 1 MG/1
1 TABLET ORAL DAILY
Status: DISCONTINUED | OUTPATIENT
Start: 2017-03-08 | End: 2017-03-08

## 2017-03-08 RX ORDER — ACETAMINOPHEN 325 MG/1
650 TABLET ORAL EVERY 4 HOURS PRN
Status: DISCONTINUED | OUTPATIENT
Start: 2017-03-08 | End: 2017-03-08

## 2017-03-08 RX ORDER — DEXTROSE AND SODIUM CHLORIDE 5; .45 G/100ML; G/100ML
INJECTION, SOLUTION INTRAVENOUS
Status: COMPLETED
Start: 2017-03-08 | End: 2017-03-08

## 2017-03-08 RX ORDER — FUROSEMIDE 40 MG/1
40 TABLET ORAL 2 TIMES DAILY
Status: DISCONTINUED | OUTPATIENT
Start: 2017-03-08 | End: 2017-03-08

## 2017-03-08 RX ORDER — GABAPENTIN 300 MG/1
300 CAPSULE ORAL 2 TIMES DAILY
Status: DISCONTINUED | OUTPATIENT
Start: 2017-03-08 | End: 2017-03-08

## 2017-03-08 RX ORDER — NICOTINE POLACRILEX 4 MG
15 LOZENGE BUCCAL
Status: DISCONTINUED | OUTPATIENT
Start: 2017-03-08 | End: 2017-03-15 | Stop reason: HOSPADM

## 2017-03-08 RX ORDER — METOPROLOL SUCCINATE 25 MG/1
25 TABLET, EXTENDED RELEASE ORAL DAILY
Status: DISCONTINUED | OUTPATIENT
Start: 2017-03-09 | End: 2017-03-08

## 2017-03-08 RX ORDER — PREDNISONE 10 MG/1
10 TABLET ORAL
Status: DISCONTINUED | OUTPATIENT
Start: 2017-03-09 | End: 2017-03-08

## 2017-03-08 RX ORDER — PANTOPRAZOLE SODIUM 40 MG/10ML
40 INJECTION, POWDER, LYOPHILIZED, FOR SOLUTION INTRAVENOUS
Status: DISCONTINUED | OUTPATIENT
Start: 2017-03-09 | End: 2017-03-15 | Stop reason: HOSPADM

## 2017-03-08 RX ORDER — PANTOPRAZOLE SODIUM 40 MG/1
40 TABLET, DELAYED RELEASE ORAL EVERY EVENING
Status: DISCONTINUED | OUTPATIENT
Start: 2017-03-08 | End: 2017-03-08

## 2017-03-08 RX ORDER — WARFARIN SODIUM 3 MG/1
6 TABLET ORAL
Status: DISCONTINUED | OUTPATIENT
Start: 2017-03-08 | End: 2017-03-08

## 2017-03-08 RX ORDER — WARFARIN SODIUM 5 MG/1
5 TABLET ORAL NIGHTLY
Status: DISCONTINUED | OUTPATIENT
Start: 2017-03-08 | End: 2017-03-08

## 2017-03-08 RX ORDER — IPRATROPIUM BROMIDE AND ALBUTEROL SULFATE 2.5; .5 MG/3ML; MG/3ML
3 SOLUTION RESPIRATORY (INHALATION)
Status: DISCONTINUED | OUTPATIENT
Start: 2017-03-08 | End: 2017-03-15 | Stop reason: HOSPADM

## 2017-03-08 RX ORDER — POLYETHYLENE GLYCOL 3350 17 G/17G
17 POWDER, FOR SOLUTION ORAL DAILY
Status: DISCONTINUED | OUTPATIENT
Start: 2017-03-09 | End: 2017-03-08

## 2017-03-08 RX ORDER — DEXTROSE AND SODIUM CHLORIDE 5; .45 G/100ML; G/100ML
100 INJECTION, SOLUTION INTRAVENOUS CONTINUOUS
Status: DISCONTINUED | OUTPATIENT
Start: 2017-03-08 | End: 2017-03-08

## 2017-03-08 RX ORDER — SODIUM CHLORIDE, SODIUM LACTATE, POTASSIUM CHLORIDE, CALCIUM CHLORIDE 600; 310; 30; 20 MG/100ML; MG/100ML; MG/100ML; MG/100ML
50 INJECTION, SOLUTION INTRAVENOUS CONTINUOUS
Status: DISCONTINUED | OUTPATIENT
Start: 2017-03-08 | End: 2017-03-15 | Stop reason: HOSPADM

## 2017-03-08 RX ORDER — SENNA AND DOCUSATE SODIUM 50; 8.6 MG/1; MG/1
2 TABLET, FILM COATED ORAL 2 TIMES DAILY
Status: DISCONTINUED | OUTPATIENT
Start: 2017-03-08 | End: 2017-03-08

## 2017-03-08 RX ORDER — HYDROCODONE BITARTRATE AND ACETAMINOPHEN 5; 325 MG/1; MG/1
1 TABLET ORAL EVERY 6 HOURS PRN
Status: DISCONTINUED | OUTPATIENT
Start: 2017-03-08 | End: 2017-03-08

## 2017-03-08 RX ORDER — MULTIPLE VITAMINS W/ MINERALS TAB 9MG-400MCG
1 TAB ORAL DAILY
Status: DISCONTINUED | OUTPATIENT
Start: 2017-03-08 | End: 2017-03-08

## 2017-03-08 RX ORDER — ACETAMINOPHEN 650 MG/1
650 SUPPOSITORY RECTAL EVERY 4 HOURS PRN
Status: DISCONTINUED | OUTPATIENT
Start: 2017-03-08 | End: 2017-03-11

## 2017-03-08 RX ORDER — CITALOPRAM 20 MG/1
20 TABLET ORAL DAILY
Status: DISCONTINUED | OUTPATIENT
Start: 2017-03-09 | End: 2017-03-08

## 2017-03-08 RX ORDER — LANOLIN ALCOHOL/MO/W.PET/CERES
6 CREAM (GRAM) TOPICAL NIGHTLY
Status: DISCONTINUED | OUTPATIENT
Start: 2017-03-08 | End: 2017-03-08

## 2017-03-08 RX ADMIN — DEXTROSE AND SODIUM CHLORIDE 100 ML/HR: 5; 450 INJECTION, SOLUTION INTRAVENOUS at 17:55

## 2017-03-08 RX ADMIN — SODIUM CHLORIDE, POTASSIUM CHLORIDE, SODIUM LACTATE AND CALCIUM CHLORIDE 50 ML/HR: 600; 310; 30; 20 INJECTION, SOLUTION INTRAVENOUS at 22:07

## 2017-03-08 RX ADMIN — IPRATROPIUM BROMIDE AND ALBUTEROL SULFATE 3 ML: .5; 3 SOLUTION RESPIRATORY (INHALATION) at 16:15

## 2017-03-08 RX ADMIN — HYDROCORTISONE SODIUM SUCCINATE 50 MG: 100 INJECTION, POWDER, FOR SOLUTION INTRAMUSCULAR; INTRAVENOUS at 20:28

## 2017-03-08 RX ADMIN — PIPERACILLIN AND TAZOBACTAM 3.38 G: 3; .375 INJECTION, POWDER, LYOPHILIZED, FOR SOLUTION INTRAVENOUS; PARENTERAL at 20:29

## 2017-03-08 RX ADMIN — SODIUM CHLORIDE 250 ML: 9 INJECTION, SOLUTION INTRAVENOUS at 21:34

## 2017-03-08 RX ADMIN — IPRATROPIUM BROMIDE AND ALBUTEROL SULFATE 3 ML: .5; 3 SOLUTION RESPIRATORY (INHALATION) at 19:39

## 2017-03-08 RX ADMIN — SODIUM CHLORIDE 250 ML: 9 INJECTION, SOLUTION INTRAVENOUS at 17:56

## 2017-03-08 NOTE — H&P
HOSPITALIST SERVICES     @ [place], [place]                Hospitalist Team    ADMISSION HISTORY AND PHYSICAL    PATIENT:      Patient Care Team:  Gerardo Williamson MD as PCP - General  Gerardo Williamson MD as PCP - Family Medicine    CHIEF COMPLAINT:     Upper abdominal pain for last few days        HISTORY OF PRESENT ILLNESS:    Ms. Alexandria Woo is  An 81 year old  female who [facility]. Dr. Garcia was making rounds today and this patient was examined any because of moderately severe abdominal pain and n/v, she was sent here to [facility] in [place] as direct admit. Work up done here showed acute cholecystitis and acute pancreatitis. Patient also appears to be septic. She looks jaundiced on examination. Patient denies any sx of chest pain, shortness of breath, dysuria or recent hx of blood in stool.              Past Medical History   Diagnosis Date   • Anemia    • Arthritis    • Chronic diastolic (congestive) heart failure    • Chronic kidney disease    • DVT (deep venous thrombosis)    • Dyslipidemia    • Gout    • Hypertension    • Lymphedema    • Morbid obesity    • Obstructive sleep apnea of adult      untreated   • Permanent atrial fibrillation    • Pulmonary HTN    • Respiratory failure, acute      hypoxic     Past Surgical History   Procedure Laterality Date   • Hernia repair     • Eye surgery     • Femur fracture surgery Right      closed reduction external fixation   • Cataract extraction       both eyes 4 years ago   • Fracture surgery       Family History   Problem Relation Age of Onset   • Cancer Mother    • Heart disease Father    • Diabetes Father    • COPD Brother    • Heart disease Brother      Social History   Substance Use Topics   • Smoking status: Never Smoker   • Smokeless tobacco: Not on file   • Alcohol use No     Prescriptions Prior to Admission   Medication Sig Dispense Refill Last Dose   • cholecalciferol (VITAMIN D3) 67238 UNITS  capsule Take 50,000 Units by mouth Every 30 (Thirty) Days.   2/12/2017 at 0900   • citalopram (CeleXA) 20 MG tablet Take 1 tablet by mouth Daily. (Patient taking differently: Take 20 mg by mouth 3 (Three) Times a Week. MON, WED, FRI)   3/8/2017 at 0900   • folic acid (FOLVITE) 1 MG tablet Take 1 mg by mouth daily.   3/8/2017 at 0900   • furosemide (LASIX) 40 MG tablet Take 1 tablet by mouth 2 (Two) Times a Day. 60 tablet 1 3/8/2017 at 0600   • gabapentin (NEURONTIN) 300 MG capsule Take 300 mg by mouth 2 (two) times a day.   3/8/2017 at 0900   • glucosamine-chondroitin 500-400 MG capsule capsule Take 2 capsules by mouth daily.   3/8/2017 at 0900   • HYDROcodone-acetaminophen (NORCO) 5-325 MG per tablet Take 1 tablet by mouth Every 6 (Six) Hours As Needed for moderate pain (4-6).  0 3/7/2017 at 2115   • insulin aspart (NovoLOG) 100 UNIT/ML injection Inject 3 Units under the skin 3 (Three) Times a Day With Meals. Hold if blood sugar less than 120  12 3/8/2017 at Unknown time   • ipratropium-albuterol (DUO-NEB) 0.5-2.5 mg/mL nebulizer Take 3 mL by nebulization 4 (Four) Times a Day. 360 mL  3/8/2017 at 0600   • metoprolol succinate XL (TOPROL-XL) 25 MG 24 hr tablet Take 25 mg by mouth daily.   3/8/2017 at 0900   • multivitamin-minerals (OCUVITE) tablet Take 1 tablet by mouth daily.   3/8/2017 at 0900   • NON FORMULARY 3L NC continuos   3/8/2017 at Unknown time   • polyethylene glycol (MIRALAX) packet Take 17 g by mouth daily.   3/8/2017 at 0900   • predniSONE (DELTASONE) 10 MG tablet Take 1 tablet by mouth Daily With Breakfast. 100 tablet 1 3/8/2017 at 0800   • sennosides-docusate sodium (SENOKOT-S) 8.6-50 MG tablet Take 2 tablets by mouth 2 (Two) Times a Day. 120 tablet 1 3/8/2017 at 0900   • warfarin (COUMADIN) 6 MG tablet Take 6 mg by mouth Daily. 6 mg every Sun, Mon, Tues, Thrs, Sat  7 mg every Wed, Fri   3/7/2017 at 1600   • acetaminophen (TYLENOL) 325 MG tablet Take 2 tablets by mouth Every 4 (Four) Hours As Needed  for mild pain (1-3).  0 Unknown at Unknown time   • bisacodyl (DULCOLAX) 5 MG EC tablet Insert 10 mg into the rectum Daily As Needed for constipation.   Unknown at Unknown time   • insulin detemir (LEVEMIR) 100 UNIT/ML injection Inject 20 Units under the skin Every Night.  12 Unknown at Unknown time   • melatonin 3 MG tablet Take 6 mg by mouth Every Night.   3/6/2017 at 2100   • pantoprazole (PROTONIX) 40 MG EC tablet Take 40 mg by mouth every evening.   3/6/2017 at unknown   • warfarin (COUMADIN) 5 MG tablet i po qhs 30 tablet 1 Unknown at Unknown time     Allergies:  Codeine; Demerol [meperidine]; and Propoxyphene    REVIEW OF SYSTEMS:  Please see the above history of present illness for pertinent positives and negatives.  The remainder of the patient's systems have been reviewed and are negative.     Vital Signs            Physical Exam:    PHYSICAL EXAMINATION:    VITAL SIGNS: As per Nurse's notes    GENERAL APPEARANCE: The patient is a well developed, well nourished, morbidly obese , in no acute distress but continues to complain of pain in upper abdomen. Denies any sx of shortness of breath or chest pain., dyspnea or acute pain. Lips and nail beds are pink.    HEENT: Normocephalic, atraumatic. PERRL. The sclerae icteric and conjunctivae pink and moist. Extraocular muscle movements intact. External inspection of the ears and nose showed no scars, lesions, or masses. No rhinitis. Lips, teeth, and gums showed normal mucosa. The oral mucosa, hard and soft palate, tongue and posterior pharynx were normal.     NECK: Supple and symmetric. No masses. No thyromegaly. No tenderness. Trachea central. No carotid bruits. No evidence of JVD.    LYMPH NODES: No palpable lymphadenopathy.    SKIN: Warm, dry and intact. Skin is icteric. No rash or lesions or wounds or patechiae.    LUNGS: Clear to auscultation bilaterally. Respiratory expansion equal bilaterally. No wheezes/rales/crackles/rubs.    CARDIOVASCULAR:  Irregularly irregular. S1, S2 normal without murmur/gallop/rub. No S3, S4. Peripheral pulses were 2+ and symmetric. Capillary refill less than 3 seconds.    ABDOMEN: Soft, non-tender, non-distended. No masses. No rebound/guarding. No hepatosplenomegaly. Normal bowel sounds. No auscultatable bruits.     RECTAL: Not done     EXTREMITIES:  No evidence of cyanosis, clubbing, but !+ ankle/ pedal edema. Pulses are 2+ throughout. Negative Homans sign bilaterally.     MUSCULOSKELETAL: Has chronic backache, No evidence of redness, swelling, warmth or pain of the joints of the extremities. Muscle strength and tone normal.    PSYCHIATRY: Responds appropriately to questions. No evidence of acute anxiety, depression, panic attacks or hallucinations.    MENTAL STATUS EXAMINATION: Neatly dressed, conscious and alert. Speech normal in tone. Pleasant and cooperative. Orientation x3. Thought process, content and insight are normal. Memory without deficits.    NEUROLOGIC: Examination is grossly intact globally with no focal deficits. Cranial nerves II through XII are grossly intact. No motor weakness. No decrease in sensation. No facial asymmetry.          Results Review:    I reviewed the patient's new clinical results.  Lab Results (most recent)     Procedure Component Value Units Date/Time    POC Glucose Fingerstick [[id number]]  (Abnormal) Collected:  03/08/17 1212    Specimen:  Blood Updated:  03/08/17 1218     Glucose 151 (H) mg/dL     Narrative:       Meter: [id number] : [id number] Valentni Chang          Imaging Results (most recent)     None        not reviewed    ECG/EMG Results (most recent)     None        not reviewed    Assessment/Plan       DIFFERENTIAL DIAGNOSES CONSIDERED FOR CHIEF COMPLAINT:        The following clinical entities were considered in differential diagnosis. The list includes commonly encountered practical probabilities and rare esoteric diagnoses worked out through systemic diagnostic methods used to  identify the presence of a disease entity where multiple diagnoses are possible. The decision is reached through either of the following methods: 1) direct confirmatory testing, or 2) a process of elimination, or 3) at least a process of obtaining information that shrinks the “probabilities” of candidate conditions to negligible levels, by using clinical evidence and thus implementing aspects of the hypothetico-deductive method.        DIFFERENTIAL DIAGNOSIS OF ABDOMINAL PAIN: 1) Appendicitis; 2) Bacterial Pneumonia; 3) Cholecystitis and Biliary Colic; 4) Constipation; 5) Cystitis in Females; 6) Diabetic Ketoacidosis; 7) Diverticulitis 8) Emergent Management of Pancreatitis; 9) Emergent Treatment of Gastroenteritis; 10) GERD; 11) Hernias; 12) Herpes Zoster; 13) Inflammatory Bowel Disease; 14) Large-Bowel Obstruction; 15) Mesenteric Ischemia Imaging; 16) ; 17) Nephrolithiasis; 18) Peptic Ulcer Disease; 19) Small-Bowel Obstruction; 20) Spontaneous Bacterial Peritonitis; 21) Urinary Obstruction; 22) Urinary Tract Infection         ASSESSMENT AND PLAN:       SUMMARY:    ?   PROPHYLAXIS:   -Oxygen Saturation: As per Nurses' notes.   -DVT Prophylaxis: On Coumadin which is on hold  -Gastritis Prophylaxis: Pantoprazole    -Constipation Prophylaxis: N/A   -Immunizations: N/A  -Intake and Output Orders: As per order sheet   -Davidson Catheter: Not indicated at this time  -Smoking/ Nicotine issues: N/A      PAIN MANAGEMENT:  -Pain Management: Inj Dilaudid:   -Miscellaneous Medications: Tylenol 650 mg 1 supp q4-6 hours for headache or   temp >100 degrees     ACTIVITIES:  -Bathroom Privileges: May use bathroom   -Activity: Encouraged to sit up in side chair for 2-4 hours BID   -Raise head of bed 45 deg to present aspiration  -PT/OT: N/A      NUTRITION AND FLUIDS:  -Diet/ Nutrition: NPO    -Fluid Status/Electrolytes: D5 1/2NS 1 L 75 mL/Hr      SOCIAL ISSUES:   -MRSA SCREEN: As per institutional protocol   -Behavioral/ Agitation  Issues: NONE   -Social Issues: Lives at [facility].   -Occupational Issues: Patient is Retired.   -Code Status: DNR on admission   -Disposition: TBD     THERAPEUTIC:   ALLERGIES: Codeine, Demerol, Propoxyphene   ANTIBIOTICS: as per order sheet   INSULIN THERAPY: Low dose insulin coverage as ordered   CHEST PAIN: NTG 0.4 MG s/l at onset of chest pain; Repeat q5 min x 2 PRN   NEBULIZER TREATMENT: DuoNeb 3 ml via nebulizer q4-6 hrs PRN for shortness of   breath and/or wheezing   ANXIETY: N/A    DEPRESSION: N/A    INSOMNIA: N/A               PLAN:    Labs and diagnostic tests reviewed: Gluc 158, Na 139, K 4.6, CO2 32.3, Creat 2.36, ALT//467, Tot Bili 5.4, PT 23.1/2.01, Lactate 2.5, Amylase 1696, Lipase 2486    Diagnostic tests reviewed:    Abdominal US ordered. Not done yet      Patient is clinically and hemodynamically stable    Will request consultation this from GI and General Surgery    Any new recommendations: As per consultatnts    New Labs ordered: CBC, CMP and Amylase and Lipase in AM    New diagnostic tests ordered: To check results of abdominal US    Any changes in medications: Inj Dilaudid for pain    To continue current management and supportive care    Pain management issues: Inj Dilaudid for pain    Discharge planning issues: Patient should be able to return to Nursing Home when ready for discharge    Will follow patient closely    Nothing new to add for right now              DIAGNOSES:      PRIMARY DIAGNOSES:    1) Acute Abdominal pain: With elevated Amylase and lipase, it seems patient has acute pancreatitis and since she has new transaminitis and her bilirubin is high, it seems she has acute cholecystitis. She has leukocytosis and elevated lactic acid. I cannot give her enormous amounts of fluids as she has hx of CHF. Will treat her with Inj Zosyn as she has evidence of acute cholecystitis with elevated lactic acid    2) Chronic steroid use: Because of the hx of chronic intake of  steroids and the fact that this patient is in septic shock, I will treat her with Inj solucortef, which can be tapered down.    2) Acute Pancreatitis: As noted above. Patient is NPO and on Inj Dilaudid for pain control    3) Abnormal LFTs: As noted above    4) Jaundice: Patient appears jaundiced as her serum bili is 5.7.    5) HTN: BP is low. Will order fluids but this patient is known to have CHF and she cannot be given too much fluid. I will move her to ICU for closer management    6) Dyslipidemia: Hx noted    7) Chronic Diastolic Heart Failure: Patient is usually on diuretics. Her elevated BUN and creatinine are consistent with dehydration and volume depletion    8) Chronic Kidney Disease: Patient's creatinine is up from 1.07 to 2.36    9) Atrial Fibrillation:Patient is on chronic coumadin therapy    10) Anemia: Patient's last Hb is 11.0    11) DJD: Hx noted    12) Morbid Obesity: Noted    13) Obstructive Sleep Apnea: Hx noted    14) Lymphedema: Hx noted    15) Gout: Hx noted    16) Hx of DVT: Patient has been on coumadin. Will D/C coumadin as patient is NPO and may need potential ERCP or MRCP    17) DVT Prophylaxis: SCDs  ?     SECONDARY DIAGNOSES:  ?     As above        SURGICAL DIAGNOSES:      S/P Bilateral cataract extraction, S/P closed reduction and external fixation Rt Femoral fracture, S/P Herniorrhaphy            I discussed the patients findings and my recommendations with patient and patient is agreeable to current treatment and management plan.     Trevor Diallo MD  03/08/17  12:39 PM          Trevor Diallo M.D., FACP  Internal Medicine/ Hospitalist        Time:       EMR Dragon/Transcription disclaimer:      Much of this encounter note is an electronic transcription/translation of spoken language to printed text. The electronic translation of spoken language may permit erroneous, or at times, nonsensical words or phrases to be inadvertently transcribed; Although I have reviewed the note for such  errors, some may still exist.

## 2017-03-09 ENCOUNTER — APPOINTMENT (OUTPATIENT)
Dept: MRI IMAGING | Facility: HOSPITAL | Age: 82
End: 2017-03-09

## 2017-03-09 ENCOUNTER — INPATIENT HOSPITAL (OUTPATIENT)
Dept: URBAN - METROPOLITAN AREA HOSPITAL 27 | Facility: HOSPITAL | Age: 82
End: 2017-03-09
Payer: MEDICARE

## 2017-03-09 DIAGNOSIS — K80.50 CALCULUS OF BILE DUCT WITHOUT CHOLANGITIS OR CHOLECYSTITIS W: ICD-10-CM

## 2017-03-09 DIAGNOSIS — K83.8 OTHER SPECIFIED DISEASES OF BILIARY TRACT: ICD-10-CM

## 2017-03-09 DIAGNOSIS — K57.10 DIVERTICULOSIS OF SMALL INTESTINE WITHOUT PERFORATION OR ABS: ICD-10-CM

## 2017-03-09 DIAGNOSIS — K85.90 ACUTE PANCREATITIS WITHOUT NECROSIS OR INFECTION, UNSPECIFIE: ICD-10-CM

## 2017-03-09 PROBLEM — I48.20 CHRONIC ATRIAL FIBRILLATION (HCC): Chronic | Status: ACTIVE | Noted: 2017-03-09

## 2017-03-09 PROBLEM — K81.9 CHOLECYSTITIS: Status: ACTIVE | Noted: 2017-03-09

## 2017-03-09 LAB
ALBUMIN SERPL-MCNC: 2.9 G/DL (ref 3.5–5.2)
ALBUMIN/GLOB SERPL: 1.2 G/DL
ALP SERPL-CCNC: 168 U/L (ref 40–129)
ALT SERPL W P-5'-P-CCNC: 225 U/L (ref 5–33)
AMYLASE SERPL-CCNC: 607 U/L (ref 28–100)
ANION GAP SERPL CALCULATED.3IONS-SCNC: 10.4 MMOL/L
AST SERPL-CCNC: 218 U/L (ref 5–32)
BASOPHILS # BLD AUTO: 0.01 10*3/MM3 (ref 0–0.2)
BASOPHILS NFR BLD AUTO: 0.1 % (ref 0–2)
BILIRUB SERPL-MCNC: 2.2 MG/DL (ref 0.2–1.2)
BUN BLD-MCNC: 47 MG/DL (ref 8–23)
BUN/CREAT SERPL: 18.1 (ref 7–25)
CALCIUM SPEC-SCNC: 9.2 MG/DL (ref 8.8–10.5)
CHLORIDE SERPL-SCNC: 96 MMOL/L (ref 98–107)
CO2 SERPL-SCNC: 32.6 MMOL/L (ref 22–29)
CREAT BLD-MCNC: 2.6 MG/DL (ref 0.57–1)
DEPRECATED RDW RBC AUTO: 51.1 FL (ref 37–54)
EOSINOPHIL # BLD AUTO: 0.02 10*3/MM3 (ref 0.1–0.3)
EOSINOPHIL NFR BLD AUTO: 0.2 % (ref 0–4)
ERYTHROCYTE [DISTWIDTH] IN BLOOD BY AUTOMATED COUNT: 15.4 % (ref 11.5–14.5)
GFR SERPL CREATININE-BSD FRML MDRD: 18 ML/MIN/1.73
GLOBULIN UR ELPH-MCNC: 2.5 GM/DL
GLUCOSE BLD-MCNC: 130 MG/DL (ref 65–99)
GLUCOSE BLDC GLUCOMTR-MCNC: 104 MG/DL (ref 70–130)
GLUCOSE BLDC GLUCOMTR-MCNC: 104 MG/DL (ref 70–130)
GLUCOSE BLDC GLUCOMTR-MCNC: 118 MG/DL (ref 70–130)
GLUCOSE BLDC GLUCOMTR-MCNC: 126 MG/DL (ref 70–130)
HCT VFR BLD AUTO: 32.7 % (ref 37–47)
HGB BLD-MCNC: 10.3 G/DL (ref 12–16)
IMM GRANULOCYTES # BLD: 0.14 10*3/MM3 (ref 0–0.03)
IMM GRANULOCYTES NFR BLD: 1.2 % (ref 0–0.5)
LIPASE SERPL-CCNC: 228 U/L (ref 13–60)
LYMPHOCYTES # BLD AUTO: 1.25 10*3/MM3 (ref 0.6–4.8)
LYMPHOCYTES NFR BLD AUTO: 10.9 % (ref 20–45)
MCH RBC QN AUTO: 28.3 PG (ref 27–31)
MCHC RBC AUTO-ENTMCNC: 31.5 G/DL (ref 31–37)
MCV RBC AUTO: 89.8 FL (ref 81–99)
MONOCYTES # BLD AUTO: 0.48 10*3/MM3 (ref 0–1)
MONOCYTES NFR BLD AUTO: 4.2 % (ref 3–8)
NEUTROPHILS # BLD AUTO: 9.61 10*3/MM3 (ref 1.5–8.3)
NEUTROPHILS NFR BLD AUTO: 83.4 % (ref 45–70)
NRBC BLD MANUAL-RTO: 0 /100 WBC (ref 0–0)
PLATELET # BLD AUTO: 136 10*3/MM3 (ref 140–500)
PMV BLD AUTO: 10 FL (ref 7.4–10.4)
POTASSIUM BLD-SCNC: 4.4 MMOL/L (ref 3.5–5.2)
PROT SERPL-MCNC: 5.4 G/DL (ref 6–8.5)
RBC # BLD AUTO: 3.64 10*6/MM3 (ref 4.2–5.4)
SODIUM BLD-SCNC: 139 MMOL/L (ref 136–145)
WBC NRBC COR # BLD: 11.51 10*3/MM3 (ref 4.8–10.8)

## 2017-03-09 PROCEDURE — 83690 ASSAY OF LIPASE: CPT | Performed by: INTERNAL MEDICINE

## 2017-03-09 PROCEDURE — 80053 COMPREHEN METABOLIC PANEL: CPT | Performed by: INTERNAL MEDICINE

## 2017-03-09 PROCEDURE — 99232 SBSQ HOSP IP/OBS MODERATE 35: CPT | Performed by: INTERNAL MEDICINE

## 2017-03-09 PROCEDURE — 99221 1ST HOSP IP/OBS SF/LOW 40: CPT | Performed by: SURGERY

## 2017-03-09 PROCEDURE — 85025 COMPLETE CBC W/AUTO DIFF WBC: CPT | Performed by: INTERNAL MEDICINE

## 2017-03-09 PROCEDURE — 99222 1ST HOSP IP/OBS MODERATE 55: CPT | Performed by: INTERNAL MEDICINE

## 2017-03-09 PROCEDURE — 94799 UNLISTED PULMONARY SVC/PX: CPT

## 2017-03-09 PROCEDURE — 74183 MRI ABD W/O CNTR FLWD CNTR: CPT

## 2017-03-09 PROCEDURE — 99222 1ST HOSP IP/OBS MODERATE 55: CPT

## 2017-03-09 PROCEDURE — 82150 ASSAY OF AMYLASE: CPT | Performed by: INTERNAL MEDICINE

## 2017-03-09 PROCEDURE — 25010000002 PIPERACILLIN SOD-TAZOBACTAM PER 1 G: Performed by: INTERNAL MEDICINE

## 2017-03-09 PROCEDURE — 82962 GLUCOSE BLOOD TEST: CPT

## 2017-03-09 RX ADMIN — IPRATROPIUM BROMIDE AND ALBUTEROL SULFATE 3 ML: .5; 3 SOLUTION RESPIRATORY (INHALATION) at 20:37

## 2017-03-09 RX ADMIN — PIPERACILLIN AND TAZOBACTAM 3.38 G: 3; .375 INJECTION, POWDER, LYOPHILIZED, FOR SOLUTION INTRAVENOUS; PARENTERAL at 15:21

## 2017-03-09 RX ADMIN — IPRATROPIUM BROMIDE AND ALBUTEROL SULFATE 3 ML: .5; 3 SOLUTION RESPIRATORY (INHALATION) at 15:07

## 2017-03-09 RX ADMIN — PIPERACILLIN AND TAZOBACTAM 3.38 G: 3; .375 INJECTION, POWDER, LYOPHILIZED, FOR SOLUTION INTRAVENOUS; PARENTERAL at 09:44

## 2017-03-09 RX ADMIN — PIPERACILLIN AND TAZOBACTAM 3.38 G: 3; .375 INJECTION, POWDER, LYOPHILIZED, FOR SOLUTION INTRAVENOUS; PARENTERAL at 02:35

## 2017-03-09 RX ADMIN — PANTOPRAZOLE SODIUM 40 MG: 40 INJECTION, POWDER, FOR SOLUTION INTRAVENOUS at 05:52

## 2017-03-09 RX ADMIN — IPRATROPIUM BROMIDE AND ALBUTEROL SULFATE 3 ML: .5; 3 SOLUTION RESPIRATORY (INHALATION) at 08:06

## 2017-03-09 RX ADMIN — PIPERACILLIN AND TAZOBACTAM 3.38 G: 3; .375 INJECTION, POWDER, LYOPHILIZED, FOR SOLUTION INTRAVENOUS; PARENTERAL at 21:18

## 2017-03-09 RX ADMIN — IPRATROPIUM BROMIDE AND ALBUTEROL SULFATE 3 ML: .5; 3 SOLUTION RESPIRATORY (INHALATION) at 11:12

## 2017-03-09 NOTE — PLAN OF CARE
Problem: Patient Care Overview (Adult)  Goal: Plan of Care Review  Outcome: Ongoing (interventions implemented as appropriate)    03/08/17 1942   Coping/Psychosocial Response Interventions   Plan Of Care Reviewed With patient   Patient Care Overview   Progress no change       Goal: Adult Individualization and Mutuality  Outcome: Ongoing (interventions implemented as appropriate)

## 2017-03-09 NOTE — PAYOR COMM NOTE
"Tati Villanueva (81 y.o. Female)     Contact:  Paula Huff    Phone: [phone]        Date of Birth Social Security Number Address Home Phone MRN    [id number]  [address] [address] [phone] [id number]    Zoroastrianism Marital Status          None        Admission Date Admission Type Admitting Provider Attending Provider Department, Room/Bed    3/8/17 Elective Alice Garcia DO Rings[place], Madonna, DO [facility] ICU, ICU2/1    Discharge Date Discharge Disposition Discharge Destination                      Attending Provider: Alice Garcia DO     Allergies:  Codeine, Demerol [Meperidine], Propoxyphene    Isolation:  None   Infection:  None   Code Status:  FULL    Ht:  64\" (162.6 cm)   Wt:  223 lb 8 oz (101 kg)    Admission Cmt:  None   Principal Problem:  None                Active Insurance as of 3/8/2017     Primary Coverage     Payor Plan Insurance Group Employer/Plan Group    HUMANA MEDICARE REPL HUMANA MEDICARE REPL [id number]     Payor Plan Address Payor Plan Phone Number Effective From Effective To    [address] [address] [phone] 1/1/2013     [place] [address]       Subscriber Name Subscriber Birth Date Member ID       TATI VILLANUEVA 1935 [id number]           Secondary Coverage     Payor Plan Insurance Group Employer/Plan Group    [place]      Payor Plan Address Payor Plan Phone Number Effective From Effective To    [address] [phone] 6/8/2016     [place] [address]       Subscriber Name Subscriber Birth Date Member ID       TATI VILLANUEVA 1935 [id number]                 Emergency Contacts      (Rel.) Home Phone Work Phone Mobile Phone    Avani Keller (Sister) [phone] -- --    Vincent Amos -- [phone]            Insurance Information                HUMANA MEDICARE REPL/HUMANA MEDICARE REPL Phone: [phone]    Subscriber: Tati Villanueva Subscriber#: " [id number]    Group#: [id number] Precert#:         [place] Phone: [phone]    Subscriber: Alexandria Villanueva Subscriber#: [id number]    Group#:  Precert#:           Problem List           Codes Noted - Resolved       Hospital    Abdominal pain ICD-10-CM: R10.9  ICD-9-CM: 789.00 3/8/2017 - Present       Non-Hospital    Acute on chronic respiratory failure ICD-10-CM: J96.20  ICD-9-CM: 518.84 1/12/2017 - Present    Influenza A with respiratory manifestations ICD-10-CM: J10.1  ICD-9-CM: 487.1 1/12/2017 - Present    Chronic anticoagulation (Chronic) ICD-10-CM: Z79.01  ICD-9-CM: V58.61 1/12/2017 - Present    Lymphedema ICD-10-CM: I89.0  ICD-9-CM: 457.1 Unknown - Present    Morbid obesity ICD-10-CM: E66.01  ICD-9-CM: 278.01 Unknown - Present    Obstructive sleep apnea of adult ICD-10-CM: G47.33  ICD-9-CM: 327.23 Unknown - Present    Pulmonary HTN ICD-10-CM: I27.2  ICD-9-CM: 416.8 Unknown - Present    Chronic diastolic (congestive) heart failure ICD-10-CM: I50.32  ICD-9-CM: 428.32, 428.0 Unknown - Present    Permanent atrial fibrillation ICD-10-CM: I48.2  ICD-9-CM: 427.31 Unknown - Present    Acute renal failure ICD-10-CM: N17.9  ICD-9-CM: 584.9 1/3/2017 - Present    Sepsis due to urinary tract infection ICD-10-CM: A41.9, N39.0  ICD-9-CM: 038.9, 995.91, 599.0 1/2/2017 - Present    Acute hyperglycemia ICD-10-CM: R73.9  ICD-9-CM: 790.29 9/28/2016 - Present    Hyperglycemia ICD-10-CM: R73.9  ICD-9-CM: 790.29 9/27/2016 - Present             History & Physical      Trevor Diallo MD at 3/8/2017 12:38 PM                   HOSPITALIST SERVICES     @ [facility]                Hospitalist Team    ADMISSION HISTORY AND PHYSICAL    PATIENT:      Patient Care Team:  Gerardo Williamson MD as PCP - General  Gerardo Williamson MD as PCP - Family Medicine    CHIEF COMPLAINT:     Upper abdominal pain for last few days        HISTORY OF PRESENT ILLNESS:    Ms. Alexandria Woo is  An 81 year old   female who [facility] an inmate [facility]. Dr. Garcia was making rounds today and this patient was examined any because of moderately severe abdominal pain and n/v, she was sent here to [facility] in [place] as direct admit. Work up done here showed acute cholecystitis and acute pancreatitis. Patient also appears to be septic. She looks jaundiced on examination. Patient denies any sx of chest pain, shortness of breath, dysuria or recent hx of blood in stool.              Past Medical History   Diagnosis Date   • Anemia    • Arthritis    • Chronic diastolic (congestive) heart failure    • Chronic kidney disease    • DVT (deep venous thrombosis)    • Dyslipidemia    • Gout    • Hypertension    • Lymphedema    • Morbid obesity    • Obstructive sleep apnea of adult      untreated   • Permanent atrial fibrillation    • Pulmonary HTN    • Respiratory failure, acute      hypoxic     Past Surgical History   Procedure Laterality Date   • Hernia repair     • Eye surgery     • Femur fracture surgery Right      closed reduction external fixation   • Cataract extraction       both eyes 4 years ago   • Fracture surgery       Family History   Problem Relation Age of Onset   • Cancer Mother    • Heart disease Father    • Diabetes Father    • COPD Brother    • Heart disease Brother      Social History   Substance Use Topics   • Smoking status: Never Smoker   • Smokeless tobacco: Not on file   • Alcohol use No     Prescriptions Prior to Admission   Medication Sig Dispense Refill Last Dose   • cholecalciferol (VITAMIN D3) 30097 UNITS capsule Take 50,000 Units by mouth Every 30 (Thirty) Days.   2/12/2017 at 0900   • citalopram (CeleXA) 20 MG tablet Take 1 tablet by mouth Daily. (Patient taking differently: Take 20 mg by mouth 3 (Three) Times a Week. MON, WED, FRI)   3/8/2017 at 0900   • folic acid (FOLVITE) 1 MG tablet Take 1 mg by mouth daily.   3/8/2017 at 0900   • furosemide (LASIX) 40 MG tablet Take 1 tablet by mouth 2  (Two) Times a Day. 60 tablet 1 3/8/2017 at 0600   • gabapentin (NEURONTIN) 300 MG capsule Take 300 mg by mouth 2 (two) times a day.   3/8/2017 at 0900   • glucosamine-chondroitin 500-400 MG capsule capsule Take 2 capsules by mouth daily.   3/8/2017 at 0900   • HYDROcodone-acetaminophen (NORCO) 5-325 MG per tablet Take 1 tablet by mouth Every 6 (Six) Hours As Needed for moderate pain (4-6).  0 3/7/2017 at 2115   • insulin aspart (NovoLOG) 100 UNIT/ML injection Inject 3 Units under the skin 3 (Three) Times a Day With Meals. Hold if blood sugar less than 120  12 3/8/2017 at Unknown time   • ipratropium-albuterol (DUO-NEB) 0.5-2.5 mg/mL nebulizer Take 3 mL by nebulization 4 (Four) Times a Day. 360 mL  3/8/2017 at 0600   • metoprolol succinate XL (TOPROL-XL) 25 MG 24 hr tablet Take 25 mg by mouth daily.   3/8/2017 at 0900   • multivitamin-minerals (OCUVITE) tablet Take 1 tablet by mouth daily.   3/8/2017 at 0900   • NON FORMULARY 3L NC continuos   3/8/2017 at Unknown time   • polyethylene glycol (MIRALAX) packet Take 17 g by mouth daily.   3/8/2017 at 0900   • predniSONE (DELTASONE) 10 MG tablet Take 1 tablet by mouth Daily With Breakfast. 100 tablet 1 3/8/2017 at 0800   • sennosides-docusate sodium (SENOKOT-S) 8.6-50 MG tablet Take 2 tablets by mouth 2 (Two) Times a Day. 120 tablet 1 3/8/2017 at 0900   • warfarin (COUMADIN) 6 MG tablet Take 6 mg by mouth Daily. 6 mg every Sun, Mon, Tues, Thrs, Sat  7 mg every Wed, Fri   3/7/2017 at 1600   • acetaminophen (TYLENOL) 325 MG tablet Take 2 tablets by mouth Every 4 (Four) Hours As Needed for mild pain (1-3).  0 Unknown at Unknown time   • bisacodyl (DULCOLAX) 5 MG EC tablet Insert 10 mg into the rectum Daily As Needed for constipation.   Unknown at Unknown time   • insulin detemir (LEVEMIR) 100 UNIT/ML injection Inject 20 Units under the skin Every Night.  12 Unknown at Unknown time   • melatonin 3 MG tablet Take 6 mg by mouth Every Night.   3/6/2017 at 2100   • pantoprazole  (PROTONIX) 40 MG EC tablet Take 40 mg by mouth every evening.   3/6/2017 at unknown   • warfarin (COUMADIN) 5 MG tablet i po qhs 30 tablet 1 Unknown at Unknown time     Allergies:  Codeine; Demerol [meperidine]; and Propoxyphene    REVIEW OF SYSTEMS:  Please see the above history of present illness for pertinent positives and negatives.  The remainder of the patient's systems have been reviewed and are negative.     Vital Signs            Physical Exam:    PHYSICAL EXAMINATION:    VITAL SIGNS: As per Nurse's notes    GENERAL APPEARANCE: The patient is a well developed, well nourished, morbidly obese , in no acute distress but continues to complain of pain in upper abdomen. Denies any sx of shortness of breath or chest pain., dyspnea or acute pain. Lips and nail beds are pink.    HEENT: Normocephalic, atraumatic. PERRL. The sclerae icteric and conjunctivae pink and moist. Extraocular muscle movements intact. External inspection of the ears and nose showed no scars, lesions, or masses. No rhinitis. Lips, teeth, and gums showed normal mucosa. The oral mucosa, hard and soft palate, tongue and posterior pharynx were normal.     NECK: Supple and symmetric. No masses. No thyromegaly. No tenderness. Trachea central. No carotid bruits. No evidence of JVD.    LYMPH NODES: No palpable lymphadenopathy.    SKIN: Warm, dry and intact. Skin is icteric. No rash or lesions or wounds or patechiae.    LUNGS: Clear to auscultation bilaterally. Respiratory expansion equal bilaterally. No wheezes/rales/crackles/rubs.    CARDIOVASCULAR: Irregularly irregular. S1, S2 normal without murmur/gallop/rub. No S3, S4. Peripheral pulses were 2+ and symmetric. Capillary refill less than 3 seconds.    ABDOMEN: Soft, non-tender, non-distended. No masses. No rebound/guarding. No hepatosplenomegaly. Normal bowel sounds. No auscultatable bruits.     RECTAL: Not done     EXTREMITIES:  No evidence of cyanosis, clubbing, but !+ ankle/ pedal edema.  Pulses are 2+ throughout. Negative Homans sign bilaterally.     MUSCULOSKELETAL: Has chronic backache, No evidence of redness, swelling, warmth or pain of the joints of the extremities. Muscle strength and tone normal.    PSYCHIATRY: Responds appropriately to questions. No evidence of acute anxiety, depression, panic attacks or hallucinations.    MENTAL STATUS EXAMINATION: Neatly dressed, conscious and alert. Speech normal in tone. Pleasant and cooperative. Orientation x3. Thought process, content and insight are normal. Memory without deficits.    NEUROLOGIC: Examination is grossly intact globally with no focal deficits. Cranial nerves II through XII are grossly intact. No motor weakness. No decrease in sensation. No facial asymmetry.          Results Review:    I reviewed the patient's new clinical results.  Lab Results (most recent)     Procedure Component Value Units Date/Time    POC Glucose Fingerstick [[id number]]  (Abnormal) Collected:  03/08/17 1212    Specimen:  Blood Updated:  03/08/17 1218     Glucose 151 (H) mg/dL     Narrative:       Meter: [id number] : [id number] Valentin Chang          Imaging Results (most recent)     None        not reviewed    ECG/EMG Results (most recent)     None        not reviewed    Assessment/Plan       DIFFERENTIAL DIAGNOSES CONSIDERED FOR CHIEF COMPLAINT:        The following clinical entities were considered in differential diagnosis. The list includes commonly encountered practical probabilities and rare esoteric diagnoses worked out through systemic diagnostic methods used to identify the presence of a disease entity where multiple diagnoses are possible. The decision is reached through either of the following methods: 1) direct confirmatory testing, or 2) a process of elimination, or 3) at least a process of obtaining information that shrinks the “probabilities” of candidate conditions to negligible levels, by using clinical evidence and thus implementing aspects of  the hypothetico-deductive method.        DIFFERENTIAL DIAGNOSIS OF ABDOMINAL PAIN: 1) Appendicitis; 2) Bacterial Pneumonia; 3) Cholecystitis and Biliary Colic; 4) Constipation; 5) Cystitis in Females; 6) Diabetic Ketoacidosis; 7) Diverticulitis 8) Emergent Management of Pancreatitis; 9) Emergent Treatment of Gastroenteritis; 10) GERD; 11) Hernias; 12) Herpes Zoster; 13) Inflammatory Bowel Disease; 14) Large-Bowel Obstruction; 15) Mesenteric Ischemia Imaging; 16) ; 17) Nephrolithiasis; 18) Peptic Ulcer Disease; 19) Small-Bowel Obstruction; 20) Spontaneous Bacterial Peritonitis; 21) Urinary Obstruction; 22) Urinary Tract Infection         ASSESSMENT AND PLAN:       SUMMARY:    ?   PROPHYLAXIS:   -Oxygen Saturation: As per Nurses' notes.   -DVT Prophylaxis: On Coumadin which is on hold  -Gastritis Prophylaxis: Pantoprazole    -Constipation Prophylaxis: N/A   -Immunizations: N/A  -Intake and Output Orders: As per order sheet   -Davidson Catheter: Not indicated at this time  -Smoking/ Nicotine issues: N/A      PAIN MANAGEMENT:  -Pain Management: Inj Dilaudid:   -Miscellaneous Medications: Tylenol 650 mg 1 supp q4-6 hours for headache or   temp >100 degrees     ACTIVITIES:  -Bathroom Privileges: May use bathroom   -Activity: Encouraged to sit up in side chair for 2-4 hours BID   -Raise head of bed 45 deg to present aspiration  -PT/OT: N/A      NUTRITION AND FLUIDS:  -Diet/ Nutrition: NPO    -Fluid Status/Electrolytes: D5 1/2NS 1 L 75 mL/Hr      SOCIAL ISSUES:   -MRSA SCREEN: As per institutional protocol   -Behavioral/ Agitation Issues: NONE   -Social Issues: Lives at [facility].   -Occupational Issues: Patient is Retired.   -Code Status: DNR on admission   -Disposition: TBD     THERAPEUTIC:   ALLERGIES: Codeine, Demerol, Propoxyphene   ANTIBIOTICS: as per order sheet   INSULIN THERAPY: Low dose insulin coverage as ordered   CHEST PAIN: NTG 0.4 MG s/l at onset of chest pain; Repeat q5 min x 2 PRN   NEBULIZER  TREATMENT: DuoNeb 3 ml via nebulizer q4-6 hrs PRN for shortness of   breath and/or wheezing   ANXIETY: N/A    DEPRESSION: N/A    INSOMNIA: N/A               PLAN:    Labs and diagnostic tests reviewed: Gluc 158, Na 139, K 4.6, CO2 32.3, Creat 2.36, ALT//467, Tot Bili 5.4, PT 23.1/2.01, Lactate 2.5, Amylase 1696, Lipase 2486    Diagnostic tests reviewed:    Abdominal US ordered. Not done yet      Patient is clinically and hemodynamically stable    Will request consultation this from GI and General Surgery    Any new recommendations: As per consultatnts    New Labs ordered: CBC, CMP and Amylase and Lipase in AM    New diagnostic tests ordered: To check results of abdominal US    Any changes in medications: Inj Dilaudid for pain    To continue current management and supportive care    Pain management issues: Inj Dilaudid for pain    Discharge planning issues: Patient should be able to return to Nursing Home when ready for discharge    Will follow patient closely    Nothing new to add for right now              DIAGNOSES:      PRIMARY DIAGNOSES:    1) Acute Abdominal pain: With elevated Amylase and lipase, it seems patient has acute pancreatitis and since she has new transaminitis and her bilirubin is high, it seems she has acute cholecystitis. She has leukocytosis and elevated lactic acid. I cannot give her enormous amounts of fluids as she has hx of CHF. Will treat her with Inj Zosyn as she has evidence of acute cholecystitis with elevated lactic acid    2) Chronic steroid use: Because of the hx of chronic intake of steroids and the fact that this patient is in septic shock, I will treat her with Inj solucortef, which can be tapered down.    2) Acute Pancreatitis: As noted above. Patient is NPO and on Inj Dilaudid for pain control    3) Abnormal LFTs: As noted above    4) Jaundice: Patient appears jaundiced as her serum bili is 5.7.    5) HTN: BP is low. Will order fluids but this patient is known to have CHF  and she cannot be given too much fluid. I will move her to ICU for closer management    6) Dyslipidemia: Hx noted    7) Chronic Diastolic Heart Failure: Patient is usually on diuretics. Her elevated BUN and creatinine are consistent with dehydration and volume depletion    8) Chronic Kidney Disease: Patient's creatinine is up from 1.07 to 2.36    9) Atrial Fibrillation:Patient is on chronic coumadin therapy    10) Anemia: Patient's last Hb is 11.0    11) DJD: Hx noted    12) Morbid Obesity: Noted    13) Obstructive Sleep Apnea: Hx noted    14) Lymphedema: Hx noted    15) Gout: Hx noted    16) Hx of DVT: Patient has been on coumadin. Will D/C coumadin as patient is NPO and may need potential ERCP or MRCP    17) DVT Prophylaxis: SCDs  ?     SECONDARY DIAGNOSES:  ?     As above        SURGICAL DIAGNOSES:      S/P Bilateral cataract extraction, S/P closed reduction and external fixation Rt Femoral fracture, S/P Herniorrhaphy            I discussed the patients findings and my recommendations with patient and patient is agreeable to current treatment and management plan.     Trevor Diallo MD  03/08/17  12:39 PM          Trevor Diallo M.D., [facility]  Internal Medicine/ Hospitalist        Time:       EMR Dragon/Transcription disclaimer:      Much of this encounter note is an electronic transcription/translation of spoken language to printed text. The electronic translation of spoken language may permit erroneous, or at times, nonsensical words or phrases to be inadvertently transcribed; Although I have reviewed the note for such errors, some may still exist.      Electronically signed by Trevor Diallo MD at 3/8/2017  3:55 PM        Emergency Department Notes     No notes of this type exist for this encounter.        Vital Signs (last 24 hours)       03/08 0700  -  03/09 0659 03/09 0700  -  03/09 1144   Most Recent    Temp (°F) 97.3 -  98.4       97.3 (36.3)    Heart Rate 69 -  85    85 -  86     86    Resp 15 -  20       20     20    BP (!)64/41 -  132/80       (!) 75/59    SpO2 (%) 95 -  99    (!)88 -  100     96          Intake & Output (last day)       03/08 0701 - 03/09 0700 03/09 0701 - 03/10 0700    I.V. (mL/kg) 1333 (13.1)     IV Piggyback 450 100    Total Intake(mL/kg) 1783 (17.6) 100 (1)    Urine (mL/kg/hr) 520 320 (0.7)    Total Output 520 320    Net +1263 -220          Unmeasured Urine Occurrence 1 x         Lines, Drains & Airways    Active LDAs     Name:   Placement date:   Placement time:   Site:   Days:    PICC Line - Single Lumen 01/03/17 1140 basilic vein (medial side of arm), right 5 Fr  01/03/17    1140      65    Peripheral IV Line - Single Lumen 03/08/17 1542 cephalic vein (lateral side of arm), left 20 gauge;2 in length  03/08/17    1542      less than 1    Urethral Catheter 03/08/17 1730 1 1  03/08/17    1730      less than 1         Inactive LDAs     None                Hospital Medications (all)       Dose Frequency Start End    acetaminophen (TYLENOL) suppository 650 mg 650 mg Every 4 Hours PRN 3/8/2017     Sig - Route: Insert 1 suppository into the rectum Every 4 (Four) Hours As Needed for fever. - Rectal    dextrose (D50W) solution 25 g 25 g Every 15 Minutes PRN 3/8/2017     Sig - Route: Infuse 50 mL into a venous catheter Every 15 (Fifteen) Minutes As Needed for low blood sugar (Blood Sugar Less Than 70, Patient Has IV Access - Unresponsive, NPO or Unable To Safely Swallow). - Intravenous    dextrose (GLUTOSE) oral gel 15 g 15 g Every 15 Minutes PRN 3/8/2017     Sig - Route: Take 15 g by mouth Every 15 (Fifteen) Minutes As Needed for low blood sugar (Blood Sugar Less Than 70, Patient Alert, Is Not NPO & Can Safely Swallow). - Oral    dextrose 5 % and sodium chloride 0.45 % 5-0.45 % infusion  - ADS Override Pull   3/8/2017 3/8/2017    Notes to Pharmacy: Created by cabinet override    glucagon (GLUCAGEN) injection 1 mg 1 mg Every 15 Minutes PRN 3/8/2017     Sig - Route: Inject 1 mg under the skin Every 15  (Fifteen) Minutes As Needed (Blood Glucose Less Than 70 - Patient Without IV Access - Unresponsive, NPO or Unable To Safely Swallow). - Subcutaneous    insulin aspart (novoLOG) injection 0-7 Units 0-7 Units Every 6 Hours 3/8/2017     Sig - Route: Inject 0-7 Units under the skin Every 6 (Six) Hours. - Subcutaneous    ipratropium-albuterol (DUO-NEB) nebulizer solution 3 mL 3 mL 4 Times Daily - RT 3/8/2017     Sig - Route: Take 3 mL by nebulization 4 (Four) Times a Day. - Nebulization    lactated ringers infusion 50 mL/hr Continuous 3/8/2017     Sig - Route: Infuse 50 mL/hr into a venous catheter Continuous. - Intravenous    pantoprazole (PROTONIX) injection 40 mg 40 mg Every Early Morning 3/9/2017     Sig - Route: Infuse 10 mL into a venous catheter Every Morning. - Intravenous    piperacillin-tazobactam (ZOSYN) 3.375 g in sodium chloride 0.9 % 100 mL IVPB 3.375 g Every 6 Hours 3/8/2017     Sig - Route: Infuse 3.375 g into a venous catheter Every 6 (Six) Hours. - Intravenous    sodium chloride 0.9 % bolus 250 mL 250 mL Once 3/8/2017 3/8/2017    Sig - Route: Infuse 250 mL into a venous catheter 1 (One) Time. - Intravenous    sodium chloride 0.9 % bolus 250 mL 250 mL Once 3/8/2017 3/8/2017    Sig - Route: Infuse 250 mL into a venous catheter 1 (One) Time. - Intravenous    acetaminophen (TYLENOL) tablet 650 mg (Discontinued) 650 mg Every 4 Hours PRN 3/8/2017 3/8/2017    Sig - Route: Take 2 tablets by mouth Every 4 (Four) Hours As Needed for Mild Pain (1-3). - Oral    cholecalciferol (VITAMIN D3) capsule 50,000 Units (Discontinued) 50,000 Units Every 30 Days 3/12/2017 3/8/2017    Sig - Route: Take 1 capsule by mouth Every 30 (Thirty) Days. - Oral    citalopram (CeleXA) tablet 20 mg (Discontinued) 20 mg Daily 3/9/2017 3/8/2017    Sig - Route: Take 1 tablet by mouth Daily. - Oral    dextrose 5 % and sodium chloride 0.45 % infusion (Discontinued) 100 mL/hr Continuous 3/8/2017 3/8/2017    Sig - Route: Infuse 100 mL/hr into a  venous catheter Continuous. - Intravenous    folic acid (FOLVITE) tablet 1 mg (Discontinued) 1 mg Daily 3/8/2017 3/8/2017    Sig - Route: Take 1 tablet by mouth Daily. - Oral    furosemide (LASIX) tablet 40 mg (Discontinued) 40 mg 2 Times Daily 3/8/2017 3/8/2017    Sig - Route: Take 1 tablet by mouth 2 (Two) Times a Day. - Oral    gabapentin (NEURONTIN) capsule 300 mg (Discontinued) 300 mg 2 Times Daily 3/8/2017 3/8/2017    Sig - Route: Take 1 capsule by mouth 2 (Two) Times a Day. - Oral    HYDROcodone-acetaminophen (NORCO) 5-325 MG per tablet 1 tablet (Discontinued) 1 tablet Every 6 Hours PRN 3/8/2017 3/8/2017    Sig - Route: Take 1 tablet by mouth Every 6 (Six) Hours As Needed for moderate pain (4-6). - Oral    hydrocortisone sodium succinate (Solu-CORTEF) injection 50 mg (Discontinued) 50 mg Every 6 Hours 3/8/2017 3/8/2017    Sig - Route: Infuse 50 mg into a venous catheter Every 6 (Six) Hours. - Intravenous    HYDROmorphone (DILAUDID) injection 1 mg (Discontinued) 1 mg Every 4 Hours PRN 3/8/2017 3/8/2017    Sig - Route: Infuse 1 mg into a venous catheter Every 4 (Four) Hours As Needed for moderate pain (4-6). - Intravenous    insulin aspart (novoLOG) injection 3 Units (Discontinued) 3 Units 3 Times Daily With Meals 3/8/2017 3/8/2017    Sig - Route: Inject 3 Units under the skin 3 (Three) Times a Day With Meals. - Subcutaneous    insulin detemir (LEVEMIR) injection 10 Units (Discontinued) 10 Units Nightly 3/8/2017 3/8/2017    Sig - Route: Inject 10 Units under the skin Every Night. - Subcutaneous    insulin detemir (LEVEMIR) injection 20 Units (Discontinued) 20 Units Nightly 3/8/2017 3/8/2017    Sig - Route: Inject 20 Units under the skin Every Night. - Subcutaneous    melatonin tablet 6 mg (Discontinued) 6 mg Nightly 3/8/2017 3/8/2017    Sig - Route: Take 2 tablets by mouth Every Night. - Oral    metoprolol succinate XL (TOPROL-XL) 24 hr tablet 25 mg (Discontinued) 25 mg Daily 3/9/2017 3/8/2017    Sig - Route:  Take 1 tablet by mouth Daily. - Oral    multivitamin with minerals 1 tablet (Discontinued) 1 tablet Daily 3/8/2017 3/8/2017    Sig - Route: Take 1 tablet by mouth Daily. - Oral    norepinephrine (LEVOPHED) 32 mcg/mL (8 mg/250 mL) infusion (Discontinued) 0.02-0.3 mcg/kg/min × 99.6 kg Titrated 3/8/2017 3/8/2017    Sig - Route: Infuse 1.992-29.88 mcg/min into a venous catheter Dose Adjusted By Provider As Needed. - Intravenous    pantoprazole (PROTONIX) EC tablet 40 mg (Discontinued) 40 mg Every Evening 3/8/2017 3/8/2017    Sig - Route: Take 1 tablet by mouth Every Evening. - Oral    piperacillin-tazobactam (ZOSYN) 3.375 g/50 mL D5W VTB (mbp) (Discontinued) 3.375 g Every 6 Hours 3/8/2017 3/8/2017    Sig - Route: Infuse 50 mL into a venous catheter Every 6 (Six) Hours. - Intravenous    Reason for Discontinue: Formulary change    polyethylene glycol (MIRALAX) powder 17 g (Discontinued) 17 g Daily 3/9/2017 3/8/2017    Sig - Route: Take 17 g by mouth Daily. - Oral    predniSONE (DELTASONE) tablet 10 mg (Discontinued) 10 mg Daily With Breakfast 3/9/2017 3/8/2017    Sig - Route: Take 1 tablet by mouth Daily With Breakfast. - Oral    sennosides-docusate sodium (SENOKOT-S) 8.6-50 MG tablet 2 tablet (Discontinued) 2 tablet 2 Times Daily 3/8/2017 3/8/2017    Sig - Route: Take 2 tablets by mouth 2 (Two) Times a Day. - Oral    warfarin (COUMADIN) tablet 5 mg (Discontinued) 5 mg Nightly 3/8/2017 3/8/2017    Sig - Route: Take 1 tablet by mouth Every Night. - Oral    warfarin (COUMADIN) tablet 6 mg (Discontinued) 6 mg Daily Warfarin 3/8/2017 3/8/2017    Sig - Route: Take 2 tablets by mouth Daily. - Oral          Blood Administration Record     None          Lab Results (last 24 hours)     Procedure Component Value Units Date/Time    POC Glucose Fingerstick [[id number]]  (Abnormal) Collected:  03/08/17 1212    Specimen:  Blood Updated:  03/08/17 1218     Glucose 151 (H) mg/dL     Narrative:       Meter: [id number] : [id number]  Valentin Chang    CBC Auto Differential [[id number]]  (Abnormal) Collected:  03/08/17 1306    Specimen:  Blood Updated:  03/08/17 1316     WBC 16.40 (H) 10*3/mm3      RBC 3.79 (L) 10*6/mm3      Hemoglobin 11.0 (L) g/dL      Hematocrit 33.6 (L) %      MCV 88.7 fL      MCH 29.0 pg      MCHC 32.7 g/dL      RDW 15.8 (H) %      RDW-SD 51.8 fl      MPV 9.6 fL      Platelets 153 10*3/mm3      Neutrophil % 84.2 (H) %      Lymphocyte % 8.9 (L) %      Monocyte % 5.7 %      Eosinophil % 0.1 %      Basophil % 0.2 %      Immature Grans % 0.9 (H) %      Neutrophils, Absolute 13.82 (H) 10*3/mm3      Lymphocytes, Absolute 1.46 10*3/mm3      Monocytes, Absolute 0.94 10*3/mm3      Eosinophils, Absolute 0.01 (L) 10*3/mm3      Basophils, Absolute 0.03 10*3/mm3      Immature Grans, Absolute 0.14 (H) 10*3/mm3      nRBC 0.0 /100 WBC     CBC & Differential [[id number]] Collected:  03/08/17 1306    Specimen:  Blood Updated:  03/08/17 1318    Narrative:       The following orders were created for panel order CBC & Differential.  Procedure                               Abnormality         Status                     ---------                               -----------         ------                     Scan Slide[[id number]]                                        Final result               CBC Auto Differential[[id number]]         Abnormal            Final result                 Please view results for these tests on the individual orders.    Scan Slide [[id number]] Collected:  03/08/17 1306    Specimen:  Blood Updated:  03/08/17 1318     Anisocytosis Slight/1+      WBC Morphology Normal      Platelet Estimate Adequate     Protime-INR [[id number]]  (Abnormal) Collected:  03/08/17 1306    Specimen:  Blood Updated:  03/08/17 1328     Protime 23.1 (H) Seconds      INR 2.01 (H)     Narrative:       Therapeutic Ranges for INR: 2.0-3.0 (PT 20-30)                              2.5-3.5 (PT 25-34)    Comprehensive Metabolic Panel [[id number]]  (Abnormal) Collected:   03/08/17 1306    Specimen:  Blood Updated:  03/08/17 1333     Glucose 158 (H) mg/dL      BUN 39 (H) mg/dL      Creatinine 2.36 (H) mg/dL      Sodium 139 mmol/L      Potassium 4.6 mmol/L      Chloride 95 (L) mmol/L      CO2 32.3 (H) mmol/L      Calcium 9.8 mg/dL      Total Protein 5.6 (L) g/dL      Albumin 3.10 (L) g/dL      ALT (SGPT) 311 (H) U/L      AST (SGOT) 467 (H) U/L      Alkaline Phosphatase 190 (H) U/L      Total Bilirubin 5.4 (H) mg/dL      eGFR Non African Amer 20 (L) mL/min/1.73      Globulin 2.5 gm/dL      A/G Ratio 1.2 g/dL      BUN/Creatinine Ratio 16.5      Anion Gap 11.7 mmol/L     Narrative:       The MDRD GFR formula is only valid for adults with stable renal function between ages 18 and 70.    Amylase [[id number]]  (Abnormal) Collected:  03/08/17 1306    Specimen:  Blood Updated:  03/08/17 1337     Amylase 1696 (H) U/L     Lipase [[id number]]  (Abnormal) Collected:  03/08/17 1306    Specimen:  Blood Updated:  03/08/17 1337     Lipase 2486 (H) U/L     Lactic Acid, Plasma [[id number]]  (Abnormal) Collected:  03/08/17 1347    Specimen:  Blood Updated:  03/08/17 1408     Lactate 2.5 (C) mmol/L     aPTT [[id number]]  (Abnormal) Collected:  03/08/17 1306    Specimen:  Blood Updated:  03/08/17 1534     PTT 43.2 (H) seconds     Narrative:       PTT = The equivalent PTT values for the therapeutic range of heparin levels at 0.1 to 0.7 U/ml are 53 to 110 seconds.    POC Glucose Fingerstick [[id number]]  (Abnormal) Collected:  03/08/17 1636    Specimen:  Blood Updated:  03/08/17 1647     Glucose 145 (H) mg/dL     Narrative:       Meter: [id number] : [id number] Valentin Chang    Urinalysis With / Culture If Indicated [[id number]]  (Abnormal) Collected:  03/08/17 1800    Specimen:  Urine from Urine, Catheter Updated:  03/08/17 1816     Color, UA Cele (A)      Appearance, UA Turbid (A)      pH, UA <=5.0      Specific Gravity, UA 1.025      Glucose,  mg/dL (Trace) (A)      Ketones, UA Trace (A)      Bilirubin, UA  Large (3+) (A)      Blood, UA Small (1+) (A)      Protein,  mg/dL (2+) (A)      Leuk Esterase, UA Trace (A)      Nitrite, UA Negative      Urobilinogen, UA 4.0 E.U./dL (A)     Urinalysis, Microscopic Only [[id number]]  (Abnormal) Collected:  03/08/17 1800    Specimen:  Urine from Urine, Catheter Updated:  03/08/17 1816     RBC, UA 6-12 (A) /HPF      WBC, UA 31-50 (A) /HPF      Bacteria, UA 3+ (A) /HPF      Squamous Epithelial Cells, UA 13-20 (A) /HPF      Hyaline Casts, UA None Seen /LPF      Amorphous Crystals, UA Moderate/2+ /HPF      Mucus, UA Small/1+ (A) /HPF      Methodology Manual Light Microscopy     POC Glucose Fingerstick [[id number]]  (Abnormal) Collected:  03/08/17 2005    Specimen:  Blood Updated:  03/08/17 2012     Glucose 164 (H) mg/dL     Narrative:       Meter: [id number] : [id number] Marlon Laura    Lactate Acid, Reflex [[id number]]  (Normal) Collected:  03/08/17 2214    Specimen:  Blood Updated:  03/08/17 2232     Lactate 1.1 mmol/L     POC Glucose Fingerstick [[id number]]  (Normal) Collected:  03/09/17 0001    Specimen:  Blood Updated:  03/09/17 0022     Glucose 118 mg/dL     Narrative:       Meter: [id number] : [id number] Kimber BETANCOURT    CBC & Differential [[id number]] Collected:  03/09/17 0445    Specimen:  Blood Updated:  03/09/17 0524    Narrative:       The following orders were created for panel order CBC & Differential.  Procedure                               Abnormality         Status                     ---------                               -----------         ------                     Scan Slide[[id number]]                                                                   CBC Auto Differential[[id number]]         Abnormal            Final result                 Please view results for these tests on the individual orders.    CBC Auto Differential [[id number]]  (Abnormal) Collected:  03/09/17 0445    Specimen:  Blood Updated:  03/09/17 0524     WBC 11.51 (H) 10*3/mm3      RBC  3.64 (L) 10*6/mm3      Hemoglobin 10.3 (L) g/dL      Hematocrit 32.7 (L) %      MCV 89.8 fL      MCH 28.3 pg      MCHC 31.5 g/dL      RDW 15.4 (H) %      RDW-SD 51.1 fl      MPV 10.0 fL      Platelets 136 (L) 10*3/mm3      Neutrophil % 83.4 (H) %      Lymphocyte % 10.9 (L) %      Monocyte % 4.2 %      Eosinophil % 0.2 %      Basophil % 0.1 %      Immature Grans % 1.2 (H) %      Neutrophils, Absolute 9.61 (H) 10*3/mm3      Lymphocytes, Absolute 1.25 10*3/mm3      Monocytes, Absolute 0.48 10*3/mm3      Eosinophils, Absolute 0.02 (L) 10*3/mm3      Basophils, Absolute 0.01 10*3/mm3      Immature Grans, Absolute 0.14 (H) 10*3/mm3      nRBC 0.0 /100 WBC     Comprehensive Metabolic Panel [[id number]]  (Abnormal) Collected:  03/09/17 0445    Specimen:  Blood Updated:  03/09/17 0542     Glucose 130 (H) mg/dL      BUN 47 (H) mg/dL      Creatinine 2.60 (H) mg/dL      Sodium 139 mmol/L      Potassium 4.4 mmol/L      Chloride 96 (L) mmol/L      CO2 32.6 (H) mmol/L      Calcium 9.2 mg/dL      Total Protein 5.4 (L) g/dL      Albumin 2.90 (L) g/dL      ALT (SGPT) 225 (H) U/L      AST (SGOT) 218 (H) U/L      Alkaline Phosphatase 168 (H) U/L      Total Bilirubin 2.2 (H) mg/dL      eGFR Non African Amer 18 (L) mL/min/1.73      Globulin 2.5 gm/dL      A/G Ratio 1.2 g/dL      BUN/Creatinine Ratio 18.1      Anion Gap 10.4 mmol/L     Narrative:       The MDRD GFR formula is only valid for adults with stable renal function between ages 18 and 70.    Amylase [[id number]]  (Abnormal) Collected:  03/09/17 0445    Specimen:  Blood Updated:  03/09/17 0542     Amylase 607 (H) U/L     Lipase [[id number]]  (Abnormal) Collected:  03/09/17 0445    Specimen:  Blood Updated:  03/09/17 0542     Lipase 228 (H) U/L     POC Glucose Fingerstick [[id number]]  (Normal) Collected:  03/09/17 0613    Specimen:  Blood Updated:  03/09/17 0625     Glucose 126 mg/dL     Narrative:       Meter: [id number] : [id number] Kimber BETANCOURT    Urine Culture [[id number]]   (Abnormal) Collected:  03/08/17 1800    Specimen:  Urine from Urine, Catheter Updated:  03/09/17 0738     Urine Culture >100,000 CFU/mL Gram Negative Bacilli (A)         Imaging Results (last 24 hours)     Procedure Component Value Units Date/Time    US Abdomen Complete [[id number]] Collected:  03/08/17 1432     Updated:  03/08/17 1452    Narrative:       INDICATION: Transabdominal pain for 4 days.     EXAM: Abdominal ultrasound, complete.     COMPARISON: CT chest dated 01/12/2017     FINDINGS:  The pancreas is prominent measuring up to 2.5 cm in thickness. Although  nonspecific, this is unusual for a patient of this age. Please  clinically for any evidence of acute pancreatic  inflammation/pancreatitis.     The echogenicity and echotexture of the hepatic parenchyma is within  normal limits. No hepatic mass. Mild intrahepatic and extrahepatic  biliary dilatation. The common duct is incompletely visualized, however  measures up to 0.8-0.9 cm in diameter.     There is a small gallstones in the gallbladder. There is mild  gallbladder wall thickening measuring up to 0.6 cm. There is borderline  gallbladder distention. No pericholecystic fluid..     The right kidney measures 9.8 cm. The left kidney measures 11.7 cm.  Bilateral renal cortical atrophy is more notable on the right kidney. No  hydronephrosis.     The spleen is mildly enlarged measuring 16 cm.       The abdominal aorta is normal in size.  The juxtahepatic IVC is within  normal limits.  No ascites.       Impression:          1. Mild intrahepatic and extrahepatic biliary dilatation. The entire  common bile duct was not visualized, therefore, distal obstructing stone  or mass may be present. Consider further evaluation with a MRCP.  2. Cholelithiasis. There is mild gallbladder distention and and  gallbladder wall thickening. These findings are concerning for possible  acute cholecystitis, however, may simply be secondary to the biliary  distention.  3. Prominent  appearance of the pancreas. While nonspecific, this is  concerning for possible pancreatitis.     This report was finalized on 3/8/2017 2:50 PM by Dr. Damian Kennedy MD.       MRI MRCP [[id number]] Collected:  03/09/17 0847     Updated:  03/09/17 0902    Narrative:       MRI ABDOMEN, NONCONTRAST, INCLUDING MRCP, 03/09/2017:     HISTORY:   81-year-old female with with abdomen pain, leukocytosis. Elevated  amylase, lipase and bilirubin levels. Previous pancreatitis seen here  January 2017. Current ultrasound showing cholelithiasis, mild bile duct  dilatation and pancreas enlargement. Evaluate for choledocholithiasis,  gallstone pancreatitis.     TECHNIQUE:  MRI examination of the abdomen was performed without contrast  administration due to abnormal renal function (GFR 18). MRCP sequences  were also obtained. The images are significantly degraded by respiratory  motion artifact.     FINDINGS:  Multiple small gallstones are resident within a diffusely thick-walled  gallbladder, there is evidence of edema within and adjacent to the  bladder wall. Acute cholecystitis is likely.     There is mild intrahepatic and extra hepatic bile duct dilatation to the  level of the ampulla with the upper extrahepatic bile duct measuring  about 10 mm. There is a single round filling defect within the mid  common bile duct measuring 4 to 5 mm consistent with  choledocholithiasis.     There is mild diffuse enlargement of the pancreas which appears mildly  edematous. The pancreatic duct is also mildly prominent at about 5 mm.  The appearance is compatible with mild acute pancreatitis.     Mildly nodular liver margin suggesting potential chronic liver disease.  No visible focal liver lesion. Hepatic vasculature appears patent. The  spleen is mildly enlarged at about 14 cm. Mild atrophy of the right  kidney. Small benign-appearing cyst within each kidney.       Impression:       1. Cholelithiasis with likely acute cholecystitis.  2.  Choledocholithiasis with a single small stone within the mid common  bile duct. Minimal-mild intrahepatic and extra hepatic bile duct  dilatation. Mildly prominent pancreatic duct.  3. Evidence of mild diffuse pancreatitis.  4. Lobulated hepatic contour suggesting chronic liver disease/cirrhosis.  Mild splenomegaly.  5. Mild right renal parenchymal atrophy. Small bilateral renal cysts.  6. Technically limited study as noted above.     This report was finalized on 3/9/2017 9:00 AM by Dr. Art Bateman MD.             ECG/EMG Results (last 24 hours)     ** No results found for the last 24 hours. **        Ventilator/Non-Invasive Ventilation Settings     None        Operative/Procedure Notes (last 24 hours) (Notes from 3/8/2017 11:45 AM through 3/9/2017 11:45 AM)     No notes of this type exist for this encounter.           Physician Progress Notes (last 24 hours) (Notes from 3/8/2017 11:45 AM through 3/9/2017 11:45 AM)      Trevor Diallo MD at 3/9/2017  7:01 AM  Version 1 of 1             HOSPITALIST SERVICES  @ [place], [place]            HOSPITALIST TEAM   PROGRESS NOTE      Patient Care Team:  Gerardo Williamson MD as PCP - General  Gerardo Williamson MD as PCP - Family Medicine        Chief Complaint:        Upper abdominal pain for last few days      Subjective:    Ms. Alexandria Woo is An 81 year old  female who is an inmate at [facility]. She has acute cholecystitis and acute pancreatitis with hypotension. Patient also appears to be septic. She looks jaundiced on examination. She is going for MRI MRA this morning.         Interval History and ROS:     Patient States her abdominal pain is not that bad now   Patient Complaints: No new complaints  Patient Denies:  Any sx of chest pain, shortness of breath, fever, dizziness or n/v  History taken from: Patient and her Nurse      Objective    Vital Signs  Temp:  [97.3 °F (36.3 °C)-98.4 °F (36.9 °C)] 97.3 °F (36.3 °C)  Heart Rate:   "[69-85] 85  Resp:  [15-20] 18  BP: ()/(32-80) 75/59  Arterial Line BP: ()/(45-58) 102/56    Flowsheet Rows         First Filed Value    Admission Height  64\" (162.6 cm) Documented at 03/08/2017 1700    Admission Weight  219 lb 9.6 oz (99.6 kg) Documented at 03/08/2017 1700              Physical Exam:      VS: T: 36.3; HR: 85; RR: 18; BP: 102/56; O2sat: 98% on nasal cannula   General: Patient lying in bed in no acute distress.   Eyes: PERRL, EOMI. Scleral icterus.   ENT: No nasal d/c. MMM. Oropharynx clear with no lesions/erythema.   Neck: Supple with no thyromegaly or masses.   Lymph Nodes: No cervical or inguinal LAD.   Cardiovascular: RRR. Prominent heart sounds, S1 and S2 normal, no m/g/r. Pulses 2+ equal on both sides.   Lungs: Diffusely decreased breath sounds bilateral. No crackles/rhonchi/wheezes. Good air movement.   Skin: Icteric. No skin rash.    Psychiatry: Alert and oriented to person, place, and time   Abdomen: Soft. Normoactive bowel sounds. Upper abdominal tenderness on palpation. No rebound or guarding.   Extremeties: No bilateral cyanosis, clubbing or edema. No petechiae. Capillary refill <3 sec.   MSK: Unremarkable.    Neurological: Cranial nerves II-XII grossly intact, normal sensation throughout.        Results Review:     I reviewed the patient's new clinical results.    Lab Results (last 24 hours)     Procedure Component Value Units Date/Time    POC Glucose Fingerstick [[id number]]  (Abnormal) Collected:  03/08/17 1212    Specimen:  Blood Updated:  03/08/17 1218     Glucose 151 (H) mg/dL     Narrative:       Meter: [id number] : [id number] Valentin Chang    CBC Auto Differential [[id number]]  (Abnormal) Collected:  03/08/17 1306    Specimen:  Blood Updated:  03/08/17 1316     WBC 16.40 (H) 10*3/mm3      RBC 3.79 (L) 10*6/mm3      Hemoglobin 11.0 (L) g/dL      Hematocrit 33.6 (L) %      MCV 88.7 fL      MCH 29.0 pg      MCHC 32.7 g/dL      RDW 15.8 (H) %      RDW-SD 51.8 fl      MPV " 9.6 fL      Platelets 153 10*3/mm3      Neutrophil % 84.2 (H) %      Lymphocyte % 8.9 (L) %      Monocyte % 5.7 %      Eosinophil % 0.1 %      Basophil % 0.2 %      Immature Grans % 0.9 (H) %      Neutrophils, Absolute 13.82 (H) 10*3/mm3      Lymphocytes, Absolute 1.46 10*3/mm3      Monocytes, Absolute 0.94 10*3/mm3      Eosinophils, Absolute 0.01 (L) 10*3/mm3      Basophils, Absolute 0.03 10*3/mm3      Immature Grans, Absolute 0.14 (H) 10*3/mm3      nRBC 0.0 /100 WBC     CBC & Differential [[id number]] Collected:  03/08/17 1306    Specimen:  Blood Updated:  03/08/17 1318    Narrative:       The following orders were created for panel order CBC & Differential.  Procedure                               Abnormality         Status                     ---------                               -----------         ------                     Scan Slide[[id number]]                                        Final result               CBC Auto Differential[[id number]]         Abnormal            Final result                 Please view results for these tests on the individual orders.    Scan Slide [[id number]] Collected:  03/08/17 1306    Specimen:  Blood Updated:  03/08/17 1318     Anisocytosis Slight/1+      WBC Morphology Normal      Platelet Estimate Adequate     Protime-INR [[id number]]  (Abnormal) Collected:  03/08/17 1306    Specimen:  Blood Updated:  03/08/17 1328     Protime 23.1 (H) Seconds      INR 2.01 (H)     Narrative:       Therapeutic Ranges for INR: 2.0-3.0 (PT 20-30)                              2.5-3.5 (PT 25-34)    Comprehensive Metabolic Panel [[id number]]  (Abnormal) Collected:  03/08/17 1306    Specimen:  Blood Updated:  03/08/17 1333     Glucose 158 (H) mg/dL      BUN 39 (H) mg/dL      Creatinine 2.36 (H) mg/dL      Sodium 139 mmol/L      Potassium 4.6 mmol/L      Chloride 95 (L) mmol/L      CO2 32.3 (H) mmol/L      Calcium 9.8 mg/dL      Total Protein 5.6 (L) g/dL      Albumin 3.10 (L) g/dL      ALT (SGPT) 311 (H) U/L       AST (SGOT) 467 (H) U/L      Alkaline Phosphatase 190 (H) U/L      Total Bilirubin 5.4 (H) mg/dL      eGFR Non African Amer 20 (L) mL/min/1.73      Globulin 2.5 gm/dL      A/G Ratio 1.2 g/dL      BUN/Creatinine Ratio 16.5      Anion Gap 11.7 mmol/L     Narrative:       The MDRD GFR formula is only valid for adults with stable renal function between ages 18 and 70.    Amylase [[id number]]  (Abnormal) Collected:  03/08/17 1306    Specimen:  Blood Updated:  03/08/17 1337     Amylase 1696 (H) U/L     Lipase [[id number]]  (Abnormal) Collected:  03/08/17 1306    Specimen:  Blood Updated:  03/08/17 1337     Lipase 2486 (H) U/L     Lactic Acid, Plasma [[id number]]  (Abnormal) Collected:  03/08/17 1347    Specimen:  Blood Updated:  03/08/17 1408     Lactate 2.5 (C) mmol/L     aPTT [[id number]]  (Abnormal) Collected:  03/08/17 1306    Specimen:  Blood Updated:  03/08/17 1534     PTT 43.2 (H) seconds     Narrative:       PTT = The equivalent PTT values for the therapeutic range of heparin levels at 0.1 to 0.7 U/ml are 53 to 110 seconds.    POC Glucose Fingerstick [[id number]]  (Abnormal) Collected:  03/08/17 1636    Specimen:  Blood Updated:  03/08/17 1647     Glucose 145 (H) mg/dL     Narrative:       Meter: [id number] : [id number] Valentin Chang    Urine Culture [[id number]] Collected:  03/08/17 1800    Specimen:  Urine from Urine, Catheter Updated:  03/08/17 1816    Urinalysis With / Culture If Indicated [[id number]]  (Abnormal) Collected:  03/08/17 1800    Specimen:  Urine from Urine, Catheter Updated:  03/08/17 1816     Color, UA Cele (A)      Appearance, UA Turbid (A)      pH, UA <=5.0      Specific Gravity, UA 1.025      Glucose,  mg/dL (Trace) (A)      Ketones, UA Trace (A)      Bilirubin, UA Large (3+) (A)      Blood, UA Small (1+) (A)      Protein,  mg/dL (2+) (A)      Leuk Esterase, UA Trace (A)      Nitrite, UA Negative      Urobilinogen, UA 4.0 E.U./dL (A)     Urinalysis, Microscopic Only [[id number]]   (Abnormal) Collected:  03/08/17 1800    Specimen:  Urine from Urine, Catheter Updated:  03/08/17 1816     RBC, UA 6-12 (A) /HPF      WBC, UA 31-50 (A) /HPF      Bacteria, UA 3+ (A) /HPF      Squamous Epithelial Cells, UA 13-20 (A) /HPF      Hyaline Casts, UA None Seen /LPF      Amorphous Crystals, UA Moderate/2+ /HPF      Mucus, UA Small/1+ (A) /HPF      Methodology Manual Light Microscopy     POC Glucose Fingerstick [[id number]]  (Abnormal) Collected:  03/08/17 2005    Specimen:  Blood Updated:  03/08/17 2012     Glucose 164 (H) mg/dL     Narrative:       Meter: [id number] : [id number] Marlon Laura    Lactate Acid, Reflex [[id number]]  (Normal) Collected:  03/08/17 2214    Specimen:  Blood Updated:  03/08/17 2232     Lactate 1.1 mmol/L     POC Glucose Fingerstick [[id number]]  (Normal) Collected:  03/09/17 0001    Specimen:  Blood Updated:  03/09/17 0022     Glucose 118 mg/dL     Narrative:       Meter: [id number] : [id number] Kimber BETANCOURT    CBC & Differential [[id number]] Collected:  03/09/17 0445    Specimen:  Blood Updated:  03/09/17 0524    Narrative:       The following orders were created for panel order CBC & Differential.  Procedure                               Abnormality         Status                     ---------                               -----------         ------                     Scan Slide[[id number]]                                                                   CBC Auto Differential[[id number]]         Abnormal            Final result                 Please view results for these tests on the individual orders.    CBC Auto Differential [[id number]]  (Abnormal) Collected:  03/09/17 0445    Specimen:  Blood Updated:  03/09/17 0524     WBC 11.51 (H) 10*3/mm3      RBC 3.64 (L) 10*6/mm3      Hemoglobin 10.3 (L) g/dL      Hematocrit 32.7 (L) %      MCV 89.8 fL      MCH 28.3 pg      MCHC 31.5 g/dL      RDW 15.4 (H) %      RDW-SD 51.1 fl      MPV 10.0 fL      Platelets 136 (L) 10*3/mm3       Neutrophil % 83.4 (H) %      Lymphocyte % 10.9 (L) %      Monocyte % 4.2 %      Eosinophil % 0.2 %      Basophil % 0.1 %      Immature Grans % 1.2 (H) %      Neutrophils, Absolute 9.61 (H) 10*3/mm3      Lymphocytes, Absolute 1.25 10*3/mm3      Monocytes, Absolute 0.48 10*3/mm3      Eosinophils, Absolute 0.02 (L) 10*3/mm3      Basophils, Absolute 0.01 10*3/mm3      Immature Grans, Absolute 0.14 (H) 10*3/mm3      nRBC 0.0 /100 WBC     Comprehensive Metabolic Panel [[id number]]  (Abnormal) Collected:  03/09/17 0445    Specimen:  Blood Updated:  03/09/17 0542     Glucose 130 (H) mg/dL      BUN 47 (H) mg/dL      Creatinine 2.60 (H) mg/dL      Sodium 139 mmol/L      Potassium 4.4 mmol/L      Chloride 96 (L) mmol/L      CO2 32.6 (H) mmol/L      Calcium 9.2 mg/dL      Total Protein 5.4 (L) g/dL      Albumin 2.90 (L) g/dL      ALT (SGPT) 225 (H) U/L      AST (SGOT) 218 (H) U/L      Alkaline Phosphatase 168 (H) U/L      Total Bilirubin 2.2 (H) mg/dL      eGFR Non African Amer 18 (L) mL/min/1.73      Globulin 2.5 gm/dL      A/G Ratio 1.2 g/dL      BUN/Creatinine Ratio 18.1      Anion Gap 10.4 mmol/L     Narrative:       The MDRD GFR formula is only valid for adults with stable renal function between ages 18 and 70.    Amylase [[id number]]  (Abnormal) Collected:  03/09/17 0445    Specimen:  Blood Updated:  03/09/17 0542     Amylase 607 (H) U/L     Lipase [[id number]]  (Abnormal) Collected:  03/09/17 0445    Specimen:  Blood Updated:  03/09/17 0542     Lipase 228 (H) U/L     POC Glucose Fingerstick [[id number]]  (Normal) Collected:  03/09/17 0613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 and  gallbladder wall thickening. These findings are concerning for possible  acute cholecystitis, however, may simply be secondary to the biliary  distention.  3. Prominent appearance of the pancreas. While nonspecific, this is  concerning for possible pancreatitis.     This report was finalized on 3/8/2017 2:50 PM by Dr. Damian Kennedy MD.       MRI MRCP [[id number]] Resulted:  03/09/17 0832     Updated:  03/09/17 0809        reviewed    ECG/EMG Results (most recent)     None      reviewed    Assessment&#47;Plan   pancreatitis  Renal failure  --at this point we will wait for her amylase and lipase to come down.  If the MRCP shows no CBD stones, we will perform a laparoscopic cholecystectomy with intraoperative cholangiograms when her pancreatitis resolves, if the CBD has stones, we will wait for Dr. Estrada to clear the CBD prior to her gallbladder surgery.  Hopefully her renal failure will improve.  We will also await for cardiology to clear her from a cardiac standpoint.    I discussed the patients findings and my recommendations with patient.     Joy Pham DO  03/09/17  8:36 AM           Electronically signed by Joy Pham DO at 3/9/2017  8:42 AM        Nutrition Notes (last 24 hours) (Notes from 3/8/2017 11:45 AM through 3/9/2017 11:45 AM)     No notes of this type exist for this encounter.            Speech Language Pathology Notes (last 24 hours) (Notes from 3/8/2017 11:45 AM through 3/9/2017 11:45 AM)     No notes of this type exist for this encounter.            Respiratory Therapy Notes (last 24 hours) (Notes from 3/8/2017 11:45 AM through 3/9/2017 11:45 AM)      Bo Gordon RRT at 3/8/2017  7:43 PM  Version 1 of 1         Problem: Patient Care Overview (Adult)  Goal: Plan of Care Review  Outcome: Ongoing (interventions implemented as appropriate)    03/08/17 1942   Coping/Psychosocial Response Interventions   Plan Of Care Reviewed With patient   Patient Care Overview   Progress no change       Goal: Adult Individualization and Mutuality  Outcome: Ongoing (interventions implemented as appropriate)         Electronically signed by Bo Gordon RRT at 3/8/2017  7:43 PM

## 2017-03-09 NOTE — CONSULTS
Patient Name: Alexandria Villanueva  :1935  81 y.o.    Date of Admission: 3/8/2017  Date of Consultation:  17  Encounter Provider: Luis Fernando Stevens III, MD  Place of Service: [facility] CARDIOLOGY  Referring Provider: Alice Garcia DO  Patient Care Team:  Gerardo Williamson MD as PCP - General  Gerardo Williamson MD as PCP - Family Medicine      Chief complaint:    History of Present Illness:    Ms. Villanueva is an 82 yo patient of Dr. Davey who was admitted with nausea and acute cholecystitis.     She was recently here in January and at that time had an abdominal CT scan that showed cholelithiasis as well as findings consistent with acute pancreatitis area     She has a history of chronic atrial fibrillation. She has a history of chronic diastolic CHF. Her most recent echocardiogram showed an ejection fraction 62%. She has severe pulmonary hypertension. Calculated pulmonary arterial pressure of 75 mmHg consistent with severe pulmonary hypertension; this was unchanged from her prior echocardiogram. She has chronic lower extremity edema and has lymphedema.  She has a history of morbid obesity as well as obstructive sleep apnea.     She has permanent atrial fibrillation; her heart rate has been under appropriate control.    She lives in a nursing home.  She denies any chest pain, pressure, tightness, squeezing or heartburn.  She denies any shortness of breath.    Past Medical History   Diagnosis Date   • Anemia    • Arthritis    • CHF (congestive heart failure)    • Chronic diastolic (congestive) heart failure    • Chronic kidney disease    • COPD (chronic obstructive pulmonary disease)    • Diabetes mellitus    • DVT (deep venous thrombosis)    • Dyslipidemia    • Gout    • Hypertension    • Lymphedema    • Morbid obesity    • Obstructive sleep apnea of adult      untreated   • Permanent atrial fibrillation    • Pulmonary HTN    • Respiratory failure, acute      hypoxic        Past Surgical History   Procedure Laterality Date   • Hernia repair     • Eye surgery     • Femur fracture surgery Right      closed reduction external fixation   • Cataract extraction       both eyes 4 years ago   • Fracture surgery     • Tonsillectomy           Prior to Admission medications    Medication Sig Start Date End Date Taking? Authorizing Provider   cholecalciferol (VITAMIN D3) 47831 UNITS capsule Take 50,000 Units by mouth Every 30 (Thirty) Days.   Yes Historical Provider, MD   citalopram (CeleXA) 20 MG tablet Take 1 tablet by mouth Daily.  Patient taking differently: Take 20 mg by mouth 3 (Three) Times a Week. MON, WED, FRI 10/2/16  Yes Alice Garcia DO   folic acid (FOLVITE) 1 MG tablet Take 1 mg by mouth daily.   Yes Historical Provider, MD   furosemide (LASIX) 40 MG tablet Take 1 tablet by mouth 2 (Two) Times a Day. 1/17/17  Yes Trevor Diallo MD   gabapentin (NEURONTIN) 300 MG capsule Take 300 mg by mouth 2 (two) times a day.   Yes Historical Provider, MD   glucosamine-chondroitin 500-400 MG capsule capsule Take 2 capsules by mouth daily.   Yes Historical Provider, MD   HYDROcodone-acetaminophen (NORCO) 5-325 MG per tablet Take 1 tablet by mouth Every 6 (Six) Hours As Needed for moderate pain (4-6). 1/8/17  Yes Glendy Gonsalez MD   insulin aspart (NovoLOG) 100 UNIT/ML injection Inject 3 Units under the skin 3 (Three) Times a Day With Meals. Hold if blood sugar less than 120 10/2/16  Yes Alice Garcia DO   ipratropium-albuterol (DUO-NEB) 0.5-2.5 mg/mL nebulizer Take 3 mL by nebulization 4 (Four) Times a Day. 1/8/17  Yes Glendy Gonsalez MD   metoprolol succinate XL (TOPROL-XL) 25 MG 24 hr tablet Take 25 mg by mouth daily.   Yes Historical Provider, MD   multivitamin-minerals (OCUVITE) tablet Take 1 tablet by mouth daily.   Yes Historical Provider, MD   NON FORMULARY 3L NC continuos   Yes Historical Provider, MD   polyethylene glycol (MIRALAX) packet Take 17  g by mouth daily.   Yes Historical Provider, MD   predniSONE (DELTASONE) 10 MG tablet Take 1 tablet by mouth Daily With Breakfast. 1/17/17  Yes Trevor Diallo MD   sennosides-docusate sodium (SENOKOT-S) 8.6-50 MG tablet Take 2 tablets by mouth 2 (Two) Times a Day. 1/17/17  Yes Trevor Diallo MD   warfarin (COUMADIN) 6 MG tablet Take 6 mg by mouth Daily. 6 mg every Sun, Mon, Tues, Thrs, Sat  7 mg every Wed, Fri   Yes Historical Provider, MD   acetaminophen (TYLENOL) 325 MG tablet Take 2 tablets by mouth Every 4 (Four) Hours As Needed for mild pain (1-3). 10/2/16   Alice Garcia DO   bisacodyl (DULCOLAX) 5 MG EC tablet Insert 10 mg into the rectum Daily As Needed for constipation.    Historical Provider, MD   insulin detemir (LEVEMIR) 100 UNIT/ML injection Inject 20 Units under the skin Every Night. 10/2/16   Alice Garcia DO   melatonin 3 MG tablet Take 6 mg by mouth Every Night.    Historical Provider, MD   pantoprazole (PROTONIX) 40 MG EC tablet Take 40 mg by mouth every evening.    Historical Provider, MD   warfarin (COUMADIN) 5 MG tablet i po qhs 1/17/17   Trevor Diallo MD       Allergies   Allergen Reactions   • Codeine Nausea And Vomiting   • Demerol [Meperidine]      Dizzy reaction   • Propoxyphene      Dizzy reaction       Social History     Social History   • Marital status:      Spouse name: N/A   • Number of children: N/A   • Years of education: N/A     Social History Main Topics   • Smoking status: Never Smoker   • Smokeless tobacco: Not on file   • Alcohol use No   • Drug use: No   • Sexual activity: Not Currently     Other Topics Concern   • Not on file     Social History Narrative       Family History   Problem Relation Age of Onset   • Cancer Mother    • Heart disease Father    • Diabetes Father    • COPD Brother    • Heart disease Brother        REVIEW OF SYSTEMS:   All systems reviewed.  Pertinent positives identified in HPI.  All other systems are negative.      Objective:      Vitals:    03/09/17 0812 03/09/17 0900 03/09/17 1000 03/09/17 1112   BP:       BP Location:       Patient Position:       Pulse:  86 86 86   Resp:    20   Temp:       TempSrc:       SpO2: 97% (!) 88% 100% 96%   Weight:       Height:         Body mass index is 38.36 kg/(m^2).    General Appearance:    Alert, cooperative, in no acute distress   Head:    Normocephalic, without obvious abnormality, atraumatic   Eyes:            Lids and lashes normal, conjunctivae and sclerae normal, no   icterus, no pallor, corneas clear, PERRLA   Ears:    Ears appear intact with no abnormalities noted   Throat:   No oral lesions, no thrush, oral mucosa moist   Neck:   No adenopathy, supple, trachea midline, no thyromegaly, no   carotid bruit, no JVD   Back:     No kyphosis present, no scoliosis present, no skin lesions, erythema or scars, no tenderness to percussion or palpation, range of motion normal   Lungs:     Coarse BS,respirations regular, even and unlabored    Heart:    irregular rhythm and normal rate, normal S1 and S2, 1/6 HSM, no gallop, no rub, no click   Chest Wall:    No abnormalities observed   Abdomen:     Normal bowel sounds, no masses, no organomegaly, soft        non-tender, non-distended, no guarding, no rebound  tenderness   Extremities:   Moves all extremities well, + edema, no cyanosis, no redness   Pulses:   Pulses palpable and equal bilaterally. Normal radial, carotid, femoral, dorsalis pedis and posterior tibial pulses bilaterally. Normal abdominal aorta   Skin:  Psychiatric:   No bleeding, bruising or rash    Alert and oriented x 3, normal mood and affect   Lab Review:       Results from last 7 days  Lab Units 03/09/17  0445   SODIUM mmol/L 139   POTASSIUM mmol/L 4.4   CHLORIDE mmol/L 96*   TOTAL CO2 mmol/L 32.6*   BUN mg/dL 47*   CREATININE mg/dL 2.60*   CALCIUM mg/dL 9.2   BILIRUBIN mg/dL 2.2*   ALK PHOS U/L 168*   ALT (SGPT) U/L 225*   AST (SGOT) U/L 218*   GLUCOSE mg/dL 130*           Results from  last 7 days  Lab Units 03/09/17  0445   WBC 10*3/mm3 11.51*   HEMOGLOBIN g/dL 10.3*   HEMATOCRIT % 32.7*   PLATELETS 10*3/mm3 136*       Results from last 7 days  Lab Units 03/08/17  1306   INR  2.01*   APTT seconds 43.2*                       I personally viewed and interpreted the patient's Telemetry data.        Assessment and Plan:       Principal Problem:    Abdominal pain  Active Problems:    Chronic diastolic (congestive) heart failure    Chronic anticoagulation    Morbid obesity    Obstructive sleep apnea of adult    Pulmonary HTN    Cholecystitis    Chronic atrial fibrillation    1.  Acute cholecystitis  2.  This patient appears to be stable from a cardiac standpoint.  There is no cardiac contraindication to proceeding with the anticipated surgical procedure.  However, her preoperative risk is certainly increased by her severe pulmonary hypertension, morbid obesity, and obstructive sleep apnea with cor pulmonale.  3.  Acute renal insufficiency  4.  Patient has chronic anticoagulation due to her chronic atrial fibrillation.  Her INR was therapeutic; warfarin is now being held in anticipation of surgery    Luis Fernando Stevens III, MD  03/09/17  11:20 AM
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
## 2017-03-09 NOTE — NURSING NOTE
spoke with patient at bedside. patient is from the [facility]. patient needs assist with all ADL's. she states she gets up by a jamie lift to a w/c. she states she wears O2. her plan is to return to [facility] when medically stable. per Cele @ [facility] patient will need a precert from [facility] prior to return. Cele to check bedhold status. will conitnue to follow.      Spoke with Cele Payton r/t patient having 2 bed hold days but will take patient back. precert is needed prior to transfer. Please alert when patient is close to discharge.

## 2017-03-09 NOTE — NURSING NOTE
Upon placement of f/c, pts skin is excoriated and cracked in the labia folds. Zinc applied for barrier after dian care. BtCHERYL kimble.

## 2017-03-09 NOTE — PLAN OF CARE
Problem: Patient Care Overview (Adult)  Goal: Plan of Care Review  Outcome: Ongoing (interventions implemented as appropriate)    03/09/17 1602   Coping/Psychosocial Response Interventions   Plan Of Care Reviewed With patient   Patient Care Overview   Progress improving   Outcome Evaluation   Outcome Summary/Follow up Plan Pt had an MRI this AM that shows cholestitias and gallstones. MD aware. Plan is to take to surgery once amylase and lipase improve. Pt remains npo. Pts b/p is stable but continues to run on the low side, 90s-100s/40s-60s. Cont to monitor pt closely using EV-1000. Cardiology consulted.         Problem: Cardiac Output, Decreased (Adult)  Goal: Identify Related Risk Factors and Signs and Symptoms  Outcome: Ongoing (interventions implemented as appropriate)  Goal: Adequate Cardiac Output/Effective Tissue Perfusion  Outcome: Ongoing (interventions implemented as appropriate)    Problem: Fall Risk (Adult)  Goal: Identify Related Risk Factors and Signs and Symptoms  Outcome: Ongoing (interventions implemented as appropriate)  Goal: Absence of Falls  Outcome: Ongoing (interventions implemented as appropriate)

## 2017-03-09 NOTE — PROGRESS NOTES
"    HOSPITALIST SERVICES  @ [facility] [place]            HOSPITALIST TEAM   PROGRESS NOTE      Patient Care Team:  Gerardo Williamson MD as PCP - General  Gerardo Williamson MD as PCP - Family Medicine        Chief Complaint:        Upper abdominal pain for last few days      Subjective:    Ms. Alexandria Woo is An 81 year old  female who is an inmate at [facility]. She has acute cholecystitis and acute pancreatitis with hypotension. Patient also appears to be septic. She looks jaundiced on examination. She is going for MRI MRA this morning.         Interval History and ROS:     Patient States her abdominal pain is not that bad now   Patient Complaints: No new complaints  Patient Denies:  Any sx of chest pain, shortness of breath, fever, dizziness or n/v  History taken from: Patient and her Nurse      Objective    Vital Signs  Temp:  [97.3 °F (36.3 °C)-98.4 °F (36.9 °C)] 97.3 °F (36.3 °C)  Heart Rate:  [69-85] 85  Resp:  [15-20] 18  BP: ()/(32-80) 75/59  Arterial Line BP: ()/(45-58) 102/56    Flowsheet Rows         First Filed Value    Admission Height  64\" (162.6 cm) Documented at 03/08/2017 1700    Admission Weight  219 lb 9.6 oz (99.6 kg) Documented at 03/08/2017 1700              Physical Exam:      VS: T: 36.3; HR: 85; RR: 18; BP: 102/56; O2sat: 98% on nasal cannula   General: Patient lying in bed in no acute distress.   Eyes: PERRL, EOMI. Scleral icterus.   ENT: No nasal d/c. MMM. Oropharynx clear with no lesions/erythema.   Neck: Supple with no thyromegaly or masses.   Lymph Nodes: No cervical or inguinal LAD.   Cardiovascular: RRR. Prominent heart sounds, S1 and S2 normal, no m/g/r. Pulses 2+ equal on both sides.   Lungs: Diffusely decreased breath sounds bilateral. No crackles/rhonchi/wheezes. Good air movement.   Skin: Icteric. No skin rash.    Psychiatry: Alert and oriented to person, place, and time   Abdomen: Soft. Normoactive bowel sounds. Upper abdominal tenderness on " palpation. No rebound or guarding.   Extremeties: No bilateral cyanosis, clubbing or edema. No petechiae. Capillary refill <3 sec.   MSK: Unremarkable.    Neurological: Cranial nerves II-XII grossly intact, normal sensation throughout.        Results Review:     I reviewed the patient's new clinical results.    Lab Results (last 24 hours)     Procedure Component Value Units Date/Time    POC Glucose Fingerstick [[id number]]  (Abnormal) Collected:  03/08/17 1212    Specimen:  Blood Updated:  03/08/17 1218     Glucose 151 (H) mg/dL     Narrative:       Meter: [id number] : [id number] Valentinkarson Pughie    CBC Auto Differential [[id number]]  (Abnormal) Collected:  03/08/17 1306    Specimen:  Blood Updated:  03/08/17 1316     WBC 16.40 (H) 10*3/mm3      RBC 3.79 (L) 10*6/mm3      Hemoglobin 11.0 (L) g/dL      Hematocrit 33.6 (L) %      MCV 88.7 fL      MCH 29.0 pg      MCHC 32.7 g/dL      RDW 15.8 (H) %      RDW-SD 51.8 fl      MPV 9.6 fL      Platelets 153 10*3/mm3      Neutrophil % 84.2 (H) %      Lymphocyte % 8.9 (L) %      Monocyte % 5.7 %      Eosinophil % 0.1 %      Basophil % 0.2 %      Immature Grans % 0.9 (H) %      Neutrophils, Absolute 13.82 (H) 10*3/mm3      Lymphocytes, Absolute 1.46 10*3/mm3      Monocytes, Absolute 0.94 10*3/mm3      Eosinophils, Absolute 0.01 (L) 10*3/mm3      Basophils, Absolute 0.03 10*3/mm3      Immature Grans, Absolute 0.14 (H) 10*3/mm3      nRBC 0.0 /100 WBC     CBC & Differential [[id number]] Collected:  03/08/17 1306    Specimen:  Blood Updated:  03/08/17 1318    Narrative:       The following orders were created for panel order CBC & Differential.  Procedure                               Abnormality         Status                     ---------                               -----------         ------                     Scan Slide[[id number]]                                        Final result               CBC Auto Differential[[id number]]         Abnormal            Final result                  Please view results for these tests on the individual orders.    Scan Slide [[id number]] Collected:  03/08/17 1306    Specimen:  Blood Updated:  03/08/17 1318     Anisocytosis Slight/1+      WBC Morphology Normal      Platelet Estimate Adequate     Protime-INR [[id number]]  (Abnormal) Collected:  03/08/17 1306    Specimen:  Blood Updated:  03/08/17 1328     Protime 23.1 (H) Seconds      INR 2.01 (H)     Narrative:       Therapeutic Ranges for INR: 2.0-3.0 (PT 20-30)                              2.5-3.5 (PT 25-34)    Comprehensive Metabolic Panel [[id number]]  (Abnormal) Collected:  03/08/17 1306    Specimen:  Blood Updated:  03/08/17 1333     Glucose 158 (H) mg/dL      BUN 39 (H) mg/dL      Creatinine 2.36 (H) mg/dL      Sodium 139 mmol/L      Potassium 4.6 mmol/L      Chloride 95 (L) mmol/L      CO2 32.3 (H) mmol/L      Calcium 9.8 mg/dL      Total Protein 5.6 (L) g/dL      Albumin 3.10 (L) g/dL      ALT (SGPT) 311 (H) U/L      AST (SGOT) 467 (H) U/L      Alkaline Phosphatase 190 (H) U/L      Total Bilirubin 5.4 (H) mg/dL      eGFR Non African Amer 20 (L) mL/min/1.73      Globulin 2.5 gm/dL      A/G Ratio 1.2 g/dL      BUN/Creatinine Ratio 16.5      Anion Gap 11.7 mmol/L     Narrative:       The MDRD GFR formula is only valid for adults with stable renal function between ages 18 and 70.    Amylase [[id number]]  (Abnormal) Collected:  03/08/17 1306    Specimen:  Blood Updated:  03/08/17 1337     Amylase 1696 (H) U/L     Lipase [[id number]]  (Abnormal) Collected:  03/08/17 1306    Specimen:  Blood Updated:  03/08/17 1337     Lipase 2486 (H) U/L     Lactic Acid, Plasma [[id number]]  (Abnormal) Collected:  03/08/17 1347    Specimen:  Blood Updated:  03/08/17 1408     Lactate 2.5 (C) mmol/L     aPTT [[id number]]  (Abnormal) Collected:  03/08/17 1306    Specimen:  Blood Updated:  03/08/17 1534     PTT 43.2 (H) seconds     Narrative:       PTT = The equivalent PTT values for the therapeutic range of heparin levels at 0.1 to 0.7  U/ml are 53 to 110 seconds.    POC Glucose Fingerstick [[id number]]  (Abnormal) Collected:  03/08/17 1636    Specimen:  Blood Updated:  03/08/17 1647     Glucose 145 (H) mg/dL     Narrative:       Meter: [id number] : [id number] Valentinkarson Chang    Urine Culture [[id number]] Collected:  03/08/17 1800    Specimen:  Urine from Urine, Catheter Updated:  03/08/17 1816    Urinalysis With / Culture If Indicated [[id number]]  (Abnormal) Collected:  03/08/17 1800    Specimen:  Urine from Urine, Catheter Updated:  03/08/17 1816     Color, UA Cele (A)      Appearance, UA Turbid (A)      pH, UA <=5.0      Specific Gravity, UA 1.025      Glucose,  mg/dL (Trace) (A)      Ketones, UA Trace (A)      Bilirubin, UA Large (3+) (A)      Blood, UA Small (1+) (A)      Protein,  mg/dL (2+) (A)      Leuk Esterase, UA Trace (A)      Nitrite, UA Negative      Urobilinogen, UA 4.0 E.U./dL (A)     Urinalysis, Microscopic Only [[id number]]  (Abnormal) Collected:  03/08/17 1800    Specimen:  Urine from Urine, Catheter Updated:  03/08/17 1816     RBC, UA 6-12 (A) /HPF      WBC, UA 31-50 (A) /HPF      Bacteria, UA 3+ (A) /HPF      Squamous Epithelial Cells, UA 13-20 (A) /HPF      Hyaline Casts, UA None Seen /LPF      Amorphous Crystals, UA Moderate/2+ /HPF      Mucus, UA Small/1+ (A) /HPF      Methodology Manual Light Microscopy     POC Glucose Fingerstick [[id number]]  (Abnormal) Collected:  03/08/17 2005    Specimen:  Blood Updated:  03/08/17 2012     Glucose 164 (H) mg/dL     Narrative:       Meter: [id number] : [id number] Marlon Laura    Lactate Acid, Reflex [[id number]]  (Normal) Collected:  03/08/17 2214    Specimen:  Blood Updated:  03/08/17 2232     Lactate 1.1 mmol/L     POC Glucose Fingerstick [[id number]]  (Normal) Collected:  03/09/17 0001    Specimen:  Blood Updated:  03/09/17 0022     Glucose 118 mg/dL     Narrative:       Meter: [id number] : [id number] Kimber BETANCOURT    CBC & Differential [[id number]] Collected:   03/09/17 0445    Specimen:  Blood Updated:  03/09/17 0524    Narrative:       The following orders were created for panel order CBC & Differential.  Procedure                               Abnormality         Status                     ---------                               -----------         ------                     Scan Slide[[id number]]                                                                   CBC Auto Differential[[id number]]         Abnormal            Final result                 Please view results for these tests on the individual orders.    CBC Auto Differential [[id number]]  (Abnormal) Collected:  03/09/17 0445    Specimen:  Blood Updated:  03/09/17 0524     WBC 11.51 (H) 10*3/mm3      RBC 3.64 (L) 10*6/mm3      Hemoglobin 10.3 (L) g/dL      Hematocrit 32.7 (L) %      MCV 89.8 fL      MCH 28.3 pg      MCHC 31.5 g/dL      RDW 15.4 (H) %      RDW-SD 51.1 fl      MPV 10.0 fL      Platelets 136 (L) 10*3/mm3      Neutrophil % 83.4 (H) %      Lymphocyte % 10.9 (L) %      Monocyte % 4.2 %      Eosinophil % 0.2 %      Basophil % 0.1 %      Immature Grans % 1.2 (H) %      Neutrophils, Absolute 9.61 (H) 10*3/mm3      Lymphocytes, Absolute 1.25 10*3/mm3      Monocytes, Absolute 0.48 10*3/mm3      Eosinophils, Absolute 0.02 (L) 10*3/mm3      Basophils, Absolute 0.01 10*3/mm3      Immature Grans, Absolute 0.14 (H) 10*3/mm3      nRBC 0.0 /100 WBC     Comprehensive Metabolic Panel [[id number]]  (Abnormal) Collected:  03/09/17 0445    Specimen:  Blood Updated:  03/09/17 0542     Glucose 130 (H) mg/dL      BUN 47 (H) mg/dL      Creatinine 2.60 (H) mg/dL      Sodium 139 mmol/L      Potassium 4.4 mmol/L      Chloride 96 (L) mmol/L      CO2 32.6 (H) mmol/L      Calcium 9.2 mg/dL      Total Protein 5.4 (L) g/dL      Albumin 2.90 (L) g/dL      ALT (SGPT) 225 (H) U/L      AST (SGOT) 218 (H) U/L      Alkaline Phosphatase 168 (H) U/L      Total Bilirubin 2.2 (H) mg/dL      eGFR Non African Amer 18 (L) mL/min/1.73      Globulin  2.5 gm/dL      A/G Ratio 1.2 g/dL      BUN/Creatinine Ratio 18.1      Anion Gap 10.4 mmol/L     Narrative:       The MDRD GFR formula is only valid for adults with stable renal function between ages 18 and 70.    Amylase [[id number]]  (Abnormal) Collected:  03/09/17 0445    Specimen:  Blood Updated:  03/09/17 0542     Amylase 607 (H) U/L     Lipase [[id number]]  (Abnormal) Collected:  03/09/17 0445    Specimen:  Blood Updated:  03/09/17 0542     Lipase 228 (H) U/L     POC Glucose Fingerstick [[id number]]  (Normal) Collected:  03/09/17 0613    Specimen:  Blood Updated:  03/09/17 0625     Glucose 126 mg/dL     Narrative:       Meter: [id number] : [id number] Kimber Butler PCA          Imaging Results (last 24 hours)     Procedure Component Value Units Date/Time    US Abdomen Complete [[id number]] Collected:  03/08/17 1432     Updated:  03/08/17 1452    Narrative:       INDICATION: Transabdominal pain for 4 days.     EXAM: Abdominal ultrasound, complete.     COMPARISON: CT chest dated 01/12/2017     FINDINGS:  The pancreas is prominent measuring up to 2.5 cm in thickness. Although  nonspecific, this is unusual for a patient of this age. Please  clinically for any evidence of acute pancreatic  inflammation/pancreatitis.     The echogenicity and echotexture of the hepatic parenchyma is within  normal limits. No hepatic mass. Mild intrahepatic and extrahepatic  biliary dilatation. The common duct is incompletely visualized, however  measures up to 0.8-0.9 cm in diameter.     There is a small gallstones in the gallbladder. There is mild  gallbladder wall thickening measuring up to 0.6 cm. There is borderline  gallbladder distention. No pericholecystic fluid..     The right kidney measures 9.8 cm. The left kidney measures 11.7 cm.  Bilateral renal cortical atrophy is more notable on the right kidney. No  hydronephrosis.     The spleen is mildly enlarged measuring 16 cm.       The abdominal aorta is normal in size.  The  juxtahepatic IVC is within  normal limits.  No ascites.       Impression:          1. Mild intrahepatic and extrahepatic biliary dilatation. The entire  common bile duct was not visualized, therefore, distal obstructing stone  or mass may be present. Consider further evaluation with a MRCP.  2. Cholelithiasis. There is mild gallbladder distention and and  gallbladder wall thickening. These findings are concerning for possible  acute cholecystitis, however, may simply be secondary to the biliary  distention.  3. Prominent appearance of the pancreas. While nonspecific, this is  concerning for possible pancreatitis.     This report was finalized on 3/8/2017 2:50 PM by Dr. Damian Kennedy MD.             Xray not reviewed personally by physician.      ECG reviewed personally by physician  ECG/EMG Results (most recent)     None          Medication Review:   I have reviewed the patient's current medication list    Current Facility-Administered Medications:   •  acetaminophen (TYLENOL) suppository 650 mg, 650 mg, Rectal, Q4H PRN, Trevor Diallo MD  •  dextrose (D50W) solution 25 g, 25 g, Intravenous, Q15 Min PRN, Glendy Gonsalez MD  •  dextrose (GLUTOSE) oral gel 15 g, 15 g, Oral, Q15 Min PRN, Glendy Gonsalez MD  •  glucagon (GLUCAGEN) injection 1 mg, 1 mg, Subcutaneous, Q15 Min PRN, Glendy Gonsalez MD  •  insulin aspart (novoLOG) injection 0-7 Units, 0-7 Units, Subcutaneous, Q6H, Glendy Gonsalez MD, 0 Units at 03/08/17 2237  •  ipratropium-albuterol (DUO-NEB) nebulizer solution 3 mL, 3 mL, Nebulization, 4x Daily - RT, Trevor Diallo MD, 3 mL at 03/08/17 1939  •  lactated ringers infusion, 50 mL/hr, Intravenous, Continuous, Glendy Gonsalez MD, Last Rate: 50 mL/hr at 03/08/17 2207, 50 mL/hr at 03/08/17 2207  •  pantoprazole (PROTONIX) injection 40 mg, 40 mg, Intravenous, Q AM, Trevor Diallo MD, 40 mg at 03/09/17 0552  •  piperacillin-tazobactam (ZOSYN) 3.375  g in sodium chloride 0.9 % 100 mL IVPB, 3.375 g, Intravenous, Q6H, Trevor Diallo MD, Stopped at 03/09/17 0429      Assessment/Plan       ASSESSMENT AND PLAN:       SUMMARY:    ?   PROPHYLAXIS:   -DVT Prophylaxis: On SCDs  -Davidson Catheter: Indicated and in place at this time    NUTRITION AND FLUIDS:  -Diet/ Nutrition: NPO    -Fluid Status/Electrolytes: Lactated Ringer 50 mL/Hr      SOCIAL ISSUES:    -Behavioral/ Agitation Issues: NONE   -Social Issues: Lives at [facility].       THERAPEUTIC:    -ANTIBIOTICS: Inj Zosyn   -PAIN MANAGEMENT: N/A              PLAN:    -Labs and diagnostic tests reviewed: Gluc 126, Na 139, K 4.4, CO2 32.6, AG 10.4, Creat 2.60, ALT//218, Alk Phos 168, Tot Bili 2.2Amylase 607, Lipase 228, Venous Lactate 1.1, WBC 11.51, Hb 10.3, Plt 136, 83.4 N, 10.9 L    -Diagnostic tests reviewed:    US Abdomen  IMPRESSION:      1. Mild intrahepatic and extrahepatic biliary dilatation. The entire  common bile duct was not visualized, therefore, distal obstructing stone  or mass may be present. Consider further evaluation with a MRI MRCP.  2. Cholelithiasis. There is mild gallbladder distention and and  gallbladder wall thickening. These findings are concerning for possible  acute cholecystitis, however, may simply be secondary to the biliary  distention.  3. Prominent appearance of the pancreas. While nonspecific, this is  concerning for possible pancreatitis.      This report was finalized on 3/8/2017 2:50 PM by Dr. Damian Kennedy MD.      MRI MRCP done today shows  IMPRESSION:  1. Cholelithiasis with likely acute cholecystitis.  2. Choledocholithiasis with a single small stone within the mid common  bile duct. Minimal-mild intrahepatic and extra hepatic bile duct  dilatation. Mildly prominent pancreatic duct.  3. Evidence of mild diffuse pancreatitis.  4. Lobulated hepatic contour suggesting chronic liver disease/cirrhosis.  Mild splenomegaly.  5. Mild right renal parenchymal atrophy.  Small bilateral renal cysts.  6. Technically limited study as noted above.      This report was finalized on 3/9/2017 9:00 AM by Dr. Art Bateman MD.            -Consultations: Patient will be seen by Dr Estrada and surgery today. Will also request cardio consult as patient has hx of CHF and has been hypotensive and  cannot be given any fluids without exacerbating her CHF    -Any new recommendations: As per GI, Cardio and Gen Surgery    -New Labs ordered: CBC, CMP, Amylase, Lipase in AM    -New diagnostic tests ordered: MRI MRCP    -Any changes in medications:    -Discharge planning issues: Patient should be able to go back home once ready for discharge    -Placement issues: N/A    -Patient is clinically and hemodynamically stable    -To continue current management and supportive care    -Will follow patient closely    -Nothing new to add for right now           DIAGNOSES:        PRIMARY DIAGNOSES:     1) Acute Abdominal pain: With elevated Amylase and lipase, it seems patient has acute pancreatitis and since she has new transaminitis and her bilirubin is high, it seems she has acute cholecystitis. She has leukocytosis and elevated lactic acid. I cannot give her enormous amounts of fluids as she has hx of CHF. Will treat her with Inj Zosyn as she has evidence of acute cholecystitis with elevated lactic acid. Although Lactic acid is 1.1 now.    MRI MRCP shows  IMPRESSION:  1. Cholelithiasis with likely acute cholecystitis.  2. Choledocholithiasis with a single small stone within the mid common  bile duct. Minimal-mild intrahepatic and extra hepatic bile duct  dilatation. Mildly prominent pancreatic duct.  3. Evidence of mild diffuse pancreatitis.  4. Lobulated hepatic contour suggesting chronic liver disease/cirrhosis.  Mild splenomegaly.  5. Mild right renal parenchymal atrophy. Small bilateral renal cysts.  6. Technically limited study as noted above.      This report was finalized on 3/9/2017 9:00 AM by  Dr. Art Bateman MD.         2) Chronic steroid use: Off Inj solucortef.      2) Acute Pancreatitis: As noted above. Patient is NPO and Amylase and Lipase are trending down. She does not have much pain.     3) Abnormal LFTs: Are gradually trending down     4) Jaundice: Patient appears jaundiced as her serum bili is 5.7.     5) HTN: BP is low. Will order fluids but this patient is known to have CHF and she cannot be given too much fluid. I will move her to ICU for closer management     6) Dyslipidemia: Hx noted     7) Chronic Diastolic Heart Failure: Patient is usually on diuretics. Her elevated BUN and creatinine are consistent with dehydration and volume depletion     8) Chronic Kidney Disease: Patient's creatinine is up from 1.07 to 2.36     9) Atrial Fibrillation:Patient is on chronic coumadin therapy     10) Anemia: Patient's last Hb is 11.0     11) DJD: Hx noted     12) Morbid Obesity: Noted     13) Obstructive Sleep Apnea: Hx noted     14) Lymphedema: Hx noted     15) Gout: Hx noted     16) Hx of DVT: Patient has been on coumadin. Will D/C coumadin as patient is NPO and may need potential ERCP or MRCP     17) DVT Prophylaxis: SCDs         SECONDARY DIAGNOSES:         As above           SURGICAL DIAGNOSES:        S/P Bilateral cataract extraction, S/P closed reduction and external fixation Rt Femoral fracture, S/P Herniorrhaphy                Plan for disposition: Patient should be able to go back to [facility] once ready for discharge    Trevor Diallo MD  03/09/17  7:01 AM            Trevor Diallo M.D., FACP  Internal Medicine/ Hospitalist          Time:       EMR Dragon/Transcription disclaimer:      Much of this encounter note is an electronic transcription/translation of spoken language to printed text. The electronic translation of spoken language may permit erroneous, or at times, nonsensical words or phrases to be inadvertently transcribed; Although I have reviewed the note for such errors,  some may still exist.

## 2017-03-09 NOTE — CONSULTS
Patient Care Team:  Gerardo Williamson MD as PCP - General  Gerardo Williamson MD as PCP - Family Medicine    CHIEF COMPLAINT: Pancreatitis    HISTORY OF PRESENT ILLNESS:    80 yo NHR non ambulatory obese WF presents with abd pain found to be pancreatits w LFTs, and GS on US with 8-9mm GBD. MRCP shows small stones in thickened GB and a persistant CBD stone. Pt denies previous w/u of abd pain but has sig history of Pulm HTN and a DVT/PE in 2001. After a sig RLE fracture she was left non-ambulatory. Cardiology has been thru to coment on anesthesia risk as well.      Past Medical History   Diagnosis Date   • Anemia    • Arthritis    • CHF (congestive heart failure)    • Chronic diastolic (congestive) heart failure    • Chronic kidney disease    • COPD (chronic obstructive pulmonary disease)    • Diabetes mellitus    • DVT (deep venous thrombosis)    • Dyslipidemia    • Gout    • Hypertension    • Lymphedema    • Morbid obesity    • Obstructive sleep apnea of adult      untreated   • Permanent atrial fibrillation    • Pulmonary HTN    • Respiratory failure, acute      hypoxic     Past Surgical History   Procedure Laterality Date   • Hernia repair     • Eye surgery     • Femur fracture surgery Right      closed reduction external fixation   • Cataract extraction       both eyes 4 years ago   • Fracture surgery     • Tonsillectomy       Family History   Problem Relation Age of Onset   • Cancer Mother    • Heart disease Father    • Diabetes Father    • COPD Brother    • Heart disease Brother      Social History   Substance Use Topics   • Smoking status: Never Smoker   • Smokeless tobacco: Not on file   • Alcohol use No     Prescriptions Prior to Admission   Medication Sig Dispense Refill Last Dose   • cholecalciferol (VITAMIN D3) 87320 UNITS capsule Take 50,000 Units by mouth Every 30 (Thirty) Days.   2/12/2017 at 0900   • citalopram (CeleXA) 20 MG tablet Take 1 tablet by mouth Daily. (Patient taking differently: Take 20 mg by  mouth 3 (Three) Times a Week. MON, WED, FRI)   3/8/2017 at 0900   • folic acid (FOLVITE) 1 MG tablet Take 1 mg by mouth daily.   3/8/2017 at 0900   • furosemide (LASIX) 40 MG tablet Take 1 tablet by mouth 2 (Two) Times a Day. 60 tablet 1 3/8/2017 at 0600   • gabapentin (NEURONTIN) 300 MG capsule Take 300 mg by mouth 2 (two) times a day.   3/8/2017 at 0900   • glucosamine-chondroitin 500-400 MG capsule capsule Take 2 capsules by mouth daily.   3/8/2017 at 0900   • HYDROcodone-acetaminophen (NORCO) 5-325 MG per tablet Take 1 tablet by mouth Every 6 (Six) Hours As Needed for moderate pain (4-6).  0 3/7/2017 at 2115   • insulin aspart (NovoLOG) 100 UNIT/ML injection Inject 3 Units under the skin 3 (Three) Times a Day With Meals. Hold if blood sugar less than 120  12 3/8/2017 at Unknown time   • ipratropium-albuterol (DUO-NEB) 0.5-2.5 mg/mL nebulizer Take 3 mL by nebulization 4 (Four) Times a Day. 360 mL  3/8/2017 at 0600   • metoprolol succinate XL (TOPROL-XL) 25 MG 24 hr tablet Take 25 mg by mouth daily.   3/8/2017 at 0900   • multivitamin-minerals (OCUVITE) tablet Take 1 tablet by mouth daily.   3/8/2017 at 0900   • NON FORMULARY 3L NC continuos   3/8/2017 at Unknown time   • polyethylene glycol (MIRALAX) packet Take 17 g by mouth daily.   3/8/2017 at 0900   • predniSONE (DELTASONE) 10 MG tablet Take 1 tablet by mouth Daily With Breakfast. 100 tablet 1 3/8/2017 at 0800   • sennosides-docusate sodium (SENOKOT-S) 8.6-50 MG tablet Take 2 tablets by mouth 2 (Two) Times a Day. 120 tablet 1 3/8/2017 at 0900   • warfarin (COUMADIN) 6 MG tablet Take 6 mg by mouth Daily. 6 mg every Sun, Mon, Tues, Thrs, Sat  7 mg every Wed, Fri   3/7/2017 at 1600   • acetaminophen (TYLENOL) 325 MG tablet Take 2 tablets by mouth Every 4 (Four) Hours As Needed for mild pain (1-3).  0 Unknown at Unknown time   • bisacodyl (DULCOLAX) 5 MG EC tablet Insert 10 mg into the rectum Daily As Needed for constipation.   Unknown at Unknown time   • insulin  "detemir (LEVEMIR) 100 UNIT/ML injection Inject 20 Units under the skin Every Night.  12 Unknown at Unknown time   • melatonin 3 MG tablet Take 6 mg by mouth Every Night.   3/6/2017 at 2100   • pantoprazole (PROTONIX) 40 MG EC tablet Take 40 mg by mouth every evening.   3/6/2017 at unknown   • warfarin (COUMADIN) 5 MG tablet i po qhs 30 tablet 1 Unknown at Unknown time     Allergies:  Codeine; Demerol [meperidine]; and Propoxyphene    REVIEW OF SYSTEMS:  Please see the above history of present illness for pertinent positives and negatives.  The remainder of the patient's systems have been reviewed and are negative.     Vital Signs  Temp:  [97.3 °F (36.3 °C)-99 °F (37.2 °C)] 99 °F (37.2 °C)  Heart Rate:  [69-90] 90  Resp:  [15-20] 18  BP: ()/(40-60) 103/48  Arterial Line BP: ()/() 91/56    Flowsheet Rows         First Filed Value    Admission Height  64\" (162.6 cm) Documented at 03/08/2017 1700    Admission Weight  219 lb 9.6 oz (99.6 kg) Documented at 03/08/2017 1700           Physical Exam:  Physical Exam   Constitutional: Patient appears well-developed and MOB and anasarca as well as steroid facies. and in no acute distress   HEENT:   Head: Normocephalic and atraumatic.   Eyes:  Pupils are equal, round, and reactive to light. EOM are intact. Sclera are anicteric and non-injected.  Mouth and Throat: Patient has moist mucous membranes. Oropharynx is clear of any erythema or exudate.     Neck: Neck supple. No JVD present. No thyromegaly present. No lymphadenopathy present.  Cardiovascular: Regular rate, regular rhythm, S1 normal and S2 normal.  Exam reveals no gallop and no friction rub.  No murmur heard.  Pulmonary/Chest: Lungs are clear to auscultation bilaterally. No respiratory distress. No wheezes. No rhonchi. No rales.   Abdominal: Soft. Bowel sounds are normal. No distension and no mass. There is no hepatosplenomegaly. There is Moderated epigastric  tenderness.   Musculoskeletal: Normal Muscle " tone  Extremities: No edema. Pulses are palpable in all 4 extremities.  Neurological: Patient is alert and oriented to person, place, and time. Cranial nerves II-XII are grossly intact with no focal deficits.  Skin: Skin is warm. No rash noted. Nails show no clubbing.  No cyanosis or erythema. Edema in all 4 extremities     Results Review:    I reviewed the patient's new clinical results.  Lab Results (most recent)     Procedure Component Value Units Date/Time    POC Glucose Fingerstick [[id number]]  (Abnormal) Collected:  03/08/17 1212    Specimen:  Blood Updated:  03/08/17 1218     Glucose 151 (H) mg/dL     Narrative:       Meter: [id number] : [id number] Valentin Chang    CBC Auto Differential [[id number]]  (Abnormal) Collected:  03/08/17 1306    Specimen:  Blood Updated:  03/08/17 1316     WBC 16.40 (H) 10*3/mm3      RBC 3.79 (L) 10*6/mm3      Hemoglobin 11.0 (L) g/dL      Hematocrit 33.6 (L) %      MCV 88.7 fL      MCH 29.0 pg      MCHC 32.7 g/dL      RDW 15.8 (H) %      RDW-SD 51.8 fl      MPV 9.6 fL      Platelets 153 10*3/mm3      Neutrophil % 84.2 (H) %      Lymphocyte % 8.9 (L) %      Monocyte % 5.7 %      Eosinophil % 0.1 %      Basophil % 0.2 %      Immature Grans % 0.9 (H) %      Neutrophils, Absolute 13.82 (H) 10*3/mm3      Lymphocytes, Absolute 1.46 10*3/mm3      Monocytes, Absolute 0.94 10*3/mm3      Eosinophils, Absolute 0.01 (L) 10*3/mm3      Basophils, Absolute 0.03 10*3/mm3      Immature Grans, Absolute 0.14 (H) 10*3/mm3      nRBC 0.0 /100 WBC     CBC & Differential [[id number]] Collected:  03/08/17 1306    Specimen:  Blood Updated:  03/08/17 1318    Narrative:       The following orders were created for panel order CBC & Differential.  Procedure                               Abnormality         Status                     ---------                               -----------         ------                     Scan Slide[[id number]]                                        Final result               CBC  Auto Differential[[id number]]         Abnormal            Final result                 Please view results for these tests on the individual orders.    Scan Slide [[id number]] Collected:  03/08/17 1306    Specimen:  Blood Updated:  03/08/17 1318     Anisocytosis Slight/1+      WBC Morphology Normal      Platelet Estimate Adequate     Protime-INR [[id number]]  (Abnormal) Collected:  03/08/17 1306    Specimen:  Blood Updated:  03/08/17 1328     Protime 23.1 (H) Seconds      INR 2.01 (H)     Narrative:       Therapeutic Ranges for INR: 2.0-3.0 (PT 20-30)                              2.5-3.5 (PT 25-34)    Comprehensive Metabolic Panel [[id number]]  (Abnormal) Collected:  03/08/17 1306    Specimen:  Blood Updated:  03/08/17 1333     Glucose 158 (H) mg/dL      BUN 39 (H) mg/dL      Creatinine 2.36 (H) mg/dL      Sodium 139 mmol/L      Potassium 4.6 mmol/L      Chloride 95 (L) mmol/L      CO2 32.3 (H) mmol/L      Calcium 9.8 mg/dL      Total Protein 5.6 (L) g/dL      Albumin 3.10 (L) g/dL      ALT (SGPT) 311 (H) U/L      AST (SGOT) 467 (H) U/L      Alkaline Phosphatase 190 (H) U/L      Total Bilirubin 5.4 (H) mg/dL      eGFR Non African Amer 20 (L) mL/min/1.73      Globulin 2.5 gm/dL      A/G Ratio 1.2 g/dL      BUN/Creatinine Ratio 16.5      Anion Gap 11.7 mmol/L     Narrative:       The MDRD GFR formula is only valid for adults with stable renal function between ages 18 and 70.    Amylase [[id number]]  (Abnormal) Collected:  03/08/17 1306    Specimen:  Blood Updated:  03/08/17 1337     Amylase 1696 (H) U/L     Lipase [[id number]]  (Abnormal) Collected:  03/08/17 1306    Specimen:  Blood Updated:  03/08/17 1337     Lipase 2486 (H) U/L     Lactic Acid, Plasma [[id number]]  (Abnormal) Collected:  03/08/17 1347    Specimen:  Blood Updated:  03/08/17 1408     Lactate 2.5 (C) mmol/L     aPTT [[id number]]  (Abnormal) Collected:  03/08/17 1306    Specimen:  Blood Updated:  03/08/17 1534     PTT 43.2 (H) seconds     Narrative:       PTT = The  equivalent PTT values for the therapeutic range of heparin levels at 0.1 to 0.7 U/ml are 53 to 110 seconds.    POC Glucose Fingerstick [[id number]]  (Abnormal) Collected:  03/08/17 1636    Specimen:  Blood Updated:  03/08/17 1647     Glucose 145 (H) mg/dL     Narrative:       Meter: [id number] : [id number] Valentin Chang    Urinalysis With / Culture If Indicated [[id number]]  (Abnormal) Collected:  03/08/17 1800    Specimen:  Urine from Urine, Catheter Updated:  03/08/17 1816     Color, UA Cele (A)      Appearance, UA Turbid (A)      pH, UA <=5.0      Specific Gravity, UA 1.025      Glucose,  mg/dL (Trace) (A)      Ketones, UA Trace (A)      Bilirubin, UA Large (3+) (A)      Blood, UA Small (1+) (A)      Protein,  mg/dL (2+) (A)      Leuk Esterase, UA Trace (A)      Nitrite, UA Negative      Urobilinogen, UA 4.0 E.U./dL (A)     Urinalysis, Microscopic Only [[id number]]  (Abnormal) Collected:  03/08/17 1800    Specimen:  Urine from Urine, Catheter Updated:  03/08/17 1816     RBC, UA 6-12 (A) /HPF      WBC, UA 31-50 (A) /HPF      Bacteria, UA 3+ (A) /HPF      Squamous Epithelial Cells, UA 13-20 (A) /HPF      Hyaline Casts, UA None Seen /LPF      Amorphous Crystals, UA Moderate/2+ /HPF      Mucus, UA Small/1+ (A) /HPF      Methodology Manual Light Microscopy     POC Glucose Fingerstick [[id number]]  (Abnormal) Collected:  03/08/17 2005    Specimen:  Blood Updated:  03/08/17 2012     Glucose 164 (H) mg/dL     Narrative:       Meter: [id number] : [id number] Marlon Laura    Lactate Acid, Reflex [[id number]]  (Normal) Collected:  03/08/17 2214    Specimen:  Blood Updated:  03/08/17 2232     Lactate 1.1 mmol/L     POC Glucose Fingerstick [[id number]]  (Normal) Collected:  03/09/17 0001    Specimen:  Blood Updated:  03/09/17 0022     Glucose 118 mg/dL     Narrative:       Meter: [id number] : [id number] Kimber BETANCOURT    CBC & Differential [[id number]] Collected:  03/09/17 0445    Specimen:  Blood Updated:   03/09/17 0524    Narrative:       The following orders were created for panel order CBC & Differential.  Procedure                               Abnormality         Status                     ---------                               -----------         ------                     Scan Slide[[id number]]                                                                   CBC Auto Differential[[id number]]         Abnormal            Final result                 Please view results for these tests on the individual orders.    CBC Auto Differential [[id number]]  (Abnormal) Collected:  03/09/17 0445    Specimen:  Blood Updated:  03/09/17 0524     WBC 11.51 (H) 10*3/mm3      RBC 3.64 (L) 10*6/mm3      Hemoglobin 10.3 (L) g/dL      Hematocrit 32.7 (L) %      MCV 89.8 fL      MCH 28.3 pg      MCHC 31.5 g/dL      RDW 15.4 (H) %      RDW-SD 51.1 fl      MPV 10.0 fL      Platelets 136 (L) 10*3/mm3      Neutrophil % 83.4 (H) %      Lymphocyte % 10.9 (L) %      Monocyte % 4.2 %      Eosinophil % 0.2 %      Basophil % 0.1 %      Immature Grans % 1.2 (H) %      Neutrophils, Absolute 9.61 (H) 10*3/mm3      Lymphocytes, Absolute 1.25 10*3/mm3      Monocytes, Absolute 0.48 10*3/mm3      Eosinophils, Absolute 0.02 (L) 10*3/mm3      Basophils, Absolute 0.01 10*3/mm3      Immature Grans, Absolute 0.14 (H) 10*3/mm3      nRBC 0.0 /100 WBC     Comprehensive Metabolic Panel [[id number]]  (Abnormal) Collected:  03/09/17 0445    Specimen:  Blood Updated:  03/09/17 0542     Glucose 130 (H) mg/dL      BUN 47 (H) mg/dL      Creatinine 2.60 (H) mg/dL      Sodium 139 mmol/L      Potassium 4.4 mmol/L      Chloride 96 (L) mmol/L      CO2 32.6 (H) mmol/L      Calcium 9.2 mg/dL      Total Protein 5.4 (L) g/dL      Albumin 2.90 (L) g/dL      ALT (SGPT) 225 (H) U/L      AST (SGOT) 218 (H) U/L      Alkaline Phosphatase 168 (H) U/L      Total Bilirubin 2.2 (H) mg/dL      eGFR Non African Amer 18 (L) mL/min/1.73      Globulin 2.5 gm/dL      A/G Ratio 1.2 g/dL       BUN/Creatinine Ratio 18.1      Anion Gap 10.4 mmol/L     Narrative:       The MDRD GFR formula is only valid for adults with stable renal function between ages 18 and 70.    Amylase [[id number]]  (Abnormal) Collected:  03/09/17 0445    Specimen:  Blood Updated:  03/09/17 0542     Amylase 607 (H) U/L     Lipase [[id number]]  (Abnormal) Collected:  03/09/17 0445    Specimen:  Blood Updated:  03/09/17 0542     Lipase 228 (H) U/L     POC Glucose Fingerstick [[id number]]  (Normal) Collected:  03/09/17 0613    Specimen:  Blood Updated:  03/09/17 0625     Glucose 126 mg/dL     Narrative:       Meter: [id number] : [id number] Kimber BETANCOURT    Urine Culture [[id number]]  (Abnormal) Collected:  03/08/17 1800    Specimen:  Urine from Urine, Catheter Updated:  03/09/17 0738     Urine Culture >100,000 CFU/mL Gram Negative Bacilli (A)     POC Glucose Fingerstick [[id number]]  (Normal) Collected:  03/09/17 1210    Specimen:  Blood Updated:  03/09/17 1216     Glucose 104 mg/dL     Narrative:       Meter: [id number] : [id number] Yahir Whitt RN          Imaging Results (most recent)     Procedure Component Value Units Date/Time    US Abdomen Complete [[id number]] Collected:  03/08/17 1432     Updated:  03/08/17 1452    Narrative:       INDICATION: Transabdominal pain for 4 days.     EXAM: Abdominal ultrasound, complete.     COMPARISON: CT chest dated 01/12/2017     FINDINGS:  The pancreas is prominent measuring up to 2.5 cm in thickness. Although  nonspecific, this is unusual for a patient of this age. Please  clinically for any evidence of acute pancreatic  inflammation/pancreatitis.     The echogenicity and echotexture of the hepatic parenchyma is within  normal limits. No hepatic mass. Mild intrahepatic and extrahepatic  biliary dilatation. The common duct is incompletely visualized, however  measures up to 0.8-0.9 cm in diameter.     There is a small gallstones in the gallbladder. There is mild  gallbladder wall thickening  measuring up to 0.6 cm. There is borderline  gallbladder distention. No pericholecystic fluid..     The right kidney measures 9.8 cm. The left kidney measures 11.7 cm.  Bilateral renal cortical atrophy is more notable on the right kidney. No  hydronephrosis.     The spleen is mildly enlarged measuring 16 cm.       The abdominal aorta is normal in size.  The juxtahepatic IVC is within  normal limits.  No ascites.       Impression:          1. Mild intrahepatic and extrahepatic biliary dilatation. The entire  common bile duct was not visualized, therefore, distal obstructing stone  or mass may be present. Consider further evaluation with a MRCP.  2. Cholelithiasis. There is mild gallbladder distention and and  gallbladder wall thickening. These findings are concerning for possible  acute cholecystitis, however, may simply be secondary to the biliary  distention.  3. Prominent appearance of the pancreas. While nonspecific, this is  concerning for possible pancreatitis.     This report was finalized on 3/8/2017 2:50 PM by Dr. Damian Kennedy MD.       MRI MRCP [[id number]] Collected:  03/09/17 0847     Updated:  03/09/17 0902    Narrative:       MRI ABDOMEN, NONCONTRAST, INCLUDING MRCP, 03/09/2017:     HISTORY:   81-year-old female with with abdomen pain, leukocytosis. Elevated  amylase, lipase and bilirubin levels. Previous pancreatitis seen here  January 2017. Current ultrasound showing cholelithiasis, mild bile duct  dilatation and pancreas enlargement. Evaluate for choledocholithiasis,  gallstone pancreatitis.     TECHNIQUE:  MRI examination of the abdomen was performed without contrast  administration due to abnormal renal function (GFR 18). MRCP sequences  were also obtained. The images are significantly degraded by respiratory  motion artifact.     FINDINGS:  Multiple small gallstones are resident within a diffusely thick-walled  gallbladder, there is evidence of edema within and adjacent to the  bladder wall. Acute  cholecystitis is likely.     There is mild intrahepatic and extra hepatic bile duct dilatation to the  level of the ampulla with the upper extrahepatic bile duct measuring  about 10 mm. There is a single round filling defect within the mid  common bile duct measuring 4 to 5 mm consistent with  choledocholithiasis.     There is mild diffuse enlargement of the pancreas which appears mildly  edematous. The pancreatic duct is also mildly prominent at about 5 mm.  The appearance is compatible with mild acute pancreatitis.     Mildly nodular liver margin suggesting potential chronic liver disease.  No visible focal liver lesion. Hepatic vasculature appears patent. The  spleen is mildly enlarged at about 14 cm. Mild atrophy of the right  kidney. Small benign-appearing cyst within each kidney.       Impression:       1. Cholelithiasis with likely acute cholecystitis.  2. Choledocholithiasis with a single small stone within the mid common  bile duct. Minimal-mild intrahepatic and extra hepatic bile duct  dilatation. Mildly prominent pancreatic duct.  3. Evidence of mild diffuse pancreatitis.  4. Lobulated hepatic contour suggesting chronic liver disease/cirrhosis.  Mild splenomegaly.  5. Mild right renal parenchymal atrophy. Small bilateral renal cysts.  6. Technically limited study as noted above.     This report was finalized on 3/9/2017 9:00 AM by Dr. Art Bateman MD.           reviewed    ECG/EMG Results (most recent)     None        reviewed    Assessment/Plan     Pancreatits  Choledocholithiasis  Cholelithiasis  Pulmonary HTN  DVT/PE- on warfarin    Recommendations are still to proceed to ERCP with anesthesia despite her increased risks, but will reverse her warfarin if needed in the AM.    I discussed the patients findings and my recommendations with patient.     Jose Estrada MD  03/09/17  6:09 PM    Time: Not recorded

## 2017-03-09 NOTE — PLAN OF CARE
Problem: Cardiac Output, Decreased (Adult)  Goal: Identify Related Risk Factors and Signs and Symptoms  Outcome: Ongoing (interventions implemented as appropriate)  Goal: Adequate Cardiac Output/Effective Tissue Perfusion  Outcome: Ongoing (interventions implemented as appropriate)    Problem: Tissue Perfusion, Ineffective Peripheral (Adult)  Goal: Identify Related Risk Factors and Signs and Symptoms  Outcome: Ongoing (interventions implemented as appropriate)  Goal: Adequate Tissue Perfusion  Outcome: Ongoing (interventions implemented as appropriate)    Problem: Fall Risk (Adult)  Goal: Identify Related Risk Factors and Signs and Symptoms  Outcome: Ongoing (interventions implemented as appropriate)  Goal: Absence of Falls  Outcome: Ongoing (interventions implemented as appropriate)

## 2017-03-10 ENCOUNTER — ANESTHESIA EVENT (OUTPATIENT)
Dept: PERIOP | Facility: HOSPITAL | Age: 82
End: 2017-03-10

## 2017-03-10 ENCOUNTER — APPOINTMENT (OUTPATIENT)
Dept: GENERAL RADIOLOGY | Facility: HOSPITAL | Age: 82
End: 2017-03-10

## 2017-03-10 ENCOUNTER — ANESTHESIA (OUTPATIENT)
Dept: PERIOP | Facility: HOSPITAL | Age: 82
End: 2017-03-10

## 2017-03-10 LAB
ABO GROUP BLD: NORMAL
ALBUMIN SERPL-MCNC: 2.4 G/DL (ref 3.5–5.2)
ALBUMIN/GLOB SERPL: 0.9 G/DL
ALP SERPL-CCNC: 139 U/L (ref 40–129)
ALT SERPL W P-5'-P-CCNC: 136 U/L (ref 5–33)
ANION GAP SERPL CALCULATED.3IONS-SCNC: 12.5 MMOL/L
AST SERPL-CCNC: 70 U/L (ref 5–32)
BACTERIA SPEC AEROBE CULT: ABNORMAL
BASOPHILS # BLD AUTO: 0.02 10*3/MM3 (ref 0–0.2)
BASOPHILS NFR BLD AUTO: 0.3 % (ref 0–2)
BILIRUB SERPL-MCNC: 1.5 MG/DL (ref 0.2–1.2)
BLD GP AB SCN SERPL QL: NEGATIVE
BUN BLD-MCNC: 49 MG/DL (ref 8–23)
BUN/CREAT SERPL: 19 (ref 7–25)
CALCIUM SPEC-SCNC: 9.2 MG/DL (ref 8.8–10.5)
CHLORIDE SERPL-SCNC: 100 MMOL/L (ref 98–107)
CO2 SERPL-SCNC: 30.5 MMOL/L (ref 22–29)
CREAT BLD-MCNC: 2.58 MG/DL (ref 0.57–1)
DEPRECATED RDW RBC AUTO: 50.2 FL (ref 37–54)
EOSINOPHIL # BLD AUTO: 0.05 10*3/MM3 (ref 0.1–0.3)
EOSINOPHIL NFR BLD AUTO: 0.7 % (ref 0–4)
ERYTHROCYTE [DISTWIDTH] IN BLOOD BY AUTOMATED COUNT: 15.1 % (ref 11.5–14.5)
GFR SERPL CREATININE-BSD FRML MDRD: 18 ML/MIN/1.73
GLOBULIN UR ELPH-MCNC: 2.8 GM/DL
GLUCOSE BLD-MCNC: 110 MG/DL (ref 65–99)
GLUCOSE BLDC GLUCOMTR-MCNC: 111 MG/DL (ref 70–130)
GLUCOSE BLDC GLUCOMTR-MCNC: 94 MG/DL (ref 70–130)
GLUCOSE BLDC GLUCOMTR-MCNC: 97 MG/DL (ref 70–130)
HCT VFR BLD AUTO: 31.6 % (ref 37–47)
HGB BLD-MCNC: 9.9 G/DL (ref 12–16)
IMM GRANULOCYTES # BLD: 0.07 10*3/MM3 (ref 0–0.03)
IMM GRANULOCYTES NFR BLD: 0.9 % (ref 0–0.5)
INR PPP: 2.3 (ref 0.9–1.1)
LIPASE SERPL-CCNC: 28 U/L (ref 13–60)
LYMPHOCYTES # BLD AUTO: 1.06 10*3/MM3 (ref 0.6–4.8)
LYMPHOCYTES NFR BLD AUTO: 13.9 % (ref 20–45)
MCH RBC QN AUTO: 28.2 PG (ref 27–31)
MCHC RBC AUTO-ENTMCNC: 31.3 G/DL (ref 31–37)
MCV RBC AUTO: 90 FL (ref 81–99)
MONOCYTES # BLD AUTO: 0.42 10*3/MM3 (ref 0–1)
MONOCYTES NFR BLD AUTO: 5.5 % (ref 3–8)
NEUTROPHILS # BLD AUTO: 5.98 10*3/MM3 (ref 1.5–8.3)
NEUTROPHILS NFR BLD AUTO: 78.7 % (ref 45–70)
NRBC BLD MANUAL-RTO: 0 /100 WBC (ref 0–0)
PLATELET # BLD AUTO: 128 10*3/MM3 (ref 140–500)
PMV BLD AUTO: 9.5 FL (ref 7.4–10.4)
POTASSIUM BLD-SCNC: 3.6 MMOL/L (ref 3.5–5.2)
PROT SERPL-MCNC: 5.2 G/DL (ref 6–8.5)
PROTHROMBIN TIME: 25.7 SECONDS (ref 12.1–15)
RBC # BLD AUTO: 3.51 10*6/MM3 (ref 4.2–5.4)
RH BLD: POSITIVE
SODIUM BLD-SCNC: 143 MMOL/L (ref 136–145)
WBC NRBC COR # BLD: 7.6 10*3/MM3 (ref 4.8–10.8)

## 2017-03-10 PROCEDURE — 99231 SBSQ HOSP IP/OBS SF/LOW 25: CPT | Performed by: SURGERY

## 2017-03-10 PROCEDURE — 99232 SBSQ HOSP IP/OBS MODERATE 35: CPT | Performed by: INTERNAL MEDICINE

## 2017-03-10 PROCEDURE — 82962 GLUCOSE BLOOD TEST: CPT

## 2017-03-10 PROCEDURE — C1769 GUIDE WIRE: HCPCS | Performed by: INTERNAL MEDICINE

## 2017-03-10 PROCEDURE — 86850 RBC ANTIBODY SCREEN: CPT | Performed by: INTERNAL MEDICINE

## 2017-03-10 PROCEDURE — 0 IOPAMIDOL 61 % SOLUTION: Performed by: INTERNAL MEDICINE

## 2017-03-10 PROCEDURE — 83690 ASSAY OF LIPASE: CPT | Performed by: INTERNAL MEDICINE

## 2017-03-10 PROCEDURE — 85025 COMPLETE CBC W/AUTO DIFF WBC: CPT | Performed by: INTERNAL MEDICINE

## 2017-03-10 PROCEDURE — 25010000002 SUCCINYLCHOLINE PER 20 MG: Performed by: NURSE ANESTHETIST, CERTIFIED REGISTERED

## 2017-03-10 PROCEDURE — P9017 PLASMA 1 DONOR FRZ W/IN 8 HR: HCPCS

## 2017-03-10 PROCEDURE — 36430 TRANSFUSION BLD/BLD COMPNT: CPT

## 2017-03-10 PROCEDURE — 25010000002 PROPOFOL 10 MG/ML EMULSION: Performed by: NURSE ANESTHETIST, CERTIFIED REGISTERED

## 2017-03-10 PROCEDURE — 94799 UNLISTED PULMONARY SVC/PX: CPT

## 2017-03-10 PROCEDURE — 43262 ENDO CHOLANGIOPANCREATOGRAPH: CPT

## 2017-03-10 PROCEDURE — 86927 PLASMA FRESH FROZEN: CPT

## 2017-03-10 PROCEDURE — 25010000002 PIPERACILLIN SOD-TAZOBACTAM PER 1 G: Performed by: INTERNAL MEDICINE

## 2017-03-10 PROCEDURE — 25010000002 FENTANYL CITRATE (PF) 100 MCG/2ML SOLUTION: Performed by: NURSE ANESTHETIST, CERTIFIED REGISTERED

## 2017-03-10 PROCEDURE — 86900 BLOOD TYPING SEROLOGIC ABO: CPT | Performed by: INTERNAL MEDICINE

## 2017-03-10 PROCEDURE — 0FC98ZZ EXTIRPATION OF MATTER FROM COMMON BILE DUCT, VIA NATURAL OR ARTIFICIAL OPENING ENDOSCOPIC: ICD-10-PCS | Performed by: INTERNAL MEDICINE

## 2017-03-10 PROCEDURE — 85610 PROTHROMBIN TIME: CPT | Performed by: INTERNAL MEDICINE

## 2017-03-10 PROCEDURE — 74330 X-RAY BILE/PANC ENDOSCOPY: CPT

## 2017-03-10 PROCEDURE — 80053 COMPREHEN METABOLIC PANEL: CPT | Performed by: INTERNAL MEDICINE

## 2017-03-10 PROCEDURE — 86901 BLOOD TYPING SEROLOGIC RH(D): CPT | Performed by: INTERNAL MEDICINE

## 2017-03-10 PROCEDURE — 43264 ERCP REMOVE DUCT CALCULI: CPT

## 2017-03-10 RX ORDER — SODIUM CHLORIDE 9 MG/ML
INJECTION, SOLUTION INTRAVENOUS
Status: COMPLETED
Start: 2017-03-10 | End: 2017-03-10

## 2017-03-10 RX ORDER — FAMOTIDINE 10 MG/ML
20 INJECTION, SOLUTION INTRAVENOUS
Status: DISCONTINUED | OUTPATIENT
Start: 2017-03-10 | End: 2017-03-10

## 2017-03-10 RX ORDER — LIDOCAINE HYDROCHLORIDE 20 MG/ML
INJECTION, SOLUTION INFILTRATION; PERINEURAL AS NEEDED
Status: DISCONTINUED | OUTPATIENT
Start: 2017-03-10 | End: 2017-03-10 | Stop reason: SURG

## 2017-03-10 RX ORDER — SODIUM CHLORIDE, SODIUM LACTATE, POTASSIUM CHLORIDE, CALCIUM CHLORIDE 600; 310; 30; 20 MG/100ML; MG/100ML; MG/100ML; MG/100ML
9 INJECTION, SOLUTION INTRAVENOUS CONTINUOUS
Status: DISCONTINUED | OUTPATIENT
Start: 2017-03-10 | End: 2017-03-15 | Stop reason: HOSPADM

## 2017-03-10 RX ORDER — LIDOCAINE HYDROCHLORIDE 10 MG/ML
0.3 INJECTION, SOLUTION EPIDURAL; INFILTRATION; INTRACAUDAL; PERINEURAL ONCE
Status: DISCONTINUED | OUTPATIENT
Start: 2017-03-10 | End: 2017-03-10

## 2017-03-10 RX ORDER — IPRATROPIUM BROMIDE AND ALBUTEROL SULFATE 2.5; .5 MG/3ML; MG/3ML
3 SOLUTION RESPIRATORY (INHALATION) ONCE
Status: COMPLETED | OUTPATIENT
Start: 2017-03-10 | End: 2017-03-10

## 2017-03-10 RX ORDER — FENTANYL CITRATE 50 UG/ML
INJECTION, SOLUTION INTRAMUSCULAR; INTRAVENOUS AS NEEDED
Status: DISCONTINUED | OUTPATIENT
Start: 2017-03-10 | End: 2017-03-10 | Stop reason: SURG

## 2017-03-10 RX ORDER — SUCCINYLCHOLINE CHLORIDE 20 MG/ML
INJECTION INTRAMUSCULAR; INTRAVENOUS AS NEEDED
Status: DISCONTINUED | OUTPATIENT
Start: 2017-03-10 | End: 2017-03-10 | Stop reason: SURG

## 2017-03-10 RX ORDER — MAGNESIUM HYDROXIDE 1200 MG/15ML
LIQUID ORAL AS NEEDED
Status: DISCONTINUED | OUTPATIENT
Start: 2017-03-10 | End: 2017-03-10 | Stop reason: HOSPADM

## 2017-03-10 RX ORDER — GLYCOPYRROLATE 0.2 MG/ML
INJECTION INTRAMUSCULAR; INTRAVENOUS AS NEEDED
Status: DISCONTINUED | OUTPATIENT
Start: 2017-03-10 | End: 2017-03-10 | Stop reason: SURG

## 2017-03-10 RX ORDER — FAMOTIDINE 20 MG/1
20 TABLET, FILM COATED ORAL
Status: DISCONTINUED | OUTPATIENT
Start: 2017-03-10 | End: 2017-03-10

## 2017-03-10 RX ORDER — PROPOFOL 10 MG/ML
VIAL (ML) INTRAVENOUS AS NEEDED
Status: DISCONTINUED | OUTPATIENT
Start: 2017-03-10 | End: 2017-03-10 | Stop reason: SURG

## 2017-03-10 RX ORDER — SODIUM CHLORIDE 0.9 % (FLUSH) 0.9 %
1-10 SYRINGE (ML) INJECTION AS NEEDED
Status: DISCONTINUED | OUTPATIENT
Start: 2017-03-10 | End: 2017-03-10

## 2017-03-10 RX ADMIN — SODIUM CHLORIDE 250 ML: 9 INJECTION, SOLUTION INTRAVENOUS at 08:57

## 2017-03-10 RX ADMIN — PIPERACILLIN AND TAZOBACTAM 3.38 G: 3; .375 INJECTION, POWDER, LYOPHILIZED, FOR SOLUTION INTRAVENOUS; PARENTERAL at 11:43

## 2017-03-10 RX ADMIN — PANTOPRAZOLE SODIUM 40 MG: 40 INJECTION, POWDER, FOR SOLUTION INTRAVENOUS at 06:10

## 2017-03-10 RX ADMIN — IPRATROPIUM BROMIDE AND ALBUTEROL SULFATE 3 ML: .5; 3 SOLUTION RESPIRATORY (INHALATION) at 20:01

## 2017-03-10 RX ADMIN — FENTANYL CITRATE 50 MCG: 50 INJECTION, SOLUTION INTRAMUSCULAR; INTRAVENOUS at 13:41

## 2017-03-10 RX ADMIN — PROPOFOL 100 MG: 10 INJECTION, EMULSION INTRAVENOUS at 13:41

## 2017-03-10 RX ADMIN — PIPERACILLIN AND TAZOBACTAM 3.38 G: 3; .375 INJECTION, POWDER, LYOPHILIZED, FOR SOLUTION INTRAVENOUS; PARENTERAL at 18:14

## 2017-03-10 RX ADMIN — GLYCOPYRROLATE 0.15 MG: 0.2 INJECTION INTRAMUSCULAR; INTRAVENOUS at 13:39

## 2017-03-10 RX ADMIN — SODIUM CHLORIDE, POTASSIUM CHLORIDE, SODIUM LACTATE AND CALCIUM CHLORIDE 50 ML/HR: 600; 310; 30; 20 INJECTION, SOLUTION INTRAVENOUS at 03:54

## 2017-03-10 RX ADMIN — SODIUM CHLORIDE, POTASSIUM CHLORIDE, SODIUM LACTATE AND CALCIUM CHLORIDE: 600; 310; 30; 20 INJECTION, SOLUTION INTRAVENOUS at 13:42

## 2017-03-10 RX ADMIN — PIPERACILLIN AND TAZOBACTAM 3.38 G: 3; .375 INJECTION, POWDER, LYOPHILIZED, FOR SOLUTION INTRAVENOUS; PARENTERAL at 03:51

## 2017-03-10 RX ADMIN — IPRATROPIUM BROMIDE AND ALBUTEROL SULFATE 3 ML: .5; 3 SOLUTION RESPIRATORY (INHALATION) at 13:20

## 2017-03-10 RX ADMIN — LIDOCAINE HYDROCHLORIDE 100 MG: 20 INJECTION, SOLUTION INFILTRATION; PERINEURAL at 13:41

## 2017-03-10 RX ADMIN — PIPERACILLIN AND TAZOBACTAM 3.38 G: 3; .375 INJECTION, POWDER, LYOPHILIZED, FOR SOLUTION INTRAVENOUS; PARENTERAL at 23:58

## 2017-03-10 RX ADMIN — IPRATROPIUM BROMIDE AND ALBUTEROL SULFATE 3 ML: .5; 3 SOLUTION RESPIRATORY (INHALATION) at 17:04

## 2017-03-10 RX ADMIN — SODIUM CHLORIDE 500 ML: 9 INJECTION, SOLUTION INTRAVENOUS at 11:51

## 2017-03-10 RX ADMIN — IPRATROPIUM BROMIDE AND ALBUTEROL SULFATE 3 ML: .5; 3 SOLUTION RESPIRATORY (INHALATION) at 08:00

## 2017-03-10 RX ADMIN — FAMOTIDINE 20 MG: 10 INJECTION, SOLUTION INTRAVENOUS at 13:13

## 2017-03-10 RX ADMIN — SUCCINYLCHOLINE CHLORIDE 100 MG: 20 INJECTION, SOLUTION INTRAMUSCULAR; INTRAVENOUS at 13:41

## 2017-03-10 NOTE — ANESTHESIA POSTPROCEDURE EVALUATION
Patient: Alexandria Villanueva    Procedure Summary     Date Anesthesia Start Anesthesia Stop Room / Location    03/10/17 9614 8000 [facility] / [facility]       Procedure Diagnosis Surgeon Provider    ENDOSCOPIC RETROGRADE CHOLANGIOPANCREATOGRAPHY  (N/A ) Choledocholithiasis; Acute cholecystitis  (Choledocholithiasis [K80.50]) MD Salvador Hutton CRNA          Anesthesia Type: general  Last vitals  BP      Temp      Pulse     Resp      SpO2        Post Anesthesia Care and Evaluation    Patient location during evaluation: bedside  Patient participation: complete - patient participated  Level of consciousness: awake and alert  Pain score: 0  Pain management: adequate  Airway patency: patent  Anesthetic complications: No anesthetic complications    Cardiovascular status: acceptable  Respiratory status: acceptable  Hydration status: acceptable

## 2017-03-10 NOTE — PROGRESS NOTES
"Adult Nutrition  Assessment/PES    Patient Name:  Alexandria Villanueva  YOB: 1935  MRN: [id number]  Admit Date:  3/8/2017    Assessment Date:  3/10/2017        Reason for Assessment       03/10/17 1328    Reason for Assessment    Reason For Assessment/Visit education   hx CHF    Diagnosis --   Acute Cholecystitis w gallstone pancreatitis               Nutrition/Diet History       03/10/17 1329    Nutrition/Diet History    Typical Food/Fluid Intake Pt in OR.             Anthropometrics       03/10/17 1329    Anthropometrics    RD Documented Current Weight  100 kg (220 lb 7.4 oz)    Anthropometrics (Special Considerations)    RD Calculated BMI (kg/m2) 38    Body Mass Index (BMI)    BMI Grade 35 - 39.9 - obesity - grade II            Labs/Tests/Procedures/Meds       03/10/17 1329    Labs/Tests/Procedures/Meds    Labs/Tests Review Reviewed;ALT;AST;Glucose;CO2;BUN;Creat;T bilirubin;GFR    Medication Review Reviewed, pertinent;Antibiotic;Insulin              Estimated/Assessed Needs       03/10/17 1329    Calculation Measurements    Weight Used For Calculations 100 kg (220 lb 7.4 oz)    Height Used for Calculations 1.626 m (5' 4\")    Estimated/Assessed Energy Needs    Energy Need Method [place]-St Jeor    Age 81    RMR ([place]-St. Jeor Equation) 1455    Activity Factors ([place] St Jeor)  Confined to bed  1.2    Total estimated needs ([place] St. Jeor) 1746 kcal     Estimated Kcal Range  9600-7841 kcal (mifflin 1.2 - 250-500 kcal for obesity)   140-168 gm CHO, 45% kcal     Estimated/Assessed Protein Needs    Weight Used for Protein Calculation 100 kg (220 lb 7.4 oz)    Protein (gm/kg) 0.8    0.8 Gm Protein (gm) 80    Estimated Protein Range 80 gm     Estimated/Assessed Fluid Needs    Fluid Need Method RDA method    RDA Method (mL)  --   9315-3577 ml/day CHF guidelines            Nutrition Prescription Ordered       03/10/17 1334    Nutrition Prescription PO    Current PO Diet NPO            Evaluation of " Received Nutrient/Fluid Intake       03/10/17 1334    Evaluation of Received Nutrient/Fluid Intake    Nutrition Delivered Fluid Evaluation    Fluid Intake Evaluation    IV Fluid (mL) 1413    Total Fluid Intake (mL) 1413    % Fluid Needs wnl fluid need range    EN Evaluation    Number of Days EN Intake Evaluated Insufficient Data              Problem/Interventions:        Problem 1       03/10/17 1336    Nutrition Diagnoses Problem 1    Problem 1 Altered GI Function    Etiology (related to) Medical Diagnosis    Signs/Symptoms (evidenced by) NPO                    Intervention Goal       03/10/17 1337    Intervention Goal    General Meet nutritional needs for age/condition    PO Establish PO;PO intake (%)    PO Intake % 50 %            Nutrition Intervention       03/10/17 1337    Nutrition Intervention    RD/Tech Action Follow Tx progress            Nutrition Prescription       03/10/17 1337    Other Orders    Other advance diet as clinically indicated with goal low fat cardiac            Education/Evaluation       03/10/17 1337    Education    Education Education not appropriate at this time    Monitor/Evaluation    Monitor Per protocol;I&O;Pertinent labs;Weight;Symptoms        Comments:    Will follow diet advancement and provide CHF edu review as more appropriate once out of surgery/ICU.      Electronically signed by:  Marguerite Manning RD  03/10/17 1:37 PM

## 2017-03-10 NOTE — ANESTHESIA PROCEDURE NOTES
Airway  Urgency: elective    Airway not difficult    General Information and Staff    Patient location during procedure: OR  CRNA: ERICK TREJO    Indications and Patient Condition  Indications for airway management: airway protection    Preoxygenated: yes  MILS not maintained throughout  Mask difficulty assessment: 0 - not attempted    Final Airway Details  Final airway type: endotracheal airway      Successful airway: ETT  Cuffed: yes   Successful intubation technique: video laryngoscopy  Facilitating devices/methods: intubating stylet  Endotracheal tube insertion site: oral  Blade: Gely  Blade size: #3  ETT size: 7.0 mm  Cormack-Lehane Classification: grade I - full view of glottis  Placement verified by: chest auscultation and capnometry   Cuff volume (mL): 5  Measured from: lips  ETT to lips (cm): 21  Number of attempts at approach: 1    Additional Comments  Atraumatic

## 2017-03-10 NOTE — PLAN OF CARE
Problem: Patient Care Overview (Adult)  Goal: Plan of Care Review  Outcome: Ongoing (interventions implemented as appropriate)    03/10/17 4199   Coping/Psychosocial Response Interventions   Plan Of Care Reviewed With patient;caregiver   Patient Care Overview   Progress no change   Outcome Evaluation   Outcome Summary/Follow up Plan pt had her ERCP today and stone was removed per DR. Estrada. DR. Cheng going to do a GB surgery on pt Monday. Sleeping since returned to floor, mild tenderness nted to Left abdomen, but denies pain. no n/v/d noted. Pt cont to have her hunter and urine came back + for ESBL DR. Diallo is aware and Contact Isolation was initiated         Problem: Cardiac Output, Decreased (Adult)  Goal: Identify Related Risk Factors and Signs and Symptoms  Outcome: Ongoing (interventions implemented as appropriate)    Problem: Tissue Perfusion, Ineffective Peripheral (Adult)  Goal: Identify Related Risk Factors and Signs and Symptoms  Outcome: Ongoing (interventions implemented as appropriate)    Problem: Fall Risk (Adult)  Goal: Identify Related Risk Factors and Signs and Symptoms  Outcome: Ongoing (interventions implemented as appropriate)  Goal: Absence of Falls  Outcome: Ongoing (interventions implemented as appropriate)    Problem: GI Endoscopy (Adult)  Goal: Signs and Symptoms of Listed Potential Problems Will be Absent or Manageable (GI Endoscopy)  Outcome: Ongoing (interventions implemented as appropriate)

## 2017-03-10 NOTE — SIGNIFICANT NOTE
ERCP performed today without difficulty.  Stable cardiac status throughout without problem or instability.    We will follow when necessary over the weekend.  I understand she is scheduled for surgery on Monday.  Please call if we can help in any way in the meantime.

## 2017-03-10 NOTE — PLAN OF CARE
Problem: GI Endoscopy (Adult)  Goal: Signs and Symptoms of Listed Potential Problems Will be Absent or Manageable (GI Endoscopy)  Outcome: Ongoing (interventions implemented as appropriate)    03/10/17 1454   GI Endoscopy   Problems Assessed (GI Endoscopy) all   Problems Present (GI Endoscopy) none

## 2017-03-10 NOTE — ANESTHESIA PREPROCEDURE EVALUATION
Anesthesia Evaluation     Patient summary reviewed and Nursing notes reviewed   NPO Status: > 8 hours   Airway   Mallampati: IV  Neck ROM: limited  possible difficult intubation  Dental    (+) poor dentition    Pulmonary    (+) COPD, sleep apnea, wheezes,   Cardiovascular   Exercise tolerance: poor (<4 METS)    ECG reviewed  Patient on routine beta blocker and Beta blocker not taken-may be given intraoperatively  Rhythm: irregular  Rate: normal    (+) hypertension, dysrhythmias Atrial Fib, CHF, DVT (2011),     ROS comment: Severe pulm. Hypertension    She has a history of chronic atrial fibrillation. She has a history of chronic diastolic CHF. Her most recent echocardiogram showed an ejection fraction 62%. She has severe pulmonary hypertension. Calculated pulmonary arterial pressure of 75 mmHg consistent with severe pulmonary hypertension; this was unchanged from her prior echocardiogram.     This patient appears to be stable from a cardiac standpoint. There is no cardiac contraindication to proceeding with the anticipated surgical procedure. However, her preoperative risk is certainly increased by her severe pulmonary hypertension, morbid obesity, and obstructive sleep apnea with cor pulmonale    Neuro/Psych  GI/Hepatic/Renal/Endo    (+) morbid obesity, chronic renal disease ARF, diabetes mellitus type 1,     Musculoskeletal     Abdominal   (+) obese,    Substance History      OB/GYN          Other   (+) arthritis                             Anesthesia Plan    ASA 4     general     intravenous induction   Anesthetic plan and risks discussed with patient.

## 2017-03-10 NOTE — PROGRESS NOTES
Surgery Progress Note   Chief Complaint:  Acute cholecystitis with gallstone pancreatitis     Subjective     Interval History:     Alexandria is awaiting her ERCP.  She has no complaints.  She denies nausea or vomiting.  She says she is hungry      Objective     Vital Signs  Temp:  [97.2 °F (36.2 °C)-99 °F (37.2 °C)] 98.1 °F (36.7 °C)  Heart Rate:  [79-98] 85  Resp:  [16-20] 20  BP: ()/(40-80) 112/69  Arterial Line BP: (0-133)/(0-109) 0/0  Body mass index is 38.02 kg/(m^2).    Intake/Output Summary (Last 24 hours) at 03/10/17 1249  Last data filed at 03/10/17 1151   Gross per 24 hour   Intake 2973.34 ml   Output   2250 ml   Net 723.34 ml     I/O this shift:  In: 1179.2 [I.V.:200; Blood:879.2; IV Piggyback:100]  Out: 450 [Urine:450]       Physical Exam:   General: patient awake, alert and cooperative   Abdomen: soft, no bowel sounds, epigastric pain and guarding on palpation   Extremities: no rash or edema   Neurologic: Normal mood and behavior     Results Review:     I reviewed the patient's new clinical results.      WBC No results found for: WBCS   HGB HEMOGLOBIN   Date Value Ref Range Status   03/10/2017 9.9 (L) 12.0 - 16.0 g/dL Final   03/09/2017 10.3 (L) 12.0 - 16.0 g/dL Final   03/08/2017 11.0 (L) 12.0 - 16.0 g/dL Final      HCT HEMATOCRIT   Date Value Ref Range Status   03/10/2017 31.6 (L) 37.0 - 47.0 % Final   03/09/2017 32.7 (L) 37.0 - 47.0 % Final   03/08/2017 33.6 (L) 37.0 - 47.0 % Final      Platlets No results found for: LABPLAT     PT/INR:    PROTIME   Date Value Ref Range Status   03/10/2017 25.7 (H) 12.1 - 15.0 Seconds Final   03/08/2017 23.1 (H) 12.1 - 15.0 Seconds Final   /  INR   Date Value Ref Range Status   03/10/2017 2.30 (H) 0.90 - 1.10 Final   03/08/2017 2.01 (H) 0.90 - 1.10 Final       Sodium SODIUM   Date Value Ref Range Status   03/10/2017 143 136 - 145 mmol/L Final   03/09/2017 139 136 - 145 mmol/L Final   03/08/2017 139 136 - 145 mmol/L Final      Potassium POTASSIUM   Date Value  Ref Range Status   03/10/2017 3.6 3.5 - 5.2 mmol/L Final   03/09/2017 4.4 3.5 - 5.2 mmol/L Final   03/08/2017 4.6 3.5 - 5.2 mmol/L Final      Chloride CHLORIDE   Date Value Ref Range Status   03/10/2017 100 98 - 107 mmol/L Final   03/09/2017 96 (L) 98 - 107 mmol/L Final   03/08/2017 95 (L) 98 - 107 mmol/L Final      Bicarbonate No results found for: PLASMABICARB   BUN BUN   Date Value Ref Range Status   03/10/2017 49 (H) 8 - 23 mg/dL Final   03/09/2017 47 (H) 8 - 23 mg/dL Final   03/08/2017 39 (H) 8 - 23 mg/dL Final      Creatinine CREATININE   Date Value Ref Range Status   03/10/2017 2.58 (H) 0.57 - 1.00 mg/dL Final   03/09/2017 2.60 (H) 0.57 - 1.00 mg/dL Final   03/08/2017 2.36 (H) 0.57 - 1.00 mg/dL Final      Calcium CALCIUM   Date Value Ref Range Status   03/10/2017 9.2 8.8 - 10.5 mg/dL Final   03/09/2017 9.2 8.8 - 10.5 mg/dL Final   03/08/2017 9.8 8.8 - 10.5 mg/dL Final      Magnesium  AST  ALT  Bilirubin, Total  AlkPhos  Albumin    Amylase  Lipase    Radiology: No results found for: MG  No components found for: AST.*  No components found for: ALT.*  No components found for: BILIRUBIN, TOTAL.*    No components found for: ALKPHOS.*  No components found for: ALBUMIN.*      No components found for: AMYLASE.*  No components found for: LIPASE.*            Imaging Results (most recent)     Procedure Component Value Units Date/Time    US Abdomen Complete [[id number]] Collected:  03/08/17 1432     Updated:  03/08/17 1452    Narrative:       INDICATION: Transabdominal pain for 4 days.     EXAM: Abdominal ultrasound, complete.     COMPARISON: CT chest dated 01/12/2017     FINDINGS:  The pancreas is prominent measuring up to 2.5 cm in thickness. Although  nonspecific, this is unusual for a patient of this age. Please  clinically for any evidence of acute pancreatic  inflammation/pancreatitis.     The echogenicity and echotexture of the hepatic parenchyma is within  normal limits. No hepatic mass. Mild intrahepatic and  extrahepatic  biliary dilatation. The common duct is incompletely visualized, however  measures up to 0.8-0.9 cm in diameter.     There is a small gallstones in the gallbladder. There is mild  gallbladder wall thickening measuring up to 0.6 cm. There is borderline  gallbladder distention. No pericholecystic fluid..     The right kidney measures 9.8 cm. The left kidney measures 11.7 cm.  Bilateral renal cortical atrophy is more notable on the right kidney. No  hydronephrosis.     The spleen is mildly enlarged measuring 16 cm.       The abdominal aorta is normal in size.  The juxtahepatic IVC is within  normal limits.  No ascites.       Impression:          1. Mild intrahepatic and extrahepatic biliary dilatation. The entire  common bile duct was not visualized, therefore, distal obstructing stone  or mass may be present. Consider further evaluation with a MRCP.  2. Cholelithiasis. There is mild gallbladder distention and and  gallbladder wall thickening. These findings are concerning for possible  acute cholecystitis, however, may simply be secondary to the biliary  distention.  3. Prominent appearance of the pancreas. While nonspecific, this is  concerning for possible pancreatitis.     This report was finalized on 3/8/2017 2:50 PM by Dr. Damian Kennedy MD.       MRI MRCP [[id number]] Collected:  03/09/17 0847     Updated:  03/09/17 0902    Narrative:       MRI ABDOMEN, NONCONTRAST, INCLUDING MRCP, 03/09/2017:     HISTORY:   81-year-old female with with abdomen pain, leukocytosis. Elevated  amylase, lipase and bilirubin levels. Previous pancreatitis seen here  January 2017. Current ultrasound showing cholelithiasis, mild bile duct  dilatation and pancreas enlargement. Evaluate for choledocholithiasis,  gallstone pancreatitis.     TECHNIQUE:  MRI examination of the abdomen was performed without contrast  administration due to abnormal renal function (GFR 18). MRCP sequences  were also obtained. The images are  significantly degraded by respiratory  motion artifact.     FINDINGS:  Multiple small gallstones are resident within a diffusely thick-walled  gallbladder, there is evidence of edema within and adjacent to the  bladder wall. Acute cholecystitis is likely.     There is mild intrahepatic and extra hepatic bile duct dilatation to the  level of the ampulla with the upper extrahepatic bile duct measuring  about 10 mm. There is a single round filling defect within the mid  common bile duct measuring 4 to 5 mm consistent with  choledocholithiasis.     There is mild diffuse enlargement of the pancreas which appears mildly  edematous. The pancreatic duct is also mildly prominent at about 5 mm.  The appearance is compatible with mild acute pancreatitis.     Mildly nodular liver margin suggesting potential chronic liver disease.  No visible focal liver lesion. Hepatic vasculature appears patent. The  spleen is mildly enlarged at about 14 cm. Mild atrophy of the right  kidney. Small benign-appearing cyst within each kidney.       Impression:       1. Cholelithiasis with likely acute cholecystitis.  2. Choledocholithiasis with a single small stone within the mid common  bile duct. Minimal-mild intrahepatic and extra hepatic bile duct  dilatation. Mildly prominent pancreatic duct.  3. Evidence of mild diffuse pancreatitis.  4. Lobulated hepatic contour suggesting chronic liver disease/cirrhosis.  Mild splenomegaly.  5. Mild right renal parenchymal atrophy. Small bilateral renal cysts.  6. Technically limited study as noted above.     This report was finalized on 3/9/2017 9:00 AM by Dr. Art Bateman MD.       SCANNED - IMAGING [[id number]] Resulted:  03/08/17      Updated:  03/10/17 1048               lactated ringers 50 mL/hr Last Rate: 50 mL/hr (03/10/17 0354)         Assessment/Plan     Patient Active Problem List   Diagnosis Code   • Hyperglycemia R73.9   • Acute hyperglycemia R73.9   • Sepsis due to urinary tract  infection A41.9, N39.0   • Chronic diastolic (congestive) heart failure I50.32   • Permanent atrial fibrillation I48.2   • Acute renal failure N17.9   • Acute on chronic respiratory failure J96.20   • Influenza A with respiratory manifestations J10.1   • Chronic anticoagulation Z79.01   • Lymphedema I89.0   • Morbid obesity E66.01   • Obstructive sleep apnea of adult G47.33   • Pulmonary HTN I27.2   • Abdominal pain R10.9   • Cholecystitis K81.9   • Chronic atrial fibrillation I48.2       Acute cholecystitis with gallstone pancreatitis  --will tentatively schedule Alexandria for  Laparoscopic cholecystectomy for Monday if her ERCP goes well today.      Joy Pham DO  03/10/17  12:49 PM

## 2017-03-10 NOTE — PLAN OF CARE
Problem: Patient Care Overview (Adult)  Goal: Plan of Care Review  Outcome: Ongoing (interventions implemented as appropriate)    03/09/17 2237   Coping/Psychosocial Response Interventions   Plan Of Care Reviewed With patient   Patient Care Overview   Progress improving       Goal: Adult Individualization and Mutuality  Outcome: Ongoing (interventions implemented as appropriate)    Problem: Cardiac Output, Decreased (Adult)  Intervention: Promote Oxygenation/Perfusion    03/09/17 2237   Positioning   Head Of Bed (HOB) Position HOB elevated   Activity   Activity Type activity adjusted per tolerance

## 2017-03-10 NOTE — PROGRESS NOTES
"    HOSPITALIST SERVICES  @ [facility] LARON MALAGON            HOSPITALIST TEAM   PROGRESS NOTE      Patient Care Team:  Gerardo Williamson MD as PCP - General  Gerardo Williamson MD as PCP - Family Medicine        Chief Complaint:        Upper abdominal pain for last few days      Subjective:      Ms. Alexandria Woo is An 81 year old  female who is an inmate at [facility]. She has acute cholecystitis and acute pancreatitis with hypotension. Patient also appears to be septic. She looks jaundiced on examination. She had MRI MRA done yesterday and is going for ERCP this morning by Dr. Estrada.        Interval History and ROS:     Patient States her abdominal pain has improved  Patient Complaints: No new complaints  Patient Denies:  Any sx of chest pain, shortness of breath, n/v  History taken from: Patient      Objective    Vital Signs  Temp:  [97.2 °F (36.2 °C)-99 °F (37.2 °C)] 97.2 °F (36.2 °C)  Heart Rate:  [79-98] 87  Resp:  [16-20] 20  BP: ()/(40-80) 112/69  Arterial Line BP: (0-133)/(0-109) 0/0    Flowsheet Rows         First Filed Value    Admission Height  64\" (162.6 cm) Documented at 03/08/2017 1700    Admission Weight  219 lb 9.6 oz (99.6 kg) Documented at 03/08/2017 1700              Physical Exam:      Vital Signs - temp 36.7°C, /69, pulse 70, RR 20   General - Pleasant 81 year old female in no acute distress.    Skin - No lesions or rash.  HEENT - Normocephalic. No scleral or icterus.   Neck - Trachea midline. Thyroid gland normal and nontender. No carotid bruits. No JVD.   Chest - Chest symmetric. Lung clear to auscultation bilaterally.   Heart - RRR. Normal S1 and S2.   Abdomen - Soft with upper abdomen discomfort. Normal bowel sounds. No organomegally noted.   Rectal - Adequate tone, no masses noted, guiac negative.   Extremities Peripheral pulses - 2+. No edema in lower extremities.   Musculoskeletal - Unremarkable.    Nervous System Mental status - She is alert and " oriented x 3. Cranial nerves - 2-12 intact.       Results Review:     I reviewed the patient's new clinical results.    Lab Results (last 24 hours)     Procedure Component Value Units Date/Time    POC Glucose Fingerstick [[id number]]  (Normal) Collected:  03/09/17 1210    Specimen:  Blood Updated:  03/09/17 1216     Glucose 104 mg/dL     Narrative:       Meter: [id number] : [id number] Yahir Whitt RN    POC Glucose Fingerstick [[id number]]  (Normal) Collected:  03/09/17 1813    Specimen:  Blood Updated:  03/09/17 1823     Glucose 104 mg/dL     Narrative:       Meter: [id number] : [id number] Brandt Marti RN    POC Glucose Fingerstick [[id number]]  (Normal) Collected:  03/10/17 0017    Specimen:  Blood Updated:  03/10/17 0024     Glucose 111 mg/dL     Narrative:       Meter: [id number] : [id number] Elfego Jordan    CBC & Differential [[id number]] Collected:  03/10/17 0425    Specimen:  Blood Updated:  03/10/17 0435    Narrative:       The following orders were created for panel order CBC & Differential.  Procedure                               Abnormality         Status                     ---------                               -----------         ------                     CBC Auto Differential[[id number]]         Abnormal            Final result                 Please view results for these tests on the individual orders.    CBC Auto Differential [[id number]]  (Abnormal) Collected:  03/10/17 0425    Specimen:  Blood Updated:  03/10/17 0435     WBC 7.60 10*3/mm3      RBC 3.51 (L) 10*6/mm3      Hemoglobin 9.9 (L) g/dL      Hematocrit 31.6 (L) %      MCV 90.0 fL      MCH 28.2 pg      MCHC 31.3 g/dL      RDW 15.1 (H) %      RDW-SD 50.2 fl      MPV 9.5 fL      Platelets 128 (L) 10*3/mm3      Neutrophil % 78.7 (H) %      Lymphocyte % 13.9 (L) %      Monocyte % 5.5 %      Eosinophil % 0.7 %      Basophil % 0.3 %      Immature Grans % 0.9 (H) %      Neutrophils, Absolute 5.98 10*3/mm3      Lymphocytes, Absolute  1.06 10*3/mm3      Monocytes, Absolute 0.42 10*3/mm3      Eosinophils, Absolute 0.05 (L) 10*3/mm3      Basophils, Absolute 0.02 10*3/mm3      Immature Grans, Absolute 0.07 (H) 10*3/mm3      nRBC 0.0 /100 WBC     Protime-INR [[id number]]  (Abnormal) Collected:  03/10/17 0425    Specimen:  Blood Updated:  03/10/17 0502     Protime 25.7 (H) Seconds      INR 2.30 (H)     Narrative:       Therapeutic Ranges for INR: 2.0-3.0 (PT 20-30)                              2.5-3.5 (PT 25-34)    Comprehensive Metabolic Panel [[id number]]  (Abnormal) Collected:  03/10/17 0425    Specimen:  Blood Updated:  03/10/17 0514     Glucose 110 (H) mg/dL      BUN 49 (H) mg/dL      Creatinine 2.58 (H) mg/dL      Sodium 143 mmol/L      Potassium 3.6 mmol/L      Chloride 100 mmol/L      CO2 30.5 (H) mmol/L      Calcium 9.2 mg/dL      Total Protein 5.2 (L) g/dL      Albumin 2.40 (L) g/dL      ALT (SGPT) 136 (H) U/L      AST (SGOT) 70 (H) U/L      Alkaline Phosphatase 139 (H) U/L      Total Bilirubin 1.5 (H) mg/dL      eGFR Non African Amer 18 (L) mL/min/1.73      Globulin 2.8 gm/dL      A/G Ratio 0.9 g/dL      BUN/Creatinine Ratio 19.0      Anion Gap 12.5 mmol/L     Narrative:       The MDRD GFR formula is only valid for adults with stable renal function between ages 18 and 70.    Lipase [[id number]]  (Normal) Collected:  03/10/17 0425    Specimen:  Blood Updated:  03/10/17 0514     Lipase 28 U/L     Urine Culture [[id number]]  (Abnormal)  (Susceptibility) Collected:  03/08/17 1800    Specimen:  Urine from Urine, Catheter Updated:  03/10/17 1018     Urine Culture >100,000 CFU/mL Escherichia coli ESBL (C)         Consider infectious disease consult.  Susceptibility results may not correlate to clinical outcomes.       Susceptibility      Escherichia coli ESBL     ERNESTO     Ampicillin >=32 ug/ml Resistant     Ampicillin + Sulbactam >=32 ug/ml Resistant     Cefazolin >=64 ug/ml Resistant     Cefepime Resistant     Ceftriaxone Resistant     Ciprofloxacin  <=0.25 ug/ml Susceptible     Ertapenem <=0.5 ug/ml Susceptible     Gentamicin <=1 ug/ml Susceptible     Levofloxacin <=0.12 ug/ml Susceptible     Nitrofurantoin <=16 ug/ml Susceptible     Piperacillin + Tazobactam 8 ug/ml Susceptible     Tetracycline <=1 ug/ml Susceptible     Trimethoprim + Sulfamethoxazole <=20 ug/ml Susceptible                          Imaging Results (last 24 hours)     Procedure Component Value Units Date/Time    SCANNED - IMAGING [[id number]] Resulted:  03/08/17      Updated:  03/10/17 1048          Xray not reviewed personally by physician.      ECG not reviewed personally by physician  ECG/EMG Results (most recent)     None          Medication Review:   I have reviewed the patient's current medication list    Current Facility-Administered Medications:   •  acetaminophen (TYLENOL) suppository 650 mg, 650 mg, Rectal, Q4H PRN, Trevor Diallo MD  •  dextrose (D50W) solution 25 g, 25 g, Intravenous, Q15 Min PRN, Glendy Gonsalez MD  •  dextrose (GLUTOSE) oral gel 15 g, 15 g, Oral, Q15 Min PRN, Glendy Gonsalez MD  •  glucagon (GLUCAGEN) injection 1 mg, 1 mg, Subcutaneous, Q15 Min PRN, Glendy Gonsalez MD  •  insulin aspart (novoLOG) injection 0-7 Units, 0-7 Units, Subcutaneous, Q6H, Glendy Gonsalez MD, 0 Units at 03/08/17 2237  •  ipratropium-albuterol (DUO-NEB) nebulizer solution 3 mL, 3 mL, Nebulization, 4x Daily - RT, Trevor Diallo MD, 3 mL at 03/10/17 0800  •  lactated ringers infusion, 50 mL/hr, Intravenous, Continuous, Glendy Gonsalez MD, Last Rate: 50 mL/hr at 03/10/17 0354, 50 mL/hr at 03/10/17 0354  •  pantoprazole (PROTONIX) injection 40 mg, 40 mg, Intravenous, Q AM, Trevor Diallo MD, 40 mg at 03/10/17 0610  •  piperacillin-tazobactam (ZOSYN) 3.375 g in sodium chloride 0.9 % 100 mL IVPB, 3.375 g, Intravenous, Q6H, Trevor Diallo MD, Last Rate: 0 mL/hr at 03/09/17 1618, 3.375 g at 03/10/17 1143      Assessment/Plan        ASSESSMENT AND PLAN:       SUMMARY:      PROPHYLAXIS:   -DVT Prophylaxis: On SCDs  -Davidson Catheter: Indicated and in place at this time     NUTRITION AND FLUIDS:  -Diet/ Nutrition: NPO    -Fluid Status/Electrolytes: Lactated Ringer 50 mL/Hr      SOCIAL ISSUES:    -Behavioral/ Agitation Issues: NONE   -Social Issues: Resides at [facility].       THERAPEUTIC:    -ANTIBIOTICS: Inj Zosyn   -PAIN MANAGEMENT: N/A                    PLAN:     -Labs and diagnostic tests reviewed: Gluc 110, Na 143, K 3.6, CO2 30.5, AG 12.5, Creat 2.58, ALT//70, Alk Phos 139, Tot Bili 1.5, Lipase 28, Venous Lactate 1.1, WBC 7.60, Hb 9.9, Plt 128, 78.7 N, 13.9 L     -Diagnostic tests reviewed:     US Abdomen  IMPRESSION:      1. Mild intrahepatic and extrahepatic biliary dilatation. The entire  common bile duct was not visualized, therefore, distal obstructing stone  or mass may be present. Consider further evaluation with a MRI MRCP.  2. Cholelithiasis. There is mild gallbladder distention and and  gallbladder wall thickening. These findings are concerning for possible  acute cholecystitis, however, may simply be secondary to the biliary  distention.  3. Prominent appearance of the pancreas. While nonspecific, this is  concerning for possible pancreatitis.      This report was finalized on 3/8/2017 2:50 PM by Dr. Damian Kennedy MD.        MRI MRCP done today shows  IMPRESSION:  1. Cholelithiasis with likely acute cholecystitis.  2. Choledocholithiasis with a single small stone within the mid common  bile duct. Minimal-mild intrahepatic and extra hepatic bile duct  dilatation. Mildly prominent pancreatic duct.  3. Evidence of mild diffuse pancreatitis.  4. Lobulated hepatic contour suggesting chronic liver disease/cirrhosis.  Mild splenomegaly.  5. Mild right renal parenchymal atrophy. Small bilateral renal cysts.  6. Technically limited study as noted above.      This report was finalized on 3/9/2017 9:00 AM by   Art Bateman MD.               -Consultations: Patient is being seen by Dr Estrada, Dr Stevens and Dr. Inman.      -Any new recommendations: As per GI, Cardio and Gen Surgery     -New Labs ordered: CBC, CMP, Amylase, Lipase in AM     -New diagnostic tests ordered: MRI MRCP     -Any changes in medications:     -Discharge planning issues: Patient should be able to go back home once ready for discharge     -Placement issues: N/A     -Patient is clinically and hemodynamically stable     -To continue current management and supportive care     -Will follow patient closely     -Nothing new to add for right now              DIAGNOSES:          PRIMARY DIAGNOSES:      1) Acute Abdominal pain: With elevated Amylase and lipase, it seems patient has acute pancreatitis and since she has new transaminitis and her bilirubin is high, it seems she has acute cholecystitis. She has leukocytosis and elevated lactic acid. I cannot give her enormous amounts of fluids as she has hx of CHF. Will treat her with Inj Zosyn as she has evidence of acute cholecystitis with elevated lactic acid. Although Lactic acid is 1.1 now.     MRI MRCP shows  IMPRESSION:  1. Cholelithiasis with likely acute cholecystitis.  2. Choledocholithiasis with a single small stone within the mid common  bile duct. Minimal-mild intrahepatic and extra hepatic bile duct  dilatation. Mildly prominent pancreatic duct.  3. Evidence of mild diffuse pancreatitis.  4. Lobulated hepatic contour suggesting chronic liver disease/cirrhosis.  Mild splenomegaly.  5. Mild right renal parenchymal atrophy. Small bilateral renal cysts.  6. Technically limited study as noted above.      This report was finalized on 3/9/2017 9:00 AM by Dr. Art Bateman MD.      Dr. Inman will take her for surgery (Cholecystectomy) on Monday. Patient has already received FFP to reverse INR. THe plan is not to start Coumadin over the weekend and not to give her Lovenox either so that  she is ready for surgery on Monday.      2) Chronic steroid use: Off Inj solucortef.       3) Acute Pancreatitis: As noted above. Patient is NPO and Amylase and Lipase are trending down. She does not have much pain.    4) UTI: Urine culture growing >100,000 E coli ESBL. Patient is on Inj Zosyn and this abx also covers ESBL.      5) Abnormal LFTs: Are gradually trending down      6) Jaundice: Patient appeared jaundiced when she came but her last serum bili is 1.5.      7) HTN: BP is 115/61.       8) Dyslipidemia: Hx noted      9) Chronic Diastolic Heart Failure: Patient is usually on diuretics. Her elevated BUN and creatinine are consistent with dehydration and volume depletion      10) Chronic Kidney Disease: Patient's creatinine is up from 1.07 to 2.58      11) Atrial Fibrillation:Noted      12) Anemia: Patient's last Hb is 9.9      13) DJD: Hx noted      14) Morbid Obesity: Noted      15) Obstructive Sleep Apnea: Hx noted      16) Lymphedema: Hx noted      17) Gout: Hx noted      18) Hx of DVT: Patient has been off coumadin. FFP given today to reverse INR and she is going for ERCP to be done by Dr. Estrada.       19) DVT Prophylaxis: SCDs          SECONDARY DIAGNOSES:          As above              SURGICAL DIAGNOSES:          S/P Bilateral cataract extraction, S/P closed reduction and external fixation Rt Femoral fracture, S/P Herniorrhaphy                    Plan for disposition: Patient should be able to go back to [facility] once ready for discharge          ?  Trevor Diallo MD  03/10/17  11:57 AM            Trevor Diallo M.D., FACP  Internal Medicine/ Hospitalist          Time:       EMR Dragon/Transcription disclaimer:      Much of this encounter note is an electronic transcription/translation of spoken language to printed text. The electronic translation of spoken language may permit erroneous, or at times, nonsensical words or phrases to be inadvertently transcribed; Although I have reviewed the  note for such errors, some may still exist.

## 2017-03-10 NOTE — OP NOTE
DATE OF PROCEDURE: 03/10/2017      PROCEDURES PERFORMED:  Endoscopic retrograde cholangiopancreatogram with sphincterotomy and stone extraction.     INDICATIONS FOR PROCEDURE: Choledocholithiasis on MRCP in a patient presenting with pancreatitis, elevated liver enzymes and gallstones on ultrasound.     SURGEON: Jose Estrada MD      ANESTHESIA: General endotracheal anesthesia.     DESCRIPTION OF PROCEDURE: The risks and benefits of the procedure were explained to the patient. Informed consent was signed. She was brought to the operating room and had general anesthesia induced. She was intubated. She was then placed in a prone head-to-the-right position on the OR table. A bite block was placed between her mandible and maxilla. The scope was introduced into the oropharynx and advanced under direct visualization into the esophagus, through to the stomach, through the stomach to the duodenum. It was shortened bringing a normal-appearing ampulla into view. There was a periampullary diverticulum that did not involve the ampulla. After multiple attempts of cannulation with injection, the pancreatic duct was mildly dilated, nearly the size of the common bile duct. However, with a distal cholangiogram being performed, a short sphincterotomy was performed. The cavity could be directed to the tip of the common bile duct and then passed into the proximal hepatics. A sphincterotomy was then performed between 10 and 12 mm with excellent hemostasis. Balloon was then exchanged for the catheter. The balloon was used as an occlusion cholangiogram revealing a single filling defect in the common left hepatic duct. The wire was in the left hepatic system. The balloon was then deflated, and advanced above the stone, inflated to what appeared to be 10-11 mm and withdrawn through the common hepatic duct and common bile duct. Stone fragments were removed. A 2nd sweep was performed. A repeat occlusion cholangiogram was performed and  despite adequate filling of the entire biliary tree, the cystic duct was not visualized neither was contrast in the gallbladder. A 3rd final sweep was performed. No other fragments were removed. The catheter and wire were removed. The scope controls were loosened and the scope was withdrawn to the proximal stomach. A drainage film showed retained contrast within the intrahepatics and a bit of contrast left in the pancreatic duct as well. The scope was taken from the patient. She tolerated the procedure well. She was extubated and taken to the recovery room in good condition.      IMPRESSION:   1.  Normal ampulla.    2.  Periampullary diverticulum.    3.  Dilated pancreatic duct.   4.  Dilated common bile duct.   5.  A single filling defect in the common bile duct.     6.  Status post sphincterotomy, balloon clearance, and removal of common bile duct stone.   7.  No visualization of the cystic duct.     RECOMMENDATIONS:   1.  At this point, we will keep the patient n.p.o. with the exception of ice chips.    2.  Recheck all of her labs in the morning.    3.  Continue her present medication.          DAISHA Alvarado  D:  03/10/2017 14:41:57   T:  03/10/2017 16:36:30   Job ID:  [id number]   Document ID:  [id number]  cc:  Joy Pham DO

## 2017-03-10 NOTE — BRIEF OP NOTE
ENDOSCOPIC RETROGRADE CHOLANGIOPANCREATOGRAPHY  Procedure Note    Alexandria Villanueva  3/8/2017 - 3/10/2017    Pre-op Diagnosis:   Choledocholithiasis [K80.50]    Post-op Diagnosis:     Post-Op Diagnosis Codes:     * Choledocholithiasis [K80.50]     * Acute cholecystitis [K81.0]    Procedure/CPT® Codes:      Procedure(s):  ENDOSCOPIC RETROGRADE CHOLANGIOPANCREATOGRAPHY     Surgeon(s):  Jose Estrada MD    Anesthesia: General    Staff:   Circulator: Deya Perkins RN; Kiya Stevens RN  Scrub Person: Catia Poe    Estimated Blood Loss: * No values recorded between 3/10/2017  1:35 PM and 3/10/2017  2:24 PM *  Urine Voided: * No values recorded between 3/10/2017  1:35 PM and 3/10/2017  2:24 PM *    Specimens:                * No specimens in log *      Drains:   Urethral Catheter 03/08/17 1730 1 1 (Active)   Daily Indications Monitoring of strict I &O 3/10/2017 12:05 PM   Securement secured to abdomen with adhesive device 3/10/2017 12:05 PM   Irrigation/Instillation Indication patency maintenance 3/10/2017  8:55 AM   Catheter care done Yes 3/10/2017 11:00 AM   Tolerance no signs/symptoms of discomfort 3/9/2017 12:45 PM   Urine Output (mL) 450 3/10/2017 10:47 AM           Findings: Normal Ampulla with periampullary diverticulum  Dilated CBD/PD   Single filling defect removed as a soft bilirubinate stone  Cystic duct never visualized even under pressure of occlusion cholangiogram    Complications: none    2804      Jose Estrada MD     Date: 3/10/2017  Time: 2:35 PM

## 2017-03-10 NOTE — PROGRESS NOTES
"    Patient Name: Alexandria Villanueva  :1935  81 y.o.      Patient Care Team:  Gerardo Williamson MD as PCP - General  Gerardo Williamson MD as PCP - Family Medicine    Chief Complaint: AFIB, cholecyctitis    Interval History: No CP/SOB       Objective   Vital Signs  Temp:  [97.5 °F (36.4 °C)-99 °F (37.2 °C)] 98.2 °F (36.8 °C)  Heart Rate:  [80-98] 81  Resp:  [18-20] 18  BP: ()/(40-80) 96/55  Arterial Line BP: ()/() 88/53    Intake/Output Summary (Last 24 hours) at 03/10/17 0751  Last data filed at 03/10/17 0549   Gross per 24 hour   Intake 1894.17 ml   Output   1800 ml   Net  94.17 ml     Flowsheet Rows         First Filed Value    Admission Height  64\" (162.6 cm) Documented at 2017 1700    Admission Weight  219 lb 9.6 oz (99.6 kg) Documented at 2017 1700          Physical Exam:   General Appearance:    Alert, cooperative, in no acute distress   Lungs:     Clear to auscultation.  Normal respiratory effort and rate.      Heart:    Regular rhythm and normal rate, normal S1 and S2, no  gallops or rubs.  1/6 HSM   Chest Wall:    No abnormalities observed   Abdomen:     Soft, nontender, positive bowel sounds.     Extremities:   no cyanosis, clubbing or edema.  No marked joint deformities.  Adequate musculoskeletal strength.       Results Review:      Results from last 7 days  Lab Units 03/10/17  0425   SODIUM mmol/L 143   POTASSIUM mmol/L 3.6   CHLORIDE mmol/L 100   TOTAL CO2 mmol/L 30.5*   BUN mg/dL 49*   CREATININE mg/dL 2.58*   GLUCOSE mg/dL 110*   CALCIUM mg/dL 9.2           Results from last 7 days  Lab Units 03/10/17  0425   WBC 10*3/mm3 7.60   HEMOGLOBIN g/dL 9.9*   HEMATOCRIT % 31.6*   PLATELETS 10*3/mm3 128*       Results from last 7 days  Lab Units 03/10/17  0425 17  1306   INR  2.30* 2.01*   APTT seconds  --  43.2*                       Medication Review:     insulin aspart 0-7 Units Subcutaneous Q6H   ipratropium-albuterol 3 mL Nebulization 4x Daily - RT   pantoprazole 40 mg " Intravenous Q AM   piperacillin-tazobactam 3.375 g Intravenous Q6H   sodium chloride             lactated ringers 50 mL/hr Last Rate: 50 mL/hr (03/10/17 0354)       Assessment/Plan   Principal Problem:    Abdominal pain  Active Problems:    Chronic diastolic (congestive) heart failure    Chronic anticoagulation    Morbid obesity    Obstructive sleep apnea of adult    Pulmonary HTN    Cholecystitis    Chronic atrial fibrillation    Plans for ERCP noted.    Will follow PRN over the weekend- call fi we can help in any way.    Luis Fernando Stevens III, MD  [facility]  03/10/17  7:51 AM

## 2017-03-11 LAB
ABO + RH BLD: NORMAL
ABO + RH BLD: NORMAL
ALBUMIN SERPL-MCNC: 2.8 G/DL (ref 3.5–5.2)
ALBUMIN/GLOB SERPL: 0.9 G/DL
ALP SERPL-CCNC: 458 U/L (ref 40–129)
ALT SERPL W P-5'-P-CCNC: 100 U/L (ref 5–33)
ANION GAP SERPL CALCULATED.3IONS-SCNC: 15.2 MMOL/L
AST SERPL-CCNC: 35 U/L (ref 5–32)
BASOPHILS # BLD AUTO: 0.01 10*3/MM3 (ref 0–0.2)
BASOPHILS NFR BLD AUTO: 0.2 % (ref 0–2)
BH BB BLOOD EXPIRATION DATE: NORMAL
BH BB BLOOD EXPIRATION DATE: NORMAL
BH BB BLOOD TYPE BARCODE: 5100
BH BB BLOOD TYPE BARCODE: 5100
BH BB DISPENSE STATUS: NORMAL
BH BB DISPENSE STATUS: NORMAL
BH BB PRODUCT CODE: NORMAL
BH BB PRODUCT CODE: NORMAL
BH BB UNIT NUMBER: NORMAL
BH BB UNIT NUMBER: NORMAL
BILIRUB SERPL-MCNC: 2.4 MG/DL (ref 0.2–1.2)
BUN BLD-MCNC: 39 MG/DL (ref 8–23)
BUN/CREAT SERPL: 17.5 (ref 7–25)
CALCIUM SPEC-SCNC: 10 MG/DL (ref 8.8–10.5)
CHLORIDE SERPL-SCNC: 103 MMOL/L (ref 98–107)
CO2 SERPL-SCNC: 29.8 MMOL/L (ref 22–29)
CREAT BLD-MCNC: 2.23 MG/DL (ref 0.57–1)
DEPRECATED RDW RBC AUTO: 49.5 FL (ref 37–54)
EOSINOPHIL # BLD AUTO: 0.02 10*3/MM3 (ref 0.1–0.3)
EOSINOPHIL NFR BLD AUTO: 0.3 % (ref 0–4)
ERYTHROCYTE [DISTWIDTH] IN BLOOD BY AUTOMATED COUNT: 14.7 % (ref 11.5–14.5)
GFR SERPL CREATININE-BSD FRML MDRD: 21 ML/MIN/1.73
GLOBULIN UR ELPH-MCNC: 3.1 GM/DL
GLUCOSE BLD-MCNC: 115 MG/DL (ref 65–99)
GLUCOSE BLDC GLUCOMTR-MCNC: 107 MG/DL (ref 70–130)
GLUCOSE BLDC GLUCOMTR-MCNC: 114 MG/DL (ref 70–130)
GLUCOSE BLDC GLUCOMTR-MCNC: 175 MG/DL (ref 70–130)
HCT VFR BLD AUTO: 32.8 % (ref 37–47)
HGB BLD-MCNC: 10.3 G/DL (ref 12–16)
IMM GRANULOCYTES # BLD: 0.02 10*3/MM3 (ref 0–0.03)
IMM GRANULOCYTES NFR BLD: 0.3 % (ref 0–0.5)
INR PPP: 1.85 (ref 0.9–1.1)
LIPASE SERPL-CCNC: 23 U/L (ref 13–60)
LYMPHOCYTES # BLD AUTO: 0.88 10*3/MM3 (ref 0.6–4.8)
LYMPHOCYTES NFR BLD AUTO: 14.1 % (ref 20–45)
MAGNESIUM SERPL-MCNC: 2.3 MG/DL (ref 1.7–2.5)
MCH RBC QN AUTO: 28.5 PG (ref 27–31)
MCHC RBC AUTO-ENTMCNC: 31.4 G/DL (ref 31–37)
MCV RBC AUTO: 90.6 FL (ref 81–99)
MONOCYTES # BLD AUTO: 0.38 10*3/MM3 (ref 0–1)
MONOCYTES NFR BLD AUTO: 6.1 % (ref 3–8)
NEUTROPHILS # BLD AUTO: 4.93 10*3/MM3 (ref 1.5–8.3)
NEUTROPHILS NFR BLD AUTO: 79 % (ref 45–70)
NRBC BLD MANUAL-RTO: 0 /100 WBC (ref 0–0)
PLATELET # BLD AUTO: 139 10*3/MM3 (ref 140–500)
PMV BLD AUTO: 10.1 FL (ref 7.4–10.4)
POTASSIUM BLD-SCNC: 3.1 MMOL/L (ref 3.5–5.2)
PROT SERPL-MCNC: 5.9 G/DL (ref 6–8.5)
PROTHROMBIN TIME: 21.6 SECONDS (ref 12.1–15)
RBC # BLD AUTO: 3.62 10*6/MM3 (ref 4.2–5.4)
SODIUM BLD-SCNC: 148 MMOL/L (ref 136–145)
UNIT  ABO: NORMAL
UNIT  ABO: NORMAL
UNIT  RH: NORMAL
UNIT  RH: NORMAL
WBC NRBC COR # BLD: 6.24 10*3/MM3 (ref 4.8–10.8)

## 2017-03-11 PROCEDURE — 25010000002 PIPERACILLIN SOD-TAZOBACTAM PER 1 G: Performed by: INTERNAL MEDICINE

## 2017-03-11 PROCEDURE — 80053 COMPREHEN METABOLIC PANEL: CPT | Performed by: INTERNAL MEDICINE

## 2017-03-11 PROCEDURE — 94799 UNLISTED PULMONARY SVC/PX: CPT

## 2017-03-11 PROCEDURE — 99232 SBSQ HOSP IP/OBS MODERATE 35: CPT | Performed by: HOSPITALIST

## 2017-03-11 PROCEDURE — 83735 ASSAY OF MAGNESIUM: CPT | Performed by: HOSPITALIST

## 2017-03-11 PROCEDURE — 99231 SBSQ HOSP IP/OBS SF/LOW 25: CPT | Performed by: SURGERY

## 2017-03-11 PROCEDURE — 82962 GLUCOSE BLOOD TEST: CPT

## 2017-03-11 PROCEDURE — 99231 SBSQ HOSP IP/OBS SF/LOW 25: CPT | Performed by: INTERNAL MEDICINE

## 2017-03-11 PROCEDURE — 63710000001 INSULIN ASPART PER 5 UNITS: Performed by: HOSPITALIST

## 2017-03-11 PROCEDURE — 85025 COMPLETE CBC W/AUTO DIFF WBC: CPT | Performed by: INTERNAL MEDICINE

## 2017-03-11 PROCEDURE — 85610 PROTHROMBIN TIME: CPT | Performed by: INTERNAL MEDICINE

## 2017-03-11 PROCEDURE — 83690 ASSAY OF LIPASE: CPT | Performed by: INTERNAL MEDICINE

## 2017-03-11 RX ORDER — HYDROCODONE BITARTRATE AND ACETAMINOPHEN 5; 325 MG/1; MG/1
1 TABLET ORAL EVERY 6 HOURS PRN
Status: DISCONTINUED | OUTPATIENT
Start: 2017-03-11 | End: 2017-03-15 | Stop reason: HOSPADM

## 2017-03-11 RX ORDER — POTASSIUM CHLORIDE 20 MEQ/1
40 TABLET, EXTENDED RELEASE ORAL ONCE
Status: COMPLETED | OUTPATIENT
Start: 2017-03-11 | End: 2017-03-11

## 2017-03-11 RX ORDER — ACETAMINOPHEN 325 MG/1
650 TABLET ORAL EVERY 6 HOURS PRN
Status: DISCONTINUED | OUTPATIENT
Start: 2017-03-11 | End: 2017-03-15 | Stop reason: HOSPADM

## 2017-03-11 RX ADMIN — INSULIN ASPART 2 UNITS: 100 INJECTION, SOLUTION INTRAVENOUS; SUBCUTANEOUS at 20:06

## 2017-03-11 RX ADMIN — PIPERACILLIN AND TAZOBACTAM 3.38 G: 3; .375 INJECTION, POWDER, LYOPHILIZED, FOR SOLUTION INTRAVENOUS; PARENTERAL at 05:44

## 2017-03-11 RX ADMIN — PIPERACILLIN AND TAZOBACTAM 3.38 G: 3; .375 INJECTION, POWDER, LYOPHILIZED, FOR SOLUTION INTRAVENOUS; PARENTERAL at 20:07

## 2017-03-11 RX ADMIN — PIPERACILLIN AND TAZOBACTAM 3.38 G: 3; .375 INJECTION, POWDER, LYOPHILIZED, FOR SOLUTION INTRAVENOUS; PARENTERAL at 12:07

## 2017-03-11 RX ADMIN — SODIUM CHLORIDE, POTASSIUM CHLORIDE, SODIUM LACTATE AND CALCIUM CHLORIDE 50 ML/HR: 600; 310; 30; 20 INJECTION, SOLUTION INTRAVENOUS at 00:50

## 2017-03-11 RX ADMIN — IPRATROPIUM BROMIDE AND ALBUTEROL SULFATE 3 ML: .5; 3 SOLUTION RESPIRATORY (INHALATION) at 12:30

## 2017-03-11 RX ADMIN — ACETAMINOPHEN 650 MG: 325 TABLET, FILM COATED ORAL at 12:07

## 2017-03-11 RX ADMIN — PANTOPRAZOLE SODIUM 40 MG: 40 INJECTION, POWDER, FOR SOLUTION INTRAVENOUS at 05:45

## 2017-03-11 RX ADMIN — IPRATROPIUM BROMIDE AND ALBUTEROL SULFATE 3 ML: .5; 3 SOLUTION RESPIRATORY (INHALATION) at 07:30

## 2017-03-11 RX ADMIN — POTASSIUM CHLORIDE 40 MEQ: 20 TABLET, EXTENDED RELEASE ORAL at 13:41

## 2017-03-11 RX ADMIN — IPRATROPIUM BROMIDE AND ALBUTEROL SULFATE 3 ML: .5; 3 SOLUTION RESPIRATORY (INHALATION) at 19:49

## 2017-03-11 RX ADMIN — IPRATROPIUM BROMIDE AND ALBUTEROL SULFATE 3 ML: .5; 3 SOLUTION RESPIRATORY (INHALATION) at 15:54

## 2017-03-11 NOTE — PROGRESS NOTES
Gastroenterology Progress Note        Chief Complaint:  Abdominal pain    Subjective     Interval History:     No pain, nausea or vomiting. S/p ERCP with cbd stone removal  Review of Systems:    All systems were reviewed and negative except for:  Gastrointestinal: postitive for  pain    Objective     Vital Signs  Temp:  [97.2 °F (36.2 °C)-98.3 °F (36.8 °C)] (P) 98.2 °F (36.8 °C)  Heart Rate:  [] 88  Resp:  [16-22] 20  BP: (106-125)/(49-74) 121/67  Arterial Line BP: (0-121)/(0-66) 121/57  Body mass index is 38.02 kg/(m^2).    Intake/Output Summary (Last 24 hours) at 03/11/17 0817  Last data filed at 03/11/17 0600   Gross per 24 hour   Intake   2280 ml   Output   2200 ml   Net     80 ml             Physical Exam:   General: patient awake, alert and cooperative   Eyes: Normal lids and lashes, no scleral icterus   Neck: supple, normal ROM   Skin: warm and dry, not jaundiced   Cardiovascular: regular rhythm and rate, no murmurs auscultated   Pulm: coarse bilateral   Abdomen: soft, nontender, nondistended; normal bowel sounds   Rectal: deferred   Extremities: no rash or edema   Neurologic: Normal mood and behavior     Results Review:     I reviewed the patient's new clinical results.      WBC No results found for: WBCS   HGB HEMOGLOBIN   Date Value Ref Range Status   03/11/2017 10.3 (L) 12.0 - 16.0 g/dL Final   03/10/2017 9.9 (L) 12.0 - 16.0 g/dL Final   03/09/2017 10.3 (L) 12.0 - 16.0 g/dL Final   03/08/2017 11.0 (L) 12.0 - 16.0 g/dL Final      HCT HEMATOCRIT   Date Value Ref Range Status   03/11/2017 32.8 (L) 37.0 - 47.0 % Final   03/10/2017 31.6 (L) 37.0 - 47.0 % Final   03/09/2017 32.7 (L) 37.0 - 47.0 % Final   03/08/2017 33.6 (L) 37.0 - 47.0 % Final      Platlets No results found for: LABPLAT     PT/INR:    PROTIME   Date Value Ref Range Status   03/11/2017 21.6 (H) 12.1 - 15.0 Seconds Final   03/10/2017 25.7 (H) 12.1 - 15.0 Seconds Final   03/08/2017 23.1 (H) 12.1 - 15.0 Seconds Final   /  INR   Date  Value Ref Range Status   03/11/2017 1.85 (H) 0.90 - 1.10 Final   03/10/2017 2.30 (H) 0.90 - 1.10 Final   03/08/2017 2.01 (H) 0.90 - 1.10 Final       Sodium SODIUM   Date Value Ref Range Status   03/11/2017 148 (H) 136 - 145 mmol/L Final   03/10/2017 143 136 - 145 mmol/L Final   03/09/2017 139 136 - 145 mmol/L Final   03/08/2017 139 136 - 145 mmol/L Final      Potassium POTASSIUM   Date Value Ref Range Status   03/11/2017 3.1 (L) 3.5 - 5.2 mmol/L Final   03/10/2017 3.6 3.5 - 5.2 mmol/L Final   03/09/2017 4.4 3.5 - 5.2 mmol/L Final   03/08/2017 4.6 3.5 - 5.2 mmol/L Final      Chloride CHLORIDE   Date Value Ref Range Status   03/11/2017 103 98 - 107 mmol/L Final   03/10/2017 100 98 - 107 mmol/L Final   03/09/2017 96 (L) 98 - 107 mmol/L Final   03/08/2017 95 (L) 98 - 107 mmol/L Final      Bicarbonate No results found for: PLASMABICARB   BUN BUN   Date Value Ref Range Status   03/11/2017 39 (H) 8 - 23 mg/dL Final   03/10/2017 49 (H) 8 - 23 mg/dL Final   03/09/2017 47 (H) 8 - 23 mg/dL Final   03/08/2017 39 (H) 8 - 23 mg/dL Final      Creatinine CREATININE   Date Value Ref Range Status   03/11/2017 2.23 (H) 0.57 - 1.00 mg/dL Final   03/10/2017 2.58 (H) 0.57 - 1.00 mg/dL Final   03/09/2017 2.60 (H) 0.57 - 1.00 mg/dL Final   03/08/2017 2.36 (H) 0.57 - 1.00 mg/dL Final      Calcium CALCIUM   Date Value Ref Range Status   03/11/2017 10.0 8.8 - 10.5 mg/dL Final   03/10/2017 9.2 8.8 - 10.5 mg/dL Final   03/09/2017 9.2 8.8 - 10.5 mg/dL Final   03/08/2017 9.8 8.8 - 10.5 mg/dL Final      Magnesium  AST  ALT  Bilirubin, Total  AlkPhos  Albumin    Amylase  Lipase    Radiology: No results found for: MG  No components found for: AST.*  No components found for: ALT.*  No components found for: BILIRUBIN, TOTAL.*    No components found for: ALKPHOS.*  No components found for: ALBUMIN.*      No components found for: AMYLASE.*  No components found for: LIPASE.*            Imaging Results (most recent)     Procedure Component Value Units  Date/Time    US Abdomen Complete [[id number]] Collected:  03/08/17 1432     Updated:  03/08/17 1452    Narrative:       INDICATION: Transabdominal pain for 4 days.     EXAM: Abdominal ultrasound, complete.     COMPARISON: CT chest dated 01/12/2017     FINDINGS:  The pancreas is prominent measuring up to 2.5 cm in thickness. Although  nonspecific, this is unusual for a patient of this age. Please  clinically for any evidence of acute pancreatic  inflammation/pancreatitis.     The echogenicity and echotexture of the hepatic parenchyma is within  normal limits. No hepatic mass. Mild intrahepatic and extrahepatic  biliary dilatation. The common duct is incompletely visualized, however  measures up to 0.8-0.9 cm in diameter.     There is a small gallstones in the gallbladder. There is mild  gallbladder wall thickening measuring up to 0.6 cm. There is borderline  gallbladder distention. No pericholecystic fluid..     The right kidney measures 9.8 cm. The left kidney measures 11.7 cm.  Bilateral renal cortical atrophy is more notable on the right kidney. No  hydronephrosis.     The spleen is mildly enlarged measuring 16 cm.       The abdominal aorta is normal in size.  The juxtahepatic IVC is within  normal limits.  No ascites.       Impression:          1. Mild intrahepatic and extrahepatic biliary dilatation. The entire  common bile duct was not visualized, therefore, distal obstructing stone  or mass may be present. Consider further evaluation with a MRCP.  2. Cholelithiasis. There is mild gallbladder distention and and  gallbladder wall thickening. These findings are concerning for possible  acute cholecystitis, however, may simply be secondary to the biliary  distention.  3. Prominent appearance of the pancreas. While nonspecific, this is  concerning for possible pancreatitis.     This report was finalized on 3/8/2017 2:50 PM by Dr. Damian Kennedy MD.       MRI MRCP [[id number]] Collected:  03/09/17 08     Updated:   03/09/17 0902    Narrative:       MRI ABDOMEN, NONCONTRAST, INCLUDING MRCP, 03/09/2017:     HISTORY:   81-year-old female with with abdomen pain, leukocytosis. Elevated  amylase, lipase and bilirubin levels. Previous pancreatitis seen here  January 2017. Current ultrasound showing cholelithiasis, mild bile duct  dilatation and pancreas enlargement. Evaluate for choledocholithiasis,  gallstone pancreatitis.     TECHNIQUE:  MRI examination of the abdomen was performed without contrast  administration due to abnormal renal function (GFR 18). MRCP sequences  were also obtained. The images are significantly degraded by respiratory  motion artifact.     FINDINGS:  Multiple small gallstones are resident within a diffusely thick-walled  gallbladder, there is evidence of edema within and adjacent to the  bladder wall. Acute cholecystitis is likely.     There is mild intrahepatic and extra hepatic bile duct dilatation to the  level of the ampulla with the upper extrahepatic bile duct measuring  about 10 mm. There is a single round filling defect within the mid  common bile duct measuring 4 to 5 mm consistent with  choledocholithiasis.     There is mild diffuse enlargement of the pancreas which appears mildly  edematous. The pancreatic duct is also mildly prominent at about 5 mm.  The appearance is compatible with mild acute pancreatitis.     Mildly nodular liver margin suggesting potential chronic liver disease.  No visible focal liver lesion. Hepatic vasculature appears patent. The  spleen is mildly enlarged at about 14 cm. Mild atrophy of the right  kidney. Small benign-appearing cyst within each kidney.       Impression:       1. Cholelithiasis with likely acute cholecystitis.  2. Choledocholithiasis with a single small stone within the mid common  bile duct. Minimal-mild intrahepatic and extra hepatic bile duct  dilatation. Mildly prominent pancreatic duct.  3. Evidence of mild diffuse pancreatitis.  4. Lobulated hepatic  contour suggesting chronic liver disease/cirrhosis.  Mild splenomegaly.  5. Mild right renal parenchymal atrophy. Small bilateral renal cysts.  6. Technically limited study as noted above.     This report was finalized on 3/9/2017 9:00 AM by Dr. Art Bateman MD.       SCANNED - IMAGING [[id number]] Resulted:  03/08/17      Updated:  03/10/17 1048    [place] ERCP [[id number]] Collected:  03/10/17 1517     Updated:  03/10/17 1523    Narrative:       FLUOROSCOPY DURING ERCP PROCEDURE, 03/10/2017:     HISTORY:  81-year-old female with recent imaging studies showing cholelithiasis,  choledocholithiasis and likely acute cholecystitis.     REPORT:  C-arm fluoroscopy was provided by radiology personnel in the OR during  ERCP procedure performed by Dr. Estrada. Fluoroscopy time was  recorded as 6.20 minutes. 10 spot film images were recorded for  documentation purposes. By report, a common bile duct stone was  extracted. The images show mild intrahepatic and extra hepatic bile duct  dilatation and a mildly dilated pancreatic duct. Please see operative  note for details.     This report was finalized on 3/10/2017 3:21 PM by Dr. Art Bateman MD.                                        lactated ringers 50 mL/hr Last Rate: 50 mL/hr (03/11/17 0050)   lactated ringers 9 mL/hr            Assessment/Plan     Patient Active Problem List   Diagnosis Code   • Hyperglycemia R73.9   • Acute hyperglycemia R73.9   • Sepsis due to urinary tract infection A41.9, N39.0   • Chronic diastolic (congestive) heart failure I50.32   • Permanent atrial fibrillation I48.2   • Acute renal failure N17.9   • Acute on chronic respiratory failure J96.20   • Influenza A with respiratory manifestations J10.1   • Chronic anticoagulation Z79.01   • Lymphedema I89.0   • Morbid obesity E66.01   • Obstructive sleep apnea of adult G47.33   • Pulmonary HTN I27.2   • Abdominal pain R10.9   • Cholecystitis K81.9   • Chronic atrial fibrillation I48.2       CBD  stone s/p ercp/sphincterotomy and stone removal, lfts noted, lipase normal,   No abdominal pain  Surgical plans per Dr. Rodríguez  Will follow      Kamilla Brock MD  03/11/17  8:17 AM

## 2017-03-11 NOTE — PROGRESS NOTES
General Surgery Progress Note    Chief Complaint:  Gallstone pancreatitis and acute cholecystitis      Interval History: ERCP with CBD stone extraction and sphincterotomy yesterday  No acute overnight events.  Reports abdominal pain is improved.  Is complaining of being hungry.  Denies nausea and vomiting.  Is having bowel movements.    Objective     Vital Signs  Temp:  [97.2 °F (36.2 °C)-98.3 °F (36.8 °C)] 98.2 °F (36.8 °C)  Heart Rate:  [] 88  Resp:  [16-22] 20  BP: (106-125)/(49-74) 124/69  Arterial Line BP: (121)/(57) 121/57  Body mass index is 38.02 kg/(m^2).    Intake/Output Summary (Last 24 hours) at 03/11/17 1101  Last data filed at 03/11/17 0932   Gross per 24 hour   Intake 1620.83 ml   Output   2050 ml   Net -429.17 ml     I/O this shift:  In: -   Out: 300 [Urine:300]       Physical Exam:   General: patient awake, alert and cooperative   Cardiovascular: regular rhythm and rate, no murmurs auscultated   Pulm: clear to auscultation bilaterally, regular and unlabored   Abdomen: soft, obese, mild epigastric and right upper quadrant tenderness,  nondistended; normal bowel sounds   Extremities: no rash or edema   Neurologic: Normal mood and behavior     Results Review:     I reviewed the patient's new clinical results.    Lab Results (last 24 hours)     Procedure Component Value Units Date/Time    POC Glucose Fingerstick [[id number]]  (Normal) Collected:  03/10/17 1220    Specimen:  Blood Updated:  03/10/17 1229     Glucose 97 mg/dL     Narrative:       Meter: [id number] : [id number] Eri Bee RN Validator    POC Glucose Fingerstick [[id number]]  (Normal) Collected:  03/10/17 1640    Specimen:  Blood Updated:  03/10/17 1646     Glucose 94 mg/dL     Narrative:       Meter: [id number] : [id number] Nirav Oliva    POC Glucose Fingerstick [[id number]]  (Normal) Collected:  03/11/17 0001    Specimen:  Blood Updated:  03/11/17 0013     Glucose 107 mg/dL     Narrative:       Meter: [id number] :  [id number] Davian Morrissey    CBC & Differential [[id number]] Collected:  03/11/17 0406    Specimen:  Blood Updated:  03/11/17 0449    Narrative:       The following orders were created for panel order CBC & Differential.  Procedure                               Abnormality         Status                     ---------                               -----------         ------                     CBC Auto Differential[[id number]]         Abnormal            Final result                 Please view results for these tests on the individual orders.    CBC Auto Differential [[id number]]  (Abnormal) Collected:  03/11/17 0406    Specimen:  Blood Updated:  03/11/17 0449     WBC 6.24 10*3/mm3      RBC 3.62 (L) 10*6/mm3      Hemoglobin 10.3 (L) g/dL      Hematocrit 32.8 (L) %      MCV 90.6 fL      MCH 28.5 pg      MCHC 31.4 g/dL      RDW 14.7 (H) %      RDW-SD 49.5 fl      MPV 10.1 fL      Platelets 139 (L) 10*3/mm3      Neutrophil % 79.0 (H) %      Lymphocyte % 14.1 (L) %      Monocyte % 6.1 %      Eosinophil % 0.3 %      Basophil % 0.2 %      Immature Grans % 0.3 %      Neutrophils, Absolute 4.93 10*3/mm3      Lymphocytes, Absolute 0.88 10*3/mm3      Monocytes, Absolute 0.38 10*3/mm3      Eosinophils, Absolute 0.02 (L) 10*3/mm3      Basophils, Absolute 0.01 10*3/mm3      Immature Grans, Absolute 0.02 10*3/mm3      nRBC 0.0 /100 WBC     Lipase [[id number]]  (Normal) Collected:  03/11/17 0406    Specimen:  Blood Updated:  03/11/17 0510     Lipase 23 U/L     Protime-INR [[id number]]  (Abnormal) Collected:  03/11/17 0406    Specimen:  Blood Updated:  03/11/17 0520     Protime 21.6 (H) Seconds      INR 1.85 (H)     Narrative:       Therapeutic Ranges for INR: 2.0-3.0 (PT 20-30)                              2.5-3.5 (PT 25-34)    Comprehensive Metabolic Panel [[id number]]  (Abnormal) Collected:  03/11/17 0406    Specimen:  Blood Updated:  03/11/17 0523     Glucose 115 (H) mg/dL      BUN 39 (H) mg/dL      Creatinine 2.23 (H) mg/dL      Sodium 148 (H)  mmol/L      Potassium 3.1 (L) mmol/L      Chloride 103 mmol/L      CO2 29.8 (H) mmol/L      Calcium 10.0 mg/dL      Total Protein 5.9 (L) g/dL      Albumin 2.80 (L) g/dL      ALT (SGPT) 100 (H) U/L      AST (SGOT) 35 (H) U/L      Alkaline Phosphatase 458 (H) U/L      Total Bilirubin 2.4 (H) mg/dL      eGFR Non African Amer 21 (L) mL/min/1.73      Globulin 3.1 gm/dL      A/G Ratio 0.9 g/dL      BUN/Creatinine Ratio 17.5      Anion Gap 15.2 mmol/L     Narrative:       The MDRD GFR formula is only valid for adults with stable renal function between ages 18 and 70.          Radiology:    Imaging Results (last 72 hours)     Procedure Component Value Units Date/Time    US Abdomen Complete [[id number]] Collected:  03/08/17 1432     Updated:  03/08/17 1452    Narrative:       INDICATION: Transabdominal pain for 4 days.     EXAM: Abdominal ultrasound, complete.     COMPARISON: CT chest dated 01/12/2017     FINDINGS:  The pancreas is prominent measuring up to 2.5 cm in thickness. Although  nonspecific, this is unusual for a patient of this age. Please  clinically for any evidence of acute pancreatic  inflammation/pancreatitis.     The echogenicity and echotexture of the hepatic parenchyma is within  normal limits. No hepatic mass. Mild intrahepatic and extrahepatic  biliary dilatation. The common duct is incompletely visualized, however  measures up to 0.8-0.9 cm in diameter.     There is a small gallstones in the gallbladder. There is mild  gallbladder wall thickening measuring up to 0.6 cm. There is borderline  gallbladder distention. No pericholecystic fluid..     The right kidney measures 9.8 cm. The left kidney measures 11.7 cm.  Bilateral renal cortical atrophy is more notable on the right kidney. No  hydronephrosis.     The spleen is mildly enlarged measuring 16 cm.       The abdominal aorta is normal in size.  The juxtahepatic IVC is within  normal limits.  No ascites.       Impression:          1. Mild intrahepatic and  extrahepatic biliary dilatation. The entire  common bile duct was not visualized, therefore, distal obstructing stone  or mass may be present. Consider further evaluation with a MRCP.  2. Cholelithiasis. There is mild gallbladder distention and and  gallbladder wall thickening. These findings are concerning for possible  acute cholecystitis, however, may simply be secondary to the biliary  distention.  3. Prominent appearance of the pancreas. While nonspecific, this is  concerning for possible pancreatitis.     This report was finalized on 3/8/2017 2:50 PM by Dr. Damian Kennedy MD.       MRI MRCP [[id number]] Collected:  03/09/17 0847     Updated:  03/09/17 0902    Narrative:       MRI ABDOMEN, NONCONTRAST, INCLUDING MRCP, 03/09/2017:     HISTORY:   81-year-old female with with abdomen pain, leukocytosis. Elevated  amylase, lipase and bilirubin levels. Previous pancreatitis seen here  January 2017. Current ultrasound showing cholelithiasis, mild bile duct  dilatation and pancreas enlargement. Evaluate for choledocholithiasis,  gallstone pancreatitis.     TECHNIQUE:  MRI examination of the abdomen was performed without contrast  administration due to abnormal renal function (GFR 18). MRCP sequences  were also obtained. The images are significantly degraded by respiratory  motion artifact.     FINDINGS:  Multiple small gallstones are resident within a diffusely thick-walled  gallbladder, there is evidence of edema within and adjacent to the  bladder wall. Acute cholecystitis is likely.     There is mild intrahepatic and extra hepatic bile duct dilatation to the  level of the ampulla with the upper extrahepatic bile duct measuring  about 10 mm. There is a single round filling defect within the mid  common bile duct measuring 4 to 5 mm consistent with  choledocholithiasis.     There is mild diffuse enlargement of the pancreas which appears mildly  edematous. The pancreatic duct is also mildly prominent at about 5 mm.  The  appearance is compatible with mild acute pancreatitis.     Mildly nodular liver margin suggesting potential chronic liver disease.  No visible focal liver lesion. Hepatic vasculature appears patent. The  spleen is mildly enlarged at about 14 cm. Mild atrophy of the right  kidney. Small benign-appearing cyst within each kidney.       Impression:       1. Cholelithiasis with likely acute cholecystitis.  2. Choledocholithiasis with a single small stone within the mid common  bile duct. Minimal-mild intrahepatic and extra hepatic bile duct  dilatation. Mildly prominent pancreatic duct.  3. Evidence of mild diffuse pancreatitis.  4. Lobulated hepatic contour suggesting chronic liver disease/cirrhosis.  Mild splenomegaly.  5. Mild right renal parenchymal atrophy. Small bilateral renal cysts.  6. Technically limited study as noted above.     This report was finalized on 3/9/2017 9:00 AM by Dr. Art Bateman MD.       SCANNED - IMAGING [[id number]] Resulted:  03/08/17      Updated:  03/10/17 1048    [place] ERCP [[id number]] Collected:  03/10/17 1517     Updated:  03/10/17 1523    Narrative:       FLUOROSCOPY DURING ERCP PROCEDURE, 03/10/2017:     HISTORY:  81-year-old female with recent imaging studies showing cholelithiasis,  choledocholithiasis and likely acute cholecystitis.     REPORT:  C-arm fluoroscopy was provided by radiology personnel in the OR during  ERCP procedure performed by Dr. Estrada. Fluoroscopy time was  recorded as 6.20 minutes. 10 spot film images were recorded for  documentation purposes. By report, a common bile duct stone was  extracted. The images show mild intrahepatic and extra hepatic bile duct  dilatation and a mildly dilated pancreatic duct. Please see operative  note for details.     This report was finalized on 3/10/2017 3:21 PM by Dr. Art Bateman MD.               lactated ringers 50 mL/hr Last Rate: 50 mL/hr (03/11/17 0050)   lactated ringers 9 mL/hr            Assessment/Plan      Patient Active Problem List   Diagnosis Code   • Hyperglycemia R73.9   • Acute hyperglycemia R73.9   • Sepsis due to urinary tract infection A41.9, N39.0   • Chronic diastolic (congestive) heart failure I50.32   • Permanent atrial fibrillation I48.2   • Acute renal failure N17.9   • Acute on chronic respiratory failure J96.20   • Influenza A with respiratory manifestations J10.1   • Chronic anticoagulation Z79.01   • Lymphedema I89.0   • Morbid obesity E66.01   • Obstructive sleep apnea of adult G47.33   • Pulmonary HTN I27.2   • Abdominal pain R10.9   • Cholecystitis K81.9   • Chronic atrial fibrillation I48.2       Acute cholecystitis with gallstone pancreatitis  Choledocholithiasis status post ERCP and CBD stone extraction/sphincterotomy  LFTs noted.  Lipase is normal.  We'll allow clear liquids today.  Recheck labs including coagulations studies in a.m.  Tentatively on for laparoscopic cholecystectomy Monday per Dr. Pham.    Plan a care discussed with patient, nursing staff and Dr. Star Hancock MD  03/11/17  11:01 AM    Time:

## 2017-03-11 NOTE — PROGRESS NOTES
Patient: Alexandria Villanueva  Procedure(s) with comments:  ENDOSCOPIC RETROGRADE CHOLANGIOPANCREATOGRAPHY sphincterotomy with stone extraction  - acute cholecystitis  choledocholithasis  Anesthesia type: [unfilled]    Patient location: ICU  Last vitals:   Vitals:    03/11/17 1554   BP:    Pulse: 75   Resp: 18   Temp:    SpO2: 95%     Level of consciousness: awake, alert and oriented    Post-anesthesia pain: adequate analgesia  Airway patency: patent  Respiratory: unassisted  Cardiovascular: stable and blood pressure at baseline  Hydration: euvolemic    Anesthetic complications: no

## 2017-03-11 NOTE — PLAN OF CARE
Problem: Patient Care Overview (Adult)  Goal: Plan of Care Review    03/11/17 0236   Coping/Psychosocial Response Interventions   Plan Of Care Reviewed With patient   Patient Care Overview   Progress progress toward functional goals is gradual

## 2017-03-11 NOTE — PLAN OF CARE
Problem: Patient Care Overview (Adult)  Goal: Plan of Care Review  Outcome: Ongoing (interventions implemented as appropriate)    03/11/17 8768   Coping/Psychosocial Response Interventions   Plan Of Care Reviewed With patient   Patient Care Overview   Progress no change   Outcome Evaluation   Outcome Summary/Follow up Plan pt was started on cl liq today and now has Hydrocodone 5/325, or tylenol for pain. tylenol was given x1 with relief noted. still going to have her GB taken out on Monday, accuchecks have been good required no SS, Heartrate cont to be 70-80 and a-fib. In good spirits was up in the chair today         Problem: Cardiac Output, Decreased (Adult)  Goal: Identify Related Risk Factors and Signs and Symptoms  Outcome: Ongoing (interventions implemented as appropriate)    Problem: Tissue Perfusion, Ineffective Peripheral (Adult)  Goal: Identify Related Risk Factors and Signs and Symptoms  Outcome: Ongoing (interventions implemented as appropriate)    Problem: Fall Risk (Adult)  Goal: Identify Related Risk Factors and Signs and Symptoms  Outcome: Ongoing (interventions implemented as appropriate)

## 2017-03-11 NOTE — PROGRESS NOTES
"Hospitalist Team      Patient Care Team:  Gerardo Williamson MD as PCP - General  Gerardo Williamson MD as PCP - Family Medicine        Chief Complaint: F/U Gallstone pancreatitis and acute cholecystitis    Subjective    Interval History and ROS:     Patient notes she's feeling better today.  She denies any N/V and notes her abdominal pain has improved.  She is afebrile.  Denies any CP or SOA. She denies any heart palpitations or diarrhea.      Objective    Vital Signs  Temp:  [97.8 °F (36.6 °C)-98.2 °F (36.8 °C)] 98.2 °F (36.8 °C)  Heart Rate:  [69-98] 75  Resp:  [18-22] 18  BP: (106-137)/(64-74) 137/70  Oxygen Therapy  SpO2: 95 %  Pulse Oximetry Type: Continuous  O2 Device: nasal cannula with humidification  Flow (L/min): 2  Oxygen Concentration (%): 38     Flowsheet Rows         First Filed Value    Admission Height  64\" (162.6 cm) Documented at 03/08/2017 1700    Admission Weight  219 lb 9.6 oz (99.6 kg) Documented at 03/08/2017 1700            Physical Exam:  Physical Exam  Constitutional: Patient appears well-developed and Obese and in NAD  HEENT:   Head: Normocephalic and atraumatic.   Eyes:  EOM are intact. Sclera are anicteric and non-injected.  Mouth and Throat: Patient has moist mucous membranes. Oropharynx is clear of any erythema or exudate.   Neck: Neck supple. No JVD present.   Cardiovascular: Irregularly irregular rhythm, rate WNL Exam reveals no gallop and no friction rub. No murmur heard.  Pulmonary/Chest: Lungs with diminished breath sounds bilateral bases, No respiratory distress. No wheezes. No rhonchi. No rales.   Abdominal: Morbidly Obese, Soft. Bowel sounds are normal. No mass.  Mild  ttp in bilateral upper quadrants and epigastric area.   Extremities: Chronic lymphedema. Pulses are palpable in all 4 extremities.  Neurological: Patient is alert and oriented to person, place, and time.   Skin: Warm and dry.  No rash noted. Nails show no clubbing.  No cyanosis or erythema.    Results Review:     I " reviewed the patient's new clinical results.    Lab Results (last 24 hours)     Procedure Component Value Units Date/Time    POC Glucose Fingerstick [[id number]]  (Normal) Collected:  03/11/17 0001    Specimen:  Blood Updated:  03/11/17 0013     Glucose 107 mg/dL     Narrative:       Meter: [id number] : [id number] Davian Morrissey    CBC & Differential [[id number]] Collected:  03/11/17 0406    Specimen:  Blood Updated:  03/11/17 0449    Narrative:       The following orders were created for panel order CBC & Differential.  Procedure                               Abnormality         Status                     ---------                               -----------         ------                     CBC Auto Differential[[id number]]         Abnormal            Final result                 Please view results for these tests on the individual orders.    CBC Auto Differential [[id number]]  (Abnormal) Collected:  03/11/17 0406    Specimen:  Blood Updated:  03/11/17 0449     WBC 6.24 10*3/mm3      RBC 3.62 (L) 10*6/mm3      Hemoglobin 10.3 (L) g/dL      Hematocrit 32.8 (L) %      MCV 90.6 fL      MCH 28.5 pg      MCHC 31.4 g/dL      RDW 14.7 (H) %      RDW-SD 49.5 fl      MPV 10.1 fL      Platelets 139 (L) 10*3/mm3      Neutrophil % 79.0 (H) %      Lymphocyte % 14.1 (L) %      Monocyte % 6.1 %      Eosinophil % 0.3 %      Basophil % 0.2 %      Immature Grans % 0.3 %      Neutrophils, Absolute 4.93 10*3/mm3      Lymphocytes, Absolute 0.88 10*3/mm3      Monocytes, Absolute 0.38 10*3/mm3      Eosinophils, Absolute 0.02 (L) 10*3/mm3      Basophils, Absolute 0.01 10*3/mm3      Immature Grans, Absolute 0.02 10*3/mm3      nRBC 0.0 /100 WBC     Lipase [[id number]]  (Normal) Collected:  03/11/17 0406    Specimen:  Blood Updated:  03/11/17 0510     Lipase 23 U/L     Protime-INR [[id number]]  (Abnormal) Collected:  03/11/17 0406    Specimen:  Blood Updated:  03/11/17 0520     Protime 21.6 (H) Seconds      INR 1.85 (H)     Narrative:       Therapeutic  Ranges for INR: 2.0-3.0 (PT 20-30)                              2.5-3.5 (PT 25-34)    Comprehensive Metabolic Panel [[id number]]  (Abnormal) Collected:  03/11/17 0406    Specimen:  Blood Updated:  03/11/17 0523     Glucose 115 (H) mg/dL      BUN 39 (H) mg/dL      Creatinine 2.23 (H) mg/dL      Sodium 148 (H) mmol/L      Potassium 3.1 (L) mmol/L      Chloride 103 mmol/L      CO2 29.8 (H) mmol/L      Calcium 10.0 mg/dL      Total Protein 5.9 (L) g/dL      Albumin 2.80 (L) g/dL      ALT (SGPT) 100 (H) U/L      AST (SGOT) 35 (H) U/L      Alkaline Phosphatase 458 (H) U/L      Total Bilirubin 2.4 (H) mg/dL      eGFR Non African Amer 21 (L) mL/min/1.73      Globulin 3.1 gm/dL      A/G Ratio 0.9 g/dL      BUN/Creatinine Ratio 17.5      Anion Gap 15.2 mmol/L     Narrative:       The MDRD GFR formula is only valid for adults with stable renal function between ages 18 and 70.    POC Glucose Fingerstick [[id number]]  (Normal) Collected:  03/11/17 1110    Specimen:  Blood Updated:  03/11/17 1116     Glucose 114 mg/dL     Narrative:       Meter: [id number] : [id number] Elfego Jordan    Magnesium [[id number]]  (Normal) Collected:  03/11/17 0406    Specimen:  Blood Updated:  03/11/17 1352     Magnesium 2.3 mg/dL           Imaging Results (last 24 hours)     ** No results found for the last 24 hours. **          ECG/EMG Results (most recent)     None          Medication Review:   I have reviewed the patient's current medication list    Current Facility-Administered Medications:   •  acetaminophen (TYLENOL) tablet 650 mg, 650 mg, Oral, Q6H PRN, Glendy Gonsalez MD, 650 mg at 03/11/17 1207  •  dextrose (D50W) solution 25 g, 25 g, Intravenous, Q15 Min PRN, Glendy Gonsalez MD  •  dextrose (GLUTOSE) oral gel 15 g, 15 g, Oral, Q15 Min PRN, Glendy Gonsalez MD  •  glucagon (GLUCAGEN) injection 1 mg, 1 mg, Subcutaneous, Q15 Min PRN, Glendy Gonsalez MD  •  HYDROcodone-acetaminophen (NORCO)  5-325 MG per tablet 1 tablet, 1 tablet, Oral, Q6H PRN, Glendy Gonsalez MD  •  insulin aspart (novoLOG) injection 0-7 Units, 0-7 Units, Subcutaneous, Q6H, Glendy Gonsalez MD, 0 Units at 03/08/17 2237  •  ipratropium-albuterol (DUO-NEB) nebulizer solution 3 mL, 3 mL, Nebulization, 4x Daily - RT, Trevor Diallo MD, 3 mL at 03/11/17 1554  •  lactated ringers infusion, 50 mL/hr, Intravenous, Continuous, Glendy Gonsalez MD, Last Rate: 50 mL/hr at 03/11/17 1240, 50 mL/hr at 03/11/17 1240  •  lactated ringers infusion, 9 mL/hr, Intravenous, Continuous, Lorena Love CRNA  •  pantoprazole (PROTONIX) injection 40 mg, 40 mg, Intravenous, Q AM, Trevor Diallo MD, 40 mg at 03/11/17 0545  •  piperacillin-tazobactam (ZOSYN) 3.375 g in sodium chloride 0.9 % 100 mL IVPB, 3.375 g, Intravenous, Q6H, Trevor Diallo MD, Stopped at 03/11/17 1239      Assessment/Plan     1. Acute Gallstone pancreatitis: Dr. Estrada following and patient s/p ERCP with stone removal and sphincterotomy. Lipase NL and LFTs trending down although alk phos and bili elevated.  Monitor.  Patient started on clear liquids by surgery today. Will resume patient's home dose norco PRN for pain.    2. Acute cholecystitis and choledocholithiasis:  See #1 above. Patient on zosyn and WBC is WNL.  Plan for cholecystectomy Monday by Dr. Pham. Patient off coumadin in anticipation of procedure.    3. Acute renal failure on CKD stage III: Baseline Cr approx 0.9-1.  Renal function slowly improving.  Likely combination of prerenal secondary to dehydration and possibly some ATN from hypotension at admission.  Continue low IVF maintenance. Monitor.    4. E-coli UTI: Zosyn will cover.     5. Hypokalemia: Will replace po, Mag is WNL. Monitor.    6. Chronic diastolic CHF, Cor pulmonale and pulmonary HTN: No acute issues currently. Careful with IVFs. Home diuretic and BB on hold currently.    7. Chronic A-fib: Coumadin on hold in  anticipation of procedure on Monday. HR remains WNL off home toprol XL. Monitor. Toprol XL held secondary to hypotension at admission.    8. Chronic hypoxic respiratory failure. On home 2L O2. No acute issues.     9. Chronic lymphedema: No acute issues.  Previously used leg wraps at NH.    10. DM-2:  Home insulin on hold, patient only on SSI here and bedsides ok. Monitor.    11. Vitamin D deficiency: Supplementation on hold.    12. Morbid obesity: Nutrition has seen here in the past. F/U at NH.    13. H/O DVT: no acute issues. Coumadin on hold secondary to plan for surgical procedure Monday.    Plan for disposition: Back to [place] when Acute issues resolved.    Glendy Gonsalez MD  03/11/17  5:27 PM

## 2017-03-12 LAB
ALBUMIN SERPL-MCNC: 2.4 G/DL (ref 3.5–5.2)
ALBUMIN/GLOB SERPL: 0.8 G/DL
ALP SERPL-CCNC: 346 U/L (ref 40–129)
ALT SERPL W P-5'-P-CCNC: 68 U/L (ref 5–33)
AMYLASE SERPL-CCNC: 88 U/L (ref 28–100)
ANION GAP SERPL CALCULATED.3IONS-SCNC: 12 MMOL/L
APTT PPP: 56.4 SECONDS (ref 24.3–38.1)
AST SERPL-CCNC: 17 U/L (ref 5–32)
BASOPHILS # BLD AUTO: 0.02 10*3/MM3 (ref 0–0.2)
BASOPHILS NFR BLD AUTO: 0.4 % (ref 0–2)
BILIRUB SERPL-MCNC: 1.2 MG/DL (ref 0.2–1.2)
BUN BLD-MCNC: 29 MG/DL (ref 8–23)
BUN/CREAT SERPL: 16.4 (ref 7–25)
CALCIUM SPEC-SCNC: 9.5 MG/DL (ref 8.8–10.5)
CHLORIDE SERPL-SCNC: 103 MMOL/L (ref 98–107)
CO2 SERPL-SCNC: 29 MMOL/L (ref 22–29)
CREAT BLD-MCNC: 1.77 MG/DL (ref 0.57–1)
DEPRECATED RDW RBC AUTO: 49.5 FL (ref 37–54)
EOSINOPHIL # BLD AUTO: 0.09 10*3/MM3 (ref 0.1–0.3)
EOSINOPHIL NFR BLD AUTO: 1.7 % (ref 0–4)
ERYTHROCYTE [DISTWIDTH] IN BLOOD BY AUTOMATED COUNT: 14.6 % (ref 11.5–14.5)
GFR SERPL CREATININE-BSD FRML MDRD: 28 ML/MIN/1.73
GLOBULIN UR ELPH-MCNC: 2.9 GM/DL
GLUCOSE BLD-MCNC: 135 MG/DL (ref 65–99)
GLUCOSE BLDC GLUCOMTR-MCNC: 119 MG/DL (ref 70–130)
GLUCOSE BLDC GLUCOMTR-MCNC: 120 MG/DL (ref 70–130)
GLUCOSE BLDC GLUCOMTR-MCNC: 122 MG/DL (ref 70–130)
GLUCOSE BLDC GLUCOMTR-MCNC: 132 MG/DL (ref 70–130)
GLUCOSE BLDC GLUCOMTR-MCNC: 89 MG/DL (ref 70–130)
HCT VFR BLD AUTO: 31.5 % (ref 37–47)
HGB BLD-MCNC: 9.7 G/DL (ref 12–16)
IMM GRANULOCYTES # BLD: 0.02 10*3/MM3 (ref 0–0.03)
IMM GRANULOCYTES NFR BLD: 0.4 % (ref 0–0.5)
INR PPP: 1.67 (ref 0.9–1.1)
INR PPP: 2.19 (ref 0.9–1.1)
LIPASE SERPL-CCNC: 20 U/L (ref 13–60)
LYMPHOCYTES # BLD AUTO: 1.03 10*3/MM3 (ref 0.6–4.8)
LYMPHOCYTES NFR BLD AUTO: 19.7 % (ref 20–45)
MCH RBC QN AUTO: 28.1 PG (ref 27–31)
MCHC RBC AUTO-ENTMCNC: 30.8 G/DL (ref 31–37)
MCV RBC AUTO: 91.3 FL (ref 81–99)
MONOCYTES # BLD AUTO: 0.38 10*3/MM3 (ref 0–1)
MONOCYTES NFR BLD AUTO: 7.3 % (ref 3–8)
NEUTROPHILS # BLD AUTO: 3.7 10*3/MM3 (ref 1.5–8.3)
NEUTROPHILS NFR BLD AUTO: 70.5 % (ref 45–70)
NRBC BLD MANUAL-RTO: 0 /100 WBC (ref 0–0)
PLATELET # BLD AUTO: 130 10*3/MM3 (ref 140–500)
PMV BLD AUTO: 10.1 FL (ref 7.4–10.4)
POTASSIUM BLD-SCNC: 3 MMOL/L (ref 3.5–5.2)
PROT SERPL-MCNC: 5.3 G/DL (ref 6–8.5)
PROTHROMBIN TIME: 19.9 SECONDS (ref 12.1–15)
PROTHROMBIN TIME: 24.7 SECONDS (ref 12.1–15)
RBC # BLD AUTO: 3.45 10*6/MM3 (ref 4.2–5.4)
SODIUM BLD-SCNC: 144 MMOL/L (ref 136–145)
WBC NRBC COR # BLD: 5.24 10*3/MM3 (ref 4.8–10.8)

## 2017-03-12 PROCEDURE — 82962 GLUCOSE BLOOD TEST: CPT

## 2017-03-12 PROCEDURE — 82150 ASSAY OF AMYLASE: CPT | Performed by: SURGERY

## 2017-03-12 PROCEDURE — 80053 COMPREHEN METABOLIC PANEL: CPT | Performed by: SURGERY

## 2017-03-12 PROCEDURE — 85610 PROTHROMBIN TIME: CPT | Performed by: INTERNAL MEDICINE

## 2017-03-12 PROCEDURE — 85610 PROTHROMBIN TIME: CPT | Performed by: HOSPITALIST

## 2017-03-12 PROCEDURE — 99231 SBSQ HOSP IP/OBS SF/LOW 25: CPT | Performed by: SURGERY

## 2017-03-12 PROCEDURE — 25010000002 VITAMIN K1 1 MG/1 ML SOLUTION: Performed by: HOSPITALIST

## 2017-03-12 PROCEDURE — 94799 UNLISTED PULMONARY SVC/PX: CPT

## 2017-03-12 PROCEDURE — 25010000002 PIPERACILLIN SOD-TAZOBACTAM PER 1 G: Performed by: INTERNAL MEDICINE

## 2017-03-12 PROCEDURE — 99232 SBSQ HOSP IP/OBS MODERATE 35: CPT | Performed by: HOSPITALIST

## 2017-03-12 PROCEDURE — 83690 ASSAY OF LIPASE: CPT | Performed by: SURGERY

## 2017-03-12 PROCEDURE — 85730 THROMBOPLASTIN TIME PARTIAL: CPT | Performed by: SURGERY

## 2017-03-12 PROCEDURE — 85025 COMPLETE CBC W/AUTO DIFF WBC: CPT | Performed by: SURGERY

## 2017-03-12 PROCEDURE — 99231 SBSQ HOSP IP/OBS SF/LOW 25: CPT | Performed by: INTERNAL MEDICINE

## 2017-03-12 RX ORDER — PHYTONADIONE 2 MG/ML
10 INJECTION, EMULSION INTRAMUSCULAR; INTRAVENOUS; SUBCUTANEOUS ONCE
Status: COMPLETED | OUTPATIENT
Start: 2017-03-12 | End: 2017-03-12

## 2017-03-12 RX ORDER — POTASSIUM CHLORIDE 20 MEQ/1
40 TABLET, EXTENDED RELEASE ORAL EVERY 4 HOURS
Status: COMPLETED | OUTPATIENT
Start: 2017-03-12 | End: 2017-03-12

## 2017-03-12 RX ORDER — METOPROLOL SUCCINATE 25 MG/1
25 TABLET, EXTENDED RELEASE ORAL
Status: DISCONTINUED | OUTPATIENT
Start: 2017-03-12 | End: 2017-03-15 | Stop reason: HOSPADM

## 2017-03-12 RX ORDER — PHYTONADIONE 5 MG/1
10 TABLET ORAL ONCE
Status: DISCONTINUED | OUTPATIENT
Start: 2017-03-12 | End: 2017-03-12 | Stop reason: CLARIF

## 2017-03-12 RX ADMIN — PIPERACILLIN AND TAZOBACTAM 3.38 G: 3; .375 INJECTION, POWDER, LYOPHILIZED, FOR SOLUTION INTRAVENOUS; PARENTERAL at 12:00

## 2017-03-12 RX ADMIN — PIPERACILLIN AND TAZOBACTAM 3.38 G: 3; .375 INJECTION, POWDER, LYOPHILIZED, FOR SOLUTION INTRAVENOUS; PARENTERAL at 06:39

## 2017-03-12 RX ADMIN — POTASSIUM CHLORIDE 40 MEQ: 20 TABLET, EXTENDED RELEASE ORAL at 16:06

## 2017-03-12 RX ADMIN — IPRATROPIUM BROMIDE AND ALBUTEROL SULFATE 3 ML: .5; 3 SOLUTION RESPIRATORY (INHALATION) at 12:33

## 2017-03-12 RX ADMIN — PHYTONADIONE 10 MG: 1 INJECTION, EMULSION INTRAMUSCULAR; INTRAVENOUS; SUBCUTANEOUS at 11:54

## 2017-03-12 RX ADMIN — PIPERACILLIN AND TAZOBACTAM 3.38 G: 3; .375 INJECTION, POWDER, LYOPHILIZED, FOR SOLUTION INTRAVENOUS; PARENTERAL at 00:14

## 2017-03-12 RX ADMIN — PIPERACILLIN AND TAZOBACTAM 3.38 G: 3; .375 INJECTION, POWDER, LYOPHILIZED, FOR SOLUTION INTRAVENOUS; PARENTERAL at 23:23

## 2017-03-12 RX ADMIN — IPRATROPIUM BROMIDE AND ALBUTEROL SULFATE 3 ML: .5; 3 SOLUTION RESPIRATORY (INHALATION) at 19:35

## 2017-03-12 RX ADMIN — PANTOPRAZOLE SODIUM 40 MG: 40 INJECTION, POWDER, FOR SOLUTION INTRAVENOUS at 06:39

## 2017-03-12 RX ADMIN — PIPERACILLIN AND TAZOBACTAM 3.38 G: 3; .375 INJECTION, POWDER, LYOPHILIZED, FOR SOLUTION INTRAVENOUS; PARENTERAL at 18:00

## 2017-03-12 RX ADMIN — METOPROLOL SUCCINATE 25 MG: 25 TABLET, EXTENDED RELEASE ORAL at 20:16

## 2017-03-12 RX ADMIN — IPRATROPIUM BROMIDE AND ALBUTEROL SULFATE 3 ML: .5; 3 SOLUTION RESPIRATORY (INHALATION) at 15:38

## 2017-03-12 RX ADMIN — HYDROCODONE BITARTRATE AND ACETAMINOPHEN 1 TABLET: 5; 325 TABLET ORAL at 14:31

## 2017-03-12 RX ADMIN — HYDROCODONE BITARTRATE AND ACETAMINOPHEN 1 TABLET: 5; 325 TABLET ORAL at 03:36

## 2017-03-12 RX ADMIN — IPRATROPIUM BROMIDE AND ALBUTEROL SULFATE 3 ML: .5; 3 SOLUTION RESPIRATORY (INHALATION) at 07:57

## 2017-03-12 RX ADMIN — POTASSIUM CHLORIDE 40 MEQ: 20 TABLET, EXTENDED RELEASE ORAL at 11:56

## 2017-03-12 RX ADMIN — SODIUM CHLORIDE, POTASSIUM CHLORIDE, SODIUM LACTATE AND CALCIUM CHLORIDE 50 ML/HR: 600; 310; 30; 20 INJECTION, SOLUTION INTRAVENOUS at 23:23

## 2017-03-12 RX ADMIN — SODIUM CHLORIDE, POTASSIUM CHLORIDE, SODIUM LACTATE AND CALCIUM CHLORIDE 50 ML/HR: 600; 310; 30; 20 INJECTION, SOLUTION INTRAVENOUS at 03:27

## 2017-03-12 RX ADMIN — HYDROCODONE BITARTRATE AND ACETAMINOPHEN 1 TABLET: 5; 325 TABLET ORAL at 23:23

## 2017-03-12 NOTE — PROGRESS NOTES
Gastroenterology Progress Note        Chief Complaint:  Abdominal pain    Subjective     Interval History:     She has been put on clear liquid diet and is doing well.  She had some right upper quadrant pain yesterday.  She denies any nausea or vomiting.  Review of Systems:    All systems were reviewed and negative except for:  Gastrointestinal: postitive for  pain    Objective     Vital Signs  Temp:  [97.5 °F (36.4 °C)-98.3 °F (36.8 °C)] 97.5 °F (36.4 °C)  Heart Rate:  [69-90] 75  Resp:  [18-22] 20  BP: (108-137)/(50-71) 128/58  Body mass index is 40.6 kg/(m^2).    Intake/Output Summary (Last 24 hours) at 03/12/17 0903  Last data filed at 03/12/17 0639   Gross per 24 hour   Intake 2614.17 ml   Output   1950 ml   Net 664.17 ml             Physical Exam:   General: patient awake, alert and cooperative   Eyes: Normal lids and lashes, no scleral icterus   Neck: supple, normal ROM   Skin: warm and dry, not jaundiced   Cardiovascular: regular rhythm and rate, no murmurs auscultated   Pulm: clear to auscultation bilaterally, regular and unlabored   Abdomen: soft, nontender, nondistended; normal bowel sounds   Rectal: deferred   Extremities: no rash or edema   Neurologic: Normal mood and behavior     Results Review:     I reviewed the patient's new clinical results.      WBC No results found for: WBCS   HGB HEMOGLOBIN   Date Value Ref Range Status   03/12/2017 9.7 (L) 12.0 - 16.0 g/dL Final   03/11/2017 10.3 (L) 12.0 - 16.0 g/dL Final   03/10/2017 9.9 (L) 12.0 - 16.0 g/dL Final      HCT HEMATOCRIT   Date Value Ref Range Status   03/12/2017 31.5 (L) 37.0 - 47.0 % Final   03/11/2017 32.8 (L) 37.0 - 47.0 % Final   03/10/2017 31.6 (L) 37.0 - 47.0 % Final      Platlets No results found for: LABPLAT     PT/INR:    PROTIME   Date Value Ref Range Status   03/12/2017 24.7 (H) 12.1 - 15.0 Seconds Final   03/11/2017 21.6 (H) 12.1 - 15.0 Seconds Final   03/10/2017 25.7 (H) 12.1 - 15.0 Seconds Final   /  INR   Date Value Ref  Range Status   03/12/2017 2.19 (H) 0.90 - 1.10 Final   03/11/2017 1.85 (H) 0.90 - 1.10 Final   03/10/2017 2.30 (H) 0.90 - 1.10 Final       Sodium SODIUM   Date Value Ref Range Status   03/12/2017 144 136 - 145 mmol/L Final   03/11/2017 148 (H) 136 - 145 mmol/L Final   03/10/2017 143 136 - 145 mmol/L Final      Potassium POTASSIUM   Date Value Ref Range Status   03/12/2017 3.0 (L) 3.5 - 5.2 mmol/L Final   03/11/2017 3.1 (L) 3.5 - 5.2 mmol/L Final   03/10/2017 3.6 3.5 - 5.2 mmol/L Final      Chloride CHLORIDE   Date Value Ref Range Status   03/12/2017 103 98 - 107 mmol/L Final   03/11/2017 103 98 - 107 mmol/L Final   03/10/2017 100 98 - 107 mmol/L Final      Bicarbonate No results found for: PLASMABICARB   BUN BUN   Date Value Ref Range Status   03/12/2017 29 (H) 8 - 23 mg/dL Final   03/11/2017 39 (H) 8 - 23 mg/dL Final   03/10/2017 49 (H) 8 - 23 mg/dL Final      Creatinine CREATININE   Date Value Ref Range Status   03/12/2017 1.77 (H) 0.57 - 1.00 mg/dL Final   03/11/2017 2.23 (H) 0.57 - 1.00 mg/dL Final   03/10/2017 2.58 (H) 0.57 - 1.00 mg/dL Final      Calcium CALCIUM   Date Value Ref Range Status   03/12/2017 9.5 8.8 - 10.5 mg/dL Final   03/11/2017 10.0 8.8 - 10.5 mg/dL Final   03/10/2017 9.2 8.8 - 10.5 mg/dL Final      Magnesium  AST  ALT  Bilirubin, Total  AlkPhos  Albumin    Amylase  Lipase    Radiology: MAGNESIUM   Date Value Ref Range Status   03/11/2017 2.3 1.7 - 2.5 mg/dL Final     No components found for: AST.*  No components found for: ALT.*  No components found for: BILIRUBIN, TOTAL.*    No components found for: ALKPHOS.*  No components found for: ALBUMIN.*      No components found for: AMYLASE.*  No components found for: LIPASE.*            Imaging Results (most recent)     Procedure Component Value Units Date/Time    US Abdomen Complete [[id number]] Collected:  03/08/17 1432     Updated:  03/08/17 1452    Narrative:       INDICATION: Transabdominal pain for 4 days.     EXAM: Abdominal ultrasound,  complete.     COMPARISON: CT chest dated 01/12/2017     FINDINGS:  The pancreas is prominent measuring up to 2.5 cm in thickness. Although  nonspecific, this is unusual for a patient of this age. Please  clinically for any evidence of acute pancreatic  inflammation/pancreatitis.     The echogenicity and echotexture of the hepatic parenchyma is within  normal limits. No hepatic mass. Mild intrahepatic and extrahepatic  biliary dilatation. The common duct is incompletely visualized, however  measures up to 0.8-0.9 cm in diameter.     There is a small gallstones in the gallbladder. There is mild  gallbladder wall thickening measuring up to 0.6 cm. There is borderline  gallbladder distention. No pericholecystic fluid..     The right kidney measures 9.8 cm. The left kidney measures 11.7 cm.  Bilateral renal cortical atrophy is more notable on the right kidney. No  hydronephrosis.     The spleen is mildly enlarged measuring 16 cm.       The abdominal aorta is normal in size.  The juxtahepatic IVC is within  normal limits.  No ascites.       Impression:          1. Mild intrahepatic and extrahepatic biliary dilatation. The entire  common bile duct was not visualized, therefore, distal obstructing stone  or mass may be present. Consider further evaluation with a MRCP.  2. Cholelithiasis. There is mild gallbladder distention and and  gallbladder wall thickening. These findings are concerning for possible  acute cholecystitis, however, may simply be secondary to the biliary  distention.  3. Prominent appearance of the pancreas. While nonspecific, this is  concerning for possible pancreatitis.     This report was finalized on 3/8/2017 2:50 PM by Dr. Damian Kennedy MD.       MRI MRCP [[id number]] Collected:  03/09/17 0847     Updated:  03/09/17 0902    Narrative:       MRI ABDOMEN, NONCONTRAST, INCLUDING MRCP, 03/09/2017:     HISTORY:   81-year-old female with with abdomen pain, leukocytosis. Elevated  amylase, lipase and  bilirubin levels. Previous pancreatitis seen here  January 2017. Current ultrasound showing cholelithiasis, mild bile duct  dilatation and pancreas enlargement. Evaluate for choledocholithiasis,  gallstone pancreatitis.     TECHNIQUE:  MRI examination of the abdomen was performed without contrast  administration due to abnormal renal function (GFR 18). MRCP sequences  were also obtained. The images are significantly degraded by respiratory  motion artifact.     FINDINGS:  Multiple small gallstones are resident within a diffusely thick-walled  gallbladder, there is evidence of edema within and adjacent to the  bladder wall. Acute cholecystitis is likely.     There is mild intrahepatic and extra hepatic bile duct dilatation to the  level of the ampulla with the upper extrahepatic bile duct measuring  about 10 mm. There is a single round filling defect within the mid  common bile duct measuring 4 to 5 mm consistent with  choledocholithiasis.     There is mild diffuse enlargement of the pancreas which appears mildly  edematous. The pancreatic duct is also mildly prominent at about 5 mm.  The appearance is compatible with mild acute pancreatitis.     Mildly nodular liver margin suggesting potential chronic liver disease.  No visible focal liver lesion. Hepatic vasculature appears patent. The  spleen is mildly enlarged at about 14 cm. Mild atrophy of the right  kidney. Small benign-appearing cyst within each kidney.       Impression:       1. Cholelithiasis with likely acute cholecystitis.  2. Choledocholithiasis with a single small stone within the mid common  bile duct. Minimal-mild intrahepatic and extra hepatic bile duct  dilatation. Mildly prominent pancreatic duct.  3. Evidence of mild diffuse pancreatitis.  4. Lobulated hepatic contour suggesting chronic liver disease/cirrhosis.  Mild splenomegaly.  5. Mild right renal parenchymal atrophy. Small bilateral renal cysts.  6. Technically limited study as noted above.      This report was finalized on 3/9/2017 9:00 AM by Dr. Art Bateman MD.       SCANNED - IMAGING [[id number]] Resulted:  03/08/17      Updated:  03/10/17 1048    FL ERCP [[id number]] Collected:  03/10/17 1517     Updated:  03/10/17 1523    Narrative:       FLUOROSCOPY DURING ERCP PROCEDURE, 03/10/2017:     HISTORY:  81-year-old female with recent imaging studies showing cholelithiasis,  choledocholithiasis and likely acute cholecystitis.     REPORT:  C-arm fluoroscopy was provided by radiology personnel in the OR during  ERCP procedure performed by Dr. Estrada. Fluoroscopy time was  recorded as 6.20 minutes. 10 spot film images were recorded for  documentation purposes. By report, a common bile duct stone was  extracted. The images show mild intrahepatic and extra hepatic bile duct  dilatation and a mildly dilated pancreatic duct. Please see operative  note for details.     This report was finalized on 3/10/2017 3:21 PM by Dr. Art Bateman MD.                                        lactated ringers 50 mL/hr Last Rate: 50 mL/hr (03/12/17 0327)   lactated ringers 9 mL/hr            Assessment/Plan     Patient Active Problem List   Diagnosis Code   • Hyperglycemia R73.9   • Acute hyperglycemia R73.9   • Sepsis due to urinary tract infection A41.9, N39.0   • Chronic diastolic (congestive) heart failure I50.32   • Permanent atrial fibrillation I48.2   • Acute renal failure N17.9   • Acute on chronic respiratory failure J96.20   • Influenza A with respiratory manifestations J10.1   • Chronic anticoagulation Z79.01   • Lymphedema I89.0   • Morbid obesity E66.01   • Obstructive sleep apnea of adult G47.33   • Pulmonary HTN I27.2   • Abdominal pain R10.9   • Cholecystitis K81.9   • Chronic atrial fibrillation I48.2       Cholecystitis  choledocheolithiasis s/p ercp with stone removal  Surgical plans per surgery        Kamilla Brock MD  03/12/17  9:03 AM    Time:

## 2017-03-12 NOTE — PROGRESS NOTES
General Surgery Progress Note    Chief Complaint:  Gallstone pancreatitis and acute cholecystitis      Interval History:   No acute overnight events.  Tolerating clear liquids.  Reports abdominal pain is improved.  Denies any nausea, vomiting.  Has been having bowel movements.    Has been having nose bleeds.    Objective     Vital Signs  Temp:  [97.5 °F (36.4 °C)-98.3 °F (36.8 °C)] 97.5 °F (36.4 °C)  Heart Rate:  [69-90] 80  Resp:  [18-22] 20  BP: (108-146)/(50-81) 146/81  Body mass index is 40.6 kg/(m^2).    Intake/Output Summary (Last 24 hours) at 03/12/17 1105  Last data filed at 03/12/17 0639   Gross per 24 hour   Intake 2614.17 ml   Output   1650 ml   Net 964.17 ml             Physical Exam:   General: patient awake, alert and cooperative   Skin: warm and dry, not jaundiced   Cardiovascular: regular rhythm and rate, no murmurs auscultated   Pulm: clear to auscultation bilaterally, regular and unlabored   Abdomen: soft, minimal epigastric tenderness, nondistended; normal bowel  sounds   Extremities: no rash or edema   Neurologic: Normal mood and behavior     Results Review:     I reviewed the patient's new clinical results.    Lab Results (last 24 hours)     Procedure Component Value Units Date/Time    POC Glucose Fingerstick [[id number]]  (Normal) Collected:  03/11/17 1110    Specimen:  Blood Updated:  03/11/17 1116     Glucose 114 mg/dL     Narrative:       Meter: [id number] : [id number] Shared [employer] Jordan    Magnesium [[id number]]  (Normal) Collected:  03/11/17 0406    Specimen:  Blood Updated:  03/11/17 1352     Magnesium 2.3 mg/dL     POC Glucose Fingerstick [[id number]]  (Abnormal) Collected:  03/11/17 1811    Specimen:  Blood Updated:  03/11/17 1827     Glucose 175 (H) mg/dL     Narrative:       Meter: [id number] : [id number] Shared [employer]    POC Glucose Fingerstick [[id number]]  (Normal) Collected:  03/11/17 2342    Specimen:  Blood Updated:  03/12/17 0001     Glucose 89 mg/dL     Narrative:       Meter:  [id number] : [id number] Marcelo Ghanshyam    POC Glucose Fingerstick [[id number]]  (Abnormal) Collected:  03/12/17 0606    Specimen:  Blood Updated:  03/12/17 0613     Glucose 132 (H) mg/dL     Narrative:       Meter: [id number] : [id number] Davian Morrissey    CBC & Differential [[id number]] Collected:  03/12/17 0516    Specimen:  Blood Updated:  03/12/17 0624    Narrative:       The following orders were created for panel order CBC & Differential.  Procedure                               Abnormality         Status                     ---------                               -----------         ------                     CBC Auto Differential[[id number]]         Abnormal            Final result                 Please view results for these tests on the individual orders.    CBC Auto Differential [[id number]]  (Abnormal) Collected:  03/12/17 0516    Specimen:  Blood Updated:  03/12/17 0624     WBC 5.24 10*3/mm3      RBC 3.45 (L) 10*6/mm3      Hemoglobin 9.7 (L) g/dL      Hematocrit 31.5 (L) %      MCV 91.3 fL      MCH 28.1 pg      MCHC 30.8 (L) g/dL      RDW 14.6 (H) %      RDW-SD 49.5 fl      MPV 10.1 fL      Platelets 130 (L) 10*3/mm3      Neutrophil % 70.5 (H) %      Lymphocyte % 19.7 (L) %      Monocyte % 7.3 %      Eosinophil % 1.7 %      Basophil % 0.4 %      Immature Grans % 0.4 %      Neutrophils, Absolute 3.70 10*3/mm3      Lymphocytes, Absolute 1.03 10*3/mm3      Monocytes, Absolute 0.38 10*3/mm3      Eosinophils, Absolute 0.09 (L) 10*3/mm3      Basophils, Absolute 0.02 10*3/mm3      Immature Grans, Absolute 0.02 10*3/mm3      nRBC 0.0 /100 WBC     Lipase [[id number]]  (Normal) Collected:  03/12/17 0516    Specimen:  Blood Updated:  03/12/17 0635     Lipase 20 U/L     Comprehensive Metabolic Panel [[id number]]  (Abnormal) Collected:  03/12/17 0516    Specimen:  Blood Updated:  03/12/17 0650     Glucose 135 (H) mg/dL      BUN 29 (H) mg/dL      Creatinine 1.77 (H) mg/dL      Sodium 144 mmol/L      Potassium 3.0 (L) mmol/L       Chloride 103 mmol/L      CO2 29.0 mmol/L      Calcium 9.5 mg/dL      Total Protein 5.3 (L) g/dL      Albumin 2.40 (L) g/dL      ALT (SGPT) 68 (H) U/L      AST (SGOT) 17 U/L      Alkaline Phosphatase 346 (H) U/L      Total Bilirubin 1.2 mg/dL      eGFR Non African Amer 28 (L) mL/min/1.73      Globulin 2.9 gm/dL      A/G Ratio 0.8 g/dL      BUN/Creatinine Ratio 16.4      Anion Gap 12.0 mmol/L     Narrative:       The MDRD GFR formula is only valid for adults with stable renal function between ages 18 and 70.    Amylase [[id number]]  (Normal) Collected:  03/12/17 0516    Specimen:  Blood Updated:  03/12/17 0650     Amylase 88 U/L     Protime-INR [[id number]]  (Abnormal) Collected:  03/12/17 0516    Specimen:  Blood Updated:  03/12/17 0706     Protime 24.7 (H) Seconds      INR 2.19 (H)     Narrative:       Therapeutic Ranges for INR: 2.0-3.0 (PT 20-30)                              2.5-3.5 (PT 25-34)    aPTT [[id number]]  (Abnormal) Collected:  03/12/17 0516    Specimen:  Blood Updated:  03/12/17 0706     PTT 56.4 (H) seconds     Narrative:       PTT = The equivalent PTT values for the therapeutic range of heparin levels at 0.1 to 0.7 U/ml are 53 to 110 seconds.          Radiology:    Imaging Results (last 72 hours)     Procedure Component Value Units Date/Time    SCANNED - IMAGING [[id number]] Resulted:  03/08/17      Updated:  03/10/17 1048    FL ERCP [[id number]] Collected:  03/10/17 1517     Updated:  03/10/17 1523    Narrative:       FLUOROSCOPY DURING ERCP PROCEDURE, 03/10/2017:     HISTORY:  81-year-old female with recent imaging studies showing cholelithiasis,  choledocholithiasis and likely acute cholecystitis.     REPORT:  C-arm fluoroscopy was provided by radiology personnel in the OR during  ERCP procedure performed by Dr. Estrada. Fluoroscopy time was  recorded as 6.20 minutes. 10 spot film images were recorded for  documentation purposes. By report, a common bile duct stone was  extracted. The images show mild  intrahepatic and extra hepatic bile duct  dilatation and a mildly dilated pancreatic duct. Please see operative  note for details.     This report was finalized on 3/10/2017 3:21 PM by Dr. Art Bateman MD.               lactated ringers 50 mL/hr Last Rate: 50 mL/hr (03/12/17 0327)   lactated ringers 9 mL/hr            Assessment/Plan     Patient Active Problem List   Diagnosis Code   • Hyperglycemia R73.9   • Acute hyperglycemia R73.9   • Sepsis due to urinary tract infection A41.9, N39.0   • Chronic diastolic (congestive) heart failure I50.32   • Permanent atrial fibrillation I48.2   • Acute renal failure N17.9   • Acute on chronic respiratory failure J96.20   • Influenza A with respiratory manifestations J10.1   • Chronic anticoagulation Z79.01   • Lymphedema I89.0   • Morbid obesity E66.01   • Obstructive sleep apnea of adult G47.33   • Pulmonary HTN I27.2   • Abdominal pain R10.9   • Cholecystitis K81.9   • Chronic atrial fibrillation I48.2       Acute cholecystitis with gallstone pancreatitis  Choledocholithiasis status post ERCP and CBD stone extraction/sphincterotomy  Clinically looks better  Amylase/lipase normal  LFTs normalizing.    Continue clear liquids.    Nosebleeds-Likely secondary to coagulopathy and oxygen.  Have asked respiratory to humidify oxygen    PT/INR noted  Will give Vitamin K x 1 dose today  Will type and cross - may need fresh frozen plasma prior to surgery.  Will recheck labs in a.m.    For laparoscopic cholecystectomy tomorrow per Dr. Pham     Plan of care discussed with patient, nursing staff and Dr. Graves.      Farida Hancock MD  03/12/17  11:05 AM    Time:

## 2017-03-12 NOTE — PLAN OF CARE
Problem: Patient Care Overview (Adult)  Goal: Plan of Care Review    03/11/17 6265   Coping/Psychosocial Response Interventions   Plan Of Care Reviewed With patient   Patient Care Overview   Progress progress toward functional goals as expected

## 2017-03-12 NOTE — PROGRESS NOTES
"Hospitalist Team      Patient Care Team:  Gerardo Williamson MD as PCP - General  Gerardo Williamson MD as PCP - Family Medicine        Chief Complaint:  F/U Gallstone pancreatitis and acute cholecystitis with choledocholithiasis    Subjective    Interval History and ROS:     Patient notes she's feeling ok today, she is tolerating clear liquids. Notes minimal abdominal pain and no N/V. Denies CP or SOA and patient is afebrile.      Objective    Vital Signs  Temp:  [97.5 °F (36.4 °C)-98.3 °F (36.8 °C)] 97.5 °F (36.4 °C)  Heart Rate:  [65-92] 77  Resp:  [18-22] 20  BP: (108-147)/(50-81) 137/62  Oxygen Therapy  SpO2: (!) 88 %  Pulse Oximetry Type: Continuous  O2 Device: nasal cannula with humidification  Flow (L/min): 1  Oxygen Concentration (%): 38     Flowsheet Rows         First Filed Value    Admission Height  64\" (162.6 cm) Documented at 03/08/2017 1700    Admission Weight  219 lb 9.6 oz (99.6 kg) Documented at 03/08/2017 1700            Physical Exam:  Constitutional: Patient appears well-developed and Obese and in NAD  HEENT:   Head: Normocephalic and atraumatic.   Eyes: EOM are intact. Sclera are anicteric and non-injected.  Mouth and Throat: Patient has moist mucous membranes. Oropharynx is clear of any erythema or exudate.   Neck: Neck supple. No JVD present.   Cardiovascular: Irregularly irregular rhythm, rate WNL Exam reveals no gallop and no friction rub. No murmur heard.  Pulmonary/Chest: Lungs with diminished breath sounds bilateral bases, No respiratory distress. No wheezes. No rhonchi. No rales.   Abdominal: Morbidly Obese, Soft. Bowel sounds are normal. No mass. Mild ttp in bilateral upper quadrants and epigastric area.   Extremities: Chronic lymphedema. Pulses are palpable in all 4 extremities.  Neurological: Patient is alert and oriented to person, place, and time.   Skin: Warm and dry.  No rash noted. Nails show no clubbing. No cyanosis or erythema.    Results Review:     I reviewed the patient's new " clinical results.    Lab Results (last 24 hours)     Procedure Component Value Units Date/Time    POC Glucose Fingerstick [[id number]]  (Abnormal) Collected:  03/11/17 1811    Specimen:  Blood Updated:  03/11/17 1827     Glucose 175 (H) mg/dL     Narrative:       Meter: [id number] : [id number] Elfego Jordan    POC Glucose Fingerstick [[id number]]  (Normal) Collected:  03/11/17 2342    Specimen:  Blood Updated:  03/12/17 0001     Glucose 89 mg/dL     Narrative:       Meter: [id number] : [id number] Davian Morrissey    POC Glucose Fingerstick [[id number]]  (Abnormal) Collected:  03/12/17 0606    Specimen:  Blood Updated:  03/12/17 0613     Glucose 132 (H) mg/dL     Narrative:       Meter: [id number] : [id number] Davian Morrissey    CBC & Differential [[id number]] Collected:  03/12/17 0516    Specimen:  Blood Updated:  03/12/17 0624    Narrative:       The following orders were created for panel order CBC & Differential.  Procedure                               Abnormality         Status                     ---------                               -----------         ------                     CBC Auto Differential[[id number]]         Abnormal            Final result                 Please view results for these tests on the individual orders.    CBC Auto Differential [[id number]]  (Abnormal) Collected:  03/12/17 0516    Specimen:  Blood Updated:  03/12/17 0624     WBC 5.24 10*3/mm3      RBC 3.45 (L) 10*6/mm3      Hemoglobin 9.7 (L) g/dL      Hematocrit 31.5 (L) %      MCV 91.3 fL      MCH 28.1 pg      MCHC 30.8 (L) g/dL      RDW 14.6 (H) %      RDW-SD 49.5 fl      MPV 10.1 fL      Platelets 130 (L) 10*3/mm3      Neutrophil % 70.5 (H) %      Lymphocyte % 19.7 (L) %      Monocyte % 7.3 %      Eosinophil % 1.7 %      Basophil % 0.4 %      Immature Grans % 0.4 %      Neutrophils, Absolute 3.70 10*3/mm3      Lymphocytes, Absolute 1.03 10*3/mm3      Monocytes, Absolute 0.38 10*3/mm3      Eosinophils, Absolute 0.09 (L) 10*3/mm3       Basophils, Absolute 0.02 10*3/mm3      Immature Grans, Absolute 0.02 10*3/mm3      nRBC 0.0 /100 WBC     Lipase [[id number]]  (Normal) Collected:  03/12/17 0516    Specimen:  Blood Updated:  03/12/17 0635     Lipase 20 U/L     Comprehensive Metabolic Panel [[id number]]  (Abnormal) Collected:  03/12/17 0516    Specimen:  Blood Updated:  03/12/17 0650     Glucose 135 (H) mg/dL      BUN 29 (H) mg/dL      Creatinine 1.77 (H) mg/dL      Sodium 144 mmol/L      Potassium 3.0 (L) mmol/L      Chloride 103 mmol/L      CO2 29.0 mmol/L      Calcium 9.5 mg/dL      Total Protein 5.3 (L) g/dL      Albumin 2.40 (L) g/dL      ALT (SGPT) 68 (H) U/L      AST (SGOT) 17 U/L      Alkaline Phosphatase 346 (H) U/L      Total Bilirubin 1.2 mg/dL      eGFR Non African Amer 28 (L) mL/min/1.73      Globulin 2.9 gm/dL      A/G Ratio 0.8 g/dL      BUN/Creatinine Ratio 16.4      Anion Gap 12.0 mmol/L     Narrative:       The MDRD GFR formula is only valid for adults with stable renal function between ages 18 and 70.    Amylase [[id number]]  (Normal) Collected:  03/12/17 0516    Specimen:  Blood Updated:  03/12/17 0650     Amylase 88 U/L     Protime-INR [[id number]]  (Abnormal) Collected:  03/12/17 0516    Specimen:  Blood Updated:  03/12/17 0706     Protime 24.7 (H) Seconds      INR 2.19 (H)     Narrative:       Therapeutic Ranges for INR: 2.0-3.0 (PT 20-30)                              2.5-3.5 (PT 25-34)    aPTT [[id number]]  (Abnormal) Collected:  03/12/17 0516    Specimen:  Blood Updated:  03/12/17 0706     PTT 56.4 (H) seconds     Narrative:       PTT = The equivalent PTT values for the therapeutic range of heparin levels at 0.1 to 0.7 U/ml are 53 to 110 seconds.    POC Glucose Fingerstick [[id number]]  (Normal) Collected:  03/12/17 1152    Specimen:  Blood Updated:  03/12/17 1158     Glucose 119 mg/dL     Narrative:       Meter: [id number] : [id number]          Imaging Results (last 24 hours)     ** No results found for the last 24 hours. **         ECG/EMG Results (most recent)     None          Medication Review:   I have reviewed the patient's current medication list    Current Facility-Administered Medications:   •  acetaminophen (TYLENOL) tablet 650 mg, 650 mg, Oral, Q6H PRN, Glendy Gonsalez MD, 650 mg at 03/11/17 1207  •  dextrose (D50W) solution 25 g, 25 g, Intravenous, Q15 Min PRN, Glendy Gonsalez MD  •  dextrose (GLUTOSE) oral gel 15 g, 15 g, Oral, Q15 Min PRN, Glendy Gonsalez MD  •  glucagon (GLUCAGEN) injection 1 mg, 1 mg, Subcutaneous, Q15 Min PRN, Glendy Gonsalez MD  •  HYDROcodone-acetaminophen (NORCO) 5-325 MG per tablet 1 tablet, 1 tablet, Oral, Q6H PRN, Glendy Gonsalez MD, 1 tablet at 03/12/17 1431  •  insulin aspart (novoLOG) injection 0-7 Units, 0-7 Units, Subcutaneous, Q6H, Glendy Gonsalez MD, 2 Units at 03/11/17 2006  •  ipratropium-albuterol (DUO-NEB) nebulizer solution 3 mL, 3 mL, Nebulization, 4x Daily - RT, Trevor Diallo MD, 3 mL at 03/12/17 1233  •  lactated ringers infusion, 50 mL/hr, Intravenous, Continuous, Glendy Gonsalez MD, Last Rate: 50 mL/hr at 03/12/17 0327, 50 mL/hr at 03/12/17 0327  •  lactated ringers infusion, 9 mL/hr, Intravenous, Continuous, Lorena Love CRNA  •  pantoprazole (PROTONIX) injection 40 mg, 40 mg, Intravenous, Q AM, Trevor Diallo MD, 40 mg at 03/12/17 0639  •  piperacillin-tazobactam (ZOSYN) 3.375 g in sodium chloride 0.9 % 100 mL IVPB, 3.375 g, Intravenous, Q6H, Trevor Diallo MD, Last Rate: 0 mL/hr at 03/11/17 1239, 3.375 g at 03/12/17 1200  •  potassium chloride (K-DUR,KLOR-CON) CR tablet 40 mEq, 40 mEq, Oral, Q4H, Glendy Gonsalez MD, 40 mEq at 03/12/17 1156      Assessment/Plan     1. Acute Gallstone pancreatitis: Dr. Estrada following and patient s/p ERCP with stone removal and sphincterotomy. Lipase NL and LFTs trending down today.  Patient started on clear liquids by surgery  yesterday and patient tolerating, she will be NPO after MN for surgery. Pain controlled with Norco.     2. Acute cholecystitis and choledocholithiasis: See #1 above. Patient on zosyn and WBC is WNL. Plan for cholecystectomy Tomorrow by Dr. Pham. Patient off coumadin in anticipation of procedure. INR increasing.  Will give dose of po vitamin K to reverse. Monitor.      3. Acute renal failure on CKD stage III: Baseline Cr approx 0.9-1. Renal function slowly improving. Likely combination of prerenal secondary to dehydration and possibly some ATN from hypotension at admission. Continue low IVF maintenance. Monitor.     4. E-coli UTI: Zosyn will cover.      5. Hypokalemia: Continue to replace po, Mag is WNL. Monitor.     6. Chronic diastolic CHF, Cor pulmonale and pulmonary HTN: No acute issues currently. Careful with IVFs. Home diuretic and BB on hold currently. Will resume BB as BP will now tolerate.     7. Chronic A-fib: Coumadin on hold in anticipation of procedure on tomorrow. HR remains WNL off home toprol XL. Toprol XL held secondary to hypotension at admission. Now BP will tolerate so will resume BB. Monitor.     8. Chronic hypoxic respiratory failure. On home 2L O2. No acute issues.      9. Chronic lymphedema: No acute issues. Previously used leg wraps at NH.     10. DM-2: Home insulin on hold, patient only on SSI here and bedsides ok. Monitor. Only on clear liquids and will be NPO after MN.     11. Vitamin D deficiency: Supplementation on hold.     12. Morbid obesity: Nutrition has seen here in the past. F/U at NH.     13. H/O DVT: no acute issues. Coumadin on hold secondary to plan for surgical procedure Monday. Occurred >5 yrs ago.    Plan for disposition: Back to [facility] when able    Glendy Gonsalez MD  03/12/17  2:58 PM

## 2017-03-12 NOTE — PLAN OF CARE
Problem: Cardiac Output, Decreased (Adult)  Goal: Identify Related Risk Factors and Signs and Symptoms  Outcome: Ongoing (interventions implemented as appropriate)    03/12/17 1903   Cardiac Output, Decreased   Cardiac Output, Decreased: Related Risk Factors oxygenation decreased;medication effects   Signs and Symptoms (Cardiac Output Decreased) edema       Goal: Adequate Cardiac Output/Effective Tissue Perfusion  Outcome: Ongoing (interventions implemented as appropriate)    Problem: Tissue Perfusion, Ineffective Peripheral (Adult)  Goal: Identify Related Risk Factors and Signs and Symptoms  Outcome: Ongoing (interventions implemented as appropriate)  Goal: Adequate Tissue Perfusion  Outcome: Ongoing (interventions implemented as appropriate)    Problem: Fall Risk (Adult)  Goal: Identify Related Risk Factors and Signs and Symptoms  Outcome: Ongoing (interventions implemented as appropriate)  Goal: Absence of Falls  Outcome: Ongoing (interventions implemented as appropriate)    Problem: GI Endoscopy (Adult)  Goal: Signs and Symptoms of Listed Potential Problems Will be Absent or Manageable (GI Endoscopy)  Outcome: Ongoing (interventions implemented as appropriate)

## 2017-03-13 ENCOUNTER — ANESTHESIA EVENT (OUTPATIENT)
Dept: PERIOP | Facility: HOSPITAL | Age: 82
End: 2017-03-13

## 2017-03-13 ENCOUNTER — ANESTHESIA (OUTPATIENT)
Dept: PERIOP | Facility: HOSPITAL | Age: 82
End: 2017-03-13

## 2017-03-13 ENCOUNTER — APPOINTMENT (OUTPATIENT)
Dept: GENERAL RADIOLOGY | Facility: HOSPITAL | Age: 82
End: 2017-03-13

## 2017-03-13 PROBLEM — N17.0 ACUTE RENAL FAILURE WITH TUBULAR NECROSIS (HCC): Status: ACTIVE | Noted: 2017-01-03

## 2017-03-13 PROBLEM — I50.32 CHRONIC DIASTOLIC CONGESTIVE HEART FAILURE (HCC): Status: ACTIVE | Noted: 2017-03-13

## 2017-03-13 PROBLEM — K85.10 ACUTE BILIARY PANCREATITIS WITHOUT INFECTION OR NECROSIS: Status: ACTIVE | Noted: 2017-03-13

## 2017-03-13 PROBLEM — N18.30 CHRONIC KIDNEY DISEASE, STAGE III (MODERATE) (HCC): Status: ACTIVE | Noted: 2017-03-13

## 2017-03-13 PROBLEM — B96.20 ESCHERICHIA COLI URINARY TRACT INFECTION: Status: ACTIVE | Noted: 2017-03-13

## 2017-03-13 PROBLEM — I27.20 PULMONARY HYPERTENSION: Status: ACTIVE | Noted: 2017-03-13

## 2017-03-13 PROBLEM — N39.0 ESCHERICHIA COLI URINARY TRACT INFECTION: Status: ACTIVE | Noted: 2017-03-13

## 2017-03-13 PROBLEM — N30.00 ACUTE CYSTITIS WITHOUT HEMATURIA: Status: ACTIVE | Noted: 2017-03-13

## 2017-03-13 PROBLEM — N17.9 ACUTE KIDNEY INJURY (HCC): Status: ACTIVE | Noted: 2017-03-13

## 2017-03-13 PROBLEM — I89.0 LYMPHEDEMA: Status: ACTIVE | Noted: 2017-03-13

## 2017-03-13 PROBLEM — E11.69 DIABETES MELLITUS TYPE 2 IN OBESE (HCC): Status: ACTIVE | Noted: 2017-03-13

## 2017-03-13 PROBLEM — K80.42 CHOLEDOCHOLITHIASIS WITH ACUTE CHOLECYSTITIS: Status: ACTIVE | Noted: 2017-03-13

## 2017-03-13 PROBLEM — E66.9 DIABETES MELLITUS TYPE 2 IN OBESE (HCC): Status: ACTIVE | Noted: 2017-03-13

## 2017-03-13 PROBLEM — E87.8 ELECTROLYTE IMBALANCE: Status: ACTIVE | Noted: 2017-03-13

## 2017-03-13 PROBLEM — J96.11 CHRONIC RESPIRATORY FAILURE WITH HYPOXIA (HCC): Status: ACTIVE | Noted: 2017-03-13

## 2017-03-13 LAB
ALBUMIN SERPL-MCNC: 2.6 G/DL (ref 3.5–5.2)
ALBUMIN/GLOB SERPL: 0.9 G/DL
ALP SERPL-CCNC: 307 U/L (ref 40–129)
ALT SERPL W P-5'-P-CCNC: 53 U/L (ref 5–33)
AMYLASE SERPL-CCNC: 63 U/L (ref 28–100)
ANION GAP SERPL CALCULATED.3IONS-SCNC: 10 MMOL/L
APTT PPP: 25.1 SECONDS (ref 24.3–38.1)
AST SERPL-CCNC: 13 U/L (ref 5–32)
BASOPHILS # BLD AUTO: 0.02 10*3/MM3 (ref 0–0.2)
BASOPHILS NFR BLD AUTO: 0.5 % (ref 0–2)
BILIRUB SERPL-MCNC: 1 MG/DL (ref 0.2–1.2)
BUN BLD-MCNC: 22 MG/DL (ref 8–23)
BUN/CREAT SERPL: 14.5 (ref 7–25)
CALCIUM SPEC-SCNC: 9.4 MG/DL (ref 8.8–10.5)
CHLORIDE SERPL-SCNC: 104 MMOL/L (ref 98–107)
CO2 SERPL-SCNC: 28 MMOL/L (ref 22–29)
CREAT BLD-MCNC: 1.52 MG/DL (ref 0.57–1)
DEPRECATED RDW RBC AUTO: 49.8 FL (ref 37–54)
EOSINOPHIL # BLD AUTO: 0.09 10*3/MM3 (ref 0.1–0.3)
EOSINOPHIL NFR BLD AUTO: 2.2 % (ref 0–4)
ERYTHROCYTE [DISTWIDTH] IN BLOOD BY AUTOMATED COUNT: 14.6 % (ref 11.5–14.5)
GFR SERPL CREATININE-BSD FRML MDRD: 33 ML/MIN/1.73
GLOBULIN UR ELPH-MCNC: 2.9 GM/DL
GLUCOSE BLD-MCNC: 111 MG/DL (ref 65–99)
GLUCOSE BLDC GLUCOMTR-MCNC: 101 MG/DL (ref 70–130)
GLUCOSE BLDC GLUCOMTR-MCNC: 105 MG/DL (ref 70–130)
GLUCOSE BLDC GLUCOMTR-MCNC: 120 MG/DL (ref 70–130)
HCT VFR BLD AUTO: 32.9 % (ref 37–47)
HGB BLD-MCNC: 10 G/DL (ref 12–16)
IMM GRANULOCYTES # BLD: 0.02 10*3/MM3 (ref 0–0.03)
IMM GRANULOCYTES NFR BLD: 0.5 % (ref 0–0.5)
INR PPP: 1.28 (ref 0.9–1.1)
LIPASE SERPL-CCNC: 19 U/L (ref 13–60)
LYMPHOCYTES # BLD AUTO: 1.1 10*3/MM3 (ref 0.6–4.8)
LYMPHOCYTES NFR BLD AUTO: 26.4 % (ref 20–45)
MCH RBC QN AUTO: 28 PG (ref 27–31)
MCHC RBC AUTO-ENTMCNC: 30.4 G/DL (ref 31–37)
MCV RBC AUTO: 92.2 FL (ref 81–99)
MONOCYTES # BLD AUTO: 0.3 10*3/MM3 (ref 0–1)
MONOCYTES NFR BLD AUTO: 7.2 % (ref 3–8)
NEUTROPHILS # BLD AUTO: 2.64 10*3/MM3 (ref 1.5–8.3)
NEUTROPHILS NFR BLD AUTO: 63.2 % (ref 45–70)
NRBC BLD MANUAL-RTO: 0 /100 WBC (ref 0–0)
PLATELET # BLD AUTO: 138 10*3/MM3 (ref 140–500)
PMV BLD AUTO: 10.4 FL (ref 7.4–10.4)
POTASSIUM BLD-SCNC: 4 MMOL/L (ref 3.5–5.2)
PROT SERPL-MCNC: 5.5 G/DL (ref 6–8.5)
PROTHROMBIN TIME: 16.1 SECONDS (ref 12.1–15)
RBC # BLD AUTO: 3.57 10*6/MM3 (ref 4.2–5.4)
SODIUM BLD-SCNC: 142 MMOL/L (ref 136–145)
WBC NRBC COR # BLD: 4.17 10*3/MM3 (ref 4.8–10.8)

## 2017-03-13 PROCEDURE — 25010000002 SUCCINYLCHOLINE PER 20 MG: Performed by: ANESTHESIOLOGY

## 2017-03-13 PROCEDURE — 99232 SBSQ HOSP IP/OBS MODERATE 35: CPT | Performed by: INTERNAL MEDICINE

## 2017-03-13 PROCEDURE — 25010000002 PROPOFOL 10 MG/ML EMULSION: Performed by: ANESTHESIOLOGY

## 2017-03-13 PROCEDURE — 94799 UNLISTED PULMONARY SVC/PX: CPT

## 2017-03-13 PROCEDURE — 0 IOPAMIDOL 61 % SOLUTION: Performed by: SURGERY

## 2017-03-13 PROCEDURE — 85025 COMPLETE CBC W/AUTO DIFF WBC: CPT | Performed by: SURGERY

## 2017-03-13 PROCEDURE — 83690 ASSAY OF LIPASE: CPT | Performed by: HOSPITALIST

## 2017-03-13 PROCEDURE — 80053 COMPREHEN METABOLIC PANEL: CPT | Performed by: HOSPITALIST

## 2017-03-13 PROCEDURE — 85610 PROTHROMBIN TIME: CPT | Performed by: SURGERY

## 2017-03-13 PROCEDURE — 25010000002 PIPERACILLIN SOD-TAZOBACTAM PER 1 G: Performed by: INTERNAL MEDICINE

## 2017-03-13 PROCEDURE — 25010000002 HYDROMORPHONE PER 4 MG: Performed by: SURGERY

## 2017-03-13 PROCEDURE — 25010000002 FENTANYL CITRATE (PF) 100 MCG/2ML SOLUTION: Performed by: ANESTHESIOLOGY

## 2017-03-13 PROCEDURE — 99232 SBSQ HOSP IP/OBS MODERATE 35: CPT | Performed by: HOSPITALIST

## 2017-03-13 PROCEDURE — 99231 SBSQ HOSP IP/OBS SF/LOW 25: CPT | Performed by: SURGERY

## 2017-03-13 PROCEDURE — 47563 LAPARO CHOLECYSTECTOMY/GRAPH: CPT | Performed by: SURGERY

## 2017-03-13 PROCEDURE — 82962 GLUCOSE BLOOD TEST: CPT

## 2017-03-13 PROCEDURE — 85730 THROMBOPLASTIN TIME PARTIAL: CPT | Performed by: SURGERY

## 2017-03-13 PROCEDURE — C1887 CATHETER, GUIDING: HCPCS | Performed by: SURGERY

## 2017-03-13 PROCEDURE — 0FT44ZZ RESECTION OF GALLBLADDER, PERCUTANEOUS ENDOSCOPIC APPROACH: ICD-10-PCS | Performed by: SURGERY

## 2017-03-13 PROCEDURE — 25010000002 METOCLOPRAMIDE PER 10 MG: Performed by: SURGERY

## 2017-03-13 PROCEDURE — 82150 ASSAY OF AMYLASE: CPT | Performed by: SURGERY

## 2017-03-13 PROCEDURE — 25010000002 DIPHENHYDRAMINE PER 50 MG

## 2017-03-13 PROCEDURE — BF100ZZ FLUOROSCOPY OF BILE DUCTS USING HIGH OSMOLAR CONTRAST: ICD-10-PCS | Performed by: SURGERY

## 2017-03-13 PROCEDURE — 25010000002 ONDANSETRON PER 1 MG

## 2017-03-13 PROCEDURE — 74300 X-RAY BILE DUCTS/PANCREAS: CPT

## 2017-03-13 PROCEDURE — 25010000002 NEOSTIGMINE 10 MG/10ML SOLUTION: Performed by: ANESTHESIOLOGY

## 2017-03-13 RX ORDER — ONDANSETRON 2 MG/ML
4 INJECTION INTRAMUSCULAR; INTRAVENOUS EVERY 4 HOURS PRN
Status: DISCONTINUED | OUTPATIENT
Start: 2017-03-13 | End: 2017-03-15 | Stop reason: HOSPADM

## 2017-03-13 RX ORDER — HYDROMORPHONE HCL 110MG/55ML
0.5 PATIENT CONTROLLED ANALGESIA SYRINGE INTRAVENOUS
Status: DISCONTINUED | OUTPATIENT
Start: 2017-03-13 | End: 2017-03-13 | Stop reason: HOSPADM

## 2017-03-13 RX ORDER — FAMOTIDINE 10 MG/ML
20 INJECTION, SOLUTION INTRAVENOUS
Status: DISCONTINUED | OUTPATIENT
Start: 2017-03-13 | End: 2017-03-13

## 2017-03-13 RX ORDER — PANTOPRAZOLE SODIUM 40 MG/10ML
40 INJECTION, POWDER, LYOPHILIZED, FOR SOLUTION INTRAVENOUS ONCE
Status: COMPLETED | OUTPATIENT
Start: 2017-03-13 | End: 2017-03-13

## 2017-03-13 RX ORDER — FENTANYL CITRATE 50 UG/ML
INJECTION, SOLUTION INTRAMUSCULAR; INTRAVENOUS AS NEEDED
Status: DISCONTINUED | OUTPATIENT
Start: 2017-03-13 | End: 2017-03-13 | Stop reason: SURG

## 2017-03-13 RX ORDER — MEPERIDINE HYDROCHLORIDE 25 MG/ML
12.5 INJECTION INTRAMUSCULAR; INTRAVENOUS; SUBCUTANEOUS
Status: DISCONTINUED | OUTPATIENT
Start: 2017-03-13 | End: 2017-03-13 | Stop reason: HOSPADM

## 2017-03-13 RX ORDER — METOCLOPRAMIDE HYDROCHLORIDE 5 MG/ML
5 INJECTION INTRAMUSCULAR; INTRAVENOUS ONCE
Status: COMPLETED | OUTPATIENT
Start: 2017-03-13 | End: 2017-03-13

## 2017-03-13 RX ORDER — PROPOFOL 10 MG/ML
VIAL (ML) INTRAVENOUS AS NEEDED
Status: DISCONTINUED | OUTPATIENT
Start: 2017-03-13 | End: 2017-03-13 | Stop reason: SURG

## 2017-03-13 RX ORDER — SODIUM CHLORIDE 0.9 % (FLUSH) 0.9 %
1-10 SYRINGE (ML) INJECTION AS NEEDED
Status: DISCONTINUED | OUTPATIENT
Start: 2017-03-13 | End: 2017-03-13

## 2017-03-13 RX ORDER — ROCURONIUM BROMIDE 10 MG/ML
INJECTION, SOLUTION INTRAVENOUS AS NEEDED
Status: DISCONTINUED | OUTPATIENT
Start: 2017-03-13 | End: 2017-03-13 | Stop reason: SURG

## 2017-03-13 RX ORDER — FAMOTIDINE 10 MG/ML
INJECTION, SOLUTION INTRAVENOUS
Status: COMPLETED
Start: 2017-03-13 | End: 2017-03-13

## 2017-03-13 RX ORDER — ONDANSETRON 2 MG/ML
4 INJECTION INTRAMUSCULAR; INTRAVENOUS ONCE AS NEEDED
Status: COMPLETED | OUTPATIENT
Start: 2017-03-13 | End: 2017-03-13

## 2017-03-13 RX ORDER — HEPARIN SODIUM 5000 [USP'U]/ML
5000 INJECTION, SOLUTION INTRAVENOUS; SUBCUTANEOUS EVERY 12 HOURS SCHEDULED
Status: DISCONTINUED | OUTPATIENT
Start: 2017-03-14 | End: 2017-03-14

## 2017-03-13 RX ORDER — DIPHENHYDRAMINE HYDROCHLORIDE 50 MG/ML
INJECTION INTRAMUSCULAR; INTRAVENOUS
Status: COMPLETED
Start: 2017-03-13 | End: 2017-03-13

## 2017-03-13 RX ORDER — MAGNESIUM HYDROXIDE 1200 MG/15ML
LIQUID ORAL AS NEEDED
Status: DISCONTINUED | OUTPATIENT
Start: 2017-03-13 | End: 2017-03-13 | Stop reason: HOSPADM

## 2017-03-13 RX ORDER — DIPHENHYDRAMINE HYDROCHLORIDE 50 MG/ML
12.5 INJECTION INTRAMUSCULAR; INTRAVENOUS ONCE
Status: COMPLETED | OUTPATIENT
Start: 2017-03-13 | End: 2017-03-13

## 2017-03-13 RX ORDER — GLYCOPYRROLATE 0.2 MG/ML
INJECTION INTRAMUSCULAR; INTRAVENOUS AS NEEDED
Status: DISCONTINUED | OUTPATIENT
Start: 2017-03-13 | End: 2017-03-13 | Stop reason: SURG

## 2017-03-13 RX ORDER — ONDANSETRON 2 MG/ML
INJECTION INTRAMUSCULAR; INTRAVENOUS
Status: COMPLETED
Start: 2017-03-13 | End: 2017-03-13

## 2017-03-13 RX ORDER — SUCCINYLCHOLINE CHLORIDE 20 MG/ML
INJECTION INTRAMUSCULAR; INTRAVENOUS AS NEEDED
Status: DISCONTINUED | OUTPATIENT
Start: 2017-03-13 | End: 2017-03-13 | Stop reason: SURG

## 2017-03-13 RX ORDER — ONDANSETRON 2 MG/ML
INJECTION INTRAMUSCULAR; INTRAVENOUS
Status: DISPENSED
Start: 2017-03-13 | End: 2017-03-14

## 2017-03-13 RX ORDER — ONDANSETRON 2 MG/ML
4 INJECTION INTRAMUSCULAR; INTRAVENOUS ONCE AS NEEDED
Status: DISCONTINUED | OUTPATIENT
Start: 2017-03-13 | End: 2017-03-13 | Stop reason: HOSPADM

## 2017-03-13 RX ORDER — BUPIVACAINE HYDROCHLORIDE AND EPINEPHRINE 5; 5 MG/ML; UG/ML
INJECTION, SOLUTION PERINEURAL AS NEEDED
Status: DISCONTINUED | OUTPATIENT
Start: 2017-03-13 | End: 2017-03-13 | Stop reason: HOSPADM

## 2017-03-13 RX ORDER — NEOSTIGMINE METHYLSULFATE 1 MG/ML
INJECTION, SOLUTION INTRAVENOUS AS NEEDED
Status: DISCONTINUED | OUTPATIENT
Start: 2017-03-13 | End: 2017-03-13 | Stop reason: SURG

## 2017-03-13 RX ORDER — LIDOCAINE HYDROCHLORIDE 20 MG/ML
INJECTION, SOLUTION INFILTRATION; PERINEURAL AS NEEDED
Status: DISCONTINUED | OUTPATIENT
Start: 2017-03-13 | End: 2017-03-13 | Stop reason: SURG

## 2017-03-13 RX ADMIN — SODIUM CHLORIDE, POTASSIUM CHLORIDE, SODIUM LACTATE AND CALCIUM CHLORIDE: 600; 310; 30; 20 INJECTION, SOLUTION INTRAVENOUS at 13:11

## 2017-03-13 RX ADMIN — IPRATROPIUM BROMIDE AND ALBUTEROL SULFATE 3 ML: .5; 3 SOLUTION RESPIRATORY (INHALATION) at 07:09

## 2017-03-13 RX ADMIN — METOCLOPRAMIDE 5 MG: 5 INJECTION, SOLUTION INTRAMUSCULAR; INTRAVENOUS at 16:48

## 2017-03-13 RX ADMIN — HYDROMORPHONE HYDROCHLORIDE 0.25 MG: 1 INJECTION, SOLUTION INTRAMUSCULAR; INTRAVENOUS; SUBCUTANEOUS at 20:38

## 2017-03-13 RX ADMIN — HYDROMORPHONE HYDROCHLORIDE 0.25 MG: 1 INJECTION, SOLUTION INTRAMUSCULAR; INTRAVENOUS; SUBCUTANEOUS at 17:37

## 2017-03-13 RX ADMIN — NEOSTIGMINE METHYLSULFATE 1 MG: 1 INJECTION INTRAVENOUS at 14:37

## 2017-03-13 RX ADMIN — FENTANYL CITRATE 25 MCG: 50 INJECTION, SOLUTION INTRAMUSCULAR; INTRAVENOUS at 15:11

## 2017-03-13 RX ADMIN — FENTANYL CITRATE 25 MCG: 50 INJECTION, SOLUTION INTRAMUSCULAR; INTRAVENOUS at 14:45

## 2017-03-13 RX ADMIN — FENTANYL CITRATE 25 MCG: 50 INJECTION, SOLUTION INTRAMUSCULAR; INTRAVENOUS at 14:25

## 2017-03-13 RX ADMIN — PANTOPRAZOLE SODIUM 40 MG: 40 INJECTION, POWDER, FOR SOLUTION INTRAVENOUS at 16:49

## 2017-03-13 RX ADMIN — FAMOTIDINE 20 MG: 10 INJECTION, SOLUTION INTRAVENOUS at 13:05

## 2017-03-13 RX ADMIN — DIPHENHYDRAMINE HYDROCHLORIDE 12.5 MG: 50 INJECTION INTRAMUSCULAR; INTRAVENOUS at 13:05

## 2017-03-13 RX ADMIN — PIPERACILLIN AND TAZOBACTAM 3.38 G: 3; .375 INJECTION, POWDER, LYOPHILIZED, FOR SOLUTION INTRAVENOUS; PARENTERAL at 05:49

## 2017-03-13 RX ADMIN — FENTANYL CITRATE 25 MCG: 50 INJECTION, SOLUTION INTRAMUSCULAR; INTRAVENOUS at 15:07

## 2017-03-13 RX ADMIN — DIPHENHYDRAMINE HYDROCHLORIDE 12.5 MG: 50 INJECTION, SOLUTION INTRAMUSCULAR; INTRAVENOUS at 13:05

## 2017-03-13 RX ADMIN — SODIUM CHLORIDE, POTASSIUM CHLORIDE, SODIUM LACTATE AND CALCIUM CHLORIDE 50 ML/HR: 600; 310; 30; 20 INJECTION, SOLUTION INTRAVENOUS at 16:36

## 2017-03-13 RX ADMIN — ROCURONIUM BROMIDE 15 MG: 10 INJECTION INTRAVENOUS at 13:32

## 2017-03-13 RX ADMIN — METOPROLOL SUCCINATE 25 MG: 25 TABLET, EXTENDED RELEASE ORAL at 09:03

## 2017-03-13 RX ADMIN — PROPOFOL 100 MG: 10 INJECTION, EMULSION INTRAVENOUS at 13:24

## 2017-03-13 RX ADMIN — SUCCINYLCHOLINE CHLORIDE 100 MG: 20 INJECTION, SOLUTION INTRAMUSCULAR; INTRAVENOUS at 13:24

## 2017-03-13 RX ADMIN — HYDROMORPHONE HYDROCHLORIDE 0.25 MG: 1 INJECTION, SOLUTION INTRAMUSCULAR; INTRAVENOUS; SUBCUTANEOUS at 16:34

## 2017-03-13 RX ADMIN — IPRATROPIUM BROMIDE AND ALBUTEROL SULFATE 3 ML: .5; 3 SOLUTION RESPIRATORY (INHALATION) at 11:01

## 2017-03-13 RX ADMIN — FENTANYL CITRATE 25 MCG: 50 INJECTION, SOLUTION INTRAMUSCULAR; INTRAVENOUS at 13:24

## 2017-03-13 RX ADMIN — IPRATROPIUM BROMIDE AND ALBUTEROL SULFATE 3 ML: .5; 3 SOLUTION RESPIRATORY (INHALATION) at 19:52

## 2017-03-13 RX ADMIN — PANTOPRAZOLE SODIUM 40 MG: 40 INJECTION, POWDER, FOR SOLUTION INTRAVENOUS at 05:49

## 2017-03-13 RX ADMIN — ROCURONIUM BROMIDE 5 MG: 10 INJECTION INTRAVENOUS at 13:24

## 2017-03-13 RX ADMIN — ONDANSETRON 4 MG: 2 INJECTION, SOLUTION INTRAMUSCULAR; INTRAVENOUS at 13:04

## 2017-03-13 RX ADMIN — LIDOCAINE HYDROCHLORIDE 40 MG: 20 INJECTION, SOLUTION INFILTRATION; PERINEURAL at 13:24

## 2017-03-13 RX ADMIN — IPRATROPIUM BROMIDE AND ALBUTEROL SULFATE 3 ML: .5; 3 SOLUTION RESPIRATORY (INHALATION) at 16:00

## 2017-03-13 RX ADMIN — ONDANSETRON 4 MG: 2 INJECTION INTRAMUSCULAR; INTRAVENOUS at 13:04

## 2017-03-13 RX ADMIN — GLYCOPYRROLATE 0.1 MG: 0.2 INJECTION INTRAMUSCULAR; INTRAVENOUS at 14:37

## 2017-03-13 RX ADMIN — FENTANYL CITRATE 25 MCG: 50 INJECTION, SOLUTION INTRAMUSCULAR; INTRAVENOUS at 13:45

## 2017-03-13 RX ADMIN — PIPERACILLIN AND TAZOBACTAM 3.38 G: 3; .375 INJECTION, POWDER, LYOPHILIZED, FOR SOLUTION INTRAVENOUS; PARENTERAL at 19:05

## 2017-03-13 NOTE — NURSING NOTE
3/13/17 @ 1515- I SPOKE WITH DR BERMEO AND ROSIBEL WELLS- PATIENT IS ANTICIPATED FOR DC BY Thursday 3/ 16/17 - I SPOKE WITH KEO @ THE [facility] IN ADMISSIONS AND NOTIFIED HER TO START PREAUTH ON Tuesday FOR HER RETURN    3/13/17 @ 1530-- I SPOKE WITH DR. BERMEO AGAIN AND PATIENT MAY BE READY FOR DC Wednesday- I CALLLED THE [facility] AND NOTIFIED KEO .    3/15/17 @ 0920- I SPOKE WITH KEO FROM ADMISSION @ [facility] AND PATIENT  IS ACCEPTED AND THEY ARE READY FOR HER RETURN AND SHE IS AWARE THAT PATIENT IS TO BE DISCHARGED TODAY - I NOTIFIED DR. LYNN AND LAKSHMI WELLS. I SPOKE WITH PATIENT AT BEDSIDE AT NOTIFIED HER AND SHE IS AGREEABLE TO RETURN-    3/15/17 @ 1543- DISCHARGED TO THE [facility] INTERMEDIATE CARE

## 2017-03-13 NOTE — PROGRESS NOTES
Surgery Progress Note   Chief Complaint:  Acute gallstone pancreatitis    Subjective     Interval History:     Alexandria is doing well status post ercp with stone extraction.  She has been tolerating her clear liquid diet without nausea or vomiting.  She has been npo since midnight.  She would like real food.      Objective     Vital Signs  Temp:  [97.6 °F (36.4 °C)-98 °F (36.7 °C)] 98 °F (36.7 °C)  Heart Rate:  [67-88] 83  Resp:  [16-20] 16  BP: (104-147)/(58-80) 129/68  Body mass index is 40.6 kg/(m^2).    Intake/Output Summary (Last 24 hours) at 03/13/17 1104  Last data filed at 03/13/17 0549   Gross per 24 hour   Intake   1833 ml   Output   1275 ml   Net    558 ml             Physical Exam:   General: patient awake, alert and cooperative   Cardiovascular: regular rhythm and rate   Pulm: clear to auscultation bilaterally   Abdomen: soft, RUQ pain on palpation   Extremities: no rash or edema   Neurologic: Normal mood and behavior     Results Review:     I reviewed the patient's new clinical results.      WBC No results found for: WBCS   HGB HEMOGLOBIN   Date Value Ref Range Status   03/13/2017 10.0 (L) 12.0 - 16.0 g/dL Final   03/12/2017 9.7 (L) 12.0 - 16.0 g/dL Final   03/11/2017 10.3 (L) 12.0 - 16.0 g/dL Final      HCT HEMATOCRIT   Date Value Ref Range Status   03/13/2017 32.9 (L) 37.0 - 47.0 % Final   03/12/2017 31.5 (L) 37.0 - 47.0 % Final   03/11/2017 32.8 (L) 37.0 - 47.0 % Final      Platlets No results found for: LABPLAT     PT/INR:    PROTIME   Date Value Ref Range Status   03/13/2017 16.1 (H) 12.1 - 15.0 Seconds Final   03/12/2017 19.9 (H) 12.1 - 15.0 Seconds Final   03/12/2017 24.7 (H) 12.1 - 15.0 Seconds Final   03/11/2017 21.6 (H) 12.1 - 15.0 Seconds Final   /  INR   Date Value Ref Range Status   03/13/2017 1.28 (H) 0.90 - 1.10 Final   03/12/2017 1.67 (H) 0.90 - 1.10 Final   03/12/2017 2.19 (H) 0.90 - 1.10 Final   03/11/2017 1.85 (H) 0.90 - 1.10 Final       Sodium SODIUM   Date Value Ref Range  Status   03/13/2017 142 136 - 145 mmol/L Final   03/12/2017 144 136 - 145 mmol/L Final   03/11/2017 148 (H) 136 - 145 mmol/L Final      Potassium POTASSIUM   Date Value Ref Range Status   03/13/2017 4.0 3.5 - 5.2 mmol/L Final   03/12/2017 3.0 (L) 3.5 - 5.2 mmol/L Final   03/11/2017 3.1 (L) 3.5 - 5.2 mmol/L Final      Chloride CHLORIDE   Date Value Ref Range Status   03/13/2017 104 98 - 107 mmol/L Final   03/12/2017 103 98 - 107 mmol/L Final   03/11/2017 103 98 - 107 mmol/L Final      Bicarbonate No results found for: PLASMABICARB   BUN BUN   Date Value Ref Range Status   03/13/2017 22 8 - 23 mg/dL Final   03/12/2017 29 (H) 8 - 23 mg/dL Final   03/11/2017 39 (H) 8 - 23 mg/dL Final      Creatinine CREATININE   Date Value Ref Range Status   03/13/2017 1.52 (H) 0.57 - 1.00 mg/dL Final   03/12/2017 1.77 (H) 0.57 - 1.00 mg/dL Final   03/11/2017 2.23 (H) 0.57 - 1.00 mg/dL Final      Calcium CALCIUM   Date Value Ref Range Status   03/13/2017 9.4 8.8 - 10.5 mg/dL Final   03/12/2017 9.5 8.8 - 10.5 mg/dL Final   03/11/2017 10.0 8.8 - 10.5 mg/dL Final      Magnesium  AST  ALT  Bilirubin, Total  AlkPhos  Albumin    Amylase  Lipase    Radiology: MAGNESIUM   Date Value Ref Range Status   03/11/2017 2.3 1.7 - 2.5 mg/dL Final     No components found for: AST.*  No components found for: ALT.*  No components found for: BILIRUBIN, TOTAL.*    No components found for: ALKPHOS.*  No components found for: ALBUMIN.*      No components found for: AMYLASE.*  No components found for: LIPASE.*            Imaging Results (most recent)     Procedure Component Value Units Date/Time    US Abdomen Complete [[id number]] Collected:  03/08/17 1432     Updated:  03/08/17 1452    Narrative:       INDICATION: Transabdominal pain for 4 days.     EXAM: Abdominal ultrasound, complete.     COMPARISON: CT chest dated 01/12/2017     FINDINGS:  The pancreas is prominent measuring up to 2.5 cm in thickness. Although  nonspecific, this is unusual for a patient of  this age. Please  clinically for any evidence of acute pancreatic  inflammation/pancreatitis.     The echogenicity and echotexture of the hepatic parenchyma is within  normal limits. No hepatic mass. Mild intrahepatic and extrahepatic  biliary dilatation. The common duct is incompletely visualized, however  measures up to 0.8-0.9 cm in diameter.     There is a small gallstones in the gallbladder. There is mild  gallbladder wall thickening measuring up to 0.6 cm. There is borderline  gallbladder distention. No pericholecystic fluid..     The right kidney measures 9.8 cm. The left kidney measures 11.7 cm.  Bilateral renal cortical atrophy is more notable on the right kidney. No  hydronephrosis.     The spleen is mildly enlarged measuring 16 cm.       The abdominal aorta is normal in size.  The juxtahepatic IVC is within  normal limits.  No ascites.       Impression:          1. Mild intrahepatic and extrahepatic biliary dilatation. The entire  common bile duct was not visualized, therefore, distal obstructing stone  or mass may be present. Consider further evaluation with a MRCP.  2. Cholelithiasis. There is mild gallbladder distention and and  gallbladder wall thickening. These findings are concerning for possible  acute cholecystitis, however, may simply be secondary to the biliary  distention.  3. Prominent appearance of the pancreas. While nonspecific, this is  concerning for possible pancreatitis.     This report was finalized on 3/8/2017 2:50 PM by Dr. Damian Kennedy MD.       MRI MRCP [[id number]] Collected:  03/09/17 0847     Updated:  03/09/17 0902    Narrative:       MRI ABDOMEN, NONCONTRAST, INCLUDING MRCP, 03/09/2017:     HISTORY:   81-year-old female with with abdomen pain, leukocytosis. Elevated  amylase, lipase and bilirubin levels. Previous pancreatitis seen here  January 2017. Current ultrasound showing cholelithiasis, mild bile duct  dilatation and pancreas enlargement. Evaluate for  choledocholithiasis,  gallstone pancreatitis.     TECHNIQUE:  MRI examination of the abdomen was performed without contrast  administration due to abnormal renal function (GFR 18). MRCP sequences  were also obtained. The images are significantly degraded by respiratory  motion artifact.     FINDINGS:  Multiple small gallstones are resident within a diffusely thick-walled  gallbladder, there is evidence of edema within and adjacent to the  bladder wall. Acute cholecystitis is likely.     There is mild intrahepatic and extra hepatic bile duct dilatation to the  level of the ampulla with the upper extrahepatic bile duct measuring  about 10 mm. There is a single round filling defect within the mid  common bile duct measuring 4 to 5 mm consistent with  choledocholithiasis.     There is mild diffuse enlargement of the pancreas which appears mildly  edematous. The pancreatic duct is also mildly prominent at about 5 mm.  The appearance is compatible with mild acute pancreatitis.     Mildly nodular liver margin suggesting potential chronic liver disease.  No visible focal liver lesion. Hepatic vasculature appears patent. The  spleen is mildly enlarged at about 14 cm. Mild atrophy of the right  kidney. Small benign-appearing cyst within each kidney.       Impression:       1. Cholelithiasis with likely acute cholecystitis.  2. Choledocholithiasis with a single small stone within the mid common  bile duct. Minimal-mild intrahepatic and extra hepatic bile duct  dilatation. Mildly prominent pancreatic duct.  3. Evidence of mild diffuse pancreatitis.  4. Lobulated hepatic contour suggesting chronic liver disease/cirrhosis.  Mild splenomegaly.  5. Mild right renal parenchymal atrophy. Small bilateral renal cysts.  6. Technically limited study as noted above.     This report was finalized on 3/9/2017 9:00 AM by Dr. Art Bateman MD.       SCANNED - IMAGING [[id number]] Resulted:  03/08/17      Updated:  03/10/17 1048    [place] ERCP  [[id number]] Collected:  03/10/17 1517     Updated:  03/10/17 1523    Narrative:       FLUOROSCOPY DURING ERCP PROCEDURE, 03/10/2017:     HISTORY:  81-year-old female with recent imaging studies showing cholelithiasis,  choledocholithiasis and likely acute cholecystitis.     REPORT:  C-arm fluoroscopy was provided by radiology personnel in the OR during  ERCP procedure performed by Dr. Estrada. Fluoroscopy time was  recorded as 6.20 minutes. 10 spot film images were recorded for  documentation purposes. By report, a common bile duct stone was  extracted. The images show mild intrahepatic and extra hepatic bile duct  dilatation and a mildly dilated pancreatic duct. Please see operative  note for details.     This report was finalized on 3/10/2017 3:21 PM by Dr. Art Bateman MD.                  lactated ringers 50 mL/hr Last Rate: 50 mL/hr (03/12/17 2323)   lactated ringers 9 mL/hr          Assessment/Plan     Patient Active Problem List   Diagnosis Code   • Hyperglycemia R73.9   • Acute hyperglycemia R73.9   • Sepsis due to urinary tract infection A41.9, N39.0   • Chronic diastolic (congestive) heart failure I50.32   • Permanent atrial fibrillation I48.2   • Acute renal failure N17.9   • Acute on chronic respiratory failure J96.20   • Influenza A with respiratory manifestations J10.1   • Chronic anticoagulation Z79.01   • Lymphedema I89.0   • Morbid obesity E66.01   • Obstructive sleep apnea of adult G47.33   • Pulmonary HTN I27.2   • Abdominal pain R10.9   • Cholecystitis K81.9   • Chronic atrial fibrillation I48.2       Acute cholecystitis with gallstone pancreatitis  We discussed performing a laparoscopic cholecystectomy with intraoperative cholangiogram, possible open procedure.  We discussed the benefits and risks, not limited to but including:  Bleeding, infection, hernia formation, having to convert to an open procedure, injury to intra-abdominal structures, biloma, bile leak, intra-abdominal abscess,  common bile duct injury, atelectasis, pneumonia, DVT/PE, complications with anesthesia, aspiration.  The patient appeared to understand and is willing to proceed.          Joy Pham DO  03/13/17  11:04 AM

## 2017-03-13 NOTE — ANESTHESIA POSTPROCEDURE EVALUATION
Patient: Alexandria Villanueva    Procedure Summary     Date Anesthesia Start Anesthesia Stop Room / Location    03/13/17 2221 1313 [facility] / [facility]       Procedure Diagnosis Surgeon Provider    CHOLECYSTECTOMY LAPAROSCOPIC INTRAOPERATIVE CHOLANGIOGRAM (N/A Abdomen) Cholecystitis  (Cholecystitis [K81.9]) DO Kiya Multani MD          Anesthesia Type: general  Last vitals  BP      Temp      Pulse     Resp      SpO2        Post Anesthesia Care and Evaluation    Patient location during evaluation: PACU  Patient participation: complete - patient participated  Level of consciousness: sleepy but conscious  Pain score: 10  Pain management: inadequate  Airway patency: patent  Anesthetic complications: No anesthetic complications  PONV Status: none  Cardiovascular status: acceptable  Hydration status: acceptable    Comments: Pt admitted due to not being able to get adequate pain relief

## 2017-03-13 NOTE — OP NOTE
PREOPERATIVE DIAGNOSIS: acute cholecystitis with cholelithiasis  POSTOPERATIVE DIAGNOSIS: acute cholecystitis with cholelithiasis    PROCEDURE: Laparoscopic cholecystectomy with Intraoperative cholangiogram  SURGEON/STAFF: Vero  ASST: Silviano Sue   SPECIMENS: Gallbladder and stones.  INTRAOPERATIVE COMPLICATIONS: None.  ANESTHESIA: General.  BLOOD LOSS:  15 ml  COUNTS: Needle and sponge counts correct.     Clinical Note: This very pleasant patient was admitted to the hospital with the aforementioned complaints and acute pancreatitis.  Benefits and risks of a laparoscopic cholecystectomy with intraoperative cholangiogram possible open procedure were discussed.  Benefits and risks not limited to but including:Bleeding, infection, hernia formation, injury to intra-abdominal structures, bile leak, biloma, intra-abdominal abscess, DVT, PE, atelectasis pneumonia, anesthesia complications. She agreed and was consented for operative management without duress.     PROCEDURE: The patient was brought to the operating room in stable condition. Perioperative antibiotics were given and sequential compression devices applied. She was then laid supine on the operating room table. General anesthesia was induced by the Anesthesia Service without difficulty.     At this time, the patient's abdomen was accessed at the level of the umbilicus in open cutdown technique and insertion of a 12-mm trocar. The abdomen was then insufflated. Brief survey of the abdominal cavity revealed no evidence of injury from insertion of the trocar, or no other intraabdominal pathology. The gallbladder was distended, edematous, and thick walled.  At this time the patient was placed in steep reverse Trendelenburg and a slightly left-side down position. Three additional 5-mm trocars were placed, all in the standard position. This was under direct vision.       The peritoneal attachment of the gallbladder at the level of the infundibulum was scored from  laterally to medially, terminating at the level of the hepatic edge. The peritoneum was gently stripped down, exposing the underlying cystic artery and cystic duct. This was also done on the lateral side of the gallbladder by reflecting the gallbladder medially. The peritoneal attachment here was again gently dissected inferiorly. After completely exposing the cystic duct as well as cystic artery, a retro-cystic window was created. These 2 structures, the cystic duct and cystic artery, were further delineated. A firm critical view was obtained, visualizing healthy-appearing hepatic tissue behind the 2 structures, with no evidence of looping, bridging or tenting of the common bile duct.     A clip was placed distally at the cystic duct and a cystic duct incision with laparoscopic scissors was performed. A percutaneous cholangio-catheter was inserted into the patient abdomen. The catheter was placed and secured into the cystic duct. Cholangiogram was performed that showed normal cystic duct, dilated common bile duct and hepatic ducts with good spillage of dye into the duodenum with no evidence of any obstructions. The cholangiocatheter was removed and the distal portion of thecystic duct was then clipped twice and ligated.  The cystic artery was then triply clipped and ligated.  It was inspected and seen to be in intact. The gallbladder was then carefully dissected off the hepatic bed using hook electrocautery. It was firmly adherent to the underlying bed, and an effort careful and meticulous hemostasis was undertaken. The gallbladder, after being removed from the hepatic bed, was placed in an EndoCatch bag. It was removed through the umbilical trocar without difficulty.    Next, the umbilical trocar was reinserted into the abdomen and the liver bed was inspected. Hemostasis was perfected with Surgicel. Copious irrigation was carried out. The clips were intact and there was no bleeding or bile leakage noted.  A 10  flat ENRICO drain was placed on the liver bedThe trocars were then removed under direct vision, air was deflated from the abdomen, and a Brenner trocar site fascia was closed with 0 Vicryl suture.  The incisions were then closed with 40 Vicryls suture. Sterile dressings were applied.    Anesthesia was reversed, the ET tube and OG tube were removed.  And the patient was taken into the recovery area in stable postoperative condition having tolerated the procedure well.    KIM Pham DO

## 2017-03-13 NOTE — PROGRESS NOTES
LOS: 5 days   Patient Care Team:  Gerardo Williamson MD as PCP - General  Gerardo Williamson MD as PCP - Family Medicine    Chief Complaint:   preop monty -     Interval History:   No dyspnea or chest pain.  No abd pain - ready for surgery.     Objective   Vital Signs  Temp:  [97.6 °F (36.4 °C)-97.9 °F (36.6 °C)] 97.6 °F (36.4 °C)  Heart Rate:  [65-92] 67  Resp:  [16-20] 16  BP: (104-147)/(58-81) 118/58    Intake/Output Summary (Last 24 hours) at 03/13/17 0854  Last data filed at 03/13/17 0549   Gross per 24 hour   Intake   1983 ml   Output   1275 ml   Net    708 ml       Comfortable NAD  Neck supple,   S1/S2 RRR, no m/r/g  Lungs CTA B, normal effort  Abdomen S/NT/ND (+) BS, no HSM appreciated  Extremities warm, no clubbing, cyanosis, 2+ LE edema  No visible or palpable skin lesions  A/Ox4, mood and affect appropriate    Results Review:        Results from last 7 days  Lab Units 03/13/17 0409 03/12/17 0516 03/11/17  0406   SODIUM mmol/L 142 144 148*   POTASSIUM mmol/L 4.0 3.0* 3.1*   CHLORIDE mmol/L 104 103 103   TOTAL CO2 mmol/L 28.0 29.0 29.8*   BUN mg/dL 22 29* 39*   CREATININE mg/dL 1.52* 1.77* 2.23*   GLUCOSE mg/dL 111* 135* 115*   CALCIUM mg/dL 9.4 9.5 10.0           Results from last 7 days  Lab Units 03/13/17 0409 03/12/17 0516 03/11/17  0406   WBC 10*3/mm3 4.17* 5.24 6.24   HEMOGLOBIN g/dL 10.0* 9.7* 10.3*   HEMATOCRIT % 32.9* 31.5* 32.8*   PLATELETS 10*3/mm3 138* 130* 139*       Results from last 7 days  Lab Units 03/13/17  0409 03/12/17 2008 03/12/17  0516  03/08/17  1306   INR  1.28* 1.67* 2.19*  < > 2.01*   APTT seconds 25.1  --  56.4*  --  43.2*   < > = values in this interval not displayed.        Results from last 7 days  Lab Units 03/11/17  0406   MAGNESIUM mg/dL 2.3           I reviewed the patient's new clinical results.  I personally viewed and interpreted the patient's EKG/Telemetry data        Medication Review:     insulin aspart 0-7 Units Subcutaneous Q6H   ipratropium-albuterol 3 mL  Nebulization 4x Daily - RT   metoprolol succinate XL 25 mg Oral Q24H   pantoprazole 40 mg Intravenous Q AM   piperacillin-tazobactam 3.375 g Intravenous Q6H         lactated ringers 50 mL/hr Last Rate: 50 mL/hr (03/12/17 2103)   lactated ringers 9 mL/hr        Assessment/Plan     Principal Problem:    Abdominal pain  Active Problems:    Chronic diastolic (congestive) heart failure    Chronic anticoagulation    Morbid obesity    Obstructive sleep apnea of adult    Pulmonary HTN    Cholecystitis    Chronic atrial fibrillation       1. Acute Gallstone pancreatitis: Dr. Estrada following and patient s/p ERCP with stone removal and sphincterotomy.      2. Acute cholecystitis and choledocholithiasis: Plan for cholecystectomy today       3. Acute renal failure on CKD stage III: Baseline Cr approx 0.9-1. Renal function slowly improving. Likely combination of prerenal secondary to dehydration and possibly some ATN from hypotension at admission. Continue low IVF maintenance. Monitor.       4. E-coli UTI: Zosyn will cover.       5.  Chronic diastolic CHF, Cor pulmonale and pulmonary HTN: No acute issues currently. Careful with IVFs.       7. Chronic A-fib: Coumadin on hold in anticipation of procedure on tomorrow. .      8. Chronic hypoxic respiratory failure. On home 2L O2. No acute issues.       9. Chronic lymphedema: No acute issues. Previously used leg wraps at NH.      10. DM-2: Home insulin on hold, patient only on SSI here and bedsides ok. Monitor. Only on clear liquids and will be NPO after MN.      Stable CV - OK for surgery.     Nika Mack MD  03/13/17  8:54 AM

## 2017-03-13 NOTE — ANESTHESIA PREPROCEDURE EVALUATION
Anesthesia Evaluation     Patient summary reviewed and Nursing notes reviewed   no history of anesthetic complications:  NPO Status: > 8 hours   Airway   Mallampati: III  TM distance: >3 FB  Neck ROM: limited  possible difficult intubation  Dental      Pulmonary     breath sounds clear to auscultation  (+) COPD (daily inhaler use. Used this AM) moderate, shortness of breath, sleep apnea (does not use CPAP regularly) on CPAP, decreased breath sounds,   Cardiovascular     ECG reviewed  Rhythm: irregular  Rate: normal    (+) hypertension well controlled, dysrhythmias Atrial Fib, CHF, DVT (HX: 16 years ago) resolved,       Neuro/Psych  GI/Hepatic/Renal/Endo    (+) obesity, morbid obesity, chronic renal disease CRI, diabetes mellitus type 2 using insulin,     Musculoskeletal     (+) back pain,       ROS comment: FX Right leg 9/2015 -- wheel chair bound since this time.    Abdominal   (+) obese,    Substance History - negative use     OB/GYN negative ob/gyn ROS         Other   (+) arthritis                               Anesthesia Plan    ASA 4     general     intravenous induction   Anesthetic plan and risks discussed with patient.  Use of blood products discussed with patient  Consented to blood products.

## 2017-03-13 NOTE — PLAN OF CARE
Problem: Patient Care Overview (Adult)  Goal: Plan of Care Review  Outcome: Ongoing (interventions implemented as appropriate)    03/13/17 1946   Coping/Psychosocial Response Interventions   Plan Of Care Reviewed With patient   Patient Care Overview   Progress improving   Outcome Evaluation   Outcome Summary/Follow up Plan pt had her GB taken out today, she is resting and has been medicated x2 with Dilaudid 0.25mg for pain. she has also had Protonix IV, and Reglan Iv x1, can have zofran every 4 hours prn for nausea. joseph drain dressing has been changed x1, other lap incision drains are d/i. taking ice chips and intermittent nausea noted. hunter cont to be clear, v/s stable         Problem: Tissue Perfusion, Ineffective Peripheral (Adult)  Goal: Identify Related Risk Factors and Signs and Symptoms  Outcome: Ongoing (interventions implemented as appropriate)    Problem: Fall Risk (Adult)  Goal: Absence of Falls  Outcome: Ongoing (interventions implemented as appropriate)

## 2017-03-13 NOTE — PLAN OF CARE
Problem: Patient Care Overview (Adult)  Goal: Plan of Care Review  Outcome: Ongoing (interventions implemented as appropriate)    03/13/17 0316   Coping/Psychosocial Response Interventions   Plan Of Care Reviewed With patient   Patient Care Overview   Progress progress toward functional goals as expected   Outcome Evaluation   Outcome Summary/Follow up Plan pt awaiting surgery today; VSS       Goal: Adult Individualization and Mutuality  Outcome: Ongoing (interventions implemented as appropriate)  Goal: Discharge Needs Assessment  Outcome: Ongoing (interventions implemented as appropriate)    Problem: Cardiac Output, Decreased (Adult)  Goal: Identify Related Risk Factors and Signs and Symptoms  Outcome: Outcome(s) achieved Date Met:  03/13/17 03/13/17 0316   Cardiac Output, Decreased   Cardiac Output, Decreased: Related Risk Factors oxygenation decreased   Signs and Symptoms (Cardiac Output Decreased) edema       Goal: Adequate Cardiac Output/Effective Tissue Perfusion  Outcome: Ongoing (interventions implemented as appropriate)    03/13/17 0316   Cardiac Output, Decreased (Adult)   Adequate Cardiac Output/Effective Tissue Perfusion making progress toward outcome         Problem: Fall Risk (Adult)  Goal: Identify Related Risk Factors and Signs and Symptoms  Outcome: Outcome(s) achieved Date Met:  03/13/17 03/13/17 0316   Fall Risk   Fall Risk: Related Risk Factors environment unfamiliar   Fall Risk: Signs and Symptoms presence of risk factors       Goal: Absence of Falls  Outcome: Ongoing (interventions implemented as appropriate)    03/13/17 0316   Fall Risk (Adult)   Absence of Falls making progress toward outcome

## 2017-03-13 NOTE — PROGRESS NOTES
"Hospitalist Team      Patient Care Team:  Gerardo Williamson MD as PCP - General  Gerardo Williamson MD as PCP - Family Medicine        Chief Complaint:  Long conversation with patient this morning concerning.     Subjective    Interval History and ROS:     The patient is NPO and is going to surgery today.  She will get cholecystectomy.  She is alert and oriented and felling well.  She wanted to discuss the code protocol and changed herself to a conditional code.  Also she wanted to be sure she was a full code during surgery.  She also wanted to pray with me which we did.  She has no complaints this morning and is ready for surgery.  She understands the risks and benefits and She is well aware that she is a high risk for any surgery but she needs this surgery and she understands this as well and wants to proceed.      Objective    Vital Signs  Temp:  [97.6 °F (36.4 °C)-98 °F (36.7 °C)] 97.9 °F (36.6 °C)  Heart Rate:  [67-94] 81  Resp:  [14-20] 16  BP: (104-136)/(58-80) 134/77  Oxygen Therapy  SpO2: 95 %  Pulse Oximetry Type: Continuous  O2 Device: nasal cannula  Flow (L/min): 2  Oxygen Concentration (%): 38}  Flowsheet Rows         First Filed Value    Admission Height  64\" (162.6 cm) Documented at 03/08/2017 1700    Admission Weight  219 lb 9.6 oz (99.6 kg) Documented at 03/08/2017 1700            Physical Exam:    Physical Exam   Constitutional: Patient appears well-developed and well-nourished and in no acute distress at this time.  However, she is concerned about her code status and we spent time discussing that.  HEENT:   Head: Normocephalic and atraumatic.   Eyes:  Pupils are equal, round, and reactive to light. EOM are intact. Sclera are anicteric and non-injected.  Mouth and Throat: Patient has moist mucous membranes. Oropharynx is clear of any erythema or exudate.     Neck: Neck supple. No JVD present. No thyromegaly present. No lymphadenopathy present.  Cardiovascular: Regular rate, regular rhythm, S1 normal and " S2 normal.  Exam reveals no gallop and no friction rub.  No murmur heard.  Pulmonary/Chest: Lungs are clear to auscultation bilaterally. No respiratory distress. No wheezes. No rhonchi. No rales.   Abdominal: Soft. Bowel sounds are normal. No distension and no mass. There is no hepatosplenomegaly. There is mild tenderness in the right and left upper quadrants of the abdomen.   Musculoskeletal: Normal Muscle tone  Extremities: positive for lymphedema but no worse than usual. Pulses are palpable in all 4 extremities.  Neurological: Patient is alert and oriented to person, place, and time. Cranial nerves II-XII are grossly intact with no focal deficits.  Skin: Skin is warm. No rash noted. Nails show no clubbing.  No cyanosis or erythema.         Results Review:     I reviewed the patient's new clinical results.    Lab Results (last 24 hours)     Procedure Component Value Units Date/Time    POC Glucose Fingerstick [[id number]]  (Normal) Collected:  03/12/17 1557    Specimen:  Blood Updated:  03/12/17 1604     Glucose 120 mg/dL     Narrative:       Meter: [id number] : [id number]    Protime-INR [[id number]]  (Abnormal) Collected:  03/12/17 2008    Specimen:  Blood Updated:  03/12/17 2026     Protime 19.9 (H) Seconds      INR 1.67 (H)     Narrative:       Therapeutic Ranges for INR: 2.0-3.0 (PT 20-30)                              2.5-3.5 (PT 25-34)    POC Glucose Fingerstick [[id number]]  (Normal) Collected:  03/12/17 2319    Specimen:  Blood Updated:  03/12/17 2337     Glucose 122 mg/dL     Narrative:       Meter: [id number] : [id number] Prashant Loredo RN    CBC & Differential [[id number]] Collected:  03/13/17 0409    Specimen:  Blood Updated:  03/13/17 0516    Narrative:       The following orders were created for panel order CBC & Differential.  Procedure                               Abnormality         Status                     ---------                               -----------         ------                     CBC  Auto Differential[[id number]]         Abnormal            Final result                 Please view results for these tests on the individual orders.    CBC Auto Differential [[id number]]  (Abnormal) Collected:  03/13/17 0409    Specimen:  Blood Updated:  03/13/17 0525     WBC 4.17 (L) 10*3/mm3      RBC 3.57 (L) 10*6/mm3      Hemoglobin 10.0 (L) g/dL      Hematocrit 32.9 (L) %      MCV 92.2 fL      MCH 28.0 pg      MCHC 30.4 (L) g/dL      RDW 14.6 (H) %      RDW-SD 49.8 fl      MPV 10.4 fL      Platelets 138 (L) 10*3/mm3      Neutrophil % 63.2 %      Lymphocyte % 26.4 %      Monocyte % 7.2 %      Eosinophil % 2.2 %      Basophil % 0.5 %      Immature Grans % 0.5 %      Neutrophils, Absolute 2.64 10*3/mm3      Lymphocytes, Absolute 1.10 10*3/mm3      Monocytes, Absolute 0.30 10*3/mm3      Eosinophils, Absolute 0.09 (L) 10*3/mm3      Basophils, Absolute 0.02 10*3/mm3      Immature Grans, Absolute 0.02 10*3/mm3      nRBC 0.0 /100 WBC     Lipase [[id number]]  (Normal) Collected:  03/13/17 0409    Specimen:  Blood Updated:  03/13/17 0533     Lipase 19 U/L     POC Glucose Fingerstick [[id number]]  (Normal) Collected:  03/13/17 0553    Specimen:  Blood Updated:  03/13/17 0603     Glucose 101 mg/dL     Narrative:       Meter: [id number] : [id number] Prashant Loredo RN    aPTT [[id number]]  (Normal) Collected:  03/13/17 0409    Specimen:  Blood Updated:  03/13/17 0627     PTT 25.1 seconds     Narrative:       PTT = The equivalent PTT values for the therapeutic range of heparin levels at 0.1 to 0.7 U/ml are 53 to 110 seconds.    Protime-INR [[id number]]  (Abnormal) Collected:  03/13/17 0409    Specimen:  Blood Updated:  03/13/17 0627     Protime 16.1 (H) Seconds      INR 1.28 (H)     Narrative:       Therapeutic Ranges for INR: 2.0-3.0 (PT 20-30)                              2.5-3.5 (PT 25-34)    Comprehensive Metabolic Panel [[id number]]  (Abnormal) Collected:  03/13/17 0409    Specimen:  Blood Updated:  03/13/17 0627     Glucose 111 (H)  mg/dL      BUN 22 mg/dL      Creatinine 1.52 (H) mg/dL      Sodium 142 mmol/L      Potassium 4.0 mmol/L      Chloride 104 mmol/L      CO2 28.0 mmol/L      Calcium 9.4 mg/dL      Total Protein 5.5 (L) g/dL      Albumin 2.60 (L) g/dL      ALT (SGPT) 53 (H) U/L      AST (SGOT) 13 U/L      Alkaline Phosphatase 307 (H) U/L      Total Bilirubin 1.0 mg/dL      eGFR Non African Amer 33 (L) mL/min/1.73      Globulin 2.9 gm/dL      A/G Ratio 0.9 g/dL      BUN/Creatinine Ratio 14.5      Anion Gap 10.0 mmol/L     Narrative:       The MDRD GFR formula is only valid for adults with stable renal function between ages 18 and 70.    Amylase [[id number]]  (Normal) Collected:  03/13/17 0409    Specimen:  Blood Updated:  03/13/17 0627     Amylase 63 U/L     POC Glucose Fingerstick [[id number]]  (Normal) Collected:  03/13/17 1141    Specimen:  Blood Updated:  03/13/17 1149     Glucose 105 mg/dL     Narrative:       Meter: [id number] : [id number] Marcell Hicks PCA          Imaging Results (last 24 hours)     ** No results found for the last 24 hours. **          Xray reviewed personally by physician.  1/12/2017        ECG not reviewed personally by physician.  Cardiology following.  ECG/EMG Results (most recent)     None          Medication Review:   I have reviewed the patient's current medication list    Current Facility-Administered Medications:   •  [MAR Hold] acetaminophen (TYLENOL) tablet 650 mg, 650 mg, Oral, Q6H PRN, Glendy Gonsalez MD, 650 mg at 03/11/17 1207  •  [MAR Hold] dextrose (D50W) solution 25 g, 25 g, Intravenous, Q15 Min PRN, Glendy Gonsalez MD  •  [MAR Hold] dextrose (GLUTOSE) oral gel 15 g, 15 g, Oral, Q15 Min PRN, Glendy Gonsalez MD  •  famotidine (PEPCID) injection 20 mg, 20 mg, Intravenous, 60 Min Pre-Op, Avila Levy CRNA, 20 mg at 03/13/17 1305  •  [MAR Hold] glucagon (GLUCAGEN) injection 1 mg, 1 mg, Subcutaneous, Q15 Min PRN, Glendy Gonsalez MD  •   [MAR Hold] HYDROcodone-acetaminophen (NORCO) 5-325 MG per tablet 1 tablet, 1 tablet, Oral, Q6H PRN, Glendy Gonsalez MD, 1 tablet at 03/12/17 2323  •  [MAR Hold] insulin aspart (novoLOG) injection 0-7 Units, 0-7 Units, Subcutaneous, Q6H, Glendy Gonsalez MD, 2 Units at 03/11/17 2006  •  [MAR Hold] ipratropium-albuterol (DUO-NEB) nebulizer solution 3 mL, 3 mL, Nebulization, 4x Daily - RT, Trevor Diallo MD, 3 mL at 03/13/17 1101  •  lactated ringers infusion, 50 mL/hr, Intravenous, Continuous, Glendy Gonsalez MD, Last Rate: 50 mL/hr at 03/12/17 2323, 50 mL/hr at 03/12/17 2323  •  lactated ringers infusion, 9 mL/hr, Intravenous, Continuous, Lorena Love CRNA  •  metoprolol succinate XL (TOPROL-XL) 24 hr tablet 25 mg, 25 mg, Oral, Q24H, Glendy Gonsalez MD, 25 mg at 03/13/17 0903  •  [MAR Hold] pantoprazole (PROTONIX) injection 40 mg, 40 mg, Intravenous, Q AM, Trevor Diallo MD, 40 mg at 03/13/17 0549  •  [MAR Hold] piperacillin-tazobactam (ZOSYN) 3.375 g in sodium chloride 0.9 % 100 mL IVPB, 3.375 g, Intravenous, Q6H, Trevor Diallo MD, Last Rate: 0 mL/hr at 03/13/17 0000, 3.375 g at 03/13/17 0549  •  sodium chloride (NS) irrigation solution, , , PRN, Joy Pham DO, 1,000 mL at 03/13/17 1356  •  sodium chloride 0.9 % flush 1-10 mL, 1-10 mL, Intravenous, PRN, Avila Levy CRNA    Facility-Administered Medications Ordered in Other Encounters:   •  FentaNYL Citrate (PF) (SUBLIMAZE) injection, , Intravenous, PRN, Kiya Marin MD, 25 mcg at 03/13/17 1345  •  lidocaine (XYLOCAINE) 2% injection, , Injection, PRN, Kiya Marin MD, 40 mg at 03/13/17 1324  •  Propofol (DIPRIVAN) injection, , Intravenous, PRN, Kiya Marin MD, 100 mg at 03/13/17 1324  •  rocuronium (ZEMURON) injection, , Intravenous, PRN, Kiya Marin MD, 15 mg at 03/13/17 1332  •  succinylcholine (ANECTINE) injection, , Intravenous, PRN, Kiya Marin MD, 100 mg at 03/13/17  1324      Assessment/Plan     Hospital Problem List     * (Principal)Acute biliary pancreatitis without infection or necrosis    Abdominal pain    Cholecystitis    Choledocholithiasis with acute cholecystitis:  Patient had ERCP and stone removal.  She  Is having cholecystectomy today.        Acute kidney injury:  GFR is 33    Renal condition is unchanged.  Continue current treatment regimen.  Renal condition will be reassessed Frequently.      Acute cystitis without hematuria:  E. Coli and treated with Zosyn and sensitive    Escherichia coli urinary tract infection:        Electrolyte Imbalance:  Ok today      Chronic respiratory failure with hypoxia:  Improved 95% on 2 L NC      Chronic anticoagulation (Chronic)    Morbid obesity (Chronic)    Obesity is unchanged.  No plans at this time.  Patient too ill        Obstructive sleep apnea of adult (Chronic)    Overview Signed 1/12/2017  4:33 PM by Luis Fernando Stevens III, MD     Untreatet:  This patient refuses to wear mask.         Chronic atrial fibrillation (Chronic):  Off of anticoagulation for surgery today      Chronic kidney disease, stage III (moderate)    Renal condition is unchanged.  Continue current treatment regimen.  Renal condition will be reassessed Monitor frequently.      Chronic diastolic congestive heart failure    Congestive heart failure due to diastolic failure chronic  Heart failure is unchanged.   Continue current treatment regimen.  Heart failure will be reassessed daily.      Pulmonary hypertension      Diabetes mellitus type 2 in obese    Diabetes is well controlled.   Continue current treatment regimen.  Diabetes will be reassessed frequently.      Lymphedema        Patient and I reviewed her code status.  She wants to be a no code but she wishes to be a full code during surgery.  She wanted to understand exactly what no code meant.  We spent 20 minutes reviewing the code protocols.  We also prayed together before her surgery.  She wanted to be  sure she would be a full code during surgery.      Plan for disposition:Back to [facility] when able.    Alice Garcia, DO  03/13/17  2:11 PM      Time: 30 min.  Patient and I reviewed her code status completely and changed her to a no code.  Also we prayed together before surgery.

## 2017-03-13 NOTE — PROGRESS NOTES
Adult Nutrition  Assessment/PES    Patient Name:  Alexandria Villanueva  YOB: 1935  MRN: [id number]  Admit Date:  3/8/2017    Assessment Date:  3/13/2017        Reason for Assessment       03/13/17 1327    Reason for Assessment    Reason For Assessment/Visit follow up protocol;NPO/Clr Policy    Gastrointestinal Cholecystitis              Nutrition/Diet History       03/13/17 1327    Nutrition/Diet History    Typical Food/Fluid Intake Pt in or for cholecystectomy today.            Anthropometrics       03/13/17 1328    Anthropometrics    RD Documented Current Weight  107 kg (235 lb 14.3 oz)   ? accurate with 7 kg gain    Anthropometrics (Special Considerations)    RD Calculated BMI (kg/m2) 40.6    Body Mass Index (BMI)    BMI Grade greater than 40 - obesity grade III            Labs/Tests/Procedures/Meds       03/13/17 1328    Labs/Tests/Procedures/Meds    Labs/Tests Review Reviewed;Glucose;Creat;ALT    Medication Review Reviewed, pertinent;Antacid;Insulin;Antibiotic              Estimated/Assessed Needs       03/13/17 1329    Estimated/Assessed Energy Needs    Estimated Kcal Range  7156-1989 kcal (remains appropriate due to suspected wt change incorrect) (mifflin - 250-500 kcal preivous wt)   140-168 gm CHO, 45% kcal     Estimated/Assessed Protein Needs    Estimated Protein Range 80 gm (.8 gm/kg previous wt)    Estimated/Assessed Fluid Needs    RDA Method (mL)  --   6678-4861 ml per CHF guidelines            Nutrition Prescription Ordered       03/13/17 1331    Nutrition Prescription PO    Current PO Diet NPO            Evaluation of Received Nutrient/Fluid Intake       03/13/17 1331    Evaluation of Received Nutrient/Fluid Intake    Nutrition Delivered Fluid Evaluation    Fluid Intake Evaluation    Oral Fluid (mL) 600    Total Fluid Intake (mL) 600    % Fluid Needs 36%    PO Evaluation    Number of Meals 2    % PO Intake 88              Problem/Interventions:        Problem 1       03/13/17 4560     Nutrition Diagnoses Problem 1    Problem 1 Altered GI Function    Etiology (related to) Medical Diagnosis    Gastrointestinal Cholecystitis    Signs/Symptoms (evidenced by) NPO                    Intervention Goal       03/13/17 1339    Intervention Goal    General Meet nutritional needs for age/condition    PO Establish PO;PO intake (%)    PO Intake % 50 %            Nutrition Intervention       03/13/17 1339    Nutrition Intervention    RD/Tech Action Await begin PO;Follow Tx progress            Nutrition Prescription       03/13/17 1339    Other Orders    Other advance diet as indicated post op            Education/Evaluation       03/13/17 1340    Education    Education Education not appropriate at this time    Monitor/Evaluation    Monitor Per protocol;I&O;PO intake;Weight        Comments:  Advance diet as indicated post-op. If unable to advance in next 2-3 days will need to consider nutrition support.   Will follow and monitor.     Electronically signed by:  Marguerite Manning RD  03/13/17 1:41 PM

## 2017-03-13 NOTE — ANESTHESIA PROCEDURE NOTES
Airway  Urgency: elective    Date/Time: 3/13/2017 1:26 PM  Airway not difficult    General Information and Staff    Patient location during procedure: OR  Anesthesiologist: NATHALIA JEFFERSON    Indications and Patient Condition  Indications for airway management: airway protection    Preoxygenated: yes  MILS maintained throughout  Mask difficulty assessment: 1 - vent by mask    Final Airway Details  Final airway type: endotracheal airway      Successful airway: ETT  Cuffed: yes   Successful intubation technique: direct laryngoscopy  Facilitating devices/methods: intubating stylet  Endotracheal tube insertion site: oral  Blade: Soto  Blade size: #3  ETT size: 7.0 mm  Cormack-Lehane Classification: grade I - full view of glottis  Placement verified by: chest auscultation and capnometry   Cuff volume (mL): 6  Measured from: lips  ETT to lips (cm): 21  Number of attempts at approach: 2

## 2017-03-13 NOTE — PLAN OF CARE
Problem: Patient Care Overview (Adult)  Goal: Plan of Care Review  Outcome: Ongoing (interventions implemented as appropriate)    03/13/17 1245   Coping/Psychosocial Response Interventions   Plan Of Care Reviewed With patient   Patient Care Overview   Progress improving   Outcome Evaluation   Outcome Summary/Follow up Plan vss, ready for or       Goal: Adult Individualization and Mutuality  Outcome: Ongoing (interventions implemented as appropriate)    Problem: Perioperative Period (Adult)  Goal: Signs and Symptoms of Listed Potential Problems Will be Absent or Manageable (Perioperative Period)  Outcome: Ongoing (interventions implemented as appropriate)

## 2017-03-14 LAB
ALBUMIN SERPL-MCNC: 2.4 G/DL (ref 3.5–5.2)
ALBUMIN/GLOB SERPL: 0.8 G/DL
ALP SERPL-CCNC: 242 U/L (ref 40–129)
ALT SERPL W P-5'-P-CCNC: 44 U/L (ref 5–33)
AMYLASE SERPL-CCNC: 36 U/L (ref 28–100)
ANION GAP SERPL CALCULATED.3IONS-SCNC: 10.6 MMOL/L
AST SERPL-CCNC: 28 U/L (ref 5–32)
BASOPHILS # BLD AUTO: 0.02 10*3/MM3 (ref 0–0.2)
BASOPHILS NFR BLD AUTO: 0.4 % (ref 0–2)
BILIRUB SERPL-MCNC: 0.9 MG/DL (ref 0.2–1.2)
BUN BLD-MCNC: 18 MG/DL (ref 8–23)
BUN/CREAT SERPL: 12.9 (ref 7–25)
CALCIUM SPEC-SCNC: 9.3 MG/DL (ref 8.8–10.5)
CHLORIDE SERPL-SCNC: 107 MMOL/L (ref 98–107)
CO2 SERPL-SCNC: 27.4 MMOL/L (ref 22–29)
CREAT BLD-MCNC: 1.4 MG/DL (ref 0.57–1)
DEPRECATED RDW RBC AUTO: 49.1 FL (ref 37–54)
EOSINOPHIL # BLD AUTO: 0.03 10*3/MM3 (ref 0.1–0.3)
EOSINOPHIL NFR BLD AUTO: 0.5 % (ref 0–4)
ERYTHROCYTE [DISTWIDTH] IN BLOOD BY AUTOMATED COUNT: 14.6 % (ref 11.5–14.5)
GFR SERPL CREATININE-BSD FRML MDRD: 36 ML/MIN/1.73
GLOBULIN UR ELPH-MCNC: 2.9 GM/DL
GLUCOSE BLD-MCNC: 125 MG/DL (ref 65–99)
GLUCOSE BLDC GLUCOMTR-MCNC: 124 MG/DL (ref 70–130)
GLUCOSE BLDC GLUCOMTR-MCNC: 126 MG/DL (ref 70–130)
GLUCOSE BLDC GLUCOMTR-MCNC: 154 MG/DL (ref 70–130)
GLUCOSE BLDC GLUCOMTR-MCNC: 164 MG/DL (ref 70–130)
HCT VFR BLD AUTO: 32.7 % (ref 37–47)
HGB BLD-MCNC: 10.1 G/DL (ref 12–16)
IMM GRANULOCYTES # BLD: 0.08 10*3/MM3 (ref 0–0.03)
IMM GRANULOCYTES NFR BLD: 1.4 % (ref 0–0.5)
INR PPP: 1.11 (ref 0.9–1.1)
LIPASE SERPL-CCNC: 16 U/L (ref 13–60)
LYMPHOCYTES # BLD AUTO: 1 10*3/MM3 (ref 0.6–4.8)
LYMPHOCYTES NFR BLD AUTO: 17.7 % (ref 20–45)
MCH RBC QN AUTO: 28.1 PG (ref 27–31)
MCHC RBC AUTO-ENTMCNC: 30.9 G/DL (ref 31–37)
MCV RBC AUTO: 90.8 FL (ref 81–99)
MONOCYTES # BLD AUTO: 0.36 10*3/MM3 (ref 0–1)
MONOCYTES NFR BLD AUTO: 6.4 % (ref 3–8)
NEUTROPHILS # BLD AUTO: 4.15 10*3/MM3 (ref 1.5–8.3)
NEUTROPHILS NFR BLD AUTO: 73.6 % (ref 45–70)
NRBC BLD MANUAL-RTO: 0 /100 WBC (ref 0–0)
PLATELET # BLD AUTO: 155 10*3/MM3 (ref 140–500)
PMV BLD AUTO: 10.2 FL (ref 7.4–10.4)
POTASSIUM BLD-SCNC: 3.8 MMOL/L (ref 3.5–5.2)
PROT SERPL-MCNC: 5.3 G/DL (ref 6–8.5)
PROTHROMBIN TIME: 14.4 SECONDS (ref 12.1–15)
RBC # BLD AUTO: 3.6 10*6/MM3 (ref 4.2–5.4)
SODIUM BLD-SCNC: 145 MMOL/L (ref 136–145)
WBC NRBC COR # BLD: 5.64 10*3/MM3 (ref 4.8–10.8)

## 2017-03-14 PROCEDURE — 25010000002 HEPARIN (PORCINE) PER 1000 UNITS: Performed by: SURGERY

## 2017-03-14 PROCEDURE — 99231 SBSQ HOSP IP/OBS SF/LOW 25: CPT | Performed by: HOSPITALIST

## 2017-03-14 PROCEDURE — 99232 SBSQ HOSP IP/OBS MODERATE 35: CPT | Performed by: INTERNAL MEDICINE

## 2017-03-14 PROCEDURE — 25010000002 PIPERACILLIN SOD-TAZOBACTAM PER 1 G: Performed by: INTERNAL MEDICINE

## 2017-03-14 PROCEDURE — 85610 PROTHROMBIN TIME: CPT | Performed by: HOSPITALIST

## 2017-03-14 PROCEDURE — 99024 POSTOP FOLLOW-UP VISIT: CPT | Performed by: SURGERY

## 2017-03-14 PROCEDURE — 97161 PT EVAL LOW COMPLEX 20 MIN: CPT

## 2017-03-14 PROCEDURE — 63710000001 INSULIN ASPART PER 5 UNITS: Performed by: HOSPITALIST

## 2017-03-14 PROCEDURE — 25010000002 HYDROMORPHONE PER 4 MG: Performed by: SURGERY

## 2017-03-14 PROCEDURE — 85025 COMPLETE CBC W/AUTO DIFF WBC: CPT | Performed by: SURGERY

## 2017-03-14 PROCEDURE — 82962 GLUCOSE BLOOD TEST: CPT

## 2017-03-14 PROCEDURE — 82150 ASSAY OF AMYLASE: CPT | Performed by: SURGERY

## 2017-03-14 PROCEDURE — 83690 ASSAY OF LIPASE: CPT | Performed by: SURGERY

## 2017-03-14 PROCEDURE — 94799 UNLISTED PULMONARY SVC/PX: CPT

## 2017-03-14 PROCEDURE — 80053 COMPREHEN METABOLIC PANEL: CPT | Performed by: SURGERY

## 2017-03-14 RX ORDER — WARFARIN SODIUM 3 MG/1
6 TABLET ORAL
Status: DISCONTINUED | OUTPATIENT
Start: 2017-03-14 | End: 2017-03-15 | Stop reason: HOSPADM

## 2017-03-14 RX ADMIN — PIPERACILLIN AND TAZOBACTAM 3.38 G: 3; .375 INJECTION, POWDER, LYOPHILIZED, FOR SOLUTION INTRAVENOUS; PARENTERAL at 12:07

## 2017-03-14 RX ADMIN — PIPERACILLIN AND TAZOBACTAM 3.38 G: 3; .375 INJECTION, POWDER, LYOPHILIZED, FOR SOLUTION INTRAVENOUS; PARENTERAL at 06:09

## 2017-03-14 RX ADMIN — PIPERACILLIN AND TAZOBACTAM 3.38 G: 3; .375 INJECTION, POWDER, LYOPHILIZED, FOR SOLUTION INTRAVENOUS; PARENTERAL at 21:29

## 2017-03-14 RX ADMIN — SODIUM CHLORIDE, POTASSIUM CHLORIDE, SODIUM LACTATE AND CALCIUM CHLORIDE 50 ML/HR: 600; 310; 30; 20 INJECTION, SOLUTION INTRAVENOUS at 21:30

## 2017-03-14 RX ADMIN — INSULIN ASPART 2 UNITS: 100 INJECTION, SOLUTION INTRAVENOUS; SUBCUTANEOUS at 21:29

## 2017-03-14 RX ADMIN — HYDROCODONE BITARTRATE AND ACETAMINOPHEN 1 TABLET: 5; 325 TABLET ORAL at 08:14

## 2017-03-14 RX ADMIN — HYDROMORPHONE HYDROCHLORIDE 0.25 MG: 1 INJECTION, SOLUTION INTRAMUSCULAR; INTRAVENOUS; SUBCUTANEOUS at 06:14

## 2017-03-14 RX ADMIN — PIPERACILLIN AND TAZOBACTAM 3.38 G: 3; .375 INJECTION, POWDER, LYOPHILIZED, FOR SOLUTION INTRAVENOUS; PARENTERAL at 00:22

## 2017-03-14 RX ADMIN — SODIUM CHLORIDE, POTASSIUM CHLORIDE, SODIUM LACTATE AND CALCIUM CHLORIDE 50 ML/HR: 600; 310; 30; 20 INJECTION, SOLUTION INTRAVENOUS at 01:04

## 2017-03-14 RX ADMIN — HYDROMORPHONE HYDROCHLORIDE 0.25 MG: 1 INJECTION, SOLUTION INTRAMUSCULAR; INTRAVENOUS; SUBCUTANEOUS at 03:18

## 2017-03-14 RX ADMIN — HEPARIN SODIUM 5000 UNITS: 5000 INJECTION, SOLUTION INTRAVENOUS; SUBCUTANEOUS at 09:07

## 2017-03-14 RX ADMIN — IPRATROPIUM BROMIDE AND ALBUTEROL SULFATE 3 ML: .5; 3 SOLUTION RESPIRATORY (INHALATION) at 16:16

## 2017-03-14 RX ADMIN — IPRATROPIUM BROMIDE AND ALBUTEROL SULFATE 3 ML: .5; 3 SOLUTION RESPIRATORY (INHALATION) at 20:15

## 2017-03-14 RX ADMIN — INSULIN ASPART 2 UNITS: 100 INJECTION, SOLUTION INTRAVENOUS; SUBCUTANEOUS at 12:14

## 2017-03-14 RX ADMIN — WARFARIN SODIUM 6 MG: 3 TABLET ORAL at 18:57

## 2017-03-14 RX ADMIN — METOPROLOL SUCCINATE 25 MG: 25 TABLET, EXTENDED RELEASE ORAL at 09:07

## 2017-03-14 RX ADMIN — IPRATROPIUM BROMIDE AND ALBUTEROL SULFATE 3 ML: .5; 3 SOLUTION RESPIRATORY (INHALATION) at 11:17

## 2017-03-14 RX ADMIN — IPRATROPIUM BROMIDE AND ALBUTEROL SULFATE 3 ML: .5; 3 SOLUTION RESPIRATORY (INHALATION) at 08:19

## 2017-03-14 RX ADMIN — PANTOPRAZOLE SODIUM 40 MG: 40 INJECTION, POWDER, FOR SOLUTION INTRAVENOUS at 06:09

## 2017-03-14 NOTE — PROGRESS NOTES
Surgery Progress Note   Chief Complaint:  Postop day 1 laparoscopic cholecystectomy for acute cholecystitis with cholelithiasis and gallstone pancreatitis    Subjective     Interval History:     Alexandria is doing well today.  She has tolerated a full liquid diet without nausea or vomiting.  She feels well.      Objective     Vital Signs  Temp:  [97.4 °F (36.3 °C)-98.5 °F (36.9 °C)] 98.5 °F (36.9 °C)  Heart Rate:  [] 94  Resp:  [12-20] 20  BP: (115-155)/() 127/63  Body mass index is 40.6 kg/(m^2).    Intake/Output Summary (Last 24 hours) at 03/14/17 1116  Last data filed at 03/14/17 0646   Gross per 24 hour   Intake 1153.6 ml   Output   1985 ml   Net -831.4 ml             Physical Exam:   General: patient awake, alert and cooperative   Abdomen: soft, no distension, dressing intact   Extremities: no rash or edema   Neurologic: Normal mood and behavior     Results Review:     I reviewed the patient's new clinical results.      WBC No results found for: WBCS   HGB HEMOGLOBIN   Date Value Ref Range Status   03/14/2017 10.1 (L) 12.0 - 16.0 g/dL Final   03/13/2017 10.0 (L) 12.0 - 16.0 g/dL Final   03/12/2017 9.7 (L) 12.0 - 16.0 g/dL Final      HCT HEMATOCRIT   Date Value Ref Range Status   03/14/2017 32.7 (L) 37.0 - 47.0 % Final   03/13/2017 32.9 (L) 37.0 - 47.0 % Final   03/12/2017 31.5 (L) 37.0 - 47.0 % Final      Platlets No results found for: LABPLAT     PT/INR:    PROTIME   Date Value Ref Range Status   03/13/2017 16.1 (H) 12.1 - 15.0 Seconds Final   03/12/2017 19.9 (H) 12.1 - 15.0 Seconds Final   03/12/2017 24.7 (H) 12.1 - 15.0 Seconds Final   /  INR   Date Value Ref Range Status   03/13/2017 1.28 (H) 0.90 - 1.10 Final   03/12/2017 1.67 (H) 0.90 - 1.10 Final   03/12/2017 2.19 (H) 0.90 - 1.10 Final       Sodium SODIUM   Date Value Ref Range Status   03/14/2017 145 136 - 145 mmol/L Final   03/13/2017 142 136 - 145 mmol/L Final   03/12/2017 144 136 - 145 mmol/L Final      Potassium POTASSIUM   Date Value  Ref Range Status   03/14/2017 3.8 3.5 - 5.2 mmol/L Final   03/13/2017 4.0 3.5 - 5.2 mmol/L Final   03/12/2017 3.0 (L) 3.5 - 5.2 mmol/L Final      Chloride CHLORIDE   Date Value Ref Range Status   03/14/2017 107 98 - 107 mmol/L Final   03/13/2017 104 98 - 107 mmol/L Final   03/12/2017 103 98 - 107 mmol/L Final      Bicarbonate No results found for: PLASMABICARB   BUN BUN   Date Value Ref Range Status   03/14/2017 18 8 - 23 mg/dL Final   03/13/2017 22 8 - 23 mg/dL Final   03/12/2017 29 (H) 8 - 23 mg/dL Final      Creatinine CREATININE   Date Value Ref Range Status   03/14/2017 1.40 (H) 0.57 - 1.00 mg/dL Final   03/13/2017 1.52 (H) 0.57 - 1.00 mg/dL Final   03/12/2017 1.77 (H) 0.57 - 1.00 mg/dL Final      Calcium CALCIUM   Date Value Ref Range Status   03/14/2017 9.3 8.8 - 10.5 mg/dL Final   03/13/2017 9.4 8.8 - 10.5 mg/dL Final   03/12/2017 9.5 8.8 - 10.5 mg/dL Final      Magnesium  AST  ALT  Bilirubin, Total  AlkPhos  Albumin    Amylase  Lipase    Radiology: No results found for: MG  No components found for: AST.*  No components found for: ALT.*  No components found for: BILIRUBIN, TOTAL.*    No components found for: ALKPHOS.*  No components found for: ALBUMIN.*      No components found for: AMYLASE.*  No components found for: LIPASE.*            Imaging Results (most recent)     Procedure Component Value Units Date/Time    US Abdomen Complete [[id number]] Collected:  03/08/17 1432     Updated:  03/08/17 1452    Narrative:       INDICATION: Transabdominal pain for 4 days.     EXAM: Abdominal ultrasound, complete.     COMPARISON: CT chest dated 01/12/2017     FINDINGS:  The pancreas is prominent measuring up to 2.5 cm in thickness. Although  nonspecific, this is unusual for a patient of this age. Please  clinically for any evidence of acute pancreatic  inflammation/pancreatitis.     The echogenicity and echotexture of the hepatic parenchyma is within  normal limits. No hepatic mass. Mild intrahepatic and  extrahepatic  biliary dilatation. The common duct is incompletely visualized, however  measures up to 0.8-0.9 cm in diameter.     There is a small gallstones in the gallbladder. There is mild  gallbladder wall thickening measuring up to 0.6 cm. There is borderline  gallbladder distention. No pericholecystic fluid..     The right kidney measures 9.8 cm. The left kidney measures 11.7 cm.  Bilateral renal cortical atrophy is more notable on the right kidney. No  hydronephrosis.     The spleen is mildly enlarged measuring 16 cm.       The abdominal aorta is normal in size.  The juxtahepatic IVC is within  normal limits.  No ascites.       Impression:          1. Mild intrahepatic and extrahepatic biliary dilatation. The entire  common bile duct was not visualized, therefore, distal obstructing stone  or mass may be present. Consider further evaluation with a MRCP.  2. Cholelithiasis. There is mild gallbladder distention and and  gallbladder wall thickening. These findings are concerning for possible  acute cholecystitis, however, may simply be secondary to the biliary  distention.  3. Prominent appearance of the pancreas. While nonspecific, this is  concerning for possible pancreatitis.     This report was finalized on 3/8/2017 2:50 PM by Dr. Damian Kennedy MD.       MRI MRCP [[id number]] Collected:  03/09/17 0847     Updated:  03/09/17 0902    Narrative:       MRI ABDOMEN, NONCONTRAST, INCLUDING MRCP, 03/09/2017:     HISTORY:   81-year-old female with with abdomen pain, leukocytosis. Elevated  amylase, lipase and bilirubin levels. Previous pancreatitis seen here  January 2017. Current ultrasound showing cholelithiasis, mild bile duct  dilatation and pancreas enlargement. Evaluate for choledocholithiasis,  gallstone pancreatitis.     TECHNIQUE:  MRI examination of the abdomen was performed without contrast  administration due to abnormal renal function (GFR 18). MRCP sequences  were also obtained. The images are  significantly degraded by respiratory  motion artifact.     FINDINGS:  Multiple small gallstones are resident within a diffusely thick-walled  gallbladder, there is evidence of edema within and adjacent to the  bladder wall. Acute cholecystitis is likely.     There is mild intrahepatic and extra hepatic bile duct dilatation to the  level of the ampulla with the upper extrahepatic bile duct measuring  about 10 mm. There is a single round filling defect within the mid  common bile duct measuring 4 to 5 mm consistent with  choledocholithiasis.     There is mild diffuse enlargement of the pancreas which appears mildly  edematous. The pancreatic duct is also mildly prominent at about 5 mm.  The appearance is compatible with mild acute pancreatitis.     Mildly nodular liver margin suggesting potential chronic liver disease.  No visible focal liver lesion. Hepatic vasculature appears patent. The  spleen is mildly enlarged at about 14 cm. Mild atrophy of the right  kidney. Small benign-appearing cyst within each kidney.       Impression:       1. Cholelithiasis with likely acute cholecystitis.  2. Choledocholithiasis with a single small stone within the mid common  bile duct. Minimal-mild intrahepatic and extra hepatic bile duct  dilatation. Mildly prominent pancreatic duct.  3. Evidence of mild diffuse pancreatitis.  4. Lobulated hepatic contour suggesting chronic liver disease/cirrhosis.  Mild splenomegaly.  5. Mild right renal parenchymal atrophy. Small bilateral renal cysts.  6. Technically limited study as noted above.     This report was finalized on 3/9/2017 9:00 AM by Dr. Art Bateman MD.       SCANNED - IMAGING [[id number]] Resulted:  03/08/17      Updated:  03/10/17 1048    FL ERCP [[id number]] Collected:  03/10/17 1517     Updated:  03/10/17 1523    Narrative:       FLUOROSCOPY DURING ERCP PROCEDURE, 03/10/2017:     HISTORY:  81-year-old female with recent imaging studies showing  cholelithiasis,  choledocholithiasis and likely acute cholecystitis.     REPORT:  C-arm fluoroscopy was provided by radiology personnel in the OR during  ERCP procedure performed by Dr. Estrada. Fluoroscopy time was  recorded as 6.20 minutes. 10 spot film images were recorded for  documentation purposes. By report, a common bile duct stone was  extracted. The images show mild intrahepatic and extra hepatic bile duct  dilatation and a mildly dilated pancreatic duct. Please see operative  note for details.     This report was finalized on 3/10/2017 3:21 PM by Dr. Art Bateman MD.       FL Cholangiogram Operative [[id number]] Collected:  03/13/17 1430     Updated:  03/13/17 1435    Narrative:       FLUOROSCOPY DURING CHOLECYSTECTOMY WITH INTRAOPERATIVE CHOLANGIOGRAM,  03/13/2017:     HISTORY:  81-year-old female with acute cholecystitis and choledocholithiasis  status post recent ERCP with stone extraction. Laparoscopic  cholecystectomy with intraoperative cholangiogram.     REPORT:  C-arm fluoroscopy was provided by radiology personnel in the OR during  laparoscopic cholecystectomy and intraoperative cholangiogram.  Fluoroscopy time was recorded as 25 seconds.      Only one fluoroscopic spot image was recorded during the procedure. This  image shows contrast material within the common bile duct, but no  proximal contrast within the common hepatic duct or intrahepatic bile  ducts. The common bile duct appears at least mildly dilated. No visible  common bile duct stone on the provided image. Contrast flows into the  duodenum through an obstructed ampulla. Correlation with real-time  fluoroscopic findings at surgery is recommended. Please see operative  note for details.     This report was finalized on 3/13/2017 2:33 PM by Dr. Art Bateman MD.                  lactated ringers 50 mL/hr Last Rate: 50 mL/hr (03/14/17 0104)   lactated ringers 9 mL/hr          Assessment/Plan     Patient Active Problem List    Diagnosis Code   • Hyperglycemia R73.9   • Acute hyperglycemia R73.9   • Sepsis due to urinary tract infection A41.9, N39.0   • Permanent atrial fibrillation I48.2   • Acute renal failure with tubular necrosis N17.0   • Acute on chronic respiratory failure J96.20   • Influenza A with respiratory manifestations J10.1   • Chronic anticoagulation Z79.01   • Lymphedema I89.0   • Morbid obesity E66.01   • Obstructive sleep apnea of adult G47.33   • Abdominal pain R10.9   • Cholecystitis K81.9   • Chronic atrial fibrillation I48.2   • Choledocholithiasis with acute cholecystitis K80.42   • Acute biliary pancreatitis without infection or necrosis K85.10   • Acute kidney injury N17.9   • Chronic kidney disease, stage III (moderate) N18.3   • Acute cystitis without hematuria N30.00   • Escherichia coli urinary tract infection N39.0, B96.20   • Electrolyte imbalance E87.8   • Chronic diastolic congestive heart failure I50.32   • Pulmonary hypertension I27.2   • Diabetes mellitus type 2 in obese E11.9, E66.9   • Lymphedema I89.0   • Chronic respiratory failure with hypoxia J96.11       Postop day 1  --I will have Dr. Fitzgerald remove her drain tomorrow before she goes back to the [facility]  --advance diet      Joy Pham DO  03/14/17  11:16 AM

## 2017-03-14 NOTE — PLAN OF CARE
Problem: Patient Care Overview (Adult)  Goal: Plan of Care Review  Outcome: Ongoing (interventions implemented as appropriate)    03/14/17 0425   Coping/Psychosocial Response Interventions   Plan Of Care Reviewed With patient   Patient Care Overview   Progress improving   Outcome Evaluation   Outcome Summary/Follow up Plan pt tolerating pain with pain meds; significant output around joseph drain; no c/o nausea or vomiting overnight; VSS       Goal: Adult Individualization and Mutuality  Outcome: Ongoing (interventions implemented as appropriate)  Goal: Discharge Needs Assessment  Outcome: Ongoing (interventions implemented as appropriate)    Problem: Cardiac Output, Decreased (Adult)  Goal: Adequate Cardiac Output/Effective Tissue Perfusion  Outcome: Ongoing (interventions implemented as appropriate)    03/14/17 0425   Cardiac Output, Decreased (Adult)   Adequate Cardiac Output/Effective Tissue Perfusion making progress toward outcome         Problem: Fall Risk (Adult)  Goal: Absence of Falls  Outcome: Ongoing (interventions implemented as appropriate)    03/14/17 0425   Fall Risk (Adult)   Absence of Falls making progress toward outcome         Problem: Perioperative Period (Adult)  Goal: Signs and Symptoms of Listed Potential Problems Will be Absent or Manageable (Perioperative Period)  Outcome: Ongoing (interventions implemented as appropriate)    03/14/17 0425   Perioperative Period   Problems Assessed (Perioperative Period) all   Problems Present (Perioperative Period) pain;situational response

## 2017-03-14 NOTE — PLAN OF CARE
Problem: Cardiac Output, Decreased (Adult)  Goal: Adequate Cardiac Output/Effective Tissue Perfusion  Outcome: Ongoing (interventions implemented as appropriate)    03/14/17 1931   Cardiac Output, Decreased (Adult)   Adequate Cardiac Output/Effective Tissue Perfusion making progress toward outcome         Problem: Tissue Perfusion, Ineffective Peripheral (Adult)  Goal: Identify Related Risk Factors and Signs and Symptoms  Outcome: Ongoing (interventions implemented as appropriate)  Goal: Adequate Tissue Perfusion  Outcome: Ongoing (interventions implemented as appropriate)    Problem: Fall Risk (Adult)  Goal: Absence of Falls  Outcome: Ongoing (interventions implemented as appropriate)    Problem: GI Endoscopy (Adult)  Goal: Signs and Symptoms of Listed Potential Problems Will be Absent or Manageable (GI Endoscopy)  Outcome: Ongoing (interventions implemented as appropriate)    Problem: Perioperative Period (Adult)  Goal: Signs and Symptoms of Listed Potential Problems Will be Absent or Manageable (Perioperative Period)  Outcome: Ongoing (interventions implemented as appropriate)

## 2017-03-14 NOTE — PLAN OF CARE
Problem: Perioperative Period (Adult)  Intervention: Promote Pulmonary Hygiene and Secretion Clearance    03/13/17 2017   Promote Aggressive Pulmonary Hygiene/Secretion Management   Cough And Deep Breathing done with encouragement   Incentive Spirometer   Administration (Incentive Spirometer) done with encouragement

## 2017-03-14 NOTE — THERAPY DISCHARGE NOTE
Acute Care - Physical Therapy Initial Eval/Discharge  [facility]     Patient Name: Alexandria Villanueva  : 1935  MRN: [id number]  Today's Date: 3/14/2017   Onset of Illness/Injury or Date of Surgery Date: 17  Date of Referral to PT: 17  Referring Physician: MAY Stevenson      Admit Date: 3/8/2017    Visit Dx:    ICD-10-CM ICD-9-CM   1. Choledocholithiasis K80.50 574.50   2. Cholecystitis K81.9 575.10   3. Pain R52 780.96     Patient Active Problem List   Diagnosis   • Hyperglycemia   • Acute hyperglycemia   • Sepsis due to urinary tract infection   • Permanent atrial fibrillation   • Acute renal failure with tubular necrosis   • Acute on chronic respiratory failure   • Influenza A with respiratory manifestations   • Chronic anticoagulation   • Lymphedema   • Morbid obesity   • Obstructive sleep apnea of adult   • Abdominal pain   • Cholecystitis   • Chronic atrial fibrillation   • Choledocholithiasis with acute cholecystitis   • Acute biliary pancreatitis without infection or necrosis   • Acute kidney injury   • Chronic kidney disease, stage III (moderate)   • Acute cystitis without hematuria   • Escherichia coli urinary tract infection   • Electrolyte imbalance   • Chronic diastolic congestive heart failure   • Pulmonary hypertension   • Diabetes mellitus type 2 in obese   • Lymphedema   • Chronic respiratory failure with hypoxia     Past Medical History   Diagnosis Date   • Anemia    • Arthritis    • CHF (congestive heart failure)    • Chronic diastolic (congestive) heart failure    • Chronic kidney disease    • COPD (chronic obstructive pulmonary disease)    • Diabetes mellitus    • DVT (deep venous thrombosis)    • Dyslipidemia    • Gout    • Hypertension    • Lymphedema    • Morbid obesity    • Obstructive sleep apnea of adult      untreated   • Permanent atrial fibrillation    • Pulmonary HTN    • Respiratory failure, acute      hypoxic     Past Surgical History   Procedure Laterality  Date   • Hernia repair     • Eye surgery     • Femur fracture surgery Right      closed reduction external fixation   • Cataract extraction       both eyes 4 years ago   • Fracture surgery     • Tonsillectomy     • Pr ercp dx collection specimen brushing/washing N/A 3/10/2017     Procedure: ENDOSCOPIC RETROGRADE CHOLANGIOPANCREATOGRAPHY sphincterotomy with stone extraction ;  Surgeon: Jose Estrada MD;  Location: [facility];  Service: Gastroenterology          PT ASSESSMENT (last 72 hours)      PT Evaluation       03/14/17 1330 03/13/17 1601    Rehab Evaluation    Document Type evaluation  -BP     Subjective Information agree to therapy;no complaints  -BP     Patient Effort, Rehab Treatment good  -BP     Symptoms Noted During/After Treatment none  -BP     General Information    Patient Profile Review yes  -BP     Onset of Illness/Injury or Date of Surgery Date 03/08/17  -BP     Referring Physician MAY Stevenson  -BP     General Observations Patient sitting in bedside chair. Patient transferred to bed to chair via jamie lift with nursing.   -BP     Pertinent History Of Current Problem Patient is s/p lap monty. Patient is a resident at [facility]. Per patient and chart review patient is bed boud/wheelchair bound. She transfers via mechanical lift with staff. She is dependent for w/c mobility and all ADL's at baseline.   -BP     Precautions/Limitations fall precautions  -BP     Prior Level of Function dependent:;ADL's;bed mobility;transfer;w/c or scooter   Patient does not ambulate   -BP     Equipment Currently Used at Home wheelchair;lift device   jamie lift   -BP     Plans/Goals Discussed With patient;agreed upon  -BP     Barriers to Rehab previous functional deficit;physical barrier  -BP     Living Environment    Lives With facility resident  -BP     Living Arrangements extended care facility   [place]   -BP     Home Accessibility no concerns  -BP     Clinical Impression    Date  of Referral to PT 03/14/17  -BP     Criteria for Skilled Therapeutic Interventions Met no problems identified which require skilled intervention  -BP     Pain Assessment    Pain Assessment No/denies pain  -BP     ROM (Range of Motion)    General ROM Detail L knee/ankle AROM WFL. R knee extension 25% reduced. Unable to fully assess knee flexion due to positioning in chair. L ankle AROM WFL. B UE AROM WFL  -BP     MMT (Manual Muscle Testing)    General MMT Assessment Detail L LE gross MMT 3-/5. R LE gross MMT 2+/5. B UE gross MMT 3+/5  -BP                           Mobility Assessment/Training    Additional Documentation Wheelchair Training/Management (Group)  -BP     Bed Mobility, Assessment/Treatment    Bed Mobility, Comment Not assessed, patient up in chair via jamie lift   -BP     Transfer Assessment/Treatment    Transfer, Comment Not appropriate to assess due to prior level of function. Patient dependent for transfers via mechanical lift at baseline.   -BP     Gait Assessment/Treatment    Gait, Comment Not appropriate to assess patient is w/c bound, does not ambulate at baseline.   -BP     Wheelchair Training/Management    Wheelchair Safety Comment Not appropriate to assessed, patient dependent for w/c propulsion and management at baseline.   -BP     Positioning and Restraints    Pre-Treatment Position sitting in chair/recliner  -BP     Post Treatment Position chair  -BP     In Chair reclined;notified nsg;call light within reach;encouraged to call for assist  -BP       User Key  (r) = Recorded By, (t) = Taken By, (c) = Cosigned By    Initials Name Provider Type    AMAURY Randhawa, RN Registered Nurse    BP Kavita Alexander, PT Physical Therapist    MW Steph Gallardo, RN Registered Nurse          Physical Therapy Education     Title: PT OT SLP Therapies (Resolved)     Topic: Physical Therapy (Resolved)     Point: Mobility training (Resolved)    Learning Progress Summary    Learner Readiness Method Response Comment  Documented by Status   Patient Acceptance E VU Educated patient on continued use of mechanical lift for transfers with nursing staff. BP 03/14/17 1427 Done                      User Key     Initials Effective Dates Name Provider Type Discipline     12/01/15 -  Kavita Alexander, ARLINE Physical Therapist PT                PT Recommendation and Plan  Anticipated Equipment Needs At Discharge:  (none)  Anticipated Discharge Disposition: extended care facility  Planned Therapy Interventions:  (evaluation only)  PT Frequency: evaluation only  Plan of Care Review  Plan Of Care Reviewed With: patient  Outcome Summary/Follow up Plan: PT Evaluation Complete: Patient is dependent for all functional transfers and mobility. She is a resident at [facility] where a mechanical lift is utilized for all transfers. She is dependent for w/c mobility and all ADL's. Functional mobility not appropriate to assess at this time. Patient is currently at baseline function. Recommend continued use of jamie lift for all transfers. Patient to discharge back to [facility]. No further skilled PT needs at this time.               Outcome Measures       03/14/17 1330          How much help from another person do you currently need...    Turning from your back to your side while in flat bed without using bedrails? 1  -BP      Moving from lying on back to sitting on the side of a flat bed without bedrails? 1  -BP      Moving to and from a bed to a chair (including a wheelchair)? 1  -BP      Standing up from a chair using your arms (e.g., wheelchair, bedside chair)? 1  -BP      Climbing 3-5 steps with a railing? 1  -BP      To walk in hospital room? 1  -BP      AM-PAC 6 Clicks Score 6  -BP      Functional Assessment    Outcome Measure Options AM-PAC 6 Clicks Basic Mobility (PT)  -BP        User Key  (r) = Recorded By, (t) = Taken By, (c) = Cosigned By    Initials Name Provider Type    BP Kavita Alexander, PT Physical Therapist           Time  Calculation:         PT Charges       03/14/17 1433          Time Calculation    Start Time 1325  -BP      PT Received On 03/14/17  -BP        User Key  (r) = Recorded By, (t) = Taken By, (c) = Cosigned By    Initials Name Provider Type    BP Kavita Alexander, PT Physical Therapist          Therapy Charges for Today     Code Description Service Date Service Provider Modifiers Qty    [id number] HC PT EVAL LOW COMPLEXITY 2 3/14/2017 Kavita Alexander PT GP 1          PT G-Codes  Outcome Measure Options: AM-PAC 6 Clicks Basic Mobility (PT)    PT Discharge Summary  Anticipated Discharge Disposition: extended care facility  Reason for Discharge: At baseline function  Discharge Destination: Extended care facility - LTC    Kavita Alexander, PT  3/14/2017

## 2017-03-14 NOTE — PROGRESS NOTES
"Hospitalist Team      Patient Care Team:  Gerardo Williamson MD as PCP - General  Gerardo Williamson MD as PCP - Family Medicine        Chief Complaint:  POD 1 Lab monty    Subjective    Interval History and ROS:     Patient States Feeling better today.  Drain still in this morning  Patient Complaints: none.  Patient Denies:  Nausea or vomiting  History taken from: patient      Objective    Vital Signs  Temp:  [97.4 °F (36.3 °C)-98.5 °F (36.9 °C)] 98.5 °F (36.9 °C)  Heart Rate:  [] 89  Resp:  [12-19] 18  BP: (115-155)/() 127/63  Oxygen Therapy  SpO2: 98 %  Pulse Oximetry Type: Continuous  O2 Device: nasal cannula  Flow (L/min): 3  Oxygen Concentration (%): 38  EtCO2 (mm Hg) (Respiratory Monitoring): 31}  Flowsheet Rows         First Filed Value    Admission Height  64\" (162.6 cm) Documented at 03/08/2017 1700    Admission Weight  219 lb 9.6 oz (99.6 kg) Documented at 03/08/2017 1700            Physical Exam:    Physical Exam   Constitutional: Patient appears tired but in no acute distress   HEENT:   Head: Normocephalic and atraumatic.   Eyes:  Pupils are equal, round, and reactive to light. EOM are intact. Sclera are anicteric and non-injected.  Mouth and Throat: Patient has moist mucous membranes. Oropharynx is clear of any erythema or exudate.     Neck: Neck supple. No JVD present. No thyromegaly present. No lymphadenopathy present.  Cardiovascular: Regular rate, regular rhythm, S1 normal and S2 normal.  Exam reveals no gallop and no friction rub.  No murmur heard.  Pulmonary/Chest: Lungs are clear to auscultation bilaterally. No respiratory distress. No wheezes. No rhonchi. No rales.   Abdominal: Soft. Bowel sounds are normal. No distension and no mass. There is no hepatosplenomegaly. There is tenderness over the site of surgery but no pain tenderness.   Musculoskeletal: Poor Muscle tone. Patient is confined to wheel chair in nursing home  Extremities: positive for edema.  Patient has known lymphedema but " no worse.  Pulses are palpable in all 4 extremities.  Neurological: Patient is alert and oriented to person, place, and time. Cranial nerves II-XII are grossly intact with no focal deficits.  Skin: Skin is warm. No rash noted. Nails show no clubbing.  No cyanosis or erythema.         Results Review:     I reviewed the patient's new clinical results.    Lab Results (last 24 hours)     Procedure Component Value Units Date/Time    POC Glucose Fingerstick [[id number]]  (Normal) Collected:  03/13/17 1141    Specimen:  Blood Updated:  03/13/17 1149     Glucose 105 mg/dL     Narrative:       Meter: [id number] : [id number] Marcell Hicks PCA    Tissue Exam [[id number]] Collected:  03/13/17 1359    Specimen:  Tissue from Gallbladder Updated:  03/13/17 1545    POC Glucose Fingerstick [[id number]]  (Normal) Collected:  03/13/17 1845    Specimen:  Blood Updated:  03/13/17 1852     Glucose 120 mg/dL     Narrative:       Meter: [id number] : [id number] Sydnee Huertas RN    POC Glucose Fingerstick [[id number]]  (Normal) Collected:  03/14/17 0008    Specimen:  Blood Updated:  03/14/17 0015     Glucose 124 mg/dL     Narrative:       Meter: [id number] : [id number] Erick Staton    CBC & Differential [[id number]] Collected:  03/14/17 0312    Specimen:  Blood Updated:  03/14/17 0417    Narrative:       The following orders were created for panel order CBC & Differential.  Procedure                               Abnormality         Status                     ---------                               -----------         ------                     CBC Auto Differential[[id number]]         Abnormal            Final result                 Please view results for these tests on the individual orders.    CBC Auto Differential [[id number]]  (Abnormal) Collected:  03/14/17 0312    Specimen:  Blood Updated:  03/14/17 0417     WBC 5.64 10*3/mm3      RBC 3.60 (L) 10*6/mm3      Hemoglobin 10.1 (L) g/dL      Hematocrit 32.7 (L) %      MCV 90.8 fL       MCH 28.1 pg      MCHC 30.9 (L) g/dL      RDW 14.6 (H) %      RDW-SD 49.1 fl      MPV 10.2 fL      Platelets 155 10*3/mm3      Neutrophil % 73.6 (H) %      Lymphocyte % 17.7 (L) %      Monocyte % 6.4 %      Eosinophil % 0.5 %      Basophil % 0.4 %      Immature Grans % 1.4 (H) %      Neutrophils, Absolute 4.15 10*3/mm3      Lymphocytes, Absolute 1.00 10*3/mm3      Monocytes, Absolute 0.36 10*3/mm3      Eosinophils, Absolute 0.03 (L) 10*3/mm3      Basophils, Absolute 0.02 10*3/mm3      Immature Grans, Absolute 0.08 (H) 10*3/mm3      nRBC 0.0 /100 WBC     Lipase [[id number]]  (Normal) Collected:  03/14/17 0312    Specimen:  Blood Updated:  03/14/17 0432     Lipase 16 U/L     Comprehensive Metabolic Panel [[id number]]  (Abnormal) Collected:  03/14/17 0312    Specimen:  Blood Updated:  03/14/17 0450     Glucose 125 (H) mg/dL      BUN 18 mg/dL      Creatinine 1.40 (H) mg/dL      Sodium 145 mmol/L      Potassium 3.8 mmol/L      Chloride 107 mmol/L      CO2 27.4 mmol/L      Calcium 9.3 mg/dL      Total Protein 5.3 (L) g/dL      Albumin 2.40 (L) g/dL      ALT (SGPT) 44 (H) U/L      AST (SGOT) 28 U/L      Alkaline Phosphatase 242 (H) U/L      Total Bilirubin 0.9 mg/dL      eGFR Non African Amer 36 (L) mL/min/1.73      Globulin 2.9 gm/dL      A/G Ratio 0.8 g/dL      BUN/Creatinine Ratio 12.9      Anion Gap 10.6 mmol/L     Narrative:       The MDRD GFR formula is only valid for adults with stable renal function between ages 18 and 70.    Amylase [[id number]]  (Normal) Collected:  03/14/17 0312    Specimen:  Blood Updated:  03/14/17 0450     Amylase 36 U/L     POC Glucose Fingerstick [[id number]]  (Normal) Collected:  03/14/17 0608    Specimen:  Blood Updated:  03/14/17 0614     Glucose 126 mg/dL     Narrative:       Meter: [id number] : [id number] Prashant Loredo RN          Imaging Results (last 24 hours)     Procedure Component Value Units Date/Time    FL Cholangiogram Operative [[id number]] Collected:  03/13/17 1430     Updated:   03/13/17 1435    Narrative:       FLUOROSCOPY DURING CHOLECYSTECTOMY WITH INTRAOPERATIVE CHOLANGIOGRAM,  03/13/2017:     HISTORY:  81-year-old female with acute cholecystitis and choledocholithiasis  status post recent ERCP with stone extraction. Laparoscopic  cholecystectomy with intraoperative cholangiogram.     REPORT:  C-arm fluoroscopy was provided by radiology personnel in the OR during  laparoscopic cholecystectomy and intraoperative cholangiogram.  Fluoroscopy time was recorded as 25 seconds.      Only one fluoroscopic spot image was recorded during the procedure. This  image shows contrast material within the common bile duct, but no  proximal contrast within the common hepatic duct or intrahepatic bile  ducts. The common bile duct appears at least mildly dilated. No visible  common bile duct stone on the provided image. Contrast flows into the  duodenum through an obstructed ampulla. Correlation with real-time  fluoroscopic findings at surgery is recommended. Please see operative  note for details.     This report was finalized on 3/13/2017 2:33 PM by Dr. Art Bateamn MD.             Xray not reviewed personally by physician.      ECG not reviewed personally by physician  ECG/EMG Results (most recent)     None          Medication Review:   I have reviewed the patient's current medication list    Current Facility-Administered Medications:   •  acetaminophen (TYLENOL) tablet 650 mg, 650 mg, Oral, Q6H PRN, Glendy Gonsalez MD, 650 mg at 03/11/17 1207  •  dextrose (D50W) solution 25 g, 25 g, Intravenous, Q15 Min PRN, Glendy Gonsalez MD  •  dextrose (GLUTOSE) oral gel 15 g, 15 g, Oral, Q15 Min PRN, Glendy Gonsalez MD  •  glucagon (GLUCAGEN) injection 1 mg, 1 mg, Subcutaneous, Q15 Min PRN, Glendy Gonsalez MD  •  heparin (porcine) 5000 UNIT/ML injection 5,000 Units, 5,000 Units, Subcutaneous, Q12H, Joy Pham DO  •  HYDROcodone-acetaminophen  (NORCO) 5-325 MG per tablet 1 tablet, 1 tablet, Oral, Q6H PRN, Glendy Gonsalez MD, 1 tablet at 03/12/17 2323  •  HYDROmorphone (DILAUDID) injection 0.25 mg, 0.25 mg, Intravenous, Q1H PRN, Joy Pham DO, 0.25 mg at 03/14/17 0614  •  insulin aspart (novoLOG) injection 0-7 Units, 0-7 Units, Subcutaneous, Q6H, Glendy Gonsalez MD, 2 Units at 03/11/17 2006  •  ipratropium-albuterol (DUO-NEB) nebulizer solution 3 mL, 3 mL, Nebulization, 4x Daily - RT, Trevor Diallo MD, 3 mL at 03/13/17 1952  •  lactated ringers infusion, 50 mL/hr, Intravenous, Continuous, Glendy Gonsalez MD, Last Rate: 50 mL/hr at 03/14/17 0104, 50 mL/hr at 03/14/17 0104  •  lactated ringers infusion, 9 mL/hr, Intravenous, Continuous, Lorena Love CRNA  •  metoprolol succinate XL (TOPROL-XL) 24 hr tablet 25 mg, 25 mg, Oral, Q24H, Glendy Gonsalez MD, 25 mg at 03/13/17 0903  •  ondansetron (ZOFRAN) injection 4 mg, 4 mg, Intravenous, Q4H PRN, Joy Pham DO  •  pantoprazole (PROTONIX) injection 40 mg, 40 mg, Intravenous, Q AM, Trevor Diallo MD, 40 mg at 03/14/17 0609  •  piperacillin-tazobactam (ZOSYN) 3.375 g in sodium chloride 0.9 % 100 mL IVPB, 3.375 g, Intravenous, Q6H, Trevor Diallo MD, Stopped at 03/14/17 0646      Assessment/Plan     Hospital Problem List     * (Principal)Acute biliary pancreatitis without infection or necrosis    Abdominal pain    Cholecystitis    Choledocholithiasis with acute cholecystitis:  Patient has a drain in and will be removed tomorrow per the surgeon.  I have advanced diet and changed the patient to a med surg telemetry patient.  Discharge is planned for in am.      Acute kidney injury:  GFR 36 today    Renal condition is improving with treatment.  Continue current treatment regimen.  Renal condition will be reassessed As an outpatient.      Acute cystitis without hematuria    Escherichia coli urinary tract infection      Electrolyte imbalance:   Resolved      Chronic respiratory failure with hypoxia:  94% on 2 L NC      Anticoagulation/ High Risk Drug:  Coumadin at 6 mg daily resumed today.  Heparin discontinued      Morbid obesity (Chronic)    Obesity is unchanged  Will address as outpatient    Obstructive sleep apnea of adult (Chronic)    Overview Signed 1/12/2017  4:33 PM by Luis Fernando Stevens III, MD     untreated         Chronic atrial fibrillation (Chronic)      Chronic kidney disease, stage III (moderate)    Renal condition is improving with treatment.  Continue current treatment regimen.  Renal condition will be reassessed as outpatient if needed.      Chronic diastolic congestive heart failure    Congestive heart failure due to diastolic heart failure.  Heart failure is unchanged.    Continue current treatment regimen.  Heart failure will be reassessed as outpatient.      Pulmonary hypertension    Diabetes mellitus type 2 in obese    Diabetes is unchanged.   Continue current treatment regimen.  Diabetes will be reassessed as outpatient.      Lymphedema          Plan for disposition: Anticipate discharge in am.    Alice Garcia DO  03/14/17  7:58 AM      Time: 20 min

## 2017-03-14 NOTE — PLAN OF CARE
Problem: Patient Care Overview (Adult)  Goal: Plan of Care Review    03/14/17 1428   Coping/Psychosocial Response Interventions   Plan Of Care Reviewed With patient   Outcome Evaluation   Outcome Summary/Follow up Plan PT Evaluation Complete: Patient is dependent for all functional transfers and mobility. She is a resident at [facility] where a mechanical lift is utilized for all transfers. She is dependent for w/c mobility and all ADL's. Functional mobility not appropriate to assess at due to prior level of function. Patient is currently at baseline. Recommend continued use of jamie lift for all transfers. Patient to discharge back to [facility]. No further skilled PT needs at this time.

## 2017-03-14 NOTE — PROGRESS NOTES
LOS: 6 days   Patient Care Team:  Gerardo Williamson MD as PCP - General  Gerardo Williamson MD as PCP - Family Medicine    Chief Complaint: hx AF       Interval History: Had monty yesterday.  Tolerating clears.  On 50 cc/hr of IVF.      Objective   Vital Signs  Temp:  [97.4 °F (36.3 °C)-98.5 °F (36.9 °C)] 98.5 °F (36.9 °C)  Heart Rate:  [] 89  Resp:  [12-19] 18  BP: (115-155)/() 127/63    Intake/Output Summary (Last 24 hours) at 03/14/17 0912  Last data filed at 03/14/17 0646   Gross per 24 hour   Intake 1153.6 ml   Output   1985 ml   Net -831.4 ml           Physical Exam   Constitutional: She is oriented to person, place, and time.   obese   HENT:   Head: Normocephalic.   Nose: Nose normal.   Mouth/Throat: Oropharynx is clear and moist.   Eyes: Conjunctivae and EOM are normal. Pupils are equal, round, and reactive to light.   Neck: Normal range of motion.   Cannot assess JVD due to body habitus   Cardiovascular: Normal rate, normal heart sounds and intact distal pulses.  An irregularly irregular rhythm present.   Pulmonary/Chest: Effort normal and breath sounds normal.   Abdominal: Soft. There is tenderness.   Cannot feel organs or aorta   Musculoskeletal: Normal range of motion. She exhibits edema (doughy legs, no pitting edema).   Neurological: She is alert and oriented to person, place, and time. No cranial nerve deficit.   Skin: Skin is warm and dry. No erythema.   Psychiatric: She has a normal mood and affect. Her behavior is normal. Judgment and thought content normal.   Vitals reviewed.      Results Review:        Results from last 7 days  Lab Units 03/14/17  0312 03/13/17  0409 03/12/17  0516   SODIUM mmol/L 145 142 144   POTASSIUM mmol/L 3.8 4.0 3.0*   CHLORIDE mmol/L 107 104 103   TOTAL CO2 mmol/L 27.4 28.0 29.0   BUN mg/dL 18 22 29*   CREATININE mg/dL 1.40* 1.52* 1.77*   GLUCOSE mg/dL 125* 111* 135*   CALCIUM mg/dL 9.3 9.4 9.5           Results from last 7 days  Lab Units 03/14/17  8885  03/13/17 0409 03/12/17  0516   WBC 10*3/mm3 5.64 4.17* 5.24   HEMOGLOBIN g/dL 10.1* 10.0* 9.7*   HEMATOCRIT % 32.7* 32.9* 31.5*   PLATELETS 10*3/mm3 155 138* 130*       Results from last 7 days  Lab Units 03/13/17  0409 03/12/17 2008 03/12/17  0516  03/08/17  1306   INR  1.28* 1.67* 2.19*  < > 2.01*   APTT seconds 25.1  --  56.4*  --  43.2*   < > = values in this interval not displayed.        Results from last 7 days  Lab Units 03/11/17  0406   MAGNESIUM mg/dL 2.3           I reviewed the patient's new clinical results.  I personally viewed and interpreted the patient's EKG/Telemetry data        Medication Review:     heparin (porcine) 5,000 Units Subcutaneous Q12H   insulin aspart 0-7 Units Subcutaneous Q6H   ipratropium-albuterol 3 mL Nebulization 4x Daily - RT   metoprolol succinate XL 25 mg Oral Q24H   pantoprazole 40 mg Intravenous Q AM   piperacillin-tazobactam 3.375 g Intravenous Q6H         lactated ringers 50 mL/hr Last Rate: 50 mL/hr (03/14/17 0104)   lactated ringers 9 mL/hr        Assessment/Plan     1.   Acute biliary pancreatitis without infection or necrosis -- s/p ERCP and monty  2.   Permanent AF -- rate controlled on home metoprolol dose.  Resume coumadin when okay.  3.   Chronic diastolic CHF with pulmonary hypertension -- resume home PO furosemide when she is back to steady state; currently she doesn't need it.  Consider stopping IVF later today if tolerating diet.  4.  CKD III -- improving, back to baseline        Jeronimo Mac MD  03/14/17  9:12 AM

## 2017-03-15 VITALS
SYSTOLIC BLOOD PRESSURE: 130 MMHG | TEMPERATURE: 98.2 F | HEIGHT: 64 IN | RESPIRATION RATE: 20 BRPM | OXYGEN SATURATION: 97 % | DIASTOLIC BLOOD PRESSURE: 72 MMHG | HEART RATE: 80 BPM | WEIGHT: 236.5 LBS | BODY MASS INDEX: 40.37 KG/M2

## 2017-03-15 LAB
GLUCOSE BLDC GLUCOMTR-MCNC: 119 MG/DL (ref 70–130)
GLUCOSE BLDC GLUCOMTR-MCNC: 163 MG/DL (ref 70–130)
INR PPP: 1.09 (ref 0.9–1.1)
PROTHROMBIN TIME: 14.1 SECONDS (ref 12.1–15)

## 2017-03-15 PROCEDURE — 82962 GLUCOSE BLOOD TEST: CPT

## 2017-03-15 PROCEDURE — 99239 HOSP IP/OBS DSCHRG MGMT >30: CPT | Performed by: NURSE PRACTITIONER

## 2017-03-15 PROCEDURE — 85610 PROTHROMBIN TIME: CPT | Performed by: HOSPITALIST

## 2017-03-15 PROCEDURE — 25010000002 PIPERACILLIN SOD-TAZOBACTAM PER 1 G

## 2017-03-15 PROCEDURE — 63710000001 INSULIN ASPART PER 5 UNITS: Performed by: HOSPITALIST

## 2017-03-15 PROCEDURE — 94799 UNLISTED PULMONARY SVC/PX: CPT

## 2017-03-15 PROCEDURE — 99232 SBSQ HOSP IP/OBS MODERATE 35: CPT | Performed by: INTERNAL MEDICINE

## 2017-03-15 RX ORDER — HYDROCODONE BITARTRATE AND ACETAMINOPHEN 5; 325 MG/1; MG/1
1 TABLET ORAL EVERY 6 HOURS PRN
Qty: 28 TABLET | Refills: 0 | Status: SHIPPED | OUTPATIENT
Start: 2017-03-15 | End: 2017-10-07

## 2017-03-15 RX ORDER — SODIUM CHLORIDE 9 MG/ML
INJECTION, SOLUTION INTRAVENOUS
Status: COMPLETED
Start: 2017-03-15 | End: 2017-03-15

## 2017-03-15 RX ORDER — ACETAMINOPHEN 325 MG/1
650 TABLET ORAL EVERY 6 HOURS PRN
Refills: 0
Start: 2017-03-15 | End: 2017-03-31

## 2017-03-15 RX ORDER — PIPERACILLIN SODIUM, TAZOBACTAM SODIUM 3; .375 G/15ML; G/15ML
INJECTION, POWDER, LYOPHILIZED, FOR SOLUTION INTRAVENOUS
Status: COMPLETED
Start: 2017-03-15 | End: 2017-03-15

## 2017-03-15 RX ORDER — L.ACID,PARA/B.BIFIDUM/S.THERM 8B CELL
1 CAPSULE ORAL DAILY
Start: 2017-03-15 | End: 2018-11-20

## 2017-03-15 RX ADMIN — METOPROLOL SUCCINATE 25 MG: 25 TABLET, EXTENDED RELEASE ORAL at 10:46

## 2017-03-15 RX ADMIN — HYDROCODONE BITARTRATE AND ACETAMINOPHEN 1 TABLET: 5; 325 TABLET ORAL at 06:30

## 2017-03-15 RX ADMIN — IPRATROPIUM BROMIDE AND ALBUTEROL SULFATE 3 ML: .5; 3 SOLUTION RESPIRATORY (INHALATION) at 11:09

## 2017-03-15 RX ADMIN — PANTOPRAZOLE SODIUM 40 MG: 40 INJECTION, POWDER, FOR SOLUTION INTRAVENOUS at 06:30

## 2017-03-15 RX ADMIN — PIPERACILLIN SODIUM,TAZOBACTAM SODIUM 3.38 MG OF PIPERACILLIN: 3; .375 INJECTION, POWDER, FOR SOLUTION INTRAVENOUS at 00:11

## 2017-03-15 RX ADMIN — INSULIN ASPART 2 UNITS: 100 INJECTION, SOLUTION INTRAVENOUS; SUBCUTANEOUS at 14:10

## 2017-03-15 RX ADMIN — PIPERACILLIN AND TAZOBACTAM 3375 MG: 3; .375 INJECTION, POWDER, FOR SOLUTION INTRAVENOUS at 06:29

## 2017-03-15 RX ADMIN — SODIUM CHLORIDE: 9 INJECTION, SOLUTION INTRAVENOUS at 00:12

## 2017-03-15 RX ADMIN — IPRATROPIUM BROMIDE AND ALBUTEROL SULFATE 3 ML: .5; 3 SOLUTION RESPIRATORY (INHALATION) at 07:33

## 2017-03-15 RX ADMIN — SODIUM CHLORIDE 100 ML: 9 INJECTION, SOLUTION INTRAVENOUS at 06:30

## 2017-03-15 NOTE — PLAN OF CARE
Problem: Patient Care Overview (Adult)  Goal: Plan of Care Review  Outcome: Ongoing (interventions implemented as appropriate)    03/15/17 0316   Coping/Psychosocial Response Interventions   Plan Of Care Reviewed With patient   Patient Care Overview   Progress progress toward functional goals as expected   Outcome Evaluation   Outcome Summary/Follow up Plan VSS, no complaints of pain. No other issues this shift. Patient ready to go home.       Goal: Discharge Needs Assessment  Outcome: Ongoing (interventions implemented as appropriate)    03/15/17 0316   Discharge Needs Assessment   Concerns To Be Addressed no discharge needs identified   Readmission Within The Last 30 Days no previous admission in last 30 days   Living Environment   Transportation Available car;family or friend will provide         Problem: Cardiac Output, Decreased (Adult)  Goal: Adequate Cardiac Output/Effective Tissue Perfusion  Outcome: Ongoing (interventions implemented as appropriate)    03/15/17 0316   Cardiac Output, Decreased (Adult)   Adequate Cardiac Output/Effective Tissue Perfusion making progress toward outcome         Problem: Fall Risk (Adult)  Goal: Absence of Falls  Outcome: Ongoing (interventions implemented as appropriate)    03/15/17 0316   Fall Risk (Adult)   Absence of Falls making progress toward outcome         Problem: GI Endoscopy (Adult)  Goal: Signs and Symptoms of Listed Potential Problems Will be Absent or Manageable (GI Endoscopy)  Outcome: Ongoing (interventions implemented as appropriate)    03/15/17 0316   GI Endoscopy   Problems Assessed (GI Endoscopy) all   Problems Present (GI Endoscopy) situational response         Problem: Perioperative Period (Adult)  Goal: Signs and Symptoms of Listed Potential Problems Will be Absent or Manageable (Perioperative Period)  Outcome: Ongoing (interventions implemented as appropriate)    03/15/17 0316   Perioperative Period   Problems Assessed (Perioperative Period) all    Problems Present (Perioperative Period) none;situational response

## 2017-03-15 NOTE — PROGRESS NOTES
[facility]/GI Progress Note   Chief Complaint: Gallstone pancreatitis status post lap scopic cholecystectomy      patient is doing well today.  She has no complaints.  She is tolerating her diet without difficulty.  She would like to go home.      Objective     Vital Signs  Temp:  [97.5 °F (36.4 °C)-98.4 °F (36.9 °C)] 98.4 °F (36.9 °C)  Heart Rate:  [72-89] 72  Resp:  [16-18] 16  BP: (112-134)/(56-63) 122/59  Body mass index is 40.6 kg/(m^2).    Intake/Output Summary (Last 24 hours) at 03/15/17 1012  Last data filed at 03/15/17 0546   Gross per 24 hour   Intake    865 ml   Output    400 ml   Net    465 ml             Physical Exam:   General: patient awake, alert and cooperative   Abdomen: soft, nontender, nondistended; normal bowel sounds                                 All incisions are healing well.  There is no evidence of cellulitis or drainage.  Jesus Manuel-Rendon drainage is serous.  Jesus Manuel-Rendon drain is removed.     Extremities: no rash or edema   Neurologic: Normal mood and behavior         WBC No results found for: WBCS   HGB HEMOGLOBIN   Date Value Ref Range Status   03/14/2017 10.1 (L) 12.0 - 16.0 g/dL Final   03/13/2017 10.0 (L) 12.0 - 16.0 g/dL Final      HCT HEMATOCRIT   Date Value Ref Range Status   03/14/2017 32.7 (L) 37.0 - 47.0 % Final   03/13/2017 32.9 (L) 37.0 - 47.0 % Final      Platlets No results found for: LABPLAT     PT/INR:    PROTIME   Date Value Ref Range Status   03/15/2017 14.1 12.1 - 15.0 Seconds Final   03/14/2017 14.4 12.1 - 15.0 Seconds Final   03/13/2017 16.1 (H) 12.1 - 15.0 Seconds Final   03/12/2017 19.9 (H) 12.1 - 15.0 Seconds Final   /  INR   Date Value Ref Range Status   03/15/2017 1.09 0.90 - 1.10 Final   03/14/2017 1.11 (H) 0.90 - 1.10 Final   03/13/2017 1.28 (H) 0.90 - 1.10 Final   03/12/2017 1.67 (H) 0.90 - 1.10 Final       Sodium SODIUM   Date Value Ref Range Status   03/14/2017 145 136 - 145 mmol/L Final   03/13/2017 142 136 - 145 mmol/L Final      Potassium POTASSIUM    Date Value Ref Range Status   03/14/2017 3.8 3.5 - 5.2 mmol/L Final   03/13/2017 4.0 3.5 - 5.2 mmol/L Final      Chloride CHLORIDE   Date Value Ref Range Status   03/14/2017 107 98 - 107 mmol/L Final   03/13/2017 104 98 - 107 mmol/L Final      Bicarbonate No results found for: PLASMABICARB   BUN BUN   Date Value Ref Range Status   03/14/2017 18 8 - 23 mg/dL Final   03/13/2017 22 8 - 23 mg/dL Final      Creatinine CREATININE   Date Value Ref Range Status   03/14/2017 1.40 (H) 0.57 - 1.00 mg/dL Final   03/13/2017 1.52 (H) 0.57 - 1.00 mg/dL Final      Calcium CALCIUM   Date Value Ref Range Status   03/14/2017 9.3 8.8 - 10.5 mg/dL Final   03/13/2017 9.4 8.8 - 10.5 mg/dL Final      Magnesium  AST  ALT  Bilirubin, Total  AlkPhos  Albumin    Amylase  Lipase    Radiology: No results found for: MG  No components found for: AST.*  No components found for: ALT.*  No components found for: BILIRUBIN, TOTAL.*    No components found for: ALKPHOS.*  No components found for: ALBUMIN.*      No components found for: AMYLASE.*  No components found for: LIPASE.*            Imaging Results (most recent)     Procedure Component Value Units Date/Time    US Abdomen Complete [[id number]] Collected:  03/08/17 1432     Updated:  03/08/17 1452    Narrative:       INDICATION: Transabdominal pain for 4 days.     EXAM: Abdominal ultrasound, complete.     COMPARISON: CT chest dated 01/12/2017     FINDINGS:  The pancreas is prominent measuring up to 2.5 cm in thickness. Although  nonspecific, this is unusual for a patient of this age. Please  clinically for any evidence of acute pancreatic  inflammation/pancreatitis.     The echogenicity and echotexture of the hepatic parenchyma is within  normal limits. No hepatic mass. Mild intrahepatic and extrahepatic  biliary dilatation. The common duct is incompletely visualized, however  measures up to 0.8-0.9 cm in diameter.     There is a small gallstones in the gallbladder. There is mild  gallbladder wall  thickening measuring up to 0.6 cm. There is borderline  gallbladder distention. No pericholecystic fluid..     The right kidney measures 9.8 cm. The left kidney measures 11.7 cm.  Bilateral renal cortical atrophy is more notable on the right kidney. No  hydronephrosis.     The spleen is mildly enlarged measuring 16 cm.       The abdominal aorta is normal in size.  The juxtahepatic IVC is within  normal limits.  No ascites.       Impression:          1. Mild intrahepatic and extrahepatic biliary dilatation. The entire  common bile duct was not visualized, therefore, distal obstructing stone  or mass may be present. Consider further evaluation with a MRCP.  2. Cholelithiasis. There is mild gallbladder distention and and  gallbladder wall thickening. These findings are concerning for possible  acute cholecystitis, however, may simply be secondary to the biliary  distention.  3. Prominent appearance of the pancreas. While nonspecific, this is  concerning for possible pancreatitis.     This report was finalized on 3/8/2017 2:50 PM by Dr. Damian Kennedy MD.       MRI MRCP [[id number]] Collected:  03/09/17 0847     Updated:  03/09/17 0902    Narrative:       MRI ABDOMEN, NONCONTRAST, INCLUDING MRCP, 03/09/2017:     HISTORY:   81-year-old female with with abdomen pain, leukocytosis. Elevated  amylase, lipase and bilirubin levels. Previous pancreatitis seen here  January 2017. Current ultrasound showing cholelithiasis, mild bile duct  dilatation and pancreas enlargement. Evaluate for choledocholithiasis,  gallstone pancreatitis.     TECHNIQUE:  MRI examination of the abdomen was performed without contrast  administration due to abnormal renal function (GFR 18). MRCP sequences  were also obtained. The images are significantly degraded by respiratory  motion artifact.     FINDINGS:  Multiple small gallstones are resident within a diffusely thick-walled  gallbladder, there is evidence of edema within and adjacent to the  bladder  wall. Acute cholecystitis is likely.     There is mild intrahepatic and extra hepatic bile duct dilatation to the  level of the ampulla with the upper extrahepatic bile duct measuring  about 10 mm. There is a single round filling defect within the mid  common bile duct measuring 4 to 5 mm consistent with  choledocholithiasis.     There is mild diffuse enlargement of the pancreas which appears mildly  edematous. The pancreatic duct is also mildly prominent at about 5 mm.  The appearance is compatible with mild acute pancreatitis.     Mildly nodular liver margin suggesting potential chronic liver disease.  No visible focal liver lesion. Hepatic vasculature appears patent. The  spleen is mildly enlarged at about 14 cm. Mild atrophy of the right  kidney. Small benign-appearing cyst within each kidney.       Impression:       1. Cholelithiasis with likely acute cholecystitis.  2. Choledocholithiasis with a single small stone within the mid common  bile duct. Minimal-mild intrahepatic and extra hepatic bile duct  dilatation. Mildly prominent pancreatic duct.  3. Evidence of mild diffuse pancreatitis.  4. Lobulated hepatic contour suggesting chronic liver disease/cirrhosis.  Mild splenomegaly.  5. Mild right renal parenchymal atrophy. Small bilateral renal cysts.  6. Technically limited study as noted above.     This report was finalized on 3/9/2017 9:00 AM by Dr. Art Bateman MD.       SCANNED - IMAGING [[id number]] Resulted:  03/08/17      Updated:  03/10/17 1048    FL ERCP [[id number]] Collected:  03/10/17 1517     Updated:  03/10/17 1523    Narrative:       FLUOROSCOPY DURING ERCP PROCEDURE, 03/10/2017:     HISTORY:  81-year-old female with recent imaging studies showing cholelithiasis,  choledocholithiasis and likely acute cholecystitis.     REPORT:  C-arm fluoroscopy was provided by radiology personnel in the OR during  ERCP procedure performed by Dr. Estrada. Fluoroscopy time was  recorded as 6.20 minutes. 10  spot film images were recorded for  documentation purposes. By report, a common bile duct stone was  extracted. The images show mild intrahepatic and extra hepatic bile duct  dilatation and a mildly dilated pancreatic duct. Please see operative  note for details.     This report was finalized on 3/10/2017 3:21 PM by Dr. Art Bateman MD.       [place] Cholangiogram Operative [[id number]] Collected:  03/13/17 1430     Updated:  03/13/17 1435    Narrative:       FLUOROSCOPY DURING CHOLECYSTECTOMY WITH INTRAOPERATIVE CHOLANGIOGRAM,  03/13/2017:     HISTORY:  81-year-old female with acute cholecystitis and choledocholithiasis  status post recent ERCP with stone extraction. Laparoscopic  cholecystectomy with intraoperative cholangiogram.     REPORT:  C-arm fluoroscopy was provided by radiology personnel in the OR during  laparoscopic cholecystectomy and intraoperative cholangiogram.  Fluoroscopy time was recorded as 25 seconds.      Only one fluoroscopic spot image was recorded during the procedure. This  image shows contrast material within the common bile duct, but no  proximal contrast within the common hepatic duct or intrahepatic bile  ducts. The common bile duct appears at least mildly dilated. No visible  common bile duct stone on the provided image. Contrast flows into the  duodenum through an obstructed ampulla. Correlation with real-time  fluoroscopic findings at surgery is recommended. Please see operative  note for details.     This report was finalized on 3/13/2017 2:33 PM by Dr. Art Bateman MD.                                        lactated ringers 50 mL/hr Last Rate: 50 mL/hr (03/14/17 2130)   lactated ringers 9 mL/hr            Assessment/Plan     Patient Active Problem List   Diagnosis Code   • Hyperglycemia R73.9   • Acute hyperglycemia R73.9   • Sepsis due to urinary tract infection A41.9, N39.0   • Permanent atrial fibrillation I48.2   • Acute renal failure with tubular necrosis N17.0   • Acute  on chronic respiratory failure J96.20   • Influenza A with respiratory manifestations J10.1   • Chronic anticoagulation Z79.01   • Lymphedema I89.0   • Morbid obesity E66.01   • Obstructive sleep apnea of adult G47.33   • Abdominal pain R10.9   • Cholecystitis K81.9   • Chronic atrial fibrillation I48.2   • Choledocholithiasis with acute cholecystitis K80.42   • Acute biliary pancreatitis without infection or necrosis K85.10   • Acute kidney injury N17.9   • Chronic kidney disease, stage III (moderate) N18.3   • Acute cystitis without hematuria N30.00   • Escherichia coli urinary tract infection N39.0, B96.20   • Electrolyte imbalance E87.8   • Chronic diastolic congestive heart failure I50.32   • Pulmonary hypertension I27.2   • Diabetes mellitus type 2 in obese E11.9, E66.9   • Lymphedema I89.0   • Chronic respiratory failure with hypoxia J96.11      patient doing well postop.  Anticipated discharge today.  She is to follow-up with Dr. Ramires  in one week.       Mau Fitzgerald MD  03/15/17  10:12 AM

## 2017-03-15 NOTE — PLAN OF CARE
Problem: Perioperative Period (Adult)  Intervention: Promote Pulmonary Hygiene and Secretion Clearance    03/15/17 0528   Promote Aggressive Pulmonary Hygiene/Secretion Management   Cough And Deep Breathing done with encouragement

## 2017-03-15 NOTE — DISCHARGE SUMMARY
Alexandria Villanueva  1935  [id number]    Hospitalists Discharge Summary    Date of Admission: 3/8/2017  Date of Discharge:  3/15/2017    Primary Discharge Diagnoses:   1. Sepsis   2. Acute gallstone pancreatitis   3. Acute cholecystitis  4. Acute choledocholithiasis  Secondary Discharge Diagnoses:   5. Acute kidney injury on CKD 3  6. ESBL E-coli UTI  7. Electrolyte imbalance  8. Chronic respiratory failure with chronic oxygen use  9. Chronic A-fib/chronic dCHF/pulmonary hypertension  10. DM2  11. Lymphedema, chronic  12. KIERA  13. Morbid obesity  14. Chronic anemia  15. Dyslipidemia     PCP  Patient Care Team:  Gerardo Williamson MD as PCP - General  Gerardo Williamson MD as PCP - Family Medicine    Consults:   Consults     Date and Time Order Name Status Description    3/9/2017 0729 Inpatient Consult to Cardiology Completed     3/8/2017 1414 Inpatient Consult to Gastroenterology Completed     3/8/2017 1414 Inpatient Consult to General Surgery Completed         Operations and Procedures Performed:  Procedure(s):  CHOLECYSTECTOMY LAPAROSCOPIC INTRAOPERATIVE CHOLANGIOGRAM     Us Abdomen Complete    Result Date: 3/8/2017  Narrative: INDICATION: Transabdominal pain for 4 days.  EXAM: Abdominal ultrasound, complete.  COMPARISON: CT chest dated 01/12/2017  FINDINGS: The pancreas is prominent measuring up to 2.5 cm in thickness. Although nonspecific, this is unusual for a patient of this age. Please clinically for any evidence of acute pancreatic inflammation/pancreatitis.  The echogenicity and echotexture of the hepatic parenchyma is within normal limits. No hepatic mass. Mild intrahepatic and extrahepatic biliary dilatation. The common duct is incompletely visualized, however measures up to 0.8-0.9 cm in diameter.  There is a small gallstones in the gallbladder. There is mild gallbladder wall thickening measuring up to 0.6 cm. There is borderline gallbladder distention. No pericholecystic fluid..  The right kidney measures  9.8 cm. The left kidney measures 11.7 cm. Bilateral renal cortical atrophy is more notable on the right kidney. No hydronephrosis.  The spleen is mildly enlarged measuring 16 cm.   The abdominal aorta is normal in size.  The juxtahepatic IVC is within normal limits.  No ascites.      Impression:  1. Mild intrahepatic and extrahepatic biliary dilatation. The entire common bile duct was not visualized, therefore, distal obstructing stone or mass may be present. Consider further evaluation with a MRCP. 2. Cholelithiasis. There is mild gallbladder distention and and gallbladder wall thickening. These findings are concerning for possible acute cholecystitis, however, may simply be secondary to the biliary distention. 3. Prominent appearance of the pancreas. While nonspecific, this is concerning for possible pancreatitis.  This report was finalized on 3/8/2017 2:50 PM by Dr. Damian Kennedy MD.      Fl Ercp    Result Date: 3/10/2017  Narrative: FLUOROSCOPY DURING ERCP PROCEDURE, 03/10/2017:  HISTORY: 81-year-old female with recent imaging studies showing cholelithiasis, choledocholithiasis and likely acute cholecystitis.  REPORT: C-arm fluoroscopy was provided by radiology personnel in the OR during ERCP procedure performed by Dr. Estrada. Fluoroscopy time was recorded as 6.20 minutes. 10 spot film images were recorded for documentation purposes. By report, a common bile duct stone was extracted. The images show mild intrahepatic and extra hepatic bile duct dilatation and a mildly dilated pancreatic duct. Please see operative note for details.  This report was finalized on 3/10/2017 3:21 PM by Dr. Art Bateman MD.      Fl Cholangiogram Operative    Result Date: 3/13/2017  Narrative: FLUOROSCOPY DURING CHOLECYSTECTOMY WITH INTRAOPERATIVE CHOLANGIOGRAM, 03/13/2017:  HISTORY: 81-year-old female with acute cholecystitis and choledocholithiasis status post recent ERCP with stone extraction. Laparoscopic cholecystectomy  with intraoperative cholangiogram.  REPORT: C-arm fluoroscopy was provided by radiology personnel in the OR during laparoscopic cholecystectomy and intraoperative cholangiogram. Fluoroscopy time was recorded as 25 seconds.  Only one fluoroscopic spot image was recorded during the procedure. This image shows contrast material within the common bile duct, but no proximal contrast within the common hepatic duct or intrahepatic bile ducts. The common bile duct appears at least mildly dilated. No visible common bile duct stone on the provided image. Contrast flows into the duodenum through an obstructed ampulla. Correlation with real-time fluoroscopic findings at surgery is recommended. Please see operative note for details.  This report was finalized on 3/13/2017 2:33 PM by Dr. Art Bateman MD.      Mri Mrcp    Result Date: 3/9/2017  Narrative: MRI ABDOMEN, NONCONTRAST, INCLUDING MRCP, 03/09/2017:  HISTORY: 81-year-old female with with abdomen pain, leukocytosis. Elevated amylase, lipase and bilirubin levels. Previous pancreatitis seen here January 2017. Current ultrasound showing cholelithiasis, mild bile duct dilatation and pancreas enlargement. Evaluate for choledocholithiasis, gallstone pancreatitis.  TECHNIQUE: MRI examination of the abdomen was performed without contrast administration due to abnormal renal function (GFR 18). MRCP sequences were also obtained. The images are significantly degraded by respiratory motion artifact.  FINDINGS: Multiple small gallstones are resident within a diffusely thick-walled gallbladder, there is evidence of edema within and adjacent to the bladder wall. Acute cholecystitis is likely.  There is mild intrahepatic and extra hepatic bile duct dilatation to the level of the ampulla with the upper extrahepatic bile duct measuring about 10 mm. There is a single round filling defect within the mid common bile duct measuring 4 to 5 mm consistent with choledocholithiasis.  There is  mild diffuse enlargement of the pancreas which appears mildly edematous. The pancreatic duct is also mildly prominent at about 5 mm. The appearance is compatible with mild acute pancreatitis.  Mildly nodular liver margin suggesting potential chronic liver disease. No visible focal liver lesion. Hepatic vasculature appears patent. The spleen is mildly enlarged at about 14 cm. Mild atrophy of the right kidney. Small benign-appearing cyst within each kidney.      Impression: 1. Cholelithiasis with likely acute cholecystitis. 2. Choledocholithiasis with a single small stone within the mid common bile duct. Minimal-mild intrahepatic and extra hepatic bile duct dilatation. Mildly prominent pancreatic duct. 3. Evidence of mild diffuse pancreatitis. 4. Lobulated hepatic contour suggesting chronic liver disease/cirrhosis. Mild splenomegaly. 5. Mild right renal parenchymal atrophy. Small bilateral renal cysts. 6. Technically limited study as noted above.  This report was finalized on 3/9/2017 9:00 AM by Dr. Art Bateman MD.      Allergies:  is allergic to codeine; demerol [meperidine]; propoxyphene; and tramadol.    [employer]  No meds per report 3/8/17    Discharge Medications:   Alexandria Villanueva   Home Medication Instructions EDYTA:[id number]    Printed on:03/15/17 1018   Medication Information                      acetaminophen (TYLENOL) 325 MG tablet  Take 2 tablets by mouth Every 6 (Six) Hours As Needed for Mild Pain (1-3).             bisacodyl (DULCOLAX) 5 MG EC tablet  Insert 10 mg into the rectum Daily As Needed for constipation.             cholecalciferol (VITAMIN D3) 89339 UNITS capsule  Take 50,000 Units by mouth Every 30 (Thirty) Days.             citalopram (CeleXA) 20 MG tablet  Take 1 tablet by mouth Daily.             enoxaparin (LOVENOX) 100 MG/ML solution syringe  Inject 1.1 mL under the skin Every 12 (Twelve) Hours.             folic acid (FOLVITE) 1 MG tablet  Take 1 mg by mouth daily.            "  gabapentin (NEURONTIN) 300 MG capsule  Take 300 mg by mouth 2 (two) times a day.             HYDROcodone-acetaminophen (NORCO) 5-325 MG per tablet  Take 1 tablet by mouth Every 6 (Six) Hours As Needed for Moderate Pain (4-6).             insulin aspart (novoLOG) 100 UNIT/ML injection  Inject 0-7 Units under the skin Every 6 (Six) Hours.             ipratropium-albuterol (DUO-NEB) 0.5-2.5 mg/mL nebulizer  Take 3 mL by nebulization 4 (Four) Times a Day.             lactobacillus acidophilus (RISAQUAD) capsule capsule  Take 1 capsule by mouth Daily.             melatonin 3 MG tablet  Take 6 mg by mouth Every Night.             metoprolol succinate XL (TOPROL-XL) 25 MG 24 hr tablet  Take 25 mg by mouth daily.             multivitamin-minerals (OCUVITE) tablet  Take 1 tablet by mouth daily.             NON FORMULARY  3L NC continuos             pantoprazole (PROTONIX) 40 MG EC tablet  Take 40 mg by mouth every evening.             polyethylene glycol (MIRALAX) packet  Take 17 g by mouth daily.             sennosides-docusate sodium (SENOKOT-S) 8.6-50 MG tablet  Take 2 tablets by mouth 2 (Two) Times a Day.             warfarin (COUMADIN) 6 MG tablet  Take 6 mg by mouth Daily. 6 mg every Sun, Mon, Tues, Thrs, Sat  7 mg every Wed, Fri               History of Present Illness: Taken from original HPI on admit per Dr. Diallo:  \"Ms. Alexandria Woo is An 81 year old  female [facility] Dr. Garcia was making rounds today and this patient was examined any because of moderately severe abdominal pain and n/v, she was sent here to [facility] as direct admit. Work up done here showed acute cholecystitis and acute pancreatitis. Patient also appears to be septic. She looks jaundiced on examination. Patient denies any sx of chest pain, shortness of breath, dysuria or recent hx of blood in stool.\"    Hospital Course  1. Sepsis secondary to   2. Acute gallstone pancreatitis   3. Acute " cholecystitis  4. Acute choledocholithiasis: managed by surgery/GI  IVFs/IV antibiotics (zosyn x 7 days)  Initially underwent ERCP with stent, then underwent lap cholecystectomy  Continued improvement/abdominal pain minimal/some loose stools-Probiotic added today  F/U Dr. Pham 1 week  F/U Dr. Estrada 4 weeks    5. Acute kidney injury on CKD 3:  Resolved with hydration  Creatinine 1.4 on 3/14/17  Recheck at intervals, stable    6. ESBL E-coli UTI: received zosyn for numbers 1,2,3,4  No further treatment needed  Repeat UA C&S in 1 week to ensure resolution    7. Electrolyte imbalance: resolved with replacements  Monitor at intervals    8. Chronic respiratory failure with chronic oxygen use:   2-3 liters/nasal cannula chronic  Chronic steroid use  Duonebs qid continued    9. Chronic A-fib/chronic dCHF/pulmonary hypertension:   Continue metoprolol succ 25 mg daily  Warfarin 6 mg nightly all days except Wed then 7 mg  Bridge with lovenox 110 mg q 12 hours  Continue monitor INRs    10. DM2: Last A1C documented 9/2016 was 11.7%  Recommend repeat with next labs  Low dose SSI/accuchecks ac/hs with glucose at goal  Has been off home levemir while here, monitor and gradually resume as able    11. Lymphedema, chronic: secondary to immobility    12. KIERA: untreated, on oxygen only    13. Morbid obesity: Body mass index is 40.6 kg/(m^2).   Nutrition to follow at [place]    14. Chronic anemia: mild, stable  Hgb 10.1 3/14/17  Check at intervals    15. Dyslipidemia: no acute issues, CCC diet    Last Lab Results:   Lab Results (most recent)     Procedure Component Value Units Date/Time    POC Glucose Fingerstick [[id number]]  (Abnormal) Collected:  03/08/17 1212    Specimen:  Blood Updated:  03/08/17 1218     Glucose 151 (H) mg/dL     Narrative:       Meter: [id number] : [id number] Valentin Chang    CBC Auto Differential [[id number]]  (Abnormal) Collected:  03/08/17 1306    Specimen:  Blood Updated:  03/08/17 1316     WBC  16.40 (H) 10*3/mm3      RBC 3.79 (L) 10*6/mm3      Hemoglobin 11.0 (L) g/dL      Hematocrit 33.6 (L) %      MCV 88.7 fL      MCH 29.0 pg      MCHC 32.7 g/dL      RDW 15.8 (H) %      RDW-SD 51.8 fl      MPV 9.6 fL      Platelets 153 10*3/mm3      Neutrophil % 84.2 (H) %      Lymphocyte % 8.9 (L) %      Monocyte % 5.7 %      Eosinophil % 0.1 %      Basophil % 0.2 %      Immature Grans % 0.9 (H) %      Neutrophils, Absolute 13.82 (H) 10*3/mm3      Lymphocytes, Absolute 1.46 10*3/mm3      Monocytes, Absolute 0.94 10*3/mm3      Eosinophils, Absolute 0.01 (L) 10*3/mm3      Basophils, Absolute 0.03 10*3/mm3      Immature Grans, Absolute 0.14 (H) 10*3/mm3      nRBC 0.0 /100 WBC     CBC & Differential [[id number]] Collected:  03/08/17 1306    Specimen:  Blood Updated:  03/08/17 1318    Narrative:       The following orders were created for panel order CBC & Differential.  Procedure                               Abnormality         Status                     ---------                               -----------         ------                     Scan Slide[[id number]]                                        Final result               CBC Auto Differential[[id number]]         Abnormal            Final result                 Please view results for these tests on the individual orders.    Scan Slide [[id number]] Collected:  03/08/17 1306    Specimen:  Blood Updated:  03/08/17 1318     Anisocytosis Slight/1+      WBC Morphology Normal      Platelet Estimate Adequate     Protime-INR [[id number]]  (Abnormal) Collected:  03/08/17 1306    Specimen:  Blood Updated:  03/08/17 1328     Protime 23.1 (H) Seconds      INR 2.01 (H)     Narrative:       Therapeutic Ranges for INR: 2.0-3.0 (PT 20-30)                              2.5-3.5 (PT 25-34)    Comprehensive Metabolic Panel [[id number]]  (Abnormal) Collected:  03/08/17 1306    Specimen:  Blood Updated:  03/08/17 1333     Glucose 158 (H) mg/dL      BUN 39 (H) mg/dL      Creatinine 2.36 (H) mg/dL       Sodium 139 mmol/L      Potassium 4.6 mmol/L      Chloride 95 (L) mmol/L      CO2 32.3 (H) mmol/L      Calcium 9.8 mg/dL      Total Protein 5.6 (L) g/dL      Albumin 3.10 (L) g/dL      ALT (SGPT) 311 (H) U/L      AST (SGOT) 467 (H) U/L      Alkaline Phosphatase 190 (H) U/L      Total Bilirubin 5.4 (H) mg/dL      eGFR Non African Amer 20 (L) mL/min/1.73      Globulin 2.5 gm/dL      A/G Ratio 1.2 g/dL      BUN/Creatinine Ratio 16.5      Anion Gap 11.7 mmol/L     Narrative:       The MDRD GFR formula is only valid for adults with stable renal function between ages 18 and 70.    Amylase [[id number]]  (Abnormal) Collected:  03/08/17 1306    Specimen:  Blood Updated:  03/08/17 1337     Amylase 1696 (H) U/L     Lipase [[id number]]  (Abnormal) Collected:  03/08/17 1306    Specimen:  Blood Updated:  03/08/17 1337     Lipase 2486 (H) U/L     Lactic Acid, Plasma [[id number]]  (Abnormal) Collected:  03/08/17 1347    Specimen:  Blood Updated:  03/08/17 1408     Lactate 2.5 (C) mmol/L     aPTT [[id number]]  (Abnormal) Collected:  03/08/17 1306    Specimen:  Blood Updated:  03/08/17 1534     PTT 43.2 (H) seconds     Narrative:       PTT = The equivalent PTT values for the therapeutic range of heparin levels at 0.1 to 0.7 U/ml are 53 to 110 seconds.    POC Glucose Fingerstick [[id number]]  (Abnormal) Collected:  03/08/17 1636    Specimen:  Blood Updated:  03/08/17 1647     Glucose 145 (H) mg/dL     Narrative:       Meter: [id number] : [id number] Valentin Chang    Urinalysis With / Culture If Indicated [[id number]]  (Abnormal) Collected:  03/08/17 1800    Specimen:  Urine from Urine, Catheter Updated:  03/08/17 1816     Color, UA Cele (A)      Appearance, UA Turbid (A)      pH, UA <=5.0      Specific Gravity, UA 1.025      Glucose,  mg/dL (Trace) (A)      Ketones, UA Trace (A)      Bilirubin, UA Large (3+) (A)      Blood, UA Small (1+) (A)      Protein,  mg/dL (2+) (A)      Leuk Esterase, UA Trace (A)      Nitrite, UA Negative       Urobilinogen, UA 4.0 E.U./dL (A)     Urinalysis, Microscopic Only [[id number]]  (Abnormal) Collected:  03/08/17 1800    Specimen:  Urine from Urine, Catheter Updated:  03/08/17 1816     RBC, UA 6-12 (A) /HPF      WBC, UA 31-50 (A) /HPF      Bacteria, UA 3+ (A) /HPF      Squamous Epithelial Cells, UA 13-20 (A) /HPF      Hyaline Casts, UA None Seen /LPF      Amorphous Crystals, UA Moderate/2+ /HPF      Mucus, UA Small/1+ (A) /HPF      Methodology Manual Light Microscopy     POC Glucose Fingerstick [[id number]]  (Abnormal) Collected:  03/08/17 2005    Specimen:  Blood Updated:  03/08/17 2012     Glucose 164 (H) mg/dL     Narrative:       Meter: [id number] : [id number] Marlon Laura    Lactate Acid, Reflex [[id number]]  (Normal) Collected:  03/08/17 2214    Specimen:  Blood Updated:  03/08/17 2232     Lactate 1.1 mmol/L     POC Glucose Fingerstick [[id number]]  (Normal) Collected:  03/09/17 0001    Specimen:  Blood Updated:  03/09/17 0022     Glucose 118 mg/dL     Narrative:       Meter: [id number] : [id number] Kimber BETANCOURT    CBC & Differential [[id number]] Collected:  03/09/17 0445    Specimen:  Blood Updated:  03/09/17 0524    Narrative:       The following orders were created for panel order CBC & Differential.  Procedure                               Abnormality         Status                     ---------                               -----------         ------                     Scan Slide[[id number]]                                                                   CBC Auto Differential[[id number]]         Abnormal            Final result                 Please view results for these tests on the individual orders.    CBC Auto Differential [[id number]]  (Abnormal) Collected:  03/09/17 0445    Specimen:  Blood Updated:  03/09/17 0524     WBC 11.51 (H) 10*3/mm3      RBC 3.64 (L) 10*6/mm3      Hemoglobin 10.3 (L) g/dL      Hematocrit 32.7 (L) %      MCV 89.8 fL      MCH 28.3 pg      MCHC 31.5 g/dL      RDW  15.4 (H) %      RDW-SD 51.1 fl      MPV 10.0 fL      Platelets 136 (L) 10*3/mm3      Neutrophil % 83.4 (H) %      Lymphocyte % 10.9 (L) %      Monocyte % 4.2 %      Eosinophil % 0.2 %      Basophil % 0.1 %      Immature Grans % 1.2 (H) %      Neutrophils, Absolute 9.61 (H) 10*3/mm3      Lymphocytes, Absolute 1.25 10*3/mm3      Monocytes, Absolute 0.48 10*3/mm3      Eosinophils, Absolute 0.02 (L) 10*3/mm3      Basophils, Absolute 0.01 10*3/mm3      Immature Grans, Absolute 0.14 (H) 10*3/mm3      nRBC 0.0 /100 WBC     Comprehensive Metabolic Panel [[id number]]  (Abnormal) Collected:  03/09/17 0445    Specimen:  Blood Updated:  03/09/17 0542     Glucose 130 (H) mg/dL      BUN 47 (H) mg/dL      Creatinine 2.60 (H) mg/dL      Sodium 139 mmol/L      Potassium 4.4 mmol/L      Chloride 96 (L) mmol/L      CO2 32.6 (H) mmol/L      Calcium 9.2 mg/dL      Total Protein 5.4 (L) g/dL      Albumin 2.90 (L) g/dL      ALT (SGPT) 225 (H) U/L      AST (SGOT) 218 (H) U/L      Alkaline Phosphatase 168 (H) U/L      Total Bilirubin 2.2 (H) mg/dL      eGFR Non African Amer 18 (L) mL/min/1.73      Globulin 2.5 gm/dL      A/G Ratio 1.2 g/dL      BUN/Creatinine Ratio 18.1      Anion Gap 10.4 mmol/L     Narrative:       The MDRD GFR formula is only valid for adults with stable renal function between ages 18 and 70.    Amylase [[id number]]  (Abnormal) Collected:  03/09/17 0445    Specimen:  Blood Updated:  03/09/17 0542     Amylase 607 (H) U/L     Lipase [[id number]]  (Abnormal) Collected:  03/09/17 0445    Specimen:  Blood Updated:  03/09/17 0542     Lipase 228 (H) U/L     POC Glucose Fingerstick [[id number]]  (Normal) Collected:  03/09/17 0613    Specimen:  Blood Updated:  03/09/17 0625     Glucose 126 mg/dL     Narrative:       Meter: [id number] : [id number] Kimber BETANCOURT    POC Glucose Fingerstick [[id number]]  (Normal) Collected:  03/09/17 1210    Specimen:  Blood Updated:  03/09/17 1216     Glucose 104 mg/dL     Narrative:       Meter:  [id number] : [id number] Yahir Whitt   RN    POC Glucose Fingerstick [[id number]]  (Normal) Collected:  03/09/17 1813    Specimen:  Blood Updated:  03/09/17 1823     Glucose 104 mg/dL     Narrative:       Meter: [id number] : [id number] Brandt Marti RN    POC Glucose Fingerstick [[id number]]  (Normal) Collected:  03/10/17 0017    Specimen:  Blood Updated:  03/10/17 0024     Glucose 111 mg/dL     Narrative:       Meter: [id number] : [id number] Elfego Jordan    CBC & Differential [[id number]] Collected:  03/10/17 0425    Specimen:  Blood Updated:  03/10/17 0435    Narrative:       The following orders were created for panel order CBC & Differential.  Procedure                               Abnormality         Status                     ---------                               -----------         ------                     CBC Auto Differential[[id number]]         Abnormal            Final result                 Please view results for these tests on the individual orders.    CBC Auto Differential [[id number]]  (Abnormal) Collected:  03/10/17 0425    Specimen:  Blood Updated:  03/10/17 0435     WBC 7.60 10*3/mm3      RBC 3.51 (L) 10*6/mm3      Hemoglobin 9.9 (L) g/dL      Hematocrit 31.6 (L) %      MCV 90.0 fL      MCH 28.2 pg      MCHC 31.3 g/dL      RDW 15.1 (H) %      RDW-SD 50.2 fl      MPV 9.5 fL      Platelets 128 (L) 10*3/mm3      Neutrophil % 78.7 (H) %      Lymphocyte % 13.9 (L) %      Monocyte % 5.5 %      Eosinophil % 0.7 %      Basophil % 0.3 %      Immature Grans % 0.9 (H) %      Neutrophils, Absolute 5.98 10*3/mm3      Lymphocytes, Absolute 1.06 10*3/mm3      Monocytes, Absolute 0.42 10*3/mm3      Eosinophils, Absolute 0.05 (L) 10*3/mm3      Basophils, Absolute 0.02 10*3/mm3      Immature Grans, Absolute 0.07 (H) 10*3/mm3      nRBC 0.0 /100 WBC     Protime-INR [[id number]]  (Abnormal) Collected:  03/10/17 0425    Specimen:  Blood Updated:  03/10/17 0502     Protime 25.7 (H) Seconds      INR 2.30  (H)     Narrative:       Therapeutic Ranges for INR: 2.0-3.0 (PT 20-30)                              2.5-3.5 (PT 25-34)    Comprehensive Metabolic Panel [[id number]]  (Abnormal) Collected:  03/10/17 0425    Specimen:  Blood Updated:  03/10/17 0514     Glucose 110 (H) mg/dL      BUN 49 (H) mg/dL      Creatinine 2.58 (H) mg/dL      Sodium 143 mmol/L      Potassium 3.6 mmol/L      Chloride 100 mmol/L      CO2 30.5 (H) mmol/L      Calcium 9.2 mg/dL      Total Protein 5.2 (L) g/dL      Albumin 2.40 (L) g/dL      ALT (SGPT) 136 (H) U/L      AST (SGOT) 70 (H) U/L      Alkaline Phosphatase 139 (H) U/L      Total Bilirubin 1.5 (H) mg/dL      eGFR Non African Amer 18 (L) mL/min/1.73      Globulin 2.8 gm/dL      A/G Ratio 0.9 g/dL      BUN/Creatinine Ratio 19.0      Anion Gap 12.5 mmol/L     Narrative:       The MDRD GFR formula is only valid for adults with stable renal function between ages 18 and 70.    Lipase [[id number]]  (Normal) Collected:  03/10/17 0425    Specimen:  Blood Updated:  03/10/17 0514     Lipase 28 U/L     Urine Culture [[id number]]  (Abnormal)  (Susceptibility) Collected:  03/08/17 1800    Specimen:  Urine from Urine, Catheter Updated:  03/10/17 1018     Urine Culture >100,000 CFU/mL Escherichia coli ESBL (C)         Consider infectious disease consult.  Susceptibility results may not correlate to clinical outcomes.       Susceptibility      Escherichia coli ESBL     ERNESTO     Ampicillin >=32 ug/ml Resistant     Ampicillin + Sulbactam >=32 ug/ml Resistant     Cefazolin >=64 ug/ml Resistant     Cefepime Resistant     Ceftriaxone Resistant     Ciprofloxacin <=0.25 ug/ml Susceptible     Ertapenem <=0.5 ug/ml Susceptible     Gentamicin <=1 ug/ml Susceptible     Levofloxacin <=0.12 ug/ml Susceptible     Nitrofurantoin <=16 ug/ml Susceptible     Piperacillin + Tazobactam 8 ug/ml Susceptible     Tetracycline <=1 ug/ml Susceptible     Trimethoprim + Sulfamethoxazole <=20 ug/ml Susceptible                    POC Glucose  Fingerstick [[id number]]  (Normal) Collected:  03/10/17 1220    Specimen:  Blood Updated:  03/10/17 1229     Glucose 97 mg/dL     Narrative:       Meter: [id number] : [id number] Eri Bee RN Validator    POC Glucose Fingerstick [[id number]]  (Normal) Collected:  03/10/17 1640    Specimen:  Blood Updated:  03/10/17 1646     Glucose 94 mg/dL     Narrative:       Meter: [id number] : [id number] Nirav Oliva    POC Glucose Fingerstick [[id number]]  (Normal) Collected:  03/11/17 0001    Specimen:  Blood Updated:  03/11/17 0013     Glucose 107 mg/dL     Narrative:       Meter: [id number] : [id number] Davian Morrissey    CBC & Differential [[id number]] Collected:  03/11/17 0406    Specimen:  Blood Updated:  03/11/17 0449    Narrative:       The following orders were created for panel order CBC & Differential.  Procedure                               Abnormality         Status                     ---------                               -----------         ------                     CBC Auto Differential[[id number]]         Abnormal            Final result                 Please view results for these tests on the individual orders.    CBC Auto Differential [[id number]]  (Abnormal) Collected:  03/11/17 0406    Specimen:  Blood Updated:  03/11/17 0449     WBC 6.24 10*3/mm3      RBC 3.62 (L) 10*6/mm3      Hemoglobin 10.3 (L) g/dL      Hematocrit 32.8 (L) %      MCV 90.6 fL      MCH 28.5 pg      MCHC 31.4 g/dL      RDW 14.7 (H) %      RDW-SD 49.5 fl      MPV 10.1 fL      Platelets 139 (L) 10*3/mm3      Neutrophil % 79.0 (H) %      Lymphocyte % 14.1 (L) %      Monocyte % 6.1 %      Eosinophil % 0.3 %      Basophil % 0.2 %      Immature Grans % 0.3 %      Neutrophils, Absolute 4.93 10*3/mm3      Lymphocytes, Absolute 0.88 10*3/mm3      Monocytes, Absolute 0.38 10*3/mm3      Eosinophils, Absolute 0.02 (L) 10*3/mm3      Basophils, Absolute 0.01 10*3/mm3      Immature Grans, Absolute 0.02 10*3/mm3      nRBC 0.0 /100 WBC     Lipase  [[id number]]  (Normal) Collected:  03/11/17 0406    Specimen:  Blood Updated:  03/11/17 0510     Lipase 23 U/L     Protime-INR [[id number]]  (Abnormal) Collected:  03/11/17 0406    Specimen:  Blood Updated:  03/11/17 0520     Protime 21.6 (H) Seconds      INR 1.85 (H)     Narrative:       Therapeutic Ranges for INR: 2.0-3.0 (PT 20-30)                              2.5-3.5 (PT 25-34)    Comprehensive Metabolic Panel [[id number]]  (Abnormal) Collected:  03/11/17 0406    Specimen:  Blood Updated:  03/11/17 0523     Glucose 115 (H) mg/dL      BUN 39 (H) mg/dL      Creatinine 2.23 (H) mg/dL      Sodium 148 (H) mmol/L      Potassium 3.1 (L) mmol/L      Chloride 103 mmol/L      CO2 29.8 (H) mmol/L      Calcium 10.0 mg/dL      Total Protein 5.9 (L) g/dL      Albumin 2.80 (L) g/dL      ALT (SGPT) 100 (H) U/L      AST (SGOT) 35 (H) U/L      Alkaline Phosphatase 458 (H) U/L      Total Bilirubin 2.4 (H) mg/dL      eGFR Non African Amer 21 (L) mL/min/1.73      Globulin 3.1 gm/dL      A/G Ratio 0.9 g/dL      BUN/Creatinine Ratio 17.5      Anion Gap 15.2 mmol/L     Narrative:       The MDRD GFR formula is only valid for adults with stable renal function between ages 18 and 70.    POC Glucose Fingerstick [[id number]]  (Normal) Collected:  03/11/17 1110    Specimen:  Blood Updated:  03/11/17 1116     Glucose 114 mg/dL     Narrative:       Meter: [id number] : [id number] Elfego Jordan    Magnesium [[id number]]  (Normal) Collected:  03/11/17 0406    Specimen:  Blood Updated:  03/11/17 1352     Magnesium 2.3 mg/dL     POC Glucose Fingerstick [[id number]]  (Abnormal) Collected:  03/11/17 1811    Specimen:  Blood Updated:  03/11/17 1827     Glucose 175 (H) mg/dL     Narrative:       Meter: [id number] : [id number] Elfego Jordan    POC Glucose Fingerstick [[id number]]  (Normal) Collected:  03/11/17 2342    Specimen:  Blood Updated:  03/12/17 0001     Glucose 89 mg/dL     Narrative:       Meter: [id number] : [id number] Davian VIZCARRA  Glucose Fingerstick [[id number]]  (Abnormal) Collected:  03/12/17 0606    Specimen:  Blood Updated:  03/12/17 0613     Glucose 132 (H) mg/dL     Narrative:       Meter: [id number] : [id number] Davian Morrissey    CBC & Differential [[id number]] Collected:  03/12/17 0516    Specimen:  Blood Updated:  03/12/17 0624    Narrative:       The following orders were created for panel order CBC & Differential.  Procedure                               Abnormality         Status                     ---------                               -----------         ------                     CBC Auto Differential[[id number]]         Abnormal            Final result                 Please view results for these tests on the individual orders.    CBC Auto Differential [[id number]]  (Abnormal) Collected:  03/12/17 0516    Specimen:  Blood Updated:  03/12/17 0624     WBC 5.24 10*3/mm3      RBC 3.45 (L) 10*6/mm3      Hemoglobin 9.7 (L) g/dL      Hematocrit 31.5 (L) %      MCV 91.3 fL      MCH 28.1 pg      MCHC 30.8 (L) g/dL      RDW 14.6 (H) %      RDW-SD 49.5 fl      MPV 10.1 fL      Platelets 130 (L) 10*3/mm3      Neutrophil % 70.5 (H) %      Lymphocyte % 19.7 (L) %      Monocyte % 7.3 %      Eosinophil % 1.7 %      Basophil % 0.4 %      Immature Grans % 0.4 %      Neutrophils, Absolute 3.70 10*3/mm3      Lymphocytes, Absolute 1.03 10*3/mm3      Monocytes, Absolute 0.38 10*3/mm3      Eosinophils, Absolute 0.09 (L) 10*3/mm3      Basophils, Absolute 0.02 10*3/mm3      Immature Grans, Absolute 0.02 10*3/mm3      nRBC 0.0 /100 WBC     Lipase [[id number]]  (Normal) Collected:  03/12/17 0516    Specimen:  Blood Updated:  03/12/17 0635     Lipase 20 U/L     Comprehensive Metabolic Panel [[id number]]  (Abnormal) Collected:  03/12/17 0516    Specimen:  Blood Updated:  03/12/17 0650     Glucose 135 (H) mg/dL      BUN 29 (H) mg/dL      Creatinine 1.77 (H) mg/dL      Sodium 144 mmol/L      Potassium 3.0 (L) mmol/L      Chloride 103 mmol/L      CO2 29.0 mmol/L       Calcium 9.5 mg/dL      Total Protein 5.3 (L) g/dL      Albumin 2.40 (L) g/dL      ALT (SGPT) 68 (H) U/L      AST (SGOT) 17 U/L      Alkaline Phosphatase 346 (H) U/L      Total Bilirubin 1.2 mg/dL      eGFR Non African Amer 28 (L) mL/min/1.73      Globulin 2.9 gm/dL      A/G Ratio 0.8 g/dL      BUN/Creatinine Ratio 16.4      Anion Gap 12.0 mmol/L     Narrative:       The MDRD GFR formula is only valid for adults with stable renal function between ages 18 and 70.    Amylase [[id number]]  (Normal) Collected:  03/12/17 0516    Specimen:  Blood Updated:  03/12/17 0650     Amylase 88 U/L     Protime-INR [[id number]]  (Abnormal) Collected:  03/12/17 0516    Specimen:  Blood Updated:  03/12/17 0706     Protime 24.7 (H) Seconds      INR 2.19 (H)     Narrative:       Therapeutic Ranges for INR: 2.0-3.0 (PT 20-30)                              2.5-3.5 (PT 25-34)    aPTT [[id number]]  (Abnormal) Collected:  03/12/17 0516    Specimen:  Blood Updated:  03/12/17 0706     PTT 56.4 (H) seconds     Narrative:       PTT = The equivalent PTT values for the therapeutic range of heparin levels at 0.1 to 0.7 U/ml are 53 to 110 seconds.    POC Glucose Fingerstick [[id number]]  (Normal) Collected:  03/12/17 1152    Specimen:  Blood Updated:  03/12/17 1158     Glucose 119 mg/dL     Narrative:       Meter: [id number] : [id number]    POC Glucose Fingerstick [[id number]]  (Normal) Collected:  03/12/17 1557    Specimen:  Blood Updated:  03/12/17 1604     Glucose 120 mg/dL     Narrative:       Meter: [id number] : [id number]    Protime-INR [[id number]]  (Abnormal) Collected:  03/12/17 2008    Specimen:  Blood Updated:  03/12/17 2026     Protime 19.9 (H) Seconds      INR 1.67 (H)     Narrative:       Therapeutic Ranges for INR: 2.0-3.0 (PT 20-30)                              2.5-3.5 (PT 25-34)    POC Glucose Fingerstick [[id number]]  (Normal) Collected:  03/12/17 2319    Specimen:  Blood Updated:  03/12/17 2337     Glucose 122 mg/dL     Narrative:        Meter: [id number] : [id number] Prashant Loredo RN    CBC & Differential [[id number]] Collected:  03/13/17 0409    Specimen:  Blood Updated:  03/13/17 0525    Narrative:       The following orders were created for panel order CBC & Differential.  Procedure                               Abnormality         Status                     ---------                               -----------         ------                     CBC Auto Differential[[id number]]         Abnormal            Final result                 Please view results for these tests on the individual orders.    CBC Auto Differential [[id number]]  (Abnormal) Collected:  03/13/17 0409    Specimen:  Blood Updated:  03/13/17 0525     WBC 4.17 (L) 10*3/mm3      RBC 3.57 (L) 10*6/mm3      Hemoglobin 10.0 (L) g/dL      Hematocrit 32.9 (L) %      MCV 92.2 fL      MCH 28.0 pg      MCHC 30.4 (L) g/dL      RDW 14.6 (H) %      RDW-SD 49.8 fl      MPV 10.4 fL      Platelets 138 (L) 10*3/mm3      Neutrophil % 63.2 %      Lymphocyte % 26.4 %      Monocyte % 7.2 %      Eosinophil % 2.2 %      Basophil % 0.5 %      Immature Grans % 0.5 %      Neutrophils, Absolute 2.64 10*3/mm3      Lymphocytes, Absolute 1.10 10*3/mm3      Monocytes, Absolute 0.30 10*3/mm3      Eosinophils, Absolute 0.09 (L) 10*3/mm3      Basophils, Absolute 0.02 10*3/mm3      Immature Grans, Absolute 0.02 10*3/mm3      nRBC 0.0 /100 WBC     Lipase [[id number]]  (Normal) Collected:  03/13/17 0409    Specimen:  Blood Updated:  03/13/17 0533     Lipase 19 U/L     POC Glucose Fingerstick [[id number]]  (Normal) Collected:  03/13/17 0553    Specimen:  Blood Updated:  03/13/17 0603     Glucose 101 mg/dL     Narrative:       Meter: [id number] : [id number] Prashant Loredo RN    aPTT [[id number]]  (Normal) Collected:  03/13/17 0409    Specimen:  Blood Updated:  03/13/17 0627     PTT 25.1 seconds     Narrative:       PTT = The equivalent PTT values for the therapeutic range of heparin levels at 0.1 to 0.7 U/ml are 53 to 110  seconds.    Protime-INR [[id number]]  (Abnormal) Collected:  03/13/17 0409    Specimen:  Blood Updated:  03/13/17 0627     Protime 16.1 (H) Seconds      INR 1.28 (H)     Narrative:       Therapeutic Ranges for INR: 2.0-3.0 (PT 20-30)                              2.5-3.5 (PT 25-34)    Comprehensive Metabolic Panel [[id number]]  (Abnormal) Collected:  03/13/17 0409    Specimen:  Blood Updated:  03/13/17 0627     Glucose 111 (H) mg/dL      BUN 22 mg/dL      Creatinine 1.52 (H) mg/dL      Sodium 142 mmol/L      Potassium 4.0 mmol/L      Chloride 104 mmol/L      CO2 28.0 mmol/L      Calcium 9.4 mg/dL      Total Protein 5.5 (L) g/dL      Albumin 2.60 (L) g/dL      ALT (SGPT) 53 (H) U/L      AST (SGOT) 13 U/L      Alkaline Phosphatase 307 (H) U/L      Total Bilirubin 1.0 mg/dL      eGFR Non African Amer 33 (L) mL/min/1.73      Globulin 2.9 gm/dL      A/G Ratio 0.9 g/dL      BUN/Creatinine Ratio 14.5      Anion Gap 10.0 mmol/L     Narrative:       The MDRD GFR formula is only valid for adults with stable renal function between ages 18 and 70.    Amylase [[id number]]  (Normal) Collected:  03/13/17 0409    Specimen:  Blood Updated:  03/13/17 0627     Amylase 63 U/L     POC Glucose Fingerstick [[id number]]  (Normal) Collected:  03/13/17 1141    Specimen:  Blood Updated:  03/13/17 1149     Glucose 105 mg/dL     Narrative:       Meter: [id number] : [id number] Marcell Hicks PCA    Tissue Exam [[id number]] Collected:  03/13/17 1359    Specimen:  Tissue from Gallbladder Updated:  03/13/17 1545    POC Glucose Fingerstick [[id number]]  (Normal) Collected:  03/13/17 1845    Specimen:  Blood Updated:  03/13/17 1852     Glucose 120 mg/dL     Narrative:       Meter: [id number] : [id number] Sydnee Huertas RN    POC Glucose Fingerstick [[id number]]  (Normal) Collected:  03/14/17 0008    Specimen:  Blood Updated:  03/14/17 0015     Glucose 124 mg/dL     Narrative:       Meter: [id number] : [id number] Erick Staton    CBC & Differential  [[id number]] Collected:  03/14/17 0312    Specimen:  Blood Updated:  03/14/17 0417    Narrative:       The following orders were created for panel order CBC & Differential.  Procedure                               Abnormality         Status                     ---------                               -----------         ------                     CBC Auto Differential[[id number]]         Abnormal            Final result                 Please view results for these tests on the individual orders.    CBC Auto Differential [[id number]]  (Abnormal) Collected:  03/14/17 0312    Specimen:  Blood Updated:  03/14/17 0417     WBC 5.64 10*3/mm3      RBC 3.60 (L) 10*6/mm3      Hemoglobin 10.1 (L) g/dL      Hematocrit 32.7 (L) %      MCV 90.8 fL      MCH 28.1 pg      MCHC 30.9 (L) g/dL      RDW 14.6 (H) %      RDW-SD 49.1 fl      MPV 10.2 fL      Platelets 155 10*3/mm3      Neutrophil % 73.6 (H) %      Lymphocyte % 17.7 (L) %      Monocyte % 6.4 %      Eosinophil % 0.5 %      Basophil % 0.4 %      Immature Grans % 1.4 (H) %      Neutrophils, Absolute 4.15 10*3/mm3      Lymphocytes, Absolute 1.00 10*3/mm3      Monocytes, Absolute 0.36 10*3/mm3      Eosinophils, Absolute 0.03 (L) 10*3/mm3      Basophils, Absolute 0.02 10*3/mm3      Immature Grans, Absolute 0.08 (H) 10*3/mm3      nRBC 0.0 /100 WBC     Lipase [[id number]]  (Normal) Collected:  03/14/17 0312    Specimen:  Blood Updated:  03/14/17 0432     Lipase 16 U/L     Comprehensive Metabolic Panel [[id number]]  (Abnormal) Collected:  03/14/17 0312    Specimen:  Blood Updated:  03/14/17 0450     Glucose 125 (H) mg/dL      BUN 18 mg/dL      Creatinine 1.40 (H) mg/dL      Sodium 145 mmol/L      Potassium 3.8 mmol/L      Chloride 107 mmol/L      CO2 27.4 mmol/L      Calcium 9.3 mg/dL      Total Protein 5.3 (L) g/dL      Albumin 2.40 (L) g/dL      ALT (SGPT) 44 (H) U/L      AST (SGOT) 28 U/L      Alkaline Phosphatase 242 (H) U/L      Total Bilirubin 0.9 mg/dL      eGFR Non  Amer 36 (L)  mL/min/1.73      Globulin 2.9 gm/dL      A/G Ratio 0.8 g/dL      BUN/Creatinine Ratio 12.9      Anion Gap 10.6 mmol/L     Narrative:       The MDRD GFR formula is only valid for adults with stable renal function between ages 18 and 70.    Amylase [[id number]]  (Normal) Collected:  03/14/17 0312    Specimen:  Blood Updated:  03/14/17 0450     Amylase 36 U/L     POC Glucose Fingerstick [[id number]]  (Normal) Collected:  03/14/17 0608    Specimen:  Blood Updated:  03/14/17 0614     Glucose 126 mg/dL     Narrative:       Meter: [id number] : [id number] Prashant Loredo RN    POC Glucose Fingerstick [[id number]]  (Abnormal) Collected:  03/14/17 1211    Specimen:  Blood Updated:  03/14/17 1220     Glucose 154 (H) mg/dL     Narrative:       Meter: [id number] : [id number] Valorie Nayak    Protime-INR [[id number]]  (Abnormal) Collected:  03/14/17 1515    Specimen:  Blood Updated:  03/14/17 1537     Protime 14.4 Seconds      INR 1.11 (H)     Narrative:       Therapeutic Ranges for INR: 2.0-3.0 (PT 20-30)                              2.5-3.5 (PT 25-34)    POC Glucose Fingerstick [[id number]]  (Abnormal) Collected:  03/14/17 1818    Specimen:  Blood Updated:  03/14/17 1826     Glucose 164 (H) mg/dL     Narrative:       Meter: [id number] : [id number] Marcell BETANCOURT    Protime-INR [[id number]]  (Normal) Collected:  03/15/17 0543    Specimen:  Blood Updated:  03/15/17 0724     Protime 14.1 Seconds      INR 1.09     Narrative:       Therapeutic Ranges for INR: 2.0-3.0 (PT 20-30)                              2.5-3.5 (PT 25-34)    POC Glucose Fingerstick [[id number]]  (Normal) Collected:  03/15/17 0753    Specimen:  Blood Updated:  03/15/17 0800     Glucose 119 mg/dL     Narrative:       Meter: [id number] : [id number] Nirav Hazel        Imaging Results (most recent)     Procedure Component Value Units Date/Time    US Abdomen Complete [[id number]] Collected:  03/08/17 1432     Updated:  03/08/17 1452    Narrative:        INDICATION: Transabdominal pain for 4 days.     EXAM: Abdominal ultrasound, complete.     COMPARISON: CT chest dated 01/12/2017     FINDINGS:  The pancreas is prominent measuring up to 2.5 cm in thickness. Although  nonspecific, this is unusual for a patient of this age. Please  clinically for any evidence of acute pancreatic  inflammation/pancreatitis.     The echogenicity and echotexture of the hepatic parenchyma is within  normal limits. No hepatic mass. Mild intrahepatic and extrahepatic  biliary dilatation. The common duct is incompletely visualized, however  measures up to 0.8-0.9 cm in diameter.     There is a small gallstones in the gallbladder. There is mild  gallbladder wall thickening measuring up to 0.6 cm. There is borderline  gallbladder distention. No pericholecystic fluid..     The right kidney measures 9.8 cm. The left kidney measures 11.7 cm.  Bilateral renal cortical atrophy is more notable on the right kidney. No  hydronephrosis.     The spleen is mildly enlarged measuring 16 cm.       The abdominal aorta is normal in size.  The juxtahepatic IVC is within  normal limits.  No ascites.       Impression:          1. Mild intrahepatic and extrahepatic biliary dilatation. The entire  common bile duct was not visualized, therefore, distal obstructing stone  or mass may be present. Consider further evaluation with a MRCP.  2. Cholelithiasis. There is mild gallbladder distention and and  gallbladder wall thickening. These findings are concerning for possible  acute cholecystitis, however, may simply be secondary to the biliary  distention.  3. Prominent appearance of the pancreas. While nonspecific, this is  concerning for possible pancreatitis.     This report was finalized on 3/8/2017 2:50 PM by Dr. Damian Kennedy MD.       MRI MRCP [[id number]] Collected:  03/09/17 0847     Updated:  03/09/17 0902    Narrative:       MRI ABDOMEN, NONCONTRAST, INCLUDING MRCP, 03/09/2017:     HISTORY:   81-year-old female  with with abdomen pain, leukocytosis. Elevated  amylase, lipase and bilirubin levels. Previous pancreatitis seen here  January 2017. Current ultrasound showing cholelithiasis, mild bile duct  dilatation and pancreas enlargement. Evaluate for choledocholithiasis,  gallstone pancreatitis.     TECHNIQUE:  MRI examination of the abdomen was performed without contrast  administration due to abnormal renal function (GFR 18). MRCP sequences  were also obtained. The images are significantly degraded by respiratory  motion artifact.     FINDINGS:  Multiple small gallstones are resident within a diffusely thick-walled  gallbladder, there is evidence of edema within and adjacent to the  bladder wall. Acute cholecystitis is likely.     There is mild intrahepatic and extra hepatic bile duct dilatation to the  level of the ampulla with the upper extrahepatic bile duct measuring  about 10 mm. There is a single round filling defect within the mid  common bile duct measuring 4 to 5 mm consistent with  choledocholithiasis.     There is mild diffuse enlargement of the pancreas which appears mildly  edematous. The pancreatic duct is also mildly prominent at about 5 mm.  The appearance is compatible with mild acute pancreatitis.     Mildly nodular liver margin suggesting potential chronic liver disease.  No visible focal liver lesion. Hepatic vasculature appears patent. The  spleen is mildly enlarged at about 14 cm. Mild atrophy of the right  kidney. Small benign-appearing cyst within each kidney.       Impression:       1. Cholelithiasis with likely acute cholecystitis.  2. Choledocholithiasis with a single small stone within the mid common  bile duct. Minimal-mild intrahepatic and extra hepatic bile duct  dilatation. Mildly prominent pancreatic duct.  3. Evidence of mild diffuse pancreatitis.  4. Lobulated hepatic contour suggesting chronic liver disease/cirrhosis.  Mild splenomegaly.  5. Mild right renal parenchymal atrophy. Small  bilateral renal cysts.  6. Technically limited study as noted above.     This report was finalized on 3/9/2017 9:00 AM by Dr. Art Bateman MD.       SCANNED - IMAGING [[id number]] Resulted:  03/08/17      Updated:  03/10/17 1048    [place] ERCP [[id number]] Collected:  03/10/17 1517     Updated:  03/10/17 1523    Narrative:       FLUOROSCOPY DURING ERCP PROCEDURE, 03/10/2017:     HISTORY:  81-year-old female with recent imaging studies showing cholelithiasis,  choledocholithiasis and likely acute cholecystitis.     REPORT:  C-arm fluoroscopy was provided by radiology personnel in the OR during  ERCP procedure performed by Dr. Estrada. Fluoroscopy time was  recorded as 6.20 minutes. 10 spot film images were recorded for  documentation purposes. By report, a common bile duct stone was  extracted. The images show mild intrahepatic and extra hepatic bile duct  dilatation and a mildly dilated pancreatic duct. Please see operative  note for details.     This report was finalized on 3/10/2017 3:21 PM by Dr. Art Bateman MD.       [place] Cholangiogram Operative [[id number]] Collected:  03/13/17 1430     Updated:  03/13/17 1435    Narrative:       FLUOROSCOPY DURING CHOLECYSTECTOMY WITH INTRAOPERATIVE CHOLANGIOGRAM,  03/13/2017:     HISTORY:  81-year-old female with acute cholecystitis and choledocholithiasis  status post recent ERCP with stone extraction. Laparoscopic  cholecystectomy with intraoperative cholangiogram.     REPORT:  C-arm fluoroscopy was provided by radiology personnel in the OR during  laparoscopic cholecystectomy and intraoperative cholangiogram.  Fluoroscopy time was recorded as 25 seconds.      Only one fluoroscopic spot image was recorded during the procedure. This  image shows contrast material within the common bile duct, but no  proximal contrast within the common hepatic duct or intrahepatic bile  ducts. The common bile duct appears at least mildly dilated. No visible  common bile duct stone on the  provided image. Contrast flows into the  duodenum through an obstructed ampulla. Correlation with real-time  fluoroscopic findings at surgery is recommended. Please see operative  note for details.     This report was finalized on 3/13/2017 2:33 PM by Dr. Art Bateman MD.           PROCEDURES  Procedure(s):  CHOLECYSTECTOMY LAPAROSCOPIC INTRAOPERATIVE CHOLANGIOGRAM    Condition on Discharge:  Stable    Physical Exam at Discharge  Vital Signs  Temp:  [97.5 °F (36.4 °C)-98.4 °F (36.9 °C)] 98.4 °F (36.9 °C)  Heart Rate:  [72-89] 72  Resp:  [16-18] 16  BP: (112-134)/(56-63) 122/59    Physical Exam:  Physical Exam   Constitutional: Patient appears well-developed and well-nourished and in no acute distress, obese  HEENT:   Head: Normocephalic and atraumatic.   Eyes:  Pupils are equal, round, and reactive to light. EOM are intact. Sclera are anicteric and non-injected.  Mouth and Throat: Patient has moist mucous membranes. Oropharynx is clear of any erythema or exudate.     Neck: Neck supple. No JVD present. No thyromegaly present. No lymphadenopathy present.  Cardiovascular: Regular rate, irregular rhythm, S1 normal and S2 normal.  Exam reveals no gallop and no friction rub.  No murmur heard.  Pulmonary/Chest: Lungs are clear to auscultation bilaterally, diminished bases. No respiratory distress. No wheezes. No rhonchi. No rales.   Abdominal: Soft. Bowel sounds are normal. No distension and no mass. There is no hepatosplenomegaly. There is no tenderness.   Musculoskeletal: Normal Muscle tone  Extremities: 2+ edema all extremities. Pulses are palpable in all 4 extremities.  Neurological: Patient is alert and oriented to person, place, and time. Cranial nerves II-XII are grossly intact with no focal deficits.  Skin: Skin is warm. No rash noted. Nails show no clubbing.  No cyanosis or erythema. Abdominal dressings clean, dry and intact    Discharge Disposition  [facility]    Visiting Nurse:    As per facility    Home  PT/OT:  As per facility    Home Safety Evaluation:  As per facility    DME  As per facility    Discharge Diet:         Dietary Orders            Start     Ordered    03/14/17 0813  Diet Full Liquid  Diet Effective Now     Question:  Diet Texture / Consistency  Answer:  Full Liquid    03/14/17 0812        Activity at Discharge:  As tolerated    Pre-discharge education  Diabetic, Cardiac, Wound Care, medications, follow up    Follow-up Appointments  No future appointments.  Additional Instructions for the Follow-ups that You Need to Schedule     Additional Discharge Follow-Up (Specify Provider)    As directed    To:  Dr. Estrada   Follow Up:  1 Month       Discharge Follow-up with PCP    As directed    Follow Up Details:  1 week       Discharge Follow-up with Specialty    As directed    Specialty:  Dr. Pham   Follow Up:  1 Week               Test Results Pending at Discharge   Order Current Status    Tissue Exam In process         Essie Garibay, MAY  03/15/17  10:18 AM    Time: Discharge over 30 min (if over 30 minutes give explanation as to why it took greater than 30 minutes)  Secondary to:  Review of lab/diagnostics/notes  Coordination of care/follow up/transport  Medication reconciliation  D/W patient

## 2017-03-15 NOTE — PROGRESS NOTES
"    Patient Name: Alexandria Villanueva  :1935  81 y.o.      Patient Care Team:  Gerardo Williamson MD as PCP - General  Gerardo Williamson MD as PCP - Family Medicine    Chief Complaint: H/O AF    Interval History: No CP, denies SOB       Objective   Vital Signs  Temp:  [97.5 °F (36.4 °C)-98.4 °F (36.9 °C)] 98.4 °F (36.9 °C)  Heart Rate:  [76-96] 79  Resp:  [16-18] 16  BP: (112-134)/(56-63) 122/59    Intake/Output Summary (Last 24 hours) at 03/15/17 0854  Last data filed at 03/15/17 0546   Gross per 24 hour   Intake    915 ml   Output    400 ml   Net    515 ml     Flowsheet Rows         First Filed Value    Admission Height  64\" (162.6 cm) Documented at 2017 1700    Admission Weight  219 lb 9.6 oz (99.6 kg) Documented at 2017 1700          Physical Exam:   General Appearance:    Resting comfortably, in no acute distress   Lungs:     Clear to auscultation.  Normal respiratory effort and rate.      Heart:    Regular rhythm and normal rate, normal S1 and S2, no murmurs, gallops or rubs.     Chest Wall:    No abnormalities observed   Abdomen:     Soft, nontender, positive bowel sounds.     Extremities:   no cyanosis, clubbing or edema.  No marked joint deformities.  Adequate musculoskeletal strength.       Results Review:      Results from last 7 days  Lab Units 17  0312   SODIUM mmol/L 145   POTASSIUM mmol/L 3.8   CHLORIDE mmol/L 107   TOTAL CO2 mmol/L 27.4   BUN mg/dL 18   CREATININE mg/dL 1.40*   GLUCOSE mg/dL 125*   CALCIUM mg/dL 9.3           Results from last 7 days  Lab Units 17  0312   WBC 10*3/mm3 5.64   HEMOGLOBIN g/dL 10.1*   HEMATOCRIT % 32.7*   PLATELETS 10*3/mm3 155       Results from last 7 days  Lab Units 03/15/17  0543 17  1515 17  0409  17  0516  17  1306   INR  1.09 1.11* 1.28*  < > 2.19*  < > 2.01*   APTT seconds  --   --  25.1  --  56.4*  --  43.2*   < > = values in this interval not displayed.    Results from last 7 days  Lab Units 17  0406 "   MAGNESIUM mg/dL 2.3                   Medication Review:     insulin aspart 0-7 Units Subcutaneous Q6H   ipratropium-albuterol 3 mL Nebulization 4x Daily - RT   metoprolol succinate XL 25 mg Oral Q24H   pantoprazole 40 mg Intravenous Q AM   piperacillin-tazobactam 3.375 g Intravenous Q6H   warfarin 6 mg Oral Daily          lactated ringers 50 mL/hr Last Rate: 50 mL/hr (03/14/17 2130)   lactated ringers 9 mL/hr        Assessment/Plan   Principal Problem:    Acute biliary pancreatitis without infection or necrosis  Active Problems:    Chronic anticoagulation    Morbid obesity    Obstructive sleep apnea of adult    Abdominal pain    Cholecystitis    Chronic atrial fibrillation    Choledocholithiasis with acute cholecystitis    Acute kidney injury    Chronic kidney disease, stage III (moderate)    Acute cystitis without hematuria    Escherichia coli urinary tract infection    Electrolyte imbalance    Chronic diastolic congestive heart failure    Pulmonary hypertension    Diabetes mellitus type 2 in obese    Lymphedema    Chronic respiratory failure with hypoxia    1.  Acute biliary pancreatitis without infection or necrosis -- s/p ERCP and monty  2. Permanent AF -- rate controlled on home metoprolol dose. Resume coumadin when okay.  3. Chronic diastolic CHF with pulmonary hypertension -- Stable cardiac status.  No evidence of volume overload today; we'll not resume her typical home diaphoretic today.  Likely will restart this at discharge.  4. CKD III       Luis Fernando Stevens III, MD  [facility]  03/15/17  8:54 AM

## 2017-03-16 LAB
LAB AP CASE REPORT: NORMAL
Lab: NORMAL
PATH REPORT.FINAL DX SPEC: NORMAL

## 2017-03-19 ENCOUNTER — LAB REQUISITION (OUTPATIENT)
Dept: LAB | Facility: HOSPITAL | Age: 82
End: 2017-03-19

## 2017-03-19 DIAGNOSIS — E10.9 TYPE 1 DIABETES MELLITUS WITHOUT COMPLICATIONS (HCC): ICD-10-CM

## 2017-03-19 LAB
ANION GAP SERPL CALCULATED.3IONS-SCNC: 5.8 MMOL/L
BASOPHILS # BLD AUTO: 0.03 10*3/MM3 (ref 0–0.2)
BASOPHILS NFR BLD AUTO: 0.6 % (ref 0–2)
BUN BLD-MCNC: 13 MG/DL (ref 8–23)
BUN/CREAT SERPL: 12.5 (ref 7–25)
CALCIUM SPEC-SCNC: 9 MG/DL (ref 8.8–10.5)
CHLORIDE SERPL-SCNC: 106 MMOL/L (ref 98–107)
CO2 SERPL-SCNC: 32.2 MMOL/L (ref 22–29)
CREAT BLD-MCNC: 1.04 MG/DL (ref 0.57–1)
DEPRECATED RDW RBC AUTO: 49.5 FL (ref 37–54)
EOSINOPHIL # BLD AUTO: 0.09 10*3/MM3 (ref 0.1–0.3)
EOSINOPHIL NFR BLD AUTO: 1.9 % (ref 0–4)
ERYTHROCYTE [DISTWIDTH] IN BLOOD BY AUTOMATED COUNT: 14.9 % (ref 11.5–14.5)
GFR SERPL CREATININE-BSD FRML MDRD: 51 ML/MIN/1.73
GLUCOSE BLD-MCNC: 130 MG/DL (ref 65–99)
HCT VFR BLD AUTO: 30.4 % (ref 37–47)
HGB BLD-MCNC: 9.3 G/DL (ref 12–16)
IMM GRANULOCYTES # BLD: 0.16 10*3/MM3 (ref 0–0.03)
IMM GRANULOCYTES NFR BLD: 3.3 % (ref 0–0.5)
LYMPHOCYTES # BLD AUTO: 1.28 10*3/MM3 (ref 0.6–4.8)
LYMPHOCYTES NFR BLD AUTO: 26.6 % (ref 20–45)
MCH RBC QN AUTO: 27.9 PG (ref 27–31)
MCHC RBC AUTO-ENTMCNC: 30.6 G/DL (ref 31–37)
MCV RBC AUTO: 91.3 FL (ref 81–99)
MONOCYTES # BLD AUTO: 0.35 10*3/MM3 (ref 0–1)
MONOCYTES NFR BLD AUTO: 7.3 % (ref 3–8)
NEUTROPHILS # BLD AUTO: 2.9 10*3/MM3 (ref 1.5–8.3)
NEUTROPHILS NFR BLD AUTO: 60.3 % (ref 45–70)
NRBC BLD MANUAL-RTO: 0 /100 WBC (ref 0–0)
PLATELET # BLD AUTO: 250 10*3/MM3 (ref 140–500)
PMV BLD AUTO: 10.2 FL (ref 7.4–10.4)
POTASSIUM BLD-SCNC: 3.2 MMOL/L (ref 3.5–5.2)
RBC # BLD AUTO: 3.33 10*6/MM3 (ref 4.2–5.4)
SODIUM BLD-SCNC: 144 MMOL/L (ref 136–145)
WBC NRBC COR # BLD: 4.81 10*3/MM3 (ref 4.8–10.8)

## 2017-03-19 PROCEDURE — 80048 BASIC METABOLIC PNL TOTAL CA: CPT | Performed by: HOSPITALIST

## 2017-03-19 PROCEDURE — 85025 COMPLETE CBC W/AUTO DIFF WBC: CPT | Performed by: HOSPITALIST

## 2017-03-31 ENCOUNTER — APPOINTMENT (OUTPATIENT)
Dept: GENERAL RADIOLOGY | Facility: HOSPITAL | Age: 82
End: 2017-03-31

## 2017-03-31 ENCOUNTER — HOSPITAL ENCOUNTER (INPATIENT)
Facility: HOSPITAL | Age: 82
LOS: 5 days | Discharge: NURSING, REHAB OR LONG-TERM CARE | End: 2017-04-05
Attending: EMERGENCY MEDICINE | Admitting: INTERNAL MEDICINE

## 2017-03-31 ENCOUNTER — LAB REQUISITION (OUTPATIENT)
Dept: LAB | Facility: HOSPITAL | Age: 82
End: 2017-03-31

## 2017-03-31 DIAGNOSIS — J18.9 PNEUMONIA OF LEFT LOWER LOBE DUE TO INFECTIOUS ORGANISM: Primary | ICD-10-CM

## 2017-03-31 DIAGNOSIS — R06.03 RESPIRATORY DISTRESS: ICD-10-CM

## 2017-03-31 DIAGNOSIS — I50.9 CONGESTIVE HEART FAILURE, UNSPECIFIED CONGESTIVE HEART FAILURE CHRONICITY, UNSPECIFIED CONGESTIVE HEART FAILURE TYPE: ICD-10-CM

## 2017-03-31 DIAGNOSIS — N17.9 ACUTE KIDNEY FAILURE (HCC): ICD-10-CM

## 2017-03-31 PROBLEM — J96.00 ACUTE RESPIRATORY FAILURE (HCC): Status: ACTIVE | Noted: 2017-03-31

## 2017-03-31 LAB
ALBUMIN SERPL-MCNC: 3.3 G/DL (ref 3.5–5.2)
ALBUMIN/GLOB SERPL: 1.3 G/DL
ALP SERPL-CCNC: 128 U/L (ref 40–129)
ALT SERPL W P-5'-P-CCNC: 10 U/L (ref 5–33)
ANION GAP SERPL CALCULATED.3IONS-SCNC: 8.8 MMOL/L
ANION GAP SERPL CALCULATED.3IONS-SCNC: 9.8 MMOL/L
ARTERIAL PATENCY WRIST A: ABNORMAL
AST SERPL-CCNC: 16 U/L (ref 5–32)
ATMOSPHERIC PRESS: 746.4 MMHG
BACTERIA UR QL AUTO: ABNORMAL /HPF
BASE EXCESS BLDA CALC-SCNC: 11.6 MMOL/L (ref -2.3–2.3)
BASOPHILS # BLD AUTO: 0.03 10*3/MM3 (ref 0–0.2)
BASOPHILS NFR BLD AUTO: 0.4 % (ref 0–2)
BDY SITE: ABNORMAL
BILIRUB SERPL-MCNC: 0.3 MG/DL (ref 0.2–1.2)
BILIRUB UR QL STRIP: NEGATIVE
BUN BLD-MCNC: 19 MG/DL (ref 8–23)
BUN BLD-MCNC: 19 MG/DL (ref 8–23)
BUN/CREAT SERPL: 16.8 (ref 7–25)
BUN/CREAT SERPL: 17.3 (ref 7–25)
CALCIUM SPEC-SCNC: 10.2 MG/DL (ref 8.8–10.5)
CALCIUM SPEC-SCNC: 9.5 MG/DL (ref 8.8–10.5)
CHLORIDE SERPL-SCNC: 103 MMOL/L (ref 98–107)
CHLORIDE SERPL-SCNC: 99 MMOL/L (ref 98–107)
CLARITY UR: CLEAR
CO2 SERPL-SCNC: 36.2 MMOL/L (ref 22–29)
CO2 SERPL-SCNC: 37.2 MMOL/L (ref 22–29)
COLOR UR: YELLOW
CREAT BLD-MCNC: 1.1 MG/DL (ref 0.57–1)
CREAT BLD-MCNC: 1.13 MG/DL (ref 0.57–1)
D-LACTATE SERPL-SCNC: 1.2 MMOL/L (ref 0.5–2)
DEPRECATED RDW RBC AUTO: 51.3 FL (ref 37–54)
EOSINOPHIL # BLD AUTO: 0.07 10*3/MM3 (ref 0.1–0.3)
EOSINOPHIL NFR BLD AUTO: 1 % (ref 0–4)
ERYTHROCYTE [DISTWIDTH] IN BLOOD BY AUTOMATED COUNT: 15.1 % (ref 11.5–14.5)
GAS FLOW AIRWAY: 6 LPM
GFR SERPL CREATININE-BSD FRML MDRD: 46 ML/MIN/1.73
GFR SERPL CREATININE-BSD FRML MDRD: 48 ML/MIN/1.73
GLOBULIN UR ELPH-MCNC: 2.6 GM/DL
GLUCOSE BLD-MCNC: 146 MG/DL (ref 65–99)
GLUCOSE BLD-MCNC: 152 MG/DL (ref 65–99)
GLUCOSE UR STRIP-MCNC: NEGATIVE MG/DL
HCO3 BLDA-SCNC: 37.8 MMOL/L (ref 22–26)
HCT VFR BLD AUTO: 30.5 % (ref 37–47)
HGB BLD-MCNC: 9.1 G/DL (ref 12–16)
HGB BLDA-MCNC: 10.3 G/DL (ref 12–18)
HGB UR QL STRIP.AUTO: ABNORMAL
HYALINE CASTS UR QL AUTO: ABNORMAL /LPF
IMM GRANULOCYTES # BLD: 0.15 10*3/MM3 (ref 0–0.03)
IMM GRANULOCYTES NFR BLD: 2.2 % (ref 0–0.5)
INR PPP: 2.73 (ref 0.9–1.1)
KETONES UR QL STRIP: NEGATIVE
LEUKOCYTE ESTERASE UR QL STRIP.AUTO: NEGATIVE
LYMPHOCYTES # BLD AUTO: 1.87 10*3/MM3 (ref 0.6–4.8)
LYMPHOCYTES NFR BLD AUTO: 27.9 % (ref 20–45)
MCH RBC QN AUTO: 27.3 PG (ref 27–31)
MCHC RBC AUTO-ENTMCNC: 29.8 G/DL (ref 31–37)
MCV RBC AUTO: 91.6 FL (ref 81–99)
MODALITY: ABNORMAL
MONOCYTES # BLD AUTO: 0.73 10*3/MM3 (ref 0–1)
MONOCYTES NFR BLD AUTO: 10.9 % (ref 3–8)
NEUTROPHILS # BLD AUTO: 3.86 10*3/MM3 (ref 1.5–8.3)
NEUTROPHILS NFR BLD AUTO: 57.6 % (ref 45–70)
NITRITE UR QL STRIP: NEGATIVE
NRBC BLD MANUAL-RTO: 0 /100 WBC (ref 0–0)
NT-PROBNP SERPL-MCNC: 9294 PG/ML (ref 5–450)
PCO2 BLDA: 59.2 MM HG (ref 35–45)
PH BLDA: 7.42 PH UNITS (ref 7.35–7.45)
PH UR STRIP.AUTO: 6 [PH] (ref 4.5–8)
PLATELET # BLD AUTO: 378 10*3/MM3 (ref 140–500)
PMV BLD AUTO: 9.8 FL (ref 7.4–10.4)
PO2 BLDA: 66.9 MM HG (ref 80–100)
POTASSIUM BLD-SCNC: 3.8 MMOL/L (ref 3.5–5.2)
POTASSIUM BLD-SCNC: 3.9 MMOL/L (ref 3.5–5.2)
PROT SERPL-MCNC: 5.9 G/DL (ref 6–8.5)
PROT UR QL STRIP: NEGATIVE
PROTHROMBIN TIME: 29.5 SECONDS (ref 12.1–15)
RBC # BLD AUTO: 3.33 10*6/MM3 (ref 4.2–5.4)
RBC # UR: ABNORMAL /HPF
REF LAB TEST METHOD: ABNORMAL
SAO2 % BLDCOA: 93.1 % (ref 91–100)
SODIUM BLD-SCNC: 146 MMOL/L (ref 136–145)
SODIUM BLD-SCNC: 148 MMOL/L (ref 136–145)
SP GR UR STRIP: 1.01 (ref 1–1.03)
SQUAMOUS #/AREA URNS HPF: ABNORMAL /HPF
TROPONIN T SERPL-MCNC: 0.01 NG/ML (ref 0–0.03)
UROBILINOGEN UR QL STRIP: ABNORMAL
WBC NRBC COR # BLD: 6.71 10*3/MM3 (ref 4.8–10.8)
WBC UR QL AUTO: ABNORMAL /HPF

## 2017-03-31 PROCEDURE — 87633 RESP VIRUS 12-25 TARGETS: CPT | Performed by: INTERNAL MEDICINE

## 2017-03-31 PROCEDURE — 87798 DETECT AGENT NOS DNA AMP: CPT | Performed by: INTERNAL MEDICINE

## 2017-03-31 PROCEDURE — 99284 EMERGENCY DEPT VISIT MOD MDM: CPT

## 2017-03-31 PROCEDURE — 82803 BLOOD GASES ANY COMBINATION: CPT | Performed by: EMERGENCY MEDICINE

## 2017-03-31 PROCEDURE — 83880 ASSAY OF NATRIURETIC PEPTIDE: CPT | Performed by: EMERGENCY MEDICINE

## 2017-03-31 PROCEDURE — 25010000002 VANCOMYCIN: Performed by: EMERGENCY MEDICINE

## 2017-03-31 PROCEDURE — 87804 INFLUENZA ASSAY W/OPTIC: CPT | Performed by: INTERNAL MEDICINE

## 2017-03-31 PROCEDURE — 81001 URINALYSIS AUTO W/SCOPE: CPT | Performed by: EMERGENCY MEDICINE

## 2017-03-31 PROCEDURE — 85610 PROTHROMBIN TIME: CPT | Performed by: EMERGENCY MEDICINE

## 2017-03-31 PROCEDURE — 87486 CHLMYD PNEUM DNA AMP PROBE: CPT | Performed by: INTERNAL MEDICINE

## 2017-03-31 PROCEDURE — 25010000002 PIPERACILLIN-TAZOBACTAM: Performed by: EMERGENCY MEDICINE

## 2017-03-31 PROCEDURE — 36600 WITHDRAWAL OF ARTERIAL BLOOD: CPT | Performed by: EMERGENCY MEDICINE

## 2017-03-31 PROCEDURE — 87186 SC STD MICRODIL/AGAR DIL: CPT | Performed by: EMERGENCY MEDICINE

## 2017-03-31 PROCEDURE — 87581 M.PNEUMON DNA AMP PROBE: CPT | Performed by: INTERNAL MEDICINE

## 2017-03-31 PROCEDURE — 84484 ASSAY OF TROPONIN QUANT: CPT | Performed by: EMERGENCY MEDICINE

## 2017-03-31 PROCEDURE — 84484 ASSAY OF TROPONIN QUANT: CPT | Performed by: INTERNAL MEDICINE

## 2017-03-31 PROCEDURE — 99285 EMERGENCY DEPT VISIT HI MDM: CPT

## 2017-03-31 PROCEDURE — 71010 HC CHEST PA OR AP: CPT

## 2017-03-31 PROCEDURE — 87077 CULTURE AEROBIC IDENTIFY: CPT | Performed by: EMERGENCY MEDICINE

## 2017-03-31 PROCEDURE — 93010 ELECTROCARDIOGRAM REPORT: CPT | Performed by: INTERNAL MEDICINE

## 2017-03-31 PROCEDURE — 87040 BLOOD CULTURE FOR BACTERIA: CPT | Performed by: EMERGENCY MEDICINE

## 2017-03-31 PROCEDURE — 85025 COMPLETE CBC W/AUTO DIFF WBC: CPT | Performed by: EMERGENCY MEDICINE

## 2017-03-31 PROCEDURE — 83605 ASSAY OF LACTIC ACID: CPT | Performed by: EMERGENCY MEDICINE

## 2017-03-31 PROCEDURE — 93005 ELECTROCARDIOGRAM TRACING: CPT | Performed by: EMERGENCY MEDICINE

## 2017-03-31 RX ORDER — CITALOPRAM 20 MG/1
20 TABLET ORAL DAILY
Status: DISCONTINUED | OUTPATIENT
Start: 2017-04-01 | End: 2017-04-05 | Stop reason: HOSPADM

## 2017-03-31 RX ORDER — FUROSEMIDE 80 MG
80 TABLET ORAL 2 TIMES DAILY
Status: DISCONTINUED | OUTPATIENT
Start: 2017-04-01 | End: 2017-04-05 | Stop reason: HOSPADM

## 2017-03-31 RX ORDER — POLYETHYLENE GLYCOL 3350 17 G/17G
17 POWDER, FOR SOLUTION ORAL DAILY
Status: DISCONTINUED | OUTPATIENT
Start: 2017-04-01 | End: 2017-04-05 | Stop reason: HOSPADM

## 2017-03-31 RX ORDER — IPRATROPIUM BROMIDE AND ALBUTEROL SULFATE 2.5; .5 MG/3ML; MG/3ML
3 SOLUTION RESPIRATORY (INHALATION)
Status: DISCONTINUED | OUTPATIENT
Start: 2017-03-31 | End: 2017-04-05 | Stop reason: HOSPADM

## 2017-03-31 RX ORDER — IPRATROPIUM BROMIDE AND ALBUTEROL SULFATE 2.5; .5 MG/3ML; MG/3ML
3 SOLUTION RESPIRATORY (INHALATION)
Status: DISCONTINUED | OUTPATIENT
Start: 2017-04-01 | End: 2017-04-01

## 2017-03-31 RX ORDER — GABAPENTIN 300 MG/1
300 CAPSULE ORAL EVERY 12 HOURS SCHEDULED
Status: DISCONTINUED | OUTPATIENT
Start: 2017-04-01 | End: 2017-04-05 | Stop reason: HOSPADM

## 2017-03-31 RX ORDER — LANOLIN ALCOHOL/MO/W.PET/CERES
6 CREAM (GRAM) TOPICAL NIGHTLY
Status: DISCONTINUED | OUTPATIENT
Start: 2017-03-31 | End: 2017-04-05 | Stop reason: HOSPADM

## 2017-03-31 RX ORDER — GUAIFENESIN 600 MG/1
1200 TABLET, EXTENDED RELEASE ORAL 2 TIMES DAILY
Status: DISCONTINUED | OUTPATIENT
Start: 2017-04-01 | End: 2017-04-05 | Stop reason: HOSPADM

## 2017-03-31 RX ORDER — L.ACID,PARA/B.BIFIDUM/S.THERM 8B CELL
1 CAPSULE ORAL DAILY
Status: DISCONTINUED | OUTPATIENT
Start: 2017-04-01 | End: 2017-04-05 | Stop reason: HOSPADM

## 2017-03-31 RX ORDER — POTASSIUM CHLORIDE 750 MG/1
10 TABLET, FILM COATED, EXTENDED RELEASE ORAL DAILY
Status: DISCONTINUED | OUTPATIENT
Start: 2017-04-01 | End: 2017-04-05 | Stop reason: HOSPADM

## 2017-03-31 RX ORDER — POTASSIUM CHLORIDE 750 MG/1
20 TABLET, FILM COATED, EXTENDED RELEASE ORAL DAILY
COMMUNITY

## 2017-03-31 RX ORDER — CEFUROXIME AXETIL 250 MG/1
250 TABLET ORAL 2 TIMES DAILY
COMMUNITY
End: 2017-04-05 | Stop reason: HOSPADM

## 2017-03-31 RX ORDER — METOPROLOL SUCCINATE 25 MG/1
25 TABLET, EXTENDED RELEASE ORAL DAILY
Status: DISCONTINUED | OUTPATIENT
Start: 2017-04-01 | End: 2017-04-01

## 2017-03-31 RX ORDER — BUDESONIDE AND FORMOTEROL FUMARATE DIHYDRATE 80; 4.5 UG/1; UG/1
2 AEROSOL RESPIRATORY (INHALATION)
Status: DISCONTINUED | OUTPATIENT
Start: 2017-04-01 | End: 2017-04-05 | Stop reason: HOSPADM

## 2017-03-31 RX ORDER — PANTOPRAZOLE SODIUM 40 MG/10ML
40 INJECTION, POWDER, LYOPHILIZED, FOR SOLUTION INTRAVENOUS
Status: DISCONTINUED | OUTPATIENT
Start: 2017-04-01 | End: 2017-04-05 | Stop reason: HOSPADM

## 2017-03-31 RX ORDER — FUROSEMIDE 80 MG
80 TABLET ORAL 2 TIMES DAILY
COMMUNITY

## 2017-03-31 RX ORDER — ACETAMINOPHEN 325 MG/1
650 TABLET ORAL EVERY 6 HOURS PRN
Status: DISCONTINUED | OUTPATIENT
Start: 2017-03-31 | End: 2017-03-31

## 2017-03-31 RX ORDER — SENNA AND DOCUSATE SODIUM 50; 8.6 MG/1; MG/1
2 TABLET, FILM COATED ORAL 2 TIMES DAILY
Status: DISCONTINUED | OUTPATIENT
Start: 2017-04-01 | End: 2017-04-05 | Stop reason: HOSPADM

## 2017-03-31 RX ORDER — GUAIFENESIN 600 MG/1
1200 TABLET, EXTENDED RELEASE ORAL 2 TIMES DAILY
Status: ON HOLD | COMMUNITY
End: 2019-03-27

## 2017-03-31 RX ORDER — HYDROCODONE BITARTRATE AND ACETAMINOPHEN 5; 325 MG/1; MG/1
1 TABLET ORAL EVERY 6 HOURS PRN
Status: DISCONTINUED | OUTPATIENT
Start: 2017-03-31 | End: 2017-04-05 | Stop reason: HOSPADM

## 2017-03-31 RX ORDER — PANTOPRAZOLE SODIUM 40 MG/1
40 TABLET, DELAYED RELEASE ORAL EVERY EVENING
Status: CANCELLED | OUTPATIENT
Start: 2017-03-31

## 2017-03-31 RX ORDER — MULTIPLE VITAMINS W/ MINERALS TAB 9MG-400MCG
1 TAB ORAL DAILY
Status: DISCONTINUED | OUTPATIENT
Start: 2017-04-01 | End: 2017-04-05 | Stop reason: HOSPADM

## 2017-03-31 RX ORDER — SODIUM CHLORIDE 0.9 % (FLUSH) 0.9 %
10 SYRINGE (ML) INJECTION AS NEEDED
Status: DISCONTINUED | OUTPATIENT
Start: 2017-03-31 | End: 2017-04-05 | Stop reason: HOSPADM

## 2017-03-31 RX ORDER — FOLIC ACID 1 MG/1
1 TABLET ORAL DAILY
Status: DISCONTINUED | OUTPATIENT
Start: 2017-04-01 | End: 2017-04-05 | Stop reason: HOSPADM

## 2017-03-31 RX ADMIN — TAZOBACTAM SODIUM AND PIPERACILLIN SODIUM 4.5 G: .5; 4 INJECTION, POWDER, LYOPHILIZED, FOR SOLUTION INTRAVENOUS at 21:51

## 2017-03-31 RX ADMIN — VANCOMYCIN HYDROCHLORIDE 1500 MG: 750 INJECTION, POWDER, LYOPHILIZED, FOR SOLUTION INTRAVENOUS at 22:20

## 2017-04-01 ENCOUNTER — APPOINTMENT (OUTPATIENT)
Dept: CT IMAGING | Facility: HOSPITAL | Age: 82
End: 2017-04-01

## 2017-04-01 LAB
ALBUMIN SERPL-MCNC: 2.8 G/DL (ref 3.5–5.2)
ALBUMIN/GLOB SERPL: 0.9 G/DL
ALP SERPL-CCNC: 113 U/L (ref 40–129)
ALT SERPL W P-5'-P-CCNC: 9 U/L (ref 5–33)
ANION GAP SERPL CALCULATED.3IONS-SCNC: 13.6 MMOL/L
AST SERPL-CCNC: 19 U/L (ref 5–32)
B PERT DNA SPEC QL NAA+PROBE: NOT DETECTED
BASOPHILS # BLD AUTO: 0.02 10*3/MM3 (ref 0–0.2)
BASOPHILS NFR BLD AUTO: 0.5 % (ref 0–2)
BILIRUB SERPL-MCNC: 0.4 MG/DL (ref 0.2–1.2)
BUN BLD-MCNC: 19 MG/DL (ref 8–23)
BUN/CREAT SERPL: 17.1 (ref 7–25)
C PNEUM DNA NPH QL NAA+NON-PROBE: NOT DETECTED
CALCIUM SPEC-SCNC: 10.2 MG/DL (ref 8.8–10.5)
CHLORIDE SERPL-SCNC: 98 MMOL/L (ref 98–107)
CO2 SERPL-SCNC: 32.4 MMOL/L (ref 22–29)
CREAT BLD-MCNC: 1.11 MG/DL (ref 0.57–1)
DEPRECATED RDW RBC AUTO: 47.8 FL (ref 37–54)
EOSINOPHIL # BLD AUTO: 0 10*3/MM3 (ref 0.1–0.3)
EOSINOPHIL NFR BLD AUTO: 0 % (ref 0–4)
ERYTHROCYTE [DISTWIDTH] IN BLOOD BY AUTOMATED COUNT: 14.9 % (ref 11.5–14.5)
FLUAV AG NPH QL: NEGATIVE
FLUAV H1 2009 PAND RNA NPH QL NAA+PROBE: NOT DETECTED
FLUAV H1 HA GENE NPH QL NAA+PROBE: NOT DETECTED
FLUAV H3 RNA NPH QL NAA+PROBE: NOT DETECTED
FLUAV SUBTYP SPEC NAA+PROBE: NOT DETECTED
FLUBV AG NPH QL IA: NEGATIVE
FLUBV RNA ISLT QL NAA+PROBE: NOT DETECTED
GFR SERPL CREATININE-BSD FRML MDRD: 47 ML/MIN/1.73
GLOBULIN UR ELPH-MCNC: 3.2 GM/DL
GLUCOSE BLD-MCNC: 186 MG/DL (ref 65–99)
GLUCOSE BLDC GLUCOMTR-MCNC: 179 MG/DL (ref 70–130)
GLUCOSE BLDC GLUCOMTR-MCNC: 207 MG/DL (ref 70–130)
GLUCOSE BLDC GLUCOMTR-MCNC: 222 MG/DL (ref 70–130)
GLUCOSE BLDC GLUCOMTR-MCNC: 228 MG/DL (ref 70–130)
GLUCOSE BLDC GLUCOMTR-MCNC: 296 MG/DL (ref 70–130)
HADV DNA SPEC NAA+PROBE: NOT DETECTED
HCOV 229E RNA SPEC QL NAA+PROBE: NOT DETECTED
HCOV HKU1 RNA SPEC QL NAA+PROBE: NOT DETECTED
HCOV NL63 RNA SPEC QL NAA+PROBE: NOT DETECTED
HCOV OC43 RNA SPEC QL NAA+PROBE: NOT DETECTED
HCT VFR BLD AUTO: 28.1 % (ref 37–47)
HGB BLD-MCNC: 8.8 G/DL (ref 12–16)
HMPV RNA NPH QL NAA+NON-PROBE: NOT DETECTED
HPIV1 RNA SPEC QL NAA+PROBE: NOT DETECTED
HPIV2 RNA SPEC QL NAA+PROBE: NOT DETECTED
HPIV3 RNA NPH QL NAA+PROBE: NOT DETECTED
HPIV4 P GENE NPH QL NAA+PROBE: NOT DETECTED
HYPOCHROMIA BLD QL: NORMAL
IMM GRANULOCYTES # BLD: 0.22 10*3/MM3 (ref 0–0.03)
IMM GRANULOCYTES NFR BLD: 5.5 % (ref 0–0.5)
INR PPP: 2.71 (ref 0.9–1.1)
LYMPHOCYTES # BLD AUTO: 0.56 10*3/MM3 (ref 0.6–4.8)
LYMPHOCYTES NFR BLD AUTO: 14.1 % (ref 20–45)
M PNEUMO IGG SER IA-ACNC: NOT DETECTED
MCH RBC QN AUTO: 27.5 PG (ref 27–31)
MCHC RBC AUTO-ENTMCNC: 31.3 G/DL (ref 31–37)
MCV RBC AUTO: 87.8 FL (ref 81–99)
MICROCYTES BLD QL: NORMAL
MONOCYTES # BLD AUTO: 0.08 10*3/MM3 (ref 0–1)
MONOCYTES NFR BLD AUTO: 2 % (ref 3–8)
NEUTROPHILS # BLD AUTO: 3.09 10*3/MM3 (ref 1.5–8.3)
NEUTROPHILS NFR BLD AUTO: 77.9 % (ref 45–70)
NRBC BLD MANUAL-RTO: 0 /100 WBC (ref 0–0)
PLAT MORPH BLD: NORMAL
PLATELET # BLD AUTO: 294 10*3/MM3 (ref 140–500)
PMV BLD AUTO: 10.4 FL (ref 7.4–10.4)
POTASSIUM BLD-SCNC: 4 MMOL/L (ref 3.5–5.2)
PROCALCITONIN SERPL-MCNC: 0.13 NG/ML (ref 0.1–0.25)
PROT SERPL-MCNC: 6 G/DL (ref 6–8.5)
PROTHROMBIN TIME: 29.3 SECONDS (ref 12.1–15)
RBC # BLD AUTO: 3.2 10*6/MM3 (ref 4.2–5.4)
RHINOVIRUS RNA SPEC NAA+PROBE: NOT DETECTED
RSV RNA NPH QL NAA+NON-PROBE: DETECTED
SODIUM BLD-SCNC: 144 MMOL/L (ref 136–145)
TROPONIN T SERPL-MCNC: 0.01 NG/ML (ref 0–0.03)
TROPONIN T SERPL-MCNC: <0.01 NG/ML (ref 0–0.03)
TROPONIN T SERPL-MCNC: <0.01 NG/ML (ref 0–0.03)
WBC MORPH BLD: NORMAL
WBC NRBC COR # BLD: 3.97 10*3/MM3 (ref 4.8–10.8)

## 2017-04-01 PROCEDURE — 94640 AIRWAY INHALATION TREATMENT: CPT

## 2017-04-01 PROCEDURE — G8997 SWALLOW GOAL STATUS: HCPCS

## 2017-04-01 PROCEDURE — 25010000002 PIPERACILLIN-TAZOBACTAM: Performed by: INTERNAL MEDICINE

## 2017-04-01 PROCEDURE — 82962 GLUCOSE BLOOD TEST: CPT

## 2017-04-01 PROCEDURE — 84484 ASSAY OF TROPONIN QUANT: CPT | Performed by: INTERNAL MEDICINE

## 2017-04-01 PROCEDURE — G8996 SWALLOW CURRENT STATUS: HCPCS

## 2017-04-01 PROCEDURE — 85007 BL SMEAR W/DIFF WBC COUNT: CPT | Performed by: INTERNAL MEDICINE

## 2017-04-01 PROCEDURE — 85610 PROTHROMBIN TIME: CPT | Performed by: INTERNAL MEDICINE

## 2017-04-01 PROCEDURE — 63710000001 INSULIN ASPART PER 5 UNITS: Performed by: HOSPITALIST

## 2017-04-01 PROCEDURE — 99291 CRITICAL CARE FIRST HOUR: CPT | Performed by: EMERGENCY MEDICINE

## 2017-04-01 PROCEDURE — 25010000002 PIPERACILLIN SOD-TAZOBACTAM PER 1 G: Performed by: INTERNAL MEDICINE

## 2017-04-01 PROCEDURE — 71250 CT THORAX DX C-: CPT

## 2017-04-01 PROCEDURE — 94799 UNLISTED PULMONARY SVC/PX: CPT

## 2017-04-01 PROCEDURE — 92610 EVALUATE SWALLOWING FUNCTION: CPT

## 2017-04-01 PROCEDURE — 85025 COMPLETE CBC W/AUTO DIFF WBC: CPT | Performed by: INTERNAL MEDICINE

## 2017-04-01 PROCEDURE — 99223 1ST HOSP IP/OBS HIGH 75: CPT | Performed by: INTERNAL MEDICINE

## 2017-04-01 PROCEDURE — 85060 BLOOD SMEAR INTERPRETATION: CPT | Performed by: INTERNAL MEDICINE

## 2017-04-01 PROCEDURE — 80053 COMPREHEN METABOLIC PANEL: CPT | Performed by: INTERNAL MEDICINE

## 2017-04-01 PROCEDURE — 25010000002 PIPERACILLIN SOD-TAZOBACTAM PER 1 G

## 2017-04-01 PROCEDURE — 93005 ELECTROCARDIOGRAM TRACING: CPT | Performed by: INTERNAL MEDICINE

## 2017-04-01 PROCEDURE — 99221 1ST HOSP IP/OBS SF/LOW 40: CPT | Performed by: HOSPITALIST

## 2017-04-01 PROCEDURE — 93010 ELECTROCARDIOGRAM REPORT: CPT | Performed by: INTERNAL MEDICINE

## 2017-04-01 PROCEDURE — 63710000001 INSULIN ASPART PER 5 UNITS: Performed by: INTERNAL MEDICINE

## 2017-04-01 PROCEDURE — 84145 PROCALCITONIN (PCT): CPT | Performed by: INTERNAL MEDICINE

## 2017-04-01 PROCEDURE — 25010000002 VANCOMYCIN 1500 MG/250ML SOLUTION: Performed by: INTERNAL MEDICINE

## 2017-04-01 RX ORDER — DEXTROSE MONOHYDRATE 25 G/50ML
25 INJECTION, SOLUTION INTRAVENOUS
Status: DISCONTINUED | OUTPATIENT
Start: 2017-04-01 | End: 2017-04-05 | Stop reason: HOSPADM

## 2017-04-01 RX ORDER — VANCOMYCIN HCL-SODIUM CHLORIDE IV SOLN 1.5 GM/250ML-0.9% 1.5-0.9/25 GM/ML-%
1500 SOLUTION INTRAVENOUS EVERY 24 HOURS
Status: DISCONTINUED | OUTPATIENT
Start: 2017-04-01 | End: 2017-04-04 | Stop reason: DRUGHIGH

## 2017-04-01 RX ORDER — NICOTINE POLACRILEX 4 MG
15 LOZENGE BUCCAL
Status: DISCONTINUED | OUTPATIENT
Start: 2017-04-01 | End: 2017-04-05 | Stop reason: HOSPADM

## 2017-04-01 RX ORDER — PIPERACILLIN SODIUM, TAZOBACTAM SODIUM 4; .5 G/20ML; G/20ML
INJECTION, POWDER, LYOPHILIZED, FOR SOLUTION INTRAVENOUS
Status: COMPLETED
Start: 2017-04-01 | End: 2017-04-01

## 2017-04-01 RX ORDER — METOPROLOL SUCCINATE 25 MG/1
37.5 TABLET, EXTENDED RELEASE ORAL DAILY
Status: DISCONTINUED | OUTPATIENT
Start: 2017-04-01 | End: 2017-04-05 | Stop reason: HOSPADM

## 2017-04-01 RX ADMIN — GABAPENTIN 300 MG: 300 CAPSULE ORAL at 08:15

## 2017-04-01 RX ADMIN — SODIUM CHLORIDE 4.5 G: 900 INJECTION, SOLUTION INTRAVENOUS at 05:22

## 2017-04-01 RX ADMIN — FOLIC ACID 1 MG: 1 TABLET ORAL at 08:16

## 2017-04-01 RX ADMIN — DOCUSATE SODIUM AND SENNOSIDES 2 TABLET: 8.6; 5 TABLET, FILM COATED ORAL at 17:08

## 2017-04-01 RX ADMIN — CITALOPRAM HYDROBROMIDE 20 MG: 20 TABLET ORAL at 08:14

## 2017-04-01 RX ADMIN — INSULIN ASPART 3 UNITS: 100 INJECTION, SOLUTION INTRAVENOUS; SUBCUTANEOUS at 07:37

## 2017-04-01 RX ADMIN — POTASSIUM CHLORIDE 10 MEQ: 750 TABLET, FILM COATED, EXTENDED RELEASE ORAL at 08:14

## 2017-04-01 RX ADMIN — INSULIN ASPART 3 UNITS: 100 INJECTION, SOLUTION INTRAVENOUS; SUBCUTANEOUS at 17:17

## 2017-04-01 RX ADMIN — SODIUM CHLORIDE 4.5 G: 900 INJECTION, SOLUTION INTRAVENOUS at 09:57

## 2017-04-01 RX ADMIN — IPRATROPIUM BROMIDE AND ALBUTEROL SULFATE 3 ML: .5; 3 SOLUTION RESPIRATORY (INHALATION) at 11:50

## 2017-04-01 RX ADMIN — HYDROCODONE BITARTRATE AND ACETAMINOPHEN 1 TABLET: 5; 325 TABLET ORAL at 07:37

## 2017-04-01 RX ADMIN — DOCUSATE SODIUM AND SENNOSIDES 2 TABLET: 8.6; 5 TABLET, FILM COATED ORAL at 08:14

## 2017-04-01 RX ADMIN — GABAPENTIN 300 MG: 300 CAPSULE ORAL at 21:05

## 2017-04-01 RX ADMIN — INSULIN ASPART 4 UNITS: 100 INJECTION, SOLUTION INTRAVENOUS; SUBCUTANEOUS at 11:54

## 2017-04-01 RX ADMIN — BUDESONIDE AND FORMOTEROL FUMARATE DIHYDRATE 2 PUFF: 80; 4.5 AEROSOL RESPIRATORY (INHALATION) at 19:16

## 2017-04-01 RX ADMIN — Medication 6 MG: at 21:05

## 2017-04-01 RX ADMIN — IPRATROPIUM BROMIDE AND ALBUTEROL SULFATE 3 ML: .5; 3 SOLUTION RESPIRATORY (INHALATION) at 07:53

## 2017-04-01 RX ADMIN — IPRATROPIUM BROMIDE AND ALBUTEROL SULFATE 3 ML: .5; 3 SOLUTION RESPIRATORY (INHALATION) at 15:55

## 2017-04-01 RX ADMIN — FUROSEMIDE 80 MG: 80 TABLET ORAL at 08:15

## 2017-04-01 RX ADMIN — IPRATROPIUM BROMIDE AND ALBUTEROL SULFATE 3 ML: .5; 3 SOLUTION RESPIRATORY (INHALATION) at 19:16

## 2017-04-01 RX ADMIN — GUAIFENESIN 1200 MG: 600 TABLET, EXTENDED RELEASE ORAL at 08:14

## 2017-04-01 RX ADMIN — IPRATROPIUM BROMIDE AND ALBUTEROL SULFATE 3 ML: .5; 3 SOLUTION RESPIRATORY (INHALATION) at 00:04

## 2017-04-01 RX ADMIN — WARFARIN SODIUM 7 MG: 5 TABLET ORAL at 17:08

## 2017-04-01 RX ADMIN — SODIUM CHLORIDE 4.5 G: 900 INJECTION, SOLUTION INTRAVENOUS at 23:27

## 2017-04-01 RX ADMIN — FUROSEMIDE 80 MG: 80 TABLET ORAL at 17:08

## 2017-04-01 RX ADMIN — SODIUM CHLORIDE 4.5 G: 900 INJECTION, SOLUTION INTRAVENOUS at 17:07

## 2017-04-01 RX ADMIN — INSULIN ASPART 3 UNITS: 100 INJECTION, SOLUTION INTRAVENOUS; SUBCUTANEOUS at 21:03

## 2017-04-01 RX ADMIN — BUDESONIDE AND FORMOTEROL FUMARATE DIHYDRATE 2 PUFF: 80; 4.5 AEROSOL RESPIRATORY (INHALATION) at 08:00

## 2017-04-01 RX ADMIN — PANTOPRAZOLE SODIUM 40 MG: 40 INJECTION, POWDER, FOR SOLUTION INTRAVENOUS at 06:36

## 2017-04-01 RX ADMIN — Medication 1 TABLET: at 08:16

## 2017-04-01 RX ADMIN — VANCOMYCIN HCL-SODIUM CHLORIDE IV SOLN 1.5 GM/250ML-0.9% 1500 MG: 1.5-0.9/25 SOLUTION at 23:27

## 2017-04-01 RX ADMIN — GUAIFENESIN 1200 MG: 600 TABLET, EXTENDED RELEASE ORAL at 17:08

## 2017-04-01 RX ADMIN — Medication 1 CAPSULE: at 08:14

## 2017-04-01 RX ADMIN — METOPROLOL SUCCINATE 37.5 MG: 25 TABLET, EXTENDED RELEASE ORAL at 08:14

## 2017-04-01 RX ADMIN — PIPERACILLIN AND TAZOBACTAM 4.5 G: 4; .5 INJECTION, POWDER, LYOPHILIZED, FOR SOLUTION INTRAVENOUS; PARENTERAL at 04:30

## 2017-04-02 LAB
GLUCOSE BLDC GLUCOMTR-MCNC: 119 MG/DL (ref 70–130)
GLUCOSE BLDC GLUCOMTR-MCNC: 166 MG/DL (ref 70–130)
GLUCOSE BLDC GLUCOMTR-MCNC: 193 MG/DL (ref 70–130)
GLUCOSE BLDC GLUCOMTR-MCNC: 272 MG/DL (ref 70–130)
INR PPP: 3.34 (ref 0.9–1.1)
PROTHROMBIN TIME: 34.6 SECONDS (ref 12.1–15)

## 2017-04-02 PROCEDURE — 99231 SBSQ HOSP IP/OBS SF/LOW 25: CPT | Performed by: HOSPITALIST

## 2017-04-02 PROCEDURE — 85610 PROTHROMBIN TIME: CPT | Performed by: INTERNAL MEDICINE

## 2017-04-02 PROCEDURE — 94660 CPAP INITIATION&MGMT: CPT

## 2017-04-02 PROCEDURE — 94799 UNLISTED PULMONARY SVC/PX: CPT

## 2017-04-02 PROCEDURE — 63710000001 INSULIN ASPART PER 5 UNITS: Performed by: HOSPITALIST

## 2017-04-02 PROCEDURE — 82962 GLUCOSE BLOOD TEST: CPT

## 2017-04-02 PROCEDURE — 99233 SBSQ HOSP IP/OBS HIGH 50: CPT | Performed by: INTERNAL MEDICINE

## 2017-04-02 PROCEDURE — 25010000002 PIPERACILLIN-TAZOBACTAM: Performed by: INTERNAL MEDICINE

## 2017-04-02 PROCEDURE — 25010000002 METHYLPREDNISOLONE PER 40 MG: Performed by: INTERNAL MEDICINE

## 2017-04-02 PROCEDURE — 25010000002 VANCOMYCIN 1500 MG/250ML SOLUTION: Performed by: INTERNAL MEDICINE

## 2017-04-02 PROCEDURE — 25010000002 PIPERACILLIN SOD-TAZOBACTAM PER 1 G: Performed by: INTERNAL MEDICINE

## 2017-04-02 RX ORDER — METHYLPREDNISOLONE SODIUM SUCCINATE 40 MG/ML
40 INJECTION, POWDER, LYOPHILIZED, FOR SOLUTION INTRAMUSCULAR; INTRAVENOUS EVERY 8 HOURS
Status: DISCONTINUED | OUTPATIENT
Start: 2017-04-02 | End: 2017-04-03

## 2017-04-02 RX ORDER — WARFARIN SODIUM 5 MG/1
5 TABLET ORAL
Status: DISCONTINUED | OUTPATIENT
Start: 2017-04-02 | End: 2017-04-04

## 2017-04-02 RX ADMIN — Medication 6 MG: at 20:42

## 2017-04-02 RX ADMIN — GABAPENTIN 300 MG: 300 CAPSULE ORAL at 08:37

## 2017-04-02 RX ADMIN — METOPROLOL SUCCINATE 37.5 MG: 25 TABLET, EXTENDED RELEASE ORAL at 09:43

## 2017-04-02 RX ADMIN — FUROSEMIDE 80 MG: 80 TABLET ORAL at 17:44

## 2017-04-02 RX ADMIN — FOLIC ACID 1 MG: 1 TABLET ORAL at 08:36

## 2017-04-02 RX ADMIN — SODIUM CHLORIDE 4.5 G: 900 INJECTION, SOLUTION INTRAVENOUS at 10:18

## 2017-04-02 RX ADMIN — Medication 1 CAPSULE: at 08:37

## 2017-04-02 RX ADMIN — SODIUM CHLORIDE 4.5 G: 900 INJECTION, SOLUTION INTRAVENOUS at 21:26

## 2017-04-02 RX ADMIN — Medication 1 TABLET: at 09:43

## 2017-04-02 RX ADMIN — IPRATROPIUM BROMIDE AND ALBUTEROL SULFATE 3 ML: .5; 3 SOLUTION RESPIRATORY (INHALATION) at 15:48

## 2017-04-02 RX ADMIN — GUAIFENESIN 1200 MG: 600 TABLET, EXTENDED RELEASE ORAL at 17:44

## 2017-04-02 RX ADMIN — Medication 10 ML: at 17:50

## 2017-04-02 RX ADMIN — IPRATROPIUM BROMIDE AND ALBUTEROL SULFATE 3 ML: .5; 3 SOLUTION RESPIRATORY (INHALATION) at 07:47

## 2017-04-02 RX ADMIN — IPRATROPIUM BROMIDE AND ALBUTEROL SULFATE 3 ML: .5; 3 SOLUTION RESPIRATORY (INHALATION) at 19:51

## 2017-04-02 RX ADMIN — BUDESONIDE AND FORMOTEROL FUMARATE DIHYDRATE 2 PUFF: 80; 4.5 AEROSOL RESPIRATORY (INHALATION) at 07:52

## 2017-04-02 RX ADMIN — Medication 10 ML: at 09:55

## 2017-04-02 RX ADMIN — GABAPENTIN 300 MG: 300 CAPSULE ORAL at 20:42

## 2017-04-02 RX ADMIN — IPRATROPIUM BROMIDE AND ALBUTEROL SULFATE 3 ML: .5; 3 SOLUTION RESPIRATORY (INHALATION) at 11:45

## 2017-04-02 RX ADMIN — INSULIN ASPART 4 UNITS: 100 INJECTION, SOLUTION INTRAVENOUS; SUBCUTANEOUS at 21:26

## 2017-04-02 RX ADMIN — DOCUSATE SODIUM AND SENNOSIDES 2 TABLET: 8.6; 5 TABLET, FILM COATED ORAL at 17:44

## 2017-04-02 RX ADMIN — PANTOPRAZOLE SODIUM 40 MG: 40 INJECTION, POWDER, FOR SOLUTION INTRAVENOUS at 05:14

## 2017-04-02 RX ADMIN — POLYETHYLENE GLYCOL (3350) 17 G: 17 POWDER, FOR SOLUTION ORAL at 08:37

## 2017-04-02 RX ADMIN — METHYLPREDNISOLONE SODIUM SUCCINATE 40 MG: 40 INJECTION, POWDER, FOR SOLUTION INTRAMUSCULAR; INTRAVENOUS at 17:44

## 2017-04-02 RX ADMIN — POTASSIUM CHLORIDE 10 MEQ: 750 TABLET, FILM COATED, EXTENDED RELEASE ORAL at 08:36

## 2017-04-02 RX ADMIN — INSULIN ASPART 2 UNITS: 100 INJECTION, SOLUTION INTRAVENOUS; SUBCUTANEOUS at 11:45

## 2017-04-02 RX ADMIN — SODIUM CHLORIDE 4.5 G: 900 INJECTION, SOLUTION INTRAVENOUS at 15:49

## 2017-04-02 RX ADMIN — WARFARIN SODIUM 5 MG: 5 TABLET ORAL at 17:44

## 2017-04-02 RX ADMIN — GUAIFENESIN 1200 MG: 600 TABLET, EXTENDED RELEASE ORAL at 08:37

## 2017-04-02 RX ADMIN — METHYLPREDNISOLONE SODIUM SUCCINATE 40 MG: 40 INJECTION, POWDER, FOR SOLUTION INTRAMUSCULAR; INTRAVENOUS at 09:54

## 2017-04-02 RX ADMIN — CITALOPRAM HYDROBROMIDE 20 MG: 20 TABLET ORAL at 08:36

## 2017-04-02 RX ADMIN — VANCOMYCIN HCL-SODIUM CHLORIDE IV SOLN 1.5 GM/250ML-0.9% 1500 MG: 1.5-0.9/25 SOLUTION at 21:58

## 2017-04-02 RX ADMIN — INSULIN ASPART 2 UNITS: 100 INJECTION, SOLUTION INTRAVENOUS; SUBCUTANEOUS at 17:43

## 2017-04-02 RX ADMIN — BUDESONIDE AND FORMOTEROL FUMARATE DIHYDRATE 2 PUFF: 80; 4.5 AEROSOL RESPIRATORY (INHALATION) at 19:51

## 2017-04-02 RX ADMIN — DOCUSATE SODIUM AND SENNOSIDES 2 TABLET: 8.6; 5 TABLET, FILM COATED ORAL at 08:36

## 2017-04-02 RX ADMIN — FUROSEMIDE 80 MG: 80 TABLET ORAL at 08:36

## 2017-04-02 RX ADMIN — SODIUM CHLORIDE 4.5 G: 900 INJECTION, SOLUTION INTRAVENOUS at 05:14

## 2017-04-02 RX ADMIN — Medication 10 ML: at 10:17

## 2017-04-03 LAB
ANION GAP SERPL CALCULATED.3IONS-SCNC: 8.3 MMOL/L
BASOPHILS # BLD AUTO: 0 10*3/MM3 (ref 0–0.2)
BASOPHILS NFR BLD AUTO: 0 % (ref 0–2)
BUN BLD-MCNC: 24 MG/DL (ref 8–23)
BUN/CREAT SERPL: 21.4 (ref 7–25)
CALCIUM SPEC-SCNC: 10.2 MG/DL (ref 8.8–10.5)
CHLORIDE SERPL-SCNC: 96 MMOL/L (ref 98–107)
CO2 SERPL-SCNC: 40.7 MMOL/L (ref 22–29)
CREAT BLD-MCNC: 1.12 MG/DL (ref 0.57–1)
DEPRECATED RDW RBC AUTO: 47.8 FL (ref 37–54)
EOSINOPHIL # BLD AUTO: 0 10*3/MM3 (ref 0.1–0.3)
EOSINOPHIL NFR BLD AUTO: 0 % (ref 0–4)
ERYTHROCYTE [DISTWIDTH] IN BLOOD BY AUTOMATED COUNT: 14.6 % (ref 11.5–14.5)
GFR SERPL CREATININE-BSD FRML MDRD: 47 ML/MIN/1.73
GLUCOSE BLD-MCNC: 164 MG/DL (ref 65–99)
GLUCOSE BLDC GLUCOMTR-MCNC: 181 MG/DL (ref 70–130)
GLUCOSE BLDC GLUCOMTR-MCNC: 228 MG/DL (ref 70–130)
GLUCOSE BLDC GLUCOMTR-MCNC: 234 MG/DL (ref 70–130)
GLUCOSE BLDC GLUCOMTR-MCNC: 261 MG/DL (ref 70–130)
GLUCOSE BLDC GLUCOMTR-MCNC: 273 MG/DL (ref 70–130)
HCT VFR BLD AUTO: 29.4 % (ref 37–47)
HGB BLD-MCNC: 8.9 G/DL (ref 12–16)
IMM GRANULOCYTES # BLD: 0.11 10*3/MM3 (ref 0–0.03)
IMM GRANULOCYTES NFR BLD: 2.9 % (ref 0–0.5)
INR PPP: 2.54 (ref 0.9–1.1)
LYMPHOCYTES # BLD AUTO: 0.85 10*3/MM3 (ref 0.6–4.8)
LYMPHOCYTES NFR BLD AUTO: 22.3 % (ref 20–45)
MCH RBC QN AUTO: 27.2 PG (ref 27–31)
MCHC RBC AUTO-ENTMCNC: 30.3 G/DL (ref 31–37)
MCV RBC AUTO: 89.9 FL (ref 81–99)
MONOCYTES # BLD AUTO: 0.11 10*3/MM3 (ref 0–1)
MONOCYTES NFR BLD AUTO: 2.9 % (ref 3–8)
NEUTROPHILS # BLD AUTO: 2.75 10*3/MM3 (ref 1.5–8.3)
NEUTROPHILS NFR BLD AUTO: 71.9 % (ref 45–70)
NRBC BLD MANUAL-RTO: 0 /100 WBC (ref 0–0)
PLATELET # BLD AUTO: 300 10*3/MM3 (ref 140–500)
PMV BLD AUTO: 9.7 FL (ref 7.4–10.4)
POTASSIUM BLD-SCNC: 3.7 MMOL/L (ref 3.5–5.2)
PROTHROMBIN TIME: 27.8 SECONDS (ref 12.1–15)
RBC # BLD AUTO: 3.27 10*6/MM3 (ref 4.2–5.4)
SODIUM BLD-SCNC: 145 MMOL/L (ref 136–145)
VANCOMYCIN SERPL-MCNC: 22.2 MCG/ML
WBC NRBC COR # BLD: 3.82 10*3/MM3 (ref 4.8–10.8)

## 2017-04-03 PROCEDURE — 25010000002 VANCOMYCIN 1500 MG/250ML SOLUTION: Performed by: INTERNAL MEDICINE

## 2017-04-03 PROCEDURE — 80202 ASSAY OF VANCOMYCIN: CPT | Performed by: INTERNAL MEDICINE

## 2017-04-03 PROCEDURE — 85025 COMPLETE CBC W/AUTO DIFF WBC: CPT | Performed by: HOSPITALIST

## 2017-04-03 PROCEDURE — 85610 PROTHROMBIN TIME: CPT | Performed by: INTERNAL MEDICINE

## 2017-04-03 PROCEDURE — 25010000002 METHYLPREDNISOLONE PER 40 MG: Performed by: INTERNAL MEDICINE

## 2017-04-03 PROCEDURE — 99232 SBSQ HOSP IP/OBS MODERATE 35: CPT | Performed by: INTERNAL MEDICINE

## 2017-04-03 PROCEDURE — 63710000001 INSULIN ASPART PER 5 UNITS: Performed by: HOSPITALIST

## 2017-04-03 PROCEDURE — 94660 CPAP INITIATION&MGMT: CPT

## 2017-04-03 PROCEDURE — 94799 UNLISTED PULMONARY SVC/PX: CPT

## 2017-04-03 PROCEDURE — 80048 BASIC METABOLIC PNL TOTAL CA: CPT | Performed by: HOSPITALIST

## 2017-04-03 PROCEDURE — 82962 GLUCOSE BLOOD TEST: CPT

## 2017-04-03 PROCEDURE — 87081 CULTURE SCREEN ONLY: CPT | Performed by: INTERNAL MEDICINE

## 2017-04-03 PROCEDURE — 25010000002 PIPERACILLIN SOD-TAZOBACTAM PER 1 G: Performed by: INTERNAL MEDICINE

## 2017-04-03 RX ORDER — METHYLPREDNISOLONE SODIUM SUCCINATE 40 MG/ML
20 INJECTION, POWDER, LYOPHILIZED, FOR SOLUTION INTRAMUSCULAR; INTRAVENOUS EVERY 8 HOURS
Status: DISCONTINUED | OUTPATIENT
Start: 2017-04-03 | End: 2017-04-04

## 2017-04-03 RX ORDER — NYSTATIN 100000 [USP'U]/G
POWDER TOPICAL EVERY 12 HOURS SCHEDULED
Status: DISCONTINUED | OUTPATIENT
Start: 2017-04-03 | End: 2017-04-05 | Stop reason: HOSPADM

## 2017-04-03 RX ADMIN — IPRATROPIUM BROMIDE AND ALBUTEROL SULFATE 3 ML: .5; 3 SOLUTION RESPIRATORY (INHALATION) at 07:26

## 2017-04-03 RX ADMIN — INSULIN ASPART 2 UNITS: 100 INJECTION, SOLUTION INTRAVENOUS; SUBCUTANEOUS at 07:42

## 2017-04-03 RX ADMIN — GUAIFENESIN 1200 MG: 600 TABLET, EXTENDED RELEASE ORAL at 08:57

## 2017-04-03 RX ADMIN — NYSTATIN: 100000 POWDER TOPICAL at 13:07

## 2017-04-03 RX ADMIN — SODIUM CHLORIDE 4.5 G: 900 INJECTION, SOLUTION INTRAVENOUS at 03:30

## 2017-04-03 RX ADMIN — DOCUSATE SODIUM AND SENNOSIDES 2 TABLET: 8.6; 5 TABLET, FILM COATED ORAL at 17:12

## 2017-04-03 RX ADMIN — GABAPENTIN 300 MG: 300 CAPSULE ORAL at 08:57

## 2017-04-03 RX ADMIN — IPRATROPIUM BROMIDE AND ALBUTEROL SULFATE 3 ML: .5; 3 SOLUTION RESPIRATORY (INHALATION) at 20:08

## 2017-04-03 RX ADMIN — DOCUSATE SODIUM AND SENNOSIDES 2 TABLET: 8.6; 5 TABLET, FILM COATED ORAL at 09:00

## 2017-04-03 RX ADMIN — FUROSEMIDE 80 MG: 80 TABLET ORAL at 17:12

## 2017-04-03 RX ADMIN — FOLIC ACID 1 MG: 1 TABLET ORAL at 08:57

## 2017-04-03 RX ADMIN — METHYLPREDNISOLONE SODIUM SUCCINATE 40 MG: 40 INJECTION, POWDER, FOR SOLUTION INTRAMUSCULAR; INTRAVENOUS at 01:24

## 2017-04-03 RX ADMIN — SODIUM CHLORIDE 4.5 G: 900 INJECTION, SOLUTION INTRAVENOUS at 16:01

## 2017-04-03 RX ADMIN — BUDESONIDE AND FORMOTEROL FUMARATE DIHYDRATE 2 PUFF: 80; 4.5 AEROSOL RESPIRATORY (INHALATION) at 20:09

## 2017-04-03 RX ADMIN — Medication 10 ML: at 11:47

## 2017-04-03 RX ADMIN — GUAIFENESIN 1200 MG: 600 TABLET, EXTENDED RELEASE ORAL at 17:12

## 2017-04-03 RX ADMIN — POLYETHYLENE GLYCOL (3350) 17 G: 17 POWDER, FOR SOLUTION ORAL at 09:19

## 2017-04-03 RX ADMIN — WARFARIN SODIUM 5 MG: 5 TABLET ORAL at 17:12

## 2017-04-03 RX ADMIN — PANTOPRAZOLE SODIUM 40 MG: 40 INJECTION, POWDER, FOR SOLUTION INTRAVENOUS at 06:16

## 2017-04-03 RX ADMIN — SODIUM CHLORIDE 4.5 G: 900 INJECTION, SOLUTION INTRAVENOUS at 21:01

## 2017-04-03 RX ADMIN — Medication 1 TABLET: at 08:58

## 2017-04-03 RX ADMIN — IPRATROPIUM BROMIDE AND ALBUTEROL SULFATE 3 ML: .5; 3 SOLUTION RESPIRATORY (INHALATION) at 15:30

## 2017-04-03 RX ADMIN — INSULIN ASPART 4 UNITS: 100 INJECTION, SOLUTION INTRAVENOUS; SUBCUTANEOUS at 20:59

## 2017-04-03 RX ADMIN — INSULIN ASPART 4 UNITS: 100 INJECTION, SOLUTION INTRAVENOUS; SUBCUTANEOUS at 11:44

## 2017-04-03 RX ADMIN — POTASSIUM CHLORIDE 10 MEQ: 750 TABLET, FILM COATED, EXTENDED RELEASE ORAL at 08:58

## 2017-04-03 RX ADMIN — VANCOMYCIN HCL-SODIUM CHLORIDE IV SOLN 1.5 GM/250ML-0.9% 1500 MG: 1.5-0.9/25 SOLUTION at 22:02

## 2017-04-03 RX ADMIN — IPRATROPIUM BROMIDE AND ALBUTEROL SULFATE 3 ML: .5; 3 SOLUTION RESPIRATORY (INHALATION) at 11:23

## 2017-04-03 RX ADMIN — Medication 1 CAPSULE: at 08:57

## 2017-04-03 RX ADMIN — NYSTATIN: 100000 POWDER TOPICAL at 20:36

## 2017-04-03 RX ADMIN — Medication 6 MG: at 20:36

## 2017-04-03 RX ADMIN — METOPROLOL SUCCINATE 37.5 MG: 25 TABLET, EXTENDED RELEASE ORAL at 08:58

## 2017-04-03 RX ADMIN — METHYLPREDNISOLONE SODIUM SUCCINATE 20 MG: 40 INJECTION, POWDER, FOR SOLUTION INTRAMUSCULAR; INTRAVENOUS at 11:07

## 2017-04-03 RX ADMIN — FUROSEMIDE 80 MG: 80 TABLET ORAL at 08:58

## 2017-04-03 RX ADMIN — GABAPENTIN 300 MG: 300 CAPSULE ORAL at 20:36

## 2017-04-03 RX ADMIN — SODIUM CHLORIDE 4.5 G: 900 INJECTION, SOLUTION INTRAVENOUS at 11:01

## 2017-04-03 RX ADMIN — BUDESONIDE AND FORMOTEROL FUMARATE DIHYDRATE 2 PUFF: 80; 4.5 AEROSOL RESPIRATORY (INHALATION) at 07:26

## 2017-04-03 RX ADMIN — METHYLPREDNISOLONE SODIUM SUCCINATE 20 MG: 40 INJECTION, POWDER, FOR SOLUTION INTRAMUSCULAR; INTRAVENOUS at 18:41

## 2017-04-03 RX ADMIN — INSULIN ASPART 3 UNITS: 100 INJECTION, SOLUTION INTRAVENOUS; SUBCUTANEOUS at 17:07

## 2017-04-03 RX ADMIN — Medication 10 ML: at 11:09

## 2017-04-03 RX ADMIN — CITALOPRAM HYDROBROMIDE 20 MG: 20 TABLET ORAL at 08:57

## 2017-04-04 PROBLEM — E87.8 ELECTROLYTE IMBALANCE: Status: RESOLVED | Noted: 2017-03-13 | Resolved: 2017-04-04

## 2017-04-04 PROBLEM — N17.9 ACUTE KIDNEY INJURY (HCC): Status: RESOLVED | Noted: 2017-03-13 | Resolved: 2017-04-04

## 2017-04-04 PROBLEM — R10.9 ABDOMINAL PAIN: Status: RESOLVED | Noted: 2017-03-08 | Resolved: 2017-04-04

## 2017-04-04 PROBLEM — J10.1 INFLUENZA A WITH RESPIRATORY MANIFESTATIONS: Status: RESOLVED | Noted: 2017-01-12 | Resolved: 2017-04-04

## 2017-04-04 PROBLEM — N30.00 ACUTE CYSTITIS WITHOUT HEMATURIA: Status: RESOLVED | Noted: 2017-03-13 | Resolved: 2017-04-04

## 2017-04-04 PROBLEM — J96.20 ACUTE ON CHRONIC RESPIRATORY FAILURE (HCC): Status: RESOLVED | Noted: 2017-01-12 | Resolved: 2017-04-04

## 2017-04-04 PROBLEM — N39.0 ESCHERICHIA COLI URINARY TRACT INFECTION: Status: RESOLVED | Noted: 2017-03-13 | Resolved: 2017-04-04

## 2017-04-04 PROBLEM — A41.9 SEPSIS DUE TO URINARY TRACT INFECTION (HCC): Status: RESOLVED | Noted: 2017-01-02 | Resolved: 2017-04-04

## 2017-04-04 PROBLEM — B96.20 ESCHERICHIA COLI URINARY TRACT INFECTION: Status: RESOLVED | Noted: 2017-03-13 | Resolved: 2017-04-04

## 2017-04-04 PROBLEM — N39.0 SEPSIS DUE TO URINARY TRACT INFECTION (HCC): Status: RESOLVED | Noted: 2017-01-02 | Resolved: 2017-04-04

## 2017-04-04 PROBLEM — K81.9 CHOLECYSTITIS: Status: RESOLVED | Noted: 2017-03-09 | Resolved: 2017-04-04

## 2017-04-04 LAB
CYTOLOGIST CVX/VAG CYTO: NORMAL
GLUCOSE BLDC GLUCOMTR-MCNC: 173 MG/DL (ref 70–130)
GLUCOSE BLDC GLUCOMTR-MCNC: 236 MG/DL (ref 70–130)
GLUCOSE BLDC GLUCOMTR-MCNC: 259 MG/DL (ref 70–130)
GLUCOSE BLDC GLUCOMTR-MCNC: 288 MG/DL (ref 70–130)
INR PPP: 3.08 (ref 0.9–1.1)
PATH INTERP BLD-IMP: NORMAL
PROTHROMBIN TIME: 32.5 SECONDS (ref 12.1–15)

## 2017-04-04 PROCEDURE — G8998 SWALLOW D/C STATUS: HCPCS

## 2017-04-04 PROCEDURE — 94799 UNLISTED PULMONARY SVC/PX: CPT

## 2017-04-04 PROCEDURE — 85610 PROTHROMBIN TIME: CPT | Performed by: INTERNAL MEDICINE

## 2017-04-04 PROCEDURE — 63710000001 PREDNISONE PER 5 MG: Performed by: INTERNAL MEDICINE

## 2017-04-04 PROCEDURE — 92526 ORAL FUNCTION THERAPY: CPT

## 2017-04-04 PROCEDURE — 25010000002 PIPERACILLIN SOD-TAZOBACTAM PER 1 G: Performed by: INTERNAL MEDICINE

## 2017-04-04 PROCEDURE — 99232 SBSQ HOSP IP/OBS MODERATE 35: CPT | Performed by: INTERNAL MEDICINE

## 2017-04-04 PROCEDURE — 25010000002 METHYLPREDNISOLONE PER 40 MG: Performed by: INTERNAL MEDICINE

## 2017-04-04 PROCEDURE — G8996 SWALLOW CURRENT STATUS: HCPCS

## 2017-04-04 PROCEDURE — 63710000001 INSULIN ASPART PER 5 UNITS: Performed by: HOSPITALIST

## 2017-04-04 PROCEDURE — 82962 GLUCOSE BLOOD TEST: CPT

## 2017-04-04 RX ORDER — AMOXICILLIN AND CLAVULANATE POTASSIUM 875; 125 MG/1; MG/1
1 TABLET, FILM COATED ORAL EVERY 12 HOURS SCHEDULED
Status: DISCONTINUED | OUTPATIENT
Start: 2017-04-04 | End: 2017-04-05 | Stop reason: HOSPADM

## 2017-04-04 RX ORDER — WARFARIN SODIUM 4 MG/1
4 TABLET ORAL
Status: DISCONTINUED | OUTPATIENT
Start: 2017-04-04 | End: 2017-04-05 | Stop reason: HOSPADM

## 2017-04-04 RX ORDER — PREDNISONE 10 MG/1
10 TABLET ORAL
Status: DISCONTINUED | OUTPATIENT
Start: 2017-04-04 | End: 2017-04-05 | Stop reason: HOSPADM

## 2017-04-04 RX ADMIN — DOCUSATE SODIUM AND SENNOSIDES 2 TABLET: 8.6; 5 TABLET, FILM COATED ORAL at 08:48

## 2017-04-04 RX ADMIN — FUROSEMIDE 80 MG: 80 TABLET ORAL at 17:36

## 2017-04-04 RX ADMIN — INSULIN ASPART 4 UNITS: 100 INJECTION, SOLUTION INTRAVENOUS; SUBCUTANEOUS at 11:58

## 2017-04-04 RX ADMIN — NYSTATIN: 100000 POWDER TOPICAL at 08:49

## 2017-04-04 RX ADMIN — GUAIFENESIN 1200 MG: 600 TABLET, EXTENDED RELEASE ORAL at 08:48

## 2017-04-04 RX ADMIN — IPRATROPIUM BROMIDE AND ALBUTEROL SULFATE 3 ML: .5; 3 SOLUTION RESPIRATORY (INHALATION) at 19:09

## 2017-04-04 RX ADMIN — METOPROLOL SUCCINATE 37.5 MG: 25 TABLET, EXTENDED RELEASE ORAL at 08:49

## 2017-04-04 RX ADMIN — IPRATROPIUM BROMIDE AND ALBUTEROL SULFATE 3 ML: .5; 3 SOLUTION RESPIRATORY (INHALATION) at 07:49

## 2017-04-04 RX ADMIN — IPRATROPIUM BROMIDE AND ALBUTEROL SULFATE 3 ML: .5; 3 SOLUTION RESPIRATORY (INHALATION) at 15:39

## 2017-04-04 RX ADMIN — PREDNISONE 10 MG: 10 TABLET ORAL at 10:55

## 2017-04-04 RX ADMIN — AMOXICILLIN AND CLAVULANATE POTASSIUM 1 TABLET: 875; 125 TABLET, FILM COATED ORAL at 10:55

## 2017-04-04 RX ADMIN — BUDESONIDE AND FORMOTEROL FUMARATE DIHYDRATE 2 PUFF: 80; 4.5 AEROSOL RESPIRATORY (INHALATION) at 19:09

## 2017-04-04 RX ADMIN — POLYETHYLENE GLYCOL (3350) 17 G: 17 POWDER, FOR SOLUTION ORAL at 08:50

## 2017-04-04 RX ADMIN — GABAPENTIN 300 MG: 300 CAPSULE ORAL at 08:47

## 2017-04-04 RX ADMIN — IPRATROPIUM BROMIDE AND ALBUTEROL SULFATE 3 ML: .5; 3 SOLUTION RESPIRATORY (INHALATION) at 11:36

## 2017-04-04 RX ADMIN — POTASSIUM CHLORIDE 10 MEQ: 750 TABLET, FILM COATED, EXTENDED RELEASE ORAL at 08:47

## 2017-04-04 RX ADMIN — Medication 1 CAPSULE: at 08:48

## 2017-04-04 RX ADMIN — PANTOPRAZOLE SODIUM 40 MG: 40 INJECTION, POWDER, FOR SOLUTION INTRAVENOUS at 05:10

## 2017-04-04 RX ADMIN — NYSTATIN: 100000 POWDER TOPICAL at 20:01

## 2017-04-04 RX ADMIN — DOCUSATE SODIUM AND SENNOSIDES 2 TABLET: 8.6; 5 TABLET, FILM COATED ORAL at 17:36

## 2017-04-04 RX ADMIN — INSULIN ASPART 4 UNITS: 100 INJECTION, SOLUTION INTRAVENOUS; SUBCUTANEOUS at 20:07

## 2017-04-04 RX ADMIN — AMOXICILLIN AND CLAVULANATE POTASSIUM 1 TABLET: 875; 125 TABLET, FILM COATED ORAL at 20:01

## 2017-04-04 RX ADMIN — METHYLPREDNISOLONE SODIUM SUCCINATE 20 MG: 40 INJECTION, POWDER, FOR SOLUTION INTRAMUSCULAR; INTRAVENOUS at 02:35

## 2017-04-04 RX ADMIN — GUAIFENESIN 1200 MG: 600 TABLET, EXTENDED RELEASE ORAL at 17:36

## 2017-04-04 RX ADMIN — GABAPENTIN 300 MG: 300 CAPSULE ORAL at 20:01

## 2017-04-04 RX ADMIN — Medication 1 TABLET: at 08:48

## 2017-04-04 RX ADMIN — WARFARIN SODIUM 4 MG: 2 TABLET ORAL at 17:32

## 2017-04-04 RX ADMIN — Medication 10 ML: at 08:49

## 2017-04-04 RX ADMIN — Medication 6 MG: at 20:01

## 2017-04-04 RX ADMIN — FOLIC ACID 1 MG: 1 TABLET ORAL at 08:47

## 2017-04-04 RX ADMIN — SODIUM CHLORIDE 4.5 G: 900 INJECTION, SOLUTION INTRAVENOUS at 03:08

## 2017-04-04 RX ADMIN — HYDROCODONE BITARTRATE AND ACETAMINOPHEN 1 TABLET: 5; 325 TABLET ORAL at 16:30

## 2017-04-04 RX ADMIN — INSULIN ASPART 3 UNITS: 100 INJECTION, SOLUTION INTRAVENOUS; SUBCUTANEOUS at 16:30

## 2017-04-04 RX ADMIN — INSULIN ASPART 2 UNITS: 100 INJECTION, SOLUTION INTRAVENOUS; SUBCUTANEOUS at 08:43

## 2017-04-04 RX ADMIN — BUDESONIDE AND FORMOTEROL FUMARATE DIHYDRATE 2 PUFF: 80; 4.5 AEROSOL RESPIRATORY (INHALATION) at 07:58

## 2017-04-04 RX ADMIN — FUROSEMIDE 80 MG: 80 TABLET ORAL at 08:47

## 2017-04-04 RX ADMIN — CITALOPRAM HYDROBROMIDE 20 MG: 20 TABLET ORAL at 08:47

## 2017-04-05 VITALS
DIASTOLIC BLOOD PRESSURE: 55 MMHG | WEIGHT: 212.5 LBS | SYSTOLIC BLOOD PRESSURE: 102 MMHG | TEMPERATURE: 97.8 F | RESPIRATION RATE: 18 BRPM | OXYGEN SATURATION: 92 % | HEART RATE: 72 BPM | HEIGHT: 64 IN | BODY MASS INDEX: 36.28 KG/M2

## 2017-04-05 LAB
BACTERIA SPEC AEROBE CULT: NORMAL
GLUCOSE BLDC GLUCOMTR-MCNC: 122 MG/DL (ref 70–130)
GLUCOSE BLDC GLUCOMTR-MCNC: 235 MG/DL (ref 70–130)
INR PPP: 2.88 (ref 0.9–1.1)
MRSA SPEC QL CULT: NORMAL
PROTHROMBIN TIME: 30.8 SECONDS (ref 12.1–15)

## 2017-04-05 PROCEDURE — 99238 HOSP IP/OBS DSCHRG MGMT 30/<: CPT | Performed by: INTERNAL MEDICINE

## 2017-04-05 PROCEDURE — 82962 GLUCOSE BLOOD TEST: CPT

## 2017-04-05 PROCEDURE — 85610 PROTHROMBIN TIME: CPT | Performed by: INTERNAL MEDICINE

## 2017-04-05 PROCEDURE — 63710000001 INSULIN ASPART PER 5 UNITS: Performed by: HOSPITALIST

## 2017-04-05 PROCEDURE — 63710000001 PREDNISONE PER 5 MG: Performed by: INTERNAL MEDICINE

## 2017-04-05 PROCEDURE — 94799 UNLISTED PULMONARY SVC/PX: CPT

## 2017-04-05 RX ORDER — BUDESONIDE AND FORMOTEROL FUMARATE DIHYDRATE 80; 4.5 UG/1; UG/1
2 AEROSOL RESPIRATORY (INHALATION)
Refills: 12
Start: 2017-04-05

## 2017-04-05 RX ORDER — METOPROLOL SUCCINATE 25 MG/1
37.5 TABLET, EXTENDED RELEASE ORAL DAILY
Start: 2017-04-05

## 2017-04-05 RX ORDER — PREDNISONE 10 MG/1
10 TABLET ORAL
Qty: 30 TABLET
Start: 2017-04-05 | End: 2019-03-31 | Stop reason: HOSPADM

## 2017-04-05 RX ORDER — WARFARIN SODIUM 4 MG/1
TABLET ORAL
Start: 2017-04-05 | End: 2017-04-05

## 2017-04-05 RX ORDER — WARFARIN SODIUM 4 MG/1
TABLET ORAL
Status: ON HOLD
Start: 2017-04-05 | End: 2019-03-27

## 2017-04-05 RX ORDER — NYSTATIN 100000 [USP'U]/G
POWDER TOPICAL EVERY 12 HOURS SCHEDULED
Refills: 0
Start: 2017-04-05 | End: 2018-11-20

## 2017-04-05 RX ORDER — AMOXICILLIN AND CLAVULANATE POTASSIUM 875; 125 MG/1; MG/1
1 TABLET, FILM COATED ORAL EVERY 12 HOURS SCHEDULED
Refills: 0
Start: 2017-04-05 | End: 2017-04-17

## 2017-04-05 RX ADMIN — GUAIFENESIN 1200 MG: 600 TABLET, EXTENDED RELEASE ORAL at 09:12

## 2017-04-05 RX ADMIN — IPRATROPIUM BROMIDE AND ALBUTEROL SULFATE 3 ML: .5; 3 SOLUTION RESPIRATORY (INHALATION) at 07:31

## 2017-04-05 RX ADMIN — NYSTATIN: 100000 POWDER TOPICAL at 09:13

## 2017-04-05 RX ADMIN — PREDNISONE 10 MG: 10 TABLET ORAL at 09:09

## 2017-04-05 RX ADMIN — FOLIC ACID 1 MG: 1 TABLET ORAL at 09:12

## 2017-04-05 RX ADMIN — INSULIN ASPART 3 UNITS: 100 INJECTION, SOLUTION INTRAVENOUS; SUBCUTANEOUS at 13:31

## 2017-04-05 RX ADMIN — FUROSEMIDE 80 MG: 80 TABLET ORAL at 09:10

## 2017-04-05 RX ADMIN — GABAPENTIN 300 MG: 300 CAPSULE ORAL at 09:12

## 2017-04-05 RX ADMIN — CITALOPRAM HYDROBROMIDE 20 MG: 20 TABLET ORAL at 09:12

## 2017-04-05 RX ADMIN — AMOXICILLIN AND CLAVULANATE POTASSIUM 1 TABLET: 875; 125 TABLET, FILM COATED ORAL at 09:09

## 2017-04-05 RX ADMIN — Medication 1 CAPSULE: at 09:13

## 2017-04-05 RX ADMIN — POTASSIUM CHLORIDE 10 MEQ: 750 TABLET, FILM COATED, EXTENDED RELEASE ORAL at 09:13

## 2017-04-05 RX ADMIN — METOPROLOL SUCCINATE 37.5 MG: 25 TABLET, EXTENDED RELEASE ORAL at 09:10

## 2017-04-05 RX ADMIN — Medication 1 TABLET: at 09:12

## 2017-04-05 RX ADMIN — BUDESONIDE AND FORMOTEROL FUMARATE DIHYDRATE 2 PUFF: 80; 4.5 AEROSOL RESPIRATORY (INHALATION) at 07:37

## 2017-04-05 RX ADMIN — PANTOPRAZOLE SODIUM 40 MG: 40 INJECTION, POWDER, FOR SOLUTION INTRAVENOUS at 05:20

## 2017-04-05 RX ADMIN — DOCUSATE SODIUM AND SENNOSIDES 2 TABLET: 8.6; 5 TABLET, FILM COATED ORAL at 09:12

## 2017-04-05 RX ADMIN — IPRATROPIUM BROMIDE AND ALBUTEROL SULFATE 3 ML: .5; 3 SOLUTION RESPIRATORY (INHALATION) at 11:39

## 2017-04-06 ENCOUNTER — OUTSIDE FACILITY SERVICE (OUTPATIENT)
Dept: HOSPITALIST | Facility: HOSPITAL | Age: 82
End: 2017-04-06

## 2017-04-06 LAB
BACTERIA SPEC AEROBE CULT: ABNORMAL
GRAM STN SPEC: ABNORMAL
ISOLATED FROM: ABNORMAL

## 2017-04-06 PROCEDURE — 99304 1ST NF CARE SF/LOW MDM 25: CPT | Performed by: INTERNAL MEDICINE

## 2017-04-12 ENCOUNTER — OUTSIDE FACILITY SERVICE (OUTPATIENT)
Dept: HOSPITALIST | Facility: HOSPITAL | Age: 82
End: 2017-04-12

## 2017-04-12 PROCEDURE — 99307 SBSQ NF CARE SF MDM 10: CPT | Performed by: HOSPITALIST

## 2017-05-09 ENCOUNTER — OUTSIDE FACILITY SERVICE (OUTPATIENT)
Dept: HOSPITALIST | Facility: HOSPITAL | Age: 82
End: 2017-05-09

## 2017-05-09 PROCEDURE — 99307 SBSQ NF CARE SF MDM 10: CPT | Performed by: HOSPITALIST

## 2017-05-31 ENCOUNTER — OUTSIDE FACILITY SERVICE (OUTPATIENT)
Dept: HOSPITALIST | Facility: HOSPITAL | Age: 82
End: 2017-05-31

## 2017-05-31 PROCEDURE — 99307 SBSQ NF CARE SF MDM 10: CPT | Performed by: HOSPITALIST

## 2017-06-07 ENCOUNTER — OUTSIDE FACILITY SERVICE (OUTPATIENT)
Dept: HOSPITALIST | Facility: HOSPITAL | Age: 82
End: 2017-06-07

## 2017-06-07 PROCEDURE — 99307 SBSQ NF CARE SF MDM 10: CPT | Performed by: INTERNAL MEDICINE

## 2017-07-03 ENCOUNTER — OFFICE (OUTPATIENT)
Dept: URBAN - METROPOLITAN AREA CLINIC 71 | Facility: CLINIC | Age: 82
End: 2017-07-03
Payer: MEDICARE

## 2017-07-03 VITALS
SYSTOLIC BLOOD PRESSURE: 110 MMHG | DIASTOLIC BLOOD PRESSURE: 80 MMHG | WEIGHT: 207 LBS | HEIGHT: 64 IN | HEART RATE: 64 BPM

## 2017-07-03 DIAGNOSIS — K59.00 CONSTIPATION, UNSPECIFIED: ICD-10-CM

## 2017-07-03 DIAGNOSIS — R10.13 EPIGASTRIC PAIN: ICD-10-CM

## 2017-07-03 PROCEDURE — 99213 OFFICE O/P EST LOW 20 MIN: CPT

## 2017-07-03 RX ORDER — HYOSCYAMINE SULFATE EXTENDED-RELEASE 0.38 MG/1
0.75 TABLET ORAL
Qty: 60 | Refills: 12 | Status: ACTIVE
Start: 2017-07-03

## 2017-07-12 ENCOUNTER — OUTSIDE FACILITY SERVICE (OUTPATIENT)
Dept: HOSPITALIST | Facility: HOSPITAL | Age: 82
End: 2017-07-12

## 2017-07-12 PROCEDURE — 99307 SBSQ NF CARE SF MDM 10: CPT | Performed by: INTERNAL MEDICINE

## 2017-08-10 ENCOUNTER — OUTSIDE FACILITY SERVICE (OUTPATIENT)
Dept: HOSPITALIST | Facility: HOSPITAL | Age: 82
End: 2017-08-10

## 2017-08-10 PROCEDURE — 99307 SBSQ NF CARE SF MDM 10: CPT | Performed by: HOSPITALIST

## 2017-09-06 ENCOUNTER — OUTSIDE FACILITY SERVICE (OUTPATIENT)
Dept: HOSPITALIST | Facility: HOSPITAL | Age: 82
End: 2017-09-06

## 2017-09-06 PROCEDURE — 99307 SBSQ NF CARE SF MDM 10: CPT | Performed by: INTERNAL MEDICINE

## 2017-10-05 ENCOUNTER — OUTSIDE FACILITY SERVICE (OUTPATIENT)
Dept: HOSPITALIST | Facility: HOSPITAL | Age: 82
End: 2017-10-05

## 2017-10-05 PROCEDURE — 99308 SBSQ NF CARE LOW MDM 20: CPT | Performed by: HOSPITALIST

## 2017-10-07 ENCOUNTER — HOSPITAL ENCOUNTER (EMERGENCY)
Facility: HOSPITAL | Age: 82
Discharge: HOME | End: 2017-10-07
Attending: EMERGENCY MEDICINE | Admitting: EMERGENCY MEDICINE

## 2017-10-07 ENCOUNTER — APPOINTMENT (OUTPATIENT)
Dept: GENERAL RADIOLOGY | Facility: HOSPITAL | Age: 82
End: 2017-10-07

## 2017-10-07 VITALS
BODY MASS INDEX: 43.19 KG/M2 | HEART RATE: 80 BPM | RESPIRATION RATE: 20 BRPM | HEIGHT: 60 IN | TEMPERATURE: 98.3 F | DIASTOLIC BLOOD PRESSURE: 87 MMHG | SYSTOLIC BLOOD PRESSURE: 124 MMHG | WEIGHT: 220 LBS | OXYGEN SATURATION: 94 %

## 2017-10-07 DIAGNOSIS — S82.101A CLOSED FRACTURE OF PROXIMAL END OF RIGHT TIBIA, UNSPECIFIED FRACTURE MORPHOLOGY, INITIAL ENCOUNTER: Primary | ICD-10-CM

## 2017-10-07 DIAGNOSIS — S72.451A CLOSED DISPLACED SUPRACONDYLAR FRACTURE OF DISTAL END OF RIGHT FEMUR WITHOUT INTRACONDYLAR EXTENSION, INITIAL ENCOUNTER (HCC): ICD-10-CM

## 2017-10-07 PROCEDURE — 99284 EMERGENCY DEPT VISIT MOD MDM: CPT | Performed by: EMERGENCY MEDICINE

## 2017-10-07 PROCEDURE — 99283 EMERGENCY DEPT VISIT LOW MDM: CPT

## 2017-10-07 PROCEDURE — 73590 X-RAY EXAM OF LOWER LEG: CPT

## 2017-10-07 RX ORDER — AMOXICILLIN 875 MG/1
875 TABLET, COATED ORAL 2 TIMES DAILY
COMMUNITY
End: 2019-03-31 | Stop reason: HOSPADM

## 2017-10-07 RX ORDER — HYDROCODONE BITARTRATE AND ACETAMINOPHEN 5; 325 MG/1; MG/1
1 TABLET ORAL EVERY 6 HOURS PRN
Qty: 30 TABLET | Refills: 0 | Status: SHIPPED | OUTPATIENT
Start: 2017-10-07 | End: 2019-03-31 | Stop reason: HOSPADM

## 2017-10-07 RX ORDER — ACETAMINOPHEN 500 MG
500 TABLET ORAL EVERY 4 HOURS PRN
COMMUNITY

## 2017-10-07 RX ORDER — BISACODYL 10 MG
10 SUPPOSITORY, RECTAL RECTAL DAILY PRN
COMMUNITY

## 2017-10-07 RX ORDER — HYOSCYAMINE SULFATE EXTENDED-RELEASE 0.38 MG/1
0.38 TABLET ORAL EVERY 12 HOURS PRN
COMMUNITY

## 2017-10-07 RX ORDER — MINOCYCLINE HYDROCHLORIDE 100 MG/1
100 CAPSULE ORAL 2 TIMES DAILY
Status: ON HOLD | COMMUNITY
End: 2019-03-27

## 2017-10-07 NOTE — DISCHARGE INSTRUCTIONS
When the immobilizer until seen by Dr. Knight.  Follow-up with Dr. Gillespie in approximately 1 week.  Call his office Monday morning to arrange follow-up time.  Continue current pain medication.  Return to ED for medical emergencies.

## 2017-10-07 NOTE — ED PROVIDER NOTES
"Subjective   Patient is a 81 y.o. female presenting with lower extremity pain.   History provided by:  Patient  Lower Extremity Issue   Location:  Knee  Time since incident:  5 days  Injury: yes    Mechanism of injury comment:  As described below.  Knee location:  R knee  Dislocation: no    Foreign body present:  No foreign bodies  Prior injury to area:  Yes (with nonunion of distal femur fracture)  Exacerbated by: movement of right lower extremity.  Associated symptoms: swelling    Associated symptoms: no fever    Risk factors: obesity    Risk factors: no concern for non-accidental trauma      HPI Narrative:Ms. Villanueva is an 82 yo WF who presents secondary to right lower 3 pain.  Patient is a resident at local nursing home.  She is morbidly obese.  She is nonambulatory.  A staff member was helping patient transfer using a \"new way\".  Patient's right leg struck the frame of patient's wheelchair.  Patient has been experiencing pain in the knee area since the injury.  She reports x-rays were performed at the nursing home which were unremarkable.  However patient continued to have pain.  She was sent to the ER today for evaluation.        Review of Systems   Constitutional: Negative.  Negative for appetite change, diaphoresis and fever.   HENT: Negative.    Eyes: Negative.    Respiratory: Negative for cough, chest tightness, shortness of breath and wheezing.    Cardiovascular: Negative for chest pain, palpitations and leg swelling.   Genitourinary: Negative.  Negative for difficulty urinating, flank pain, frequency and hematuria.   Musculoskeletal: Negative.    Skin: Negative.  Negative for color change, pallor and rash.   Neurological: Negative.  Negative for dizziness, seizures, syncope and headaches.   Psychiatric/Behavioral: Negative.  Negative for agitation, behavioral problems and hallucinations.       Past Medical History:   Diagnosis Date   • Anemia    • Arthritis    • CHF (congestive heart failure)    • " Chronic diastolic (congestive) heart failure    • Chronic kidney disease    • COPD (chronic obstructive pulmonary disease)    • Diabetes mellitus    • DVT (deep venous thrombosis)    • Dyslipidemia    • Gout    • Hypertension    • Lymphedema    • Morbid obesity    • Obstructive sleep apnea of adult     untreated   • Permanent atrial fibrillation    • Pulmonary HTN    • Respiratory failure, acute     hypoxic       Allergies   Allergen Reactions   • Codeine Nausea And Vomiting   • Demerol [Meperidine]      Dizzy reaction   • Propoxyphene      Dizzy reaction   • Tramadol      Makes her dizzy       Past Surgical History:   Procedure Laterality Date   • CATARACT EXTRACTION      both eyes 4 years ago   • CHOLECYSTECTOMY     • CHOLECYSTECTOMY WITH INTRAOPERATIVE CHOLANGIOGRAM N/A 3/13/2017    Procedure: CHOLECYSTECTOMY LAPAROSCOPIC INTRAOPERATIVE CHOLANGIOGRAM;  Surgeon: Joy Pham DO;  Location: [facility];  Service:    • EYE SURGERY     • FEMUR FRACTURE SURGERY Right     closed reduction external fixation   • FRACTURE SURGERY     • HERNIA REPAIR     • [place] ERCP DX COLLECTION SPECIMEN BRUSHING/WASHING N/A 3/10/2017    Procedure: ENDOSCOPIC RETROGRADE CHOLANGIOPANCREATOGRAPHY sphincterotomy with stone extraction ;  Surgeon: Jose Estrada MD;  Location: [facility];  Service: Gastroenterology   • TONSILLECTOMY         Family History   Problem Relation Age of Onset   • Cancer Mother    • Heart disease Father    • Diabetes Father    • COPD Brother    • Heart disease Brother        Social History     Social History   • Marital status:      Spouse name: N/A   • Number of children: N/A   • Years of education: N/A     Social History Main Topics   • Smoking status: Never Smoker   • Smokeless tobacco: None   • Alcohol use No   • Drug use: No   • Sexual activity: Defer     Other Topics Concern   • None     Social History Narrative           Objective   Physical Exam   Constitutional: She is oriented to  person, place, and time. She appears well-developed and well-nourished. No distress.   81-year-old morbidly obese white female laying in bed.    Musculoskeletal:        Right lower leg: She exhibits tenderness and bony tenderness.        Legs:  Neurological: She is alert and oriented to person, place, and time.   Skin: She is not diaphoretic.   Nursing note and vitals reviewed.      Procedures         ED Course  ED Course   Comment By Time   10/07/17  12:38 PM  Right lower extremity injury.  Will obtain x-rays. Siva Wallace MD 10/07 1309   10/07/17  1:21 PM  Discussed with Dr. Gillespie.  Patient is not a surgical candidate due to other health problems.  Placing patient in the immobilizer.  Dr. Gillespie will see in office this coming week. Siva Wallace MD 10/07 1522                  MDM  Number of Diagnoses or Management Options  Closed displaced supracondylar fracture of distal end of right femur without intracondylar extension, initial encounter: established and worsening  Closed fracture of proximal end of right tibia, unspecified fracture morphology, initial encounter: new and requires workup     Amount and/or Complexity of Data Reviewed  Tests in the radiology section of CPT®: reviewed and ordered  Discuss the patient with other providers: yes (Dr. Victor Manuel Gillespie)  Independent visualization of images, tracings, or specimens: yes    Risk of Complications, Morbidity, and/or Mortality  Presenting problems: low  Diagnostic procedures: low  Management options: low    Patient Progress  Patient progress: improved      Final diagnoses:   Closed fracture of proximal end of right tibia, unspecified fracture morphology, initial encounter   Closed displaced supracondylar fracture of distal end of right femur without intracondylar extension, initial encounter            Siva Wallace MD  10/07/17 1528

## 2017-10-12 ENCOUNTER — OUTSIDE FACILITY SERVICE (OUTPATIENT)
Dept: HOSPITALIST | Facility: HOSPITAL | Age: 82
End: 2017-10-12

## 2017-10-12 PROCEDURE — 99307 SBSQ NF CARE SF MDM 10: CPT | Performed by: HOSPITALIST

## 2017-10-16 ENCOUNTER — OFFICE VISIT (OUTPATIENT)
Dept: ORTHOPEDIC SURGERY | Facility: CLINIC | Age: 82
End: 2017-10-16

## 2017-10-16 VITALS — HEIGHT: 60 IN | WEIGHT: 224 LBS | BODY MASS INDEX: 43.98 KG/M2

## 2017-10-16 DIAGNOSIS — E66.01 MORBID OBESITY (HCC): Chronic | ICD-10-CM

## 2017-10-16 DIAGNOSIS — S82.191A OTHER CLOSED FRACTURE OF PROXIMAL END OF RIGHT TIBIA, INITIAL ENCOUNTER: Primary | ICD-10-CM

## 2017-10-16 PROCEDURE — 99214 OFFICE O/P EST MOD 30 MIN: CPT | Performed by: ORTHOPAEDIC SURGERY

## 2017-10-16 PROCEDURE — 27530 TREAT KNEE FRACTURE: CPT | Performed by: ORTHOPAEDIC SURGERY

## 2017-10-16 NOTE — PROGRESS NOTES
Subjective:     Patient ID: Alexandria Villanueva is a 81 y.o. female.    Chief Complaint:  Right leg pain, new issue  History of Present Illness  Alexandria Villanueva returns to clinic today for evaluation of right leg pain which started after being transferred any full weightbearing status on her right lower extremity on October 2, 2017 at her nursing home.  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  However when she did bear weight on the right lower extremity she started to note pain in her right leg, was brought to emergency department on October 7, x-rays were taken that point in time indicating nondisplaced proximal tibial metaphyseal fracture.  Patient has been in a knee immobilizer since this time, nonweightbearing right lower extremity, bed to chair transfers with staff assistance only.  Denies numbness or tingling right lower extremity, rates current level of pain as a 4-5 out of 10, aching in nature, exacerbated with local pressure and any attempts at weightbearing, moderate improvement with rest and use of immobilizer. Denies any increased pain to her right distal femur region.  Denies any radiation of pain from the right proximal tibia region.    Social History     Occupational History   • Not on file.     Social History Main Topics   • Smoking status: Never Smoker   • Smokeless tobacco: Never Used   • Alcohol use No   • Drug use: No   • Sexual activity: Defer      Past Medical History:   Diagnosis Date   • Anemia    • Arthritis    • CHF (congestive heart failure)    • Chronic diastolic (congestive) heart failure    • Chronic kidney disease    • COPD (chronic obstructive pulmonary disease)    • Diabetes mellitus    • DVT (deep venous thrombosis)    • Dyslipidemia    • Gout    • Hypertension    • Lymphedema    • Morbid obesity    • Obstructive sleep apnea of adult     untreated   •  Permanent atrial fibrillation    • Pulmonary HTN    • Respiratory failure, acute     hypoxic     Past Surgical History:   Procedure Laterality Date   • CATARACT EXTRACTION      both eyes 4 years ago   • CHOLECYSTECTOMY     • CHOLECYSTECTOMY WITH INTRAOPERATIVE CHOLANGIOGRAM N/A 3/13/2017    Procedure: CHOLECYSTECTOMY LAPAROSCOPIC INTRAOPERATIVE CHOLANGIOGRAM;  Surgeon: Joy Pham DO;  Location: [facility];  Service:    • EYE SURGERY     • FEMUR FRACTURE SURGERY Right     closed reduction external fixation   • FRACTURE SURGERY     • HERNIA REPAIR     • [place] ERCP DX COLLECTION SPECIMEN BRUSHING/WASHING N/A 3/10/2017    Procedure: ENDOSCOPIC RETROGRADE CHOLANGIOPANCREATOGRAPHY sphincterotomy with stone extraction ;  Surgeon: Jose Estrada MD;  Location: [facility];  Service: Gastroenterology   • TONSILLECTOMY         Family History   Problem Relation Age of Onset   • Cancer Mother    • Heart disease Father    • Diabetes Father    • COPD Brother    • Heart disease Brother          Review of Systems   Constitutional: Negative for chills, diaphoresis, fever and unexpected weight change.   HENT: Negative for hearing loss, nosebleeds, sore throat and tinnitus.    Eyes: Negative for pain and visual disturbance.   Respiratory: Negative for cough, shortness of breath and wheezing.    Cardiovascular: Negative for chest pain and palpitations.   Gastrointestinal: Negative for abdominal pain, diarrhea, nausea and vomiting.   Endocrine: Negative for cold intolerance, heat intolerance and polydipsia.   Genitourinary: Negative for difficulty urinating, dysuria and hematuria.   Musculoskeletal: Positive for arthralgias and myalgias. Negative for joint swelling.   Skin: Negative for rash and wound.   Allergic/Immunologic: Negative for environmental allergies.   Neurological: Negative for dizziness, syncope and numbness.   Hematological: Does not bruise/bleed easily.   Psychiatric/Behavioral: Negative for dysphoric  "mood and sleep disturbance. The patient is not nervous/anxious.            Objective:  Vitals:    10/16/17 1006   Weight: 224 lb (102 kg)   Height: 60\" (152.4 cm)     Last 2 weights    10/16/17  1006   Weight: 224 lb (102 kg)     Body mass index is 43.75 kg/(m^2).  General: No acute distress.  Resp: normal respiratory effort  Skin: no rashes or wounds; normal turgor  Psych: mood and affect appropriate; recent and remote memory intact         Ortho Exam    right leg-maximal tenderness palpation over proximal tibial metaphyseal region, moderate soft tissue swelling noted in this region.  Mild tenderness palpation over the calf with a negative Homans sign.  Patient is able to flex and extend toes and ankle right foot and limited fashion.  Positive sensation light touch all discretions right foot symmetric to the left, brisk cap refill all digits, 2+ dorsalis is pulse right foot.  Skin is intact with compartments soft and easily compressible of her right proximal tibia.    Imaging:   Review of outside x-rays from emergency department right lower extremity indicate nondisplaced tibial fracture through the proximal metaphysis, no evidence of significant displacement or angulation, prior fibrous nonunion site noted distal femur stable position comparison to most recent x-rays from our system.    Assessment:       1. Other closed fracture of proximal end of right tibia, initial encounter          Plan:  FELIPE query complete.          Discussed treatment options at length with patient at today's visit. Patient will like to continue with nonoperative treatment I think that is reasonable at this time given her previous non-weightbearing status and he went to the right lower extremity.  We will continue with immobilizer given her morbid obesity as I would be concerned about any cast or splint creating pressure ulcers in her case.  We will have nursing home obtain repeat x-rays at follow-up visit and reassess position, " alignment, and possible healing fracture that time.    Alexandria Villanueva was in agreement with plan and had all questions answered.     Orders:  No orders of the defined types were placed in this encounter.      Medications:  No orders of the defined types were placed in this encounter.      Followup:  Return in about 4 weeks (around 11/13/2017).          Dragon transcription disclaimer     Much of this encounter note is an electronic transcription/translation of spoken language to printed text. The electronic translation of spoken language may permit erroneous, or at times, nonsensical words or phrases to be inadvertently transcribed. Although I have reviewed the note for such errors, some may still exist.

## 2017-10-24 PROBLEM — S82.101A CLOSED FRACTURE OF RIGHT PROXIMAL TIBIA: Status: ACTIVE | Noted: 2017-10-24

## 2017-11-08 ENCOUNTER — OUTSIDE FACILITY SERVICE (OUTPATIENT)
Dept: HOSPITALIST | Facility: HOSPITAL | Age: 82
End: 2017-11-08

## 2017-11-08 PROCEDURE — 99307 SBSQ NF CARE SF MDM 10: CPT | Performed by: INTERNAL MEDICINE

## 2017-11-10 ENCOUNTER — OFFICE VISIT (OUTPATIENT)
Dept: ORTHOPEDIC SURGERY | Facility: CLINIC | Age: 82
End: 2017-11-10

## 2017-11-10 VITALS — WEIGHT: 214 LBS | HEIGHT: 64 IN | BODY MASS INDEX: 36.54 KG/M2

## 2017-11-10 DIAGNOSIS — S82.191G OTHER CLOSED FRACTURE OF PROXIMAL END OF RIGHT TIBIA WITH DELAYED HEALING, SUBSEQUENT ENCOUNTER: Primary | ICD-10-CM

## 2017-11-10 PROCEDURE — 99024 POSTOP FOLLOW-UP VISIT: CPT | Performed by: ORTHOPAEDIC SURGERY

## 2017-11-26 NOTE — PROGRESS NOTES
CC: Follow-up closed treatment right proximal tibia metaphyseal fracture, date of injury 10/2/2017    Interval history: Patient returns to clinic today, has been using immobilizer, states that her leg is feeling much better this point time.  States lymphedema and right lower extremity is stable.  She is tolerating transfers better.  Still nonweightbearing on the right lower extremity.    Exam: Right lower extremity with minimal tenderness palpation over proximal tibia, motion occurs to her distal femur nonunion with minimal motion at her right knee joint line, stable sensation light touch over right lower extremity symmetric to the left.  No overlying skin changes or wound issues noted.    Imaging: Review of outside x-rays right leg as well as radiology report indicates moderate callus formation around proximal tibia with fracture alignment stable this time, there is significant flexion deformity at distal femur nonunion with advanced degenerative change at the knee joint and both medial and lateral compartments.    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  Discussed with patient that her proximal tibia fracture appears to be doing well at this point time, however she is articulate into her distal femur nonunion site.  She states that this is relatively stable and appears to be unchanged compared to prior functioning for her.  She uses her right lower extremity for transfers only.  I did recommend immobilizer at all times when doing any transfers or rolling of patient in order to prevent's soft tissue compromise that could occur from traumatic issues to surrounding soft tissue envelope from the distal femur nonunion.  I did review with patient and the option for delores-prosthesis of distal femur as discussed with Dr. Anthony but she still understood this option currently given her morbid obesity.  All questions answered  today, I will follow up with patient as needed for

## 2017-12-07 ENCOUNTER — OUTSIDE FACILITY SERVICE (OUTPATIENT)
Dept: HOSPITALIST | Facility: HOSPITAL | Age: 82
End: 2017-12-07

## 2017-12-07 PROCEDURE — 99308 SBSQ NF CARE LOW MDM 20: CPT | Performed by: HOSPITALIST

## 2018-01-10 ENCOUNTER — OUTSIDE FACILITY SERVICE (OUTPATIENT)
Dept: HOSPITALIST | Facility: HOSPITAL | Age: 83
End: 2018-01-10

## 2018-01-10 PROCEDURE — 99308 SBSQ NF CARE LOW MDM 20: CPT | Performed by: HOSPITALIST

## 2018-02-08 ENCOUNTER — OUTSIDE FACILITY SERVICE (OUTPATIENT)
Dept: HOSPITALIST | Facility: HOSPITAL | Age: 83
End: 2018-02-08

## 2018-02-08 PROCEDURE — 99308 SBSQ NF CARE LOW MDM 20: CPT | Performed by: HOSPITALIST

## 2018-03-15 ENCOUNTER — OUTSIDE FACILITY SERVICE (OUTPATIENT)
Dept: HOSPITALIST | Facility: HOSPITAL | Age: 83
End: 2018-03-15

## 2018-03-15 PROCEDURE — 99308 SBSQ NF CARE LOW MDM 20: CPT | Performed by: NURSE PRACTITIONER

## 2018-03-22 ENCOUNTER — OUTSIDE FACILITY SERVICE (OUTPATIENT)
Dept: HOSPITALIST | Facility: HOSPITAL | Age: 83
End: 2018-03-22

## 2018-03-22 PROCEDURE — 99307 SBSQ NF CARE SF MDM 10: CPT | Performed by: NURSE PRACTITIONER

## 2018-04-05 ENCOUNTER — OUTSIDE FACILITY SERVICE (OUTPATIENT)
Dept: HOSPITALIST | Facility: HOSPITAL | Age: 83
End: 2018-04-05

## 2018-04-05 PROCEDURE — 99305 1ST NF CARE MODERATE MDM 35: CPT | Performed by: INTERNAL MEDICINE

## 2018-04-19 ENCOUNTER — LAB REQUISITION (OUTPATIENT)
Dept: LAB | Facility: HOSPITAL | Age: 83
End: 2018-04-19

## 2018-04-19 ENCOUNTER — OUTSIDE FACILITY SERVICE (OUTPATIENT)
Dept: HOSPITALIST | Facility: HOSPITAL | Age: 83
End: 2018-04-19

## 2018-04-19 DIAGNOSIS — J11.1 INFLUENZA DUE TO UNIDENTIFIED INFLUENZA VIRUS WITH OTHER RESPIRATORY MANIFESTATIONS: ICD-10-CM

## 2018-04-19 LAB
FLUAV AG NPH QL: NEGATIVE
FLUBV AG NPH QL IA: NEGATIVE

## 2018-04-19 PROCEDURE — 99308 SBSQ NF CARE LOW MDM 20: CPT | Performed by: HOSPITALIST

## 2018-04-19 PROCEDURE — 87804 INFLUENZA ASSAY W/OPTIC: CPT | Performed by: HOSPITALIST

## 2018-04-26 ENCOUNTER — OUTSIDE FACILITY SERVICE (OUTPATIENT)
Dept: HOSPITALIST | Facility: HOSPITAL | Age: 83
End: 2018-04-26

## 2018-04-26 PROCEDURE — 99308 SBSQ NF CARE LOW MDM 20: CPT | Performed by: NURSE PRACTITIONER

## 2018-05-17 ENCOUNTER — OUTSIDE FACILITY SERVICE (OUTPATIENT)
Dept: HOSPITALIST | Facility: HOSPITAL | Age: 83
End: 2018-05-17

## 2018-05-17 PROCEDURE — 99308 SBSQ NF CARE LOW MDM 20: CPT | Performed by: NURSE PRACTITIONER

## 2018-06-14 ENCOUNTER — OUTSIDE FACILITY SERVICE (OUTPATIENT)
Dept: HOSPITALIST | Facility: HOSPITAL | Age: 83
End: 2018-06-14

## 2018-06-14 PROCEDURE — 99307 SBSQ NF CARE SF MDM 10: CPT | Performed by: NURSE PRACTITIONER

## 2018-07-12 ENCOUNTER — OUTSIDE FACILITY SERVICE (OUTPATIENT)
Dept: HOSPITALIST | Facility: HOSPITAL | Age: 83
End: 2018-07-12

## 2018-07-12 PROCEDURE — 99307 SBSQ NF CARE SF MDM 10: CPT | Performed by: HOSPITALIST

## 2018-08-09 ENCOUNTER — OUTSIDE FACILITY SERVICE (OUTPATIENT)
Dept: HOSPITALIST | Facility: HOSPITAL | Age: 83
End: 2018-08-09

## 2018-08-11 ENCOUNTER — LAB REQUISITION (OUTPATIENT)
Dept: LAB | Facility: HOSPITAL | Age: 83
End: 2018-08-11

## 2018-08-11 DIAGNOSIS — R35.0 FREQUENCY OF MICTURITION: ICD-10-CM

## 2018-08-11 LAB
BILIRUB UR QL STRIP: NEGATIVE
CLARITY UR: CLEAR
COLOR UR: YELLOW
GLUCOSE UR STRIP-MCNC: NEGATIVE MG/DL
HGB UR QL STRIP.AUTO: NEGATIVE
KETONES UR QL STRIP: NEGATIVE
LEUKOCYTE ESTERASE UR QL STRIP.AUTO: NEGATIVE
NITRITE UR QL STRIP: NEGATIVE
PH UR STRIP.AUTO: 5.5 [PH] (ref 4.5–8)
PROT UR QL STRIP: NEGATIVE
SP GR UR STRIP: 1.01 (ref 1–1.03)
UROBILINOGEN UR QL STRIP: NORMAL

## 2018-08-11 PROCEDURE — 81003 URINALYSIS AUTO W/O SCOPE: CPT | Performed by: HOSPITALIST

## 2018-11-09 ENCOUNTER — TRANSCRIBE ORDERS (OUTPATIENT)
Dept: ADMINISTRATIVE | Facility: HOSPITAL | Age: 83
End: 2018-11-09

## 2018-11-09 DIAGNOSIS — J18.9 UNRESOLVED PNEUMONIA: Primary | ICD-10-CM

## 2018-11-16 ENCOUNTER — APPOINTMENT (OUTPATIENT)
Dept: CT IMAGING | Facility: HOSPITAL | Age: 83
End: 2018-11-16
Attending: INTERNAL MEDICINE

## 2018-11-19 ENCOUNTER — HOSPITAL ENCOUNTER (OUTPATIENT)
Dept: CT IMAGING | Facility: HOSPITAL | Age: 83
Discharge: HOME | End: 2018-11-19
Attending: INTERNAL MEDICINE | Admitting: INTERNAL MEDICINE

## 2018-11-19 DIAGNOSIS — J18.9 UNRESOLVED PNEUMONIA: ICD-10-CM

## 2018-11-19 PROCEDURE — 71250 CT THORAX DX C-: CPT

## 2019-03-26 ENCOUNTER — LAB REQUISITION (OUTPATIENT)
Dept: LAB | Facility: HOSPITAL | Age: 84
End: 2019-03-26

## 2019-03-26 DIAGNOSIS — R06.02 SHORTNESS OF BREATH: ICD-10-CM

## 2019-03-26 LAB
ALBUMIN SERPL-MCNC: 3.5 G/DL (ref 3.5–5.2)
ALBUMIN/GLOB SERPL: 1.6 G/DL
ALP SERPL-CCNC: 79 U/L (ref 39–117)
ALT SERPL W P-5'-P-CCNC: 8 U/L (ref 1–33)
ANION GAP SERPL CALCULATED.3IONS-SCNC: 6.1 MMOL/L
AST SERPL-CCNC: 13 U/L (ref 1–32)
B PARAPERT DNA SPEC QL NAA+PROBE: NOT DETECTED
B PERT DNA SPEC QL NAA+PROBE: NOT DETECTED
BASOPHILS # BLD AUTO: 0.02 10*3/MM3 (ref 0–0.2)
BASOPHILS NFR BLD AUTO: 0.4 % (ref 0–1.5)
BILIRUB SERPL-MCNC: 0.8 MG/DL (ref 0.2–1.2)
BUN BLD-MCNC: 20 MG/DL (ref 8–23)
BUN/CREAT SERPL: 18.9 (ref 7–25)
C PNEUM DNA NPH QL NAA+NON-PROBE: NOT DETECTED
CALCIUM SPEC-SCNC: 10 MG/DL (ref 8.6–10.5)
CHLORIDE SERPL-SCNC: 103 MMOL/L (ref 98–107)
CO2 SERPL-SCNC: 37.9 MMOL/L (ref 22–29)
CREAT BLD-MCNC: 1.06 MG/DL (ref 0.57–1)
DEPRECATED RDW RBC AUTO: 49.1 FL (ref 37–54)
EOSINOPHIL # BLD AUTO: 0.03 10*3/MM3 (ref 0–0.4)
EOSINOPHIL NFR BLD AUTO: 0.5 % (ref 0.3–6.2)
ERYTHROCYTE [DISTWIDTH] IN BLOOD BY AUTOMATED COUNT: 14.4 % (ref 12.3–15.4)
FLUAV AG NPH QL: NEGATIVE
FLUAV H1 2009 PAND RNA NPH QL NAA+PROBE: NOT DETECTED
FLUAV H1 HA GENE NPH QL NAA+PROBE: NOT DETECTED
FLUAV H3 RNA NPH QL NAA+PROBE: NOT DETECTED
FLUAV SUBTYP SPEC NAA+PROBE: NOT DETECTED
FLUBV AG NPH QL IA: NEGATIVE
FLUBV RNA ISLT QL NAA+PROBE: NOT DETECTED
GFR SERPL CREATININE-BSD FRML MDRD: 50 ML/MIN/1.73
GLOBULIN UR ELPH-MCNC: 2.2 GM/DL
GLUCOSE BLD-MCNC: 136 MG/DL (ref 65–99)
HADV DNA SPEC NAA+PROBE: NOT DETECTED
HCOV 229E RNA SPEC QL NAA+PROBE: NOT DETECTED
HCOV HKU1 RNA SPEC QL NAA+PROBE: NOT DETECTED
HCOV NL63 RNA SPEC QL NAA+PROBE: NOT DETECTED
HCOV OC43 RNA SPEC QL NAA+PROBE: NOT DETECTED
HCT VFR BLD AUTO: 36.8 % (ref 34–46.6)
HGB BLD-MCNC: 11.4 G/DL (ref 12–15.9)
HMPV RNA NPH QL NAA+NON-PROBE: DETECTED
HPIV1 RNA SPEC QL NAA+PROBE: NOT DETECTED
HPIV2 RNA SPEC QL NAA+PROBE: NOT DETECTED
HPIV3 RNA NPH QL NAA+PROBE: NOT DETECTED
HPIV4 P GENE NPH QL NAA+PROBE: NOT DETECTED
IMM GRANULOCYTES # BLD AUTO: 0.02 10*3/MM3 (ref 0–0.05)
IMM GRANULOCYTES NFR BLD AUTO: 0.4 % (ref 0–0.5)
LYMPHOCYTES # BLD AUTO: 1.01 10*3/MM3 (ref 0.7–3.1)
LYMPHOCYTES NFR BLD AUTO: 17.8 % (ref 19.6–45.3)
M PNEUMO IGG SER IA-ACNC: NOT DETECTED
MCH RBC QN AUTO: 28.4 PG (ref 26.6–33)
MCHC RBC AUTO-ENTMCNC: 31 G/DL (ref 31.5–35.7)
MCV RBC AUTO: 91.5 FL (ref 79–97)
MONOCYTES # BLD AUTO: 0.48 10*3/MM3 (ref 0.1–0.9)
MONOCYTES NFR BLD AUTO: 8.5 % (ref 5–12)
NEUTROPHILS # BLD AUTO: 4.1 10*3/MM3 (ref 1.4–7)
NEUTROPHILS NFR BLD AUTO: 72.4 % (ref 42.7–76)
PLATELET # BLD AUTO: 133 10*3/MM3 (ref 140–450)
PMV BLD AUTO: 9.9 FL (ref 6–12)
POTASSIUM BLD-SCNC: 4 MMOL/L (ref 3.5–5.2)
PROT SERPL-MCNC: 5.7 G/DL (ref 6–8.5)
RBC # BLD AUTO: 4.02 10*6/MM3 (ref 3.77–5.28)
RHINOVIRUS RNA SPEC NAA+PROBE: NOT DETECTED
RSV RNA NPH QL NAA+NON-PROBE: NOT DETECTED
SODIUM BLD-SCNC: 147 MMOL/L (ref 136–145)
WBC NRBC COR # BLD: 5.66 10*3/MM3 (ref 3.4–10.8)

## 2019-03-26 PROCEDURE — 87804 INFLUENZA ASSAY W/OPTIC: CPT | Performed by: INTERNAL MEDICINE

## 2019-03-26 PROCEDURE — 80053 COMPREHEN METABOLIC PANEL: CPT | Performed by: INTERNAL MEDICINE

## 2019-03-26 PROCEDURE — 87633 RESP VIRUS 12-25 TARGETS: CPT | Performed by: INTERNAL MEDICINE

## 2019-03-26 PROCEDURE — 85025 COMPLETE CBC W/AUTO DIFF WBC: CPT | Performed by: INTERNAL MEDICINE

## 2019-03-26 PROCEDURE — 87486 CHLMYD PNEUM DNA AMP PROBE: CPT | Performed by: INTERNAL MEDICINE

## 2019-03-26 PROCEDURE — 87798 DETECT AGENT NOS DNA AMP: CPT | Performed by: INTERNAL MEDICINE

## 2019-03-26 PROCEDURE — 87581 M.PNEUMON DNA AMP PROBE: CPT | Performed by: INTERNAL MEDICINE

## 2019-03-27 ENCOUNTER — APPOINTMENT (OUTPATIENT)
Dept: GENERAL RADIOLOGY | Facility: HOSPITAL | Age: 84
End: 2019-03-27

## 2019-03-27 ENCOUNTER — HOSPITAL ENCOUNTER (INPATIENT)
Facility: HOSPITAL | Age: 84
LOS: 4 days | Discharge: NURSING, REHAB OR LONG-TERM CARE | End: 2019-03-31
Attending: EMERGENCY MEDICINE | Admitting: INTERNAL MEDICINE

## 2019-03-27 DIAGNOSIS — I50.32 CHRONIC DIASTOLIC CONGESTIVE HEART FAILURE (HCC): ICD-10-CM

## 2019-03-27 DIAGNOSIS — J96.01 ACUTE RESPIRATORY FAILURE WITH HYPOXIA (HCC): Primary | ICD-10-CM

## 2019-03-27 DIAGNOSIS — J44.1 COPD EXACERBATION (HCC): ICD-10-CM

## 2019-03-27 DIAGNOSIS — N18.30 CHRONIC KIDNEY DISEASE, STAGE III (MODERATE) (HCC): ICD-10-CM

## 2019-03-27 LAB
ALBUMIN SERPL-MCNC: 3.7 G/DL (ref 3.5–5.2)
ALBUMIN/GLOB SERPL: 1.3 G/DL
ALP SERPL-CCNC: 76 U/L (ref 39–117)
ALT SERPL W P-5'-P-CCNC: 10 U/L (ref 1–33)
ANION GAP SERPL CALCULATED.3IONS-SCNC: 9 MMOL/L
ARTERIAL PATENCY WRIST A: POSITIVE
AST SERPL-CCNC: 16 U/L (ref 1–32)
ATMOSPHERIC PRESS: 746 MMHG
BASE EXCESS BLDA CALC-SCNC: 9.5 MMOL/L (ref 0–2)
BASOPHILS # BLD AUTO: 0.01 10*3/MM3 (ref 0–0.2)
BASOPHILS NFR BLD AUTO: 0.2 % (ref 0–1.5)
BDY SITE: ABNORMAL
BILIRUB SERPL-MCNC: 0.7 MG/DL (ref 0.2–1.2)
BODY TEMPERATURE: 37 C
BUN BLD-MCNC: 26 MG/DL (ref 8–23)
BUN/CREAT SERPL: 21.7 (ref 7–25)
CALCIUM SPEC-SCNC: 9.9 MG/DL (ref 8.6–10.5)
CHLORIDE SERPL-SCNC: 97 MMOL/L (ref 98–107)
CO2 SERPL-SCNC: 36 MMOL/L (ref 22–29)
CREAT BLD-MCNC: 1.2 MG/DL (ref 0.57–1)
D DIMER PPP FEU-MCNC: 0.49 MCGFEU/ML (ref 0–0.46)
DEPRECATED RDW RBC AUTO: 50.2 FL (ref 37–54)
EOSINOPHIL # BLD AUTO: 0.01 10*3/MM3 (ref 0–0.4)
EOSINOPHIL NFR BLD AUTO: 0.2 % (ref 0.3–6.2)
ERYTHROCYTE [DISTWIDTH] IN BLOOD BY AUTOMATED COUNT: 14.9 % (ref 12.3–15.4)
GAS FLOW AIRWAY: 12 LPM
GFR SERPL CREATININE-BSD FRML MDRD: 43 ML/MIN/1.73
GLOBULIN UR ELPH-MCNC: 2.9 GM/DL
GLUCOSE BLD-MCNC: 148 MG/DL (ref 65–99)
GLUCOSE BLDC GLUCOMTR-MCNC: 124 MG/DL (ref 70–130)
HCO3 BLDA-SCNC: 36 MMOL/L (ref 20–26)
HCT VFR BLD AUTO: 39.2 % (ref 34–46.6)
HGB BLD-MCNC: 12.1 G/DL (ref 12–15.9)
HGB BLDA-MCNC: 12.2 G/DL (ref 13.5–17.5)
HOLD SPECIMEN: NORMAL
HOLD SPECIMEN: NORMAL
IMM GRANULOCYTES # BLD AUTO: 0.04 10*3/MM3 (ref 0–0.05)
IMM GRANULOCYTES NFR BLD AUTO: 0.7 % (ref 0–0.5)
INR PPP: 4.08 (ref 0.9–1.1)
LYMPHOCYTES # BLD AUTO: 0.74 10*3/MM3 (ref 0.7–3.1)
LYMPHOCYTES NFR BLD AUTO: 13 % (ref 19.6–45.3)
MCH RBC QN AUTO: 28.1 PG (ref 26.6–33)
MCHC RBC AUTO-ENTMCNC: 30.9 G/DL (ref 31.5–35.7)
MCV RBC AUTO: 91 FL (ref 79–97)
MODALITY: ABNORMAL
MONOCYTES # BLD AUTO: 0.34 10*3/MM3 (ref 0.1–0.9)
MONOCYTES NFR BLD AUTO: 6 % (ref 5–12)
NEUTROPHILS # BLD AUTO: 4.56 10*3/MM3 (ref 1.4–7)
NEUTROPHILS NFR BLD AUTO: 79.9 % (ref 42.7–76)
NRBC BLD AUTO-RTO: 0 /100 WBC (ref 0–0)
NT-PROBNP SERPL-MCNC: 6586 PG/ML (ref 5–1800)
PCO2 BLDA: 56.7 MM HG (ref 35–45)
PCO2 TEMP ADJ BLD: 56.7 MM HG (ref 35–45)
PH BLDA: 7.41 PH UNITS (ref 7.35–7.45)
PH, TEMP CORRECTED: 7.41 PH UNITS (ref 7.35–7.45)
PLATELET # BLD AUTO: 133 10*3/MM3 (ref 140–450)
PMV BLD AUTO: 9.9 FL (ref 6–12)
PO2 BLDA: 89.6 MM HG (ref 83–108)
PO2 TEMP ADJ BLD: 89.6 MM HG (ref 83–108)
POTASSIUM BLD-SCNC: 4.3 MMOL/L (ref 3.5–5.2)
PROT SERPL-MCNC: 6.6 G/DL (ref 6–8.5)
PROTHROMBIN TIME: 39 SECONDS (ref 12.1–15)
RBC # BLD AUTO: 4.31 10*6/MM3 (ref 3.77–5.28)
SAO2 % BLDCOA: 97.8 % (ref 94–99)
SODIUM BLD-SCNC: 142 MMOL/L (ref 136–145)
TROPONIN T SERPL-MCNC: 0.01 NG/ML (ref 0–0.03)
VENTILATOR MODE: ABNORMAL
WBC NRBC COR # BLD: 5.7 10*3/MM3 (ref 3.4–10.8)
WHOLE BLOOD HOLD SPECIMEN: NORMAL
WHOLE BLOOD HOLD SPECIMEN: NORMAL

## 2019-03-27 PROCEDURE — 85379 FIBRIN DEGRADATION QUANT: CPT | Performed by: EMERGENCY MEDICINE

## 2019-03-27 PROCEDURE — 85025 COMPLETE CBC W/AUTO DIFF WBC: CPT | Performed by: EMERGENCY MEDICINE

## 2019-03-27 PROCEDURE — 71046 X-RAY EXAM CHEST 2 VIEWS: CPT

## 2019-03-27 PROCEDURE — 94799 UNLISTED PULMONARY SVC/PX: CPT

## 2019-03-27 PROCEDURE — 94640 AIRWAY INHALATION TREATMENT: CPT

## 2019-03-27 PROCEDURE — 82803 BLOOD GASES ANY COMBINATION: CPT

## 2019-03-27 PROCEDURE — 82962 GLUCOSE BLOOD TEST: CPT

## 2019-03-27 PROCEDURE — 25010000002 METHYLPREDNISOLONE PER 125 MG: Performed by: HOSPITALIST

## 2019-03-27 PROCEDURE — 93005 ELECTROCARDIOGRAM TRACING: CPT | Performed by: EMERGENCY MEDICINE

## 2019-03-27 PROCEDURE — 99291 CRITICAL CARE FIRST HOUR: CPT | Performed by: EMERGENCY MEDICINE

## 2019-03-27 PROCEDURE — 99285 EMERGENCY DEPT VISIT HI MDM: CPT

## 2019-03-27 PROCEDURE — 84484 ASSAY OF TROPONIN QUANT: CPT | Performed by: EMERGENCY MEDICINE

## 2019-03-27 PROCEDURE — 80053 COMPREHEN METABOLIC PANEL: CPT | Performed by: EMERGENCY MEDICINE

## 2019-03-27 PROCEDURE — 83880 ASSAY OF NATRIURETIC PEPTIDE: CPT | Performed by: EMERGENCY MEDICINE

## 2019-03-27 PROCEDURE — 93010 ELECTROCARDIOGRAM REPORT: CPT | Performed by: INTERNAL MEDICINE

## 2019-03-27 PROCEDURE — 36600 WITHDRAWAL OF ARTERIAL BLOOD: CPT

## 2019-03-27 PROCEDURE — 99222 1ST HOSP IP/OBS MODERATE 55: CPT | Performed by: HOSPITALIST

## 2019-03-27 PROCEDURE — 85610 PROTHROMBIN TIME: CPT | Performed by: EMERGENCY MEDICINE

## 2019-03-27 PROCEDURE — 84145 PROCALCITONIN (PCT): CPT | Performed by: HOSPITALIST

## 2019-03-27 RX ORDER — NICOTINE POLACRILEX 4 MG
15 LOZENGE BUCCAL
Status: DISCONTINUED | OUTPATIENT
Start: 2019-03-27 | End: 2019-03-31 | Stop reason: HOSPADM

## 2019-03-27 RX ORDER — PANTOPRAZOLE SODIUM 40 MG/1
40 TABLET, DELAYED RELEASE ORAL EVERY EVENING
Status: DISCONTINUED | OUTPATIENT
Start: 2019-03-28 | End: 2019-03-31 | Stop reason: HOSPADM

## 2019-03-27 RX ORDER — GUAIFENESIN 200 MG/1
800 TABLET ORAL 3 TIMES DAILY
COMMUNITY
Start: 2018-08-18

## 2019-03-27 RX ORDER — IPRATROPIUM BROMIDE AND ALBUTEROL SULFATE 2.5; .5 MG/3ML; MG/3ML
SOLUTION RESPIRATORY (INHALATION)
Status: COMPLETED
Start: 2019-03-27 | End: 2019-03-27

## 2019-03-27 RX ORDER — IPRATROPIUM BROMIDE AND ALBUTEROL SULFATE 2.5; .5 MG/3ML; MG/3ML
3 SOLUTION RESPIRATORY (INHALATION) ONCE
Status: COMPLETED | OUTPATIENT
Start: 2019-03-27 | End: 2019-03-27

## 2019-03-27 RX ORDER — GABAPENTIN 300 MG/1
300 CAPSULE ORAL EVERY 12 HOURS SCHEDULED
Status: DISCONTINUED | OUTPATIENT
Start: 2019-03-28 | End: 2019-03-31 | Stop reason: HOSPADM

## 2019-03-27 RX ORDER — IPRATROPIUM BROMIDE AND ALBUTEROL SULFATE 2.5; .5 MG/3ML; MG/3ML
3 SOLUTION RESPIRATORY (INHALATION)
Status: DISCONTINUED | OUTPATIENT
Start: 2019-03-27 | End: 2019-03-28

## 2019-03-27 RX ORDER — ACETAMINOPHEN 500 MG
500 TABLET ORAL EVERY 4 HOURS PRN
Status: DISCONTINUED | OUTPATIENT
Start: 2019-03-27 | End: 2019-03-31 | Stop reason: HOSPADM

## 2019-03-27 RX ORDER — METHYLPREDNISOLONE SODIUM SUCCINATE 125 MG/2ML
60 INJECTION, POWDER, LYOPHILIZED, FOR SOLUTION INTRAMUSCULAR; INTRAVENOUS EVERY 6 HOURS
Status: DISCONTINUED | OUTPATIENT
Start: 2019-03-27 | End: 2019-03-31 | Stop reason: HOSPADM

## 2019-03-27 RX ORDER — SODIUM CHLORIDE 0.9 % (FLUSH) 0.9 %
10 SYRINGE (ML) INJECTION AS NEEDED
Status: DISCONTINUED | OUTPATIENT
Start: 2019-03-27 | End: 2019-03-31 | Stop reason: HOSPADM

## 2019-03-27 RX ORDER — DEXTROSE MONOHYDRATE 25 G/50ML
25 INJECTION, SOLUTION INTRAVENOUS
Status: DISCONTINUED | OUTPATIENT
Start: 2019-03-27 | End: 2019-03-31 | Stop reason: HOSPADM

## 2019-03-27 RX ORDER — CITALOPRAM 20 MG/1
20 TABLET ORAL DAILY
Status: DISCONTINUED | OUTPATIENT
Start: 2019-03-28 | End: 2019-03-31 | Stop reason: HOSPADM

## 2019-03-27 RX ORDER — GUAIFENESIN 600 MG/1
600 TABLET, EXTENDED RELEASE ORAL EVERY 12 HOURS SCHEDULED
Status: DISCONTINUED | OUTPATIENT
Start: 2019-03-28 | End: 2019-03-31 | Stop reason: HOSPADM

## 2019-03-27 RX ORDER — WARFARIN SODIUM 5 MG/1
5 TABLET ORAL NIGHTLY
COMMUNITY
Start: 2019-03-12

## 2019-03-27 RX ORDER — BISACODYL 10 MG
10 SUPPOSITORY, RECTAL RECTAL DAILY
Status: DISCONTINUED | OUTPATIENT
Start: 2019-03-28 | End: 2019-03-31 | Stop reason: HOSPADM

## 2019-03-27 RX ORDER — IPRATROPIUM BROMIDE AND ALBUTEROL SULFATE 2.5; .5 MG/3ML; MG/3ML
3 SOLUTION RESPIRATORY (INHALATION)
Status: DISCONTINUED | OUTPATIENT
Start: 2019-03-27 | End: 2019-03-31 | Stop reason: HOSPADM

## 2019-03-27 RX ORDER — METOPROLOL SUCCINATE 25 MG/1
37.5 TABLET, EXTENDED RELEASE ORAL DAILY
Status: DISCONTINUED | OUTPATIENT
Start: 2019-03-28 | End: 2019-03-31 | Stop reason: HOSPADM

## 2019-03-27 RX ADMIN — IPRATROPIUM BROMIDE AND ALBUTEROL SULFATE 3 ML: .5; 3 SOLUTION RESPIRATORY (INHALATION) at 14:32

## 2019-03-27 RX ADMIN — GABAPENTIN 300 MG: 300 CAPSULE ORAL at 23:57

## 2019-03-27 RX ADMIN — IPRATROPIUM BROMIDE AND ALBUTEROL SULFATE 3 ML: 2.5; .5 SOLUTION RESPIRATORY (INHALATION) at 14:32

## 2019-03-27 RX ADMIN — IPRATROPIUM BROMIDE AND ALBUTEROL SULFATE 3 ML: .5; 3 SOLUTION RESPIRATORY (INHALATION) at 22:54

## 2019-03-27 RX ADMIN — GUAIFENESIN 600 MG: 600 TABLET, EXTENDED RELEASE ORAL at 23:57

## 2019-03-27 RX ADMIN — IPRATROPIUM BROMIDE AND ALBUTEROL SULFATE 3 ML: 2.5; .5 SOLUTION RESPIRATORY (INHALATION) at 19:21

## 2019-03-27 RX ADMIN — METHYLPREDNISOLONE SODIUM SUCCINATE 60 MG: 125 INJECTION, POWDER, FOR SOLUTION INTRAMUSCULAR; INTRAVENOUS at 21:50

## 2019-03-27 RX ADMIN — IPRATROPIUM BROMIDE AND ALBUTEROL SULFATE 3 ML: .5; 3 SOLUTION RESPIRATORY (INHALATION) at 19:21

## 2019-03-28 LAB
ANION GAP SERPL CALCULATED.3IONS-SCNC: 13.4 MMOL/L
ARTERIAL PATENCY WRIST A: ABNORMAL
ATMOSPHERIC PRESS: 745 MMHG
BASE EXCESS BLDA CALC-SCNC: 8.5 MMOL/L (ref 0–2)
BDY SITE: ABNORMAL
BODY TEMPERATURE: 37 C
BUN BLD-MCNC: 30 MG/DL (ref 8–23)
BUN/CREAT SERPL: 26.3 (ref 7–25)
CALCIUM SPEC-SCNC: 10.3 MG/DL (ref 8.6–10.5)
CHLORIDE SERPL-SCNC: 97 MMOL/L (ref 98–107)
CO2 SERPL-SCNC: 32.6 MMOL/L (ref 22–29)
CREAT BLD-MCNC: 1.14 MG/DL (ref 0.57–1)
GAS FLOW AIRWAY: 50 LPM
GFR SERPL CREATININE-BSD FRML MDRD: 46 ML/MIN/1.73
GLUCOSE BLD-MCNC: 166 MG/DL (ref 65–99)
GLUCOSE BLDC GLUCOMTR-MCNC: 144 MG/DL (ref 70–130)
GLUCOSE BLDC GLUCOMTR-MCNC: 176 MG/DL (ref 70–130)
GLUCOSE BLDC GLUCOMTR-MCNC: 307 MG/DL (ref 70–130)
HCO3 BLDA-SCNC: 34.7 MMOL/L (ref 20–26)
HGB BLDA-MCNC: 12.6 G/DL (ref 13.5–17.5)
HOROWITZ INDEX BLD+IHG-RTO: 30 %
INR PPP: 4.45 (ref 0.9–1.1)
Lab: ABNORMAL
MODALITY: ABNORMAL
NOTIFIED BY: ABNORMAL
NOTIFIED WHO: ABNORMAL
PCO2 BLDA: 54.5 MM HG (ref 35–45)
PCO2 TEMP ADJ BLD: 54.5 MM HG (ref 35–45)
PH BLDA: 7.41 PH UNITS (ref 7.35–7.45)
PH, TEMP CORRECTED: 7.41 PH UNITS (ref 7.35–7.45)
PO2 BLDA: 49.1 MM HG (ref 83–108)
PO2 TEMP ADJ BLD: 49.1 MM HG (ref 83–108)
POTASSIUM BLD-SCNC: 4.5 MMOL/L (ref 3.5–5.2)
PROCALCITONIN SERPL-MCNC: 0.92 NG/ML (ref 0.1–0.25)
PROCALCITONIN SERPL-MCNC: 1.15 NG/ML (ref 0.1–0.25)
PROTHROMBIN TIME: 41.8 SECONDS (ref 12.1–15)
SAO2 % BLDCOA: 87.6 % (ref 94–99)
SODIUM BLD-SCNC: 143 MMOL/L (ref 136–145)
VENTILATOR MODE: ABNORMAL

## 2019-03-28 PROCEDURE — 36600 WITHDRAWAL OF ARTERIAL BLOOD: CPT

## 2019-03-28 PROCEDURE — 99291 CRITICAL CARE FIRST HOUR: CPT | Performed by: INTERNAL MEDICINE

## 2019-03-28 PROCEDURE — 94799 UNLISTED PULMONARY SVC/PX: CPT

## 2019-03-28 PROCEDURE — 82803 BLOOD GASES ANY COMBINATION: CPT

## 2019-03-28 PROCEDURE — 84145 PROCALCITONIN (PCT): CPT | Performed by: NURSE PRACTITIONER

## 2019-03-28 PROCEDURE — 63710000001 INSULIN ASPART PER 5 UNITS: Performed by: HOSPITALIST

## 2019-03-28 PROCEDURE — 80048 BASIC METABOLIC PNL TOTAL CA: CPT | Performed by: HOSPITALIST

## 2019-03-28 PROCEDURE — 82962 GLUCOSE BLOOD TEST: CPT

## 2019-03-28 PROCEDURE — 85610 PROTHROMBIN TIME: CPT | Performed by: HOSPITALIST

## 2019-03-28 PROCEDURE — 25010000002 METHYLPREDNISOLONE PER 125 MG: Performed by: HOSPITALIST

## 2019-03-28 RX ORDER — IPRATROPIUM BROMIDE AND ALBUTEROL SULFATE 2.5; .5 MG/3ML; MG/3ML
3 SOLUTION RESPIRATORY (INHALATION)
Status: DISCONTINUED | OUTPATIENT
Start: 2019-03-28 | End: 2019-03-29

## 2019-03-28 RX ADMIN — IPRATROPIUM BROMIDE AND ALBUTEROL SULFATE 3 ML: .5; 3 SOLUTION RESPIRATORY (INHALATION) at 19:02

## 2019-03-28 RX ADMIN — IPRATROPIUM BROMIDE AND ALBUTEROL SULFATE 3 ML: .5; 3 SOLUTION RESPIRATORY (INHALATION) at 15:36

## 2019-03-28 RX ADMIN — GUAIFENESIN 600 MG: 600 TABLET, EXTENDED RELEASE ORAL at 08:10

## 2019-03-28 RX ADMIN — IPRATROPIUM BROMIDE AND ALBUTEROL SULFATE 3 ML: .5; 3 SOLUTION RESPIRATORY (INHALATION) at 22:45

## 2019-03-28 RX ADMIN — PANTOPRAZOLE SODIUM 40 MG: 40 TABLET, DELAYED RELEASE ORAL at 17:16

## 2019-03-28 RX ADMIN — METOPROLOL SUCCINATE 37.5 MG: 25 TABLET, EXTENDED RELEASE ORAL at 08:10

## 2019-03-28 RX ADMIN — GUAIFENESIN 600 MG: 600 TABLET, EXTENDED RELEASE ORAL at 21:03

## 2019-03-28 RX ADMIN — IPRATROPIUM BROMIDE AND ALBUTEROL SULFATE 3 ML: .5; 3 SOLUTION RESPIRATORY (INHALATION) at 07:33

## 2019-03-28 RX ADMIN — INSULIN ASPART 7 UNITS: 100 INJECTION, SOLUTION INTRAVENOUS; SUBCUTANEOUS at 21:04

## 2019-03-28 RX ADMIN — METHYLPREDNISOLONE SODIUM SUCCINATE 60 MG: 125 INJECTION, POWDER, FOR SOLUTION INTRAMUSCULAR; INTRAVENOUS at 17:12

## 2019-03-28 RX ADMIN — BISACODYL 10 MG: 10 SUPPOSITORY RECTAL at 08:11

## 2019-03-28 RX ADMIN — METHYLPREDNISOLONE SODIUM SUCCINATE 60 MG: 125 INJECTION, POWDER, FOR SOLUTION INTRAMUSCULAR; INTRAVENOUS at 21:03

## 2019-03-28 RX ADMIN — METHYLPREDNISOLONE SODIUM SUCCINATE 60 MG: 125 INJECTION, POWDER, FOR SOLUTION INTRAMUSCULAR; INTRAVENOUS at 04:10

## 2019-03-28 RX ADMIN — GABAPENTIN 300 MG: 300 CAPSULE ORAL at 08:10

## 2019-03-28 RX ADMIN — METHYLPREDNISOLONE SODIUM SUCCINATE 60 MG: 125 INJECTION, POWDER, FOR SOLUTION INTRAMUSCULAR; INTRAVENOUS at 11:13

## 2019-03-28 RX ADMIN — CITALOPRAM HYDROBROMIDE 20 MG: 20 TABLET ORAL at 08:11

## 2019-03-28 RX ADMIN — INSULIN ASPART 2 UNITS: 100 INJECTION, SOLUTION INTRAVENOUS; SUBCUTANEOUS at 12:42

## 2019-03-28 RX ADMIN — GABAPENTIN 300 MG: 300 CAPSULE ORAL at 21:03

## 2019-03-28 RX ADMIN — SODIUM CHLORIDE, PRESERVATIVE FREE 10 ML: 5 INJECTION INTRAVENOUS at 21:04

## 2019-03-28 RX ADMIN — IPRATROPIUM BROMIDE AND ALBUTEROL SULFATE 3 ML: .5; 3 SOLUTION RESPIRATORY (INHALATION) at 11:18

## 2019-03-29 LAB
ANION GAP SERPL CALCULATED.3IONS-SCNC: 12.3 MMOL/L
BASOPHILS # BLD AUTO: 0 10*3/MM3 (ref 0–0.2)
BASOPHILS NFR BLD AUTO: 0 % (ref 0–1.5)
BUN BLD-MCNC: 43 MG/DL (ref 8–23)
BUN/CREAT SERPL: 36.8 (ref 7–25)
CALCIUM SPEC-SCNC: 10.7 MG/DL (ref 8.6–10.5)
CHLORIDE SERPL-SCNC: 99 MMOL/L (ref 98–107)
CO2 SERPL-SCNC: 32.7 MMOL/L (ref 22–29)
CREAT BLD-MCNC: 1.17 MG/DL (ref 0.57–1)
DEPRECATED RDW RBC AUTO: 45.5 FL (ref 37–54)
EOSINOPHIL # BLD AUTO: 0 10*3/MM3 (ref 0–0.4)
EOSINOPHIL NFR BLD AUTO: 0 % (ref 0.3–6.2)
ERYTHROCYTE [DISTWIDTH] IN BLOOD BY AUTOMATED COUNT: 13.7 % (ref 12.3–15.4)
GFR SERPL CREATININE-BSD FRML MDRD: 44 ML/MIN/1.73
GLUCOSE BLD-MCNC: 177 MG/DL (ref 65–99)
GLUCOSE BLDC GLUCOMTR-MCNC: 184 MG/DL (ref 70–130)
GLUCOSE BLDC GLUCOMTR-MCNC: 192 MG/DL (ref 70–130)
GLUCOSE BLDC GLUCOMTR-MCNC: 192 MG/DL (ref 70–130)
GLUCOSE BLDC GLUCOMTR-MCNC: 195 MG/DL (ref 70–130)
HCT VFR BLD AUTO: 39.2 % (ref 34–46.6)
HGB BLD-MCNC: 12.4 G/DL (ref 12–15.9)
IMM GRANULOCYTES # BLD AUTO: 0.01 10*3/MM3 (ref 0–0.05)
IMM GRANULOCYTES NFR BLD AUTO: 0.2 % (ref 0–0.5)
INR PPP: 5.53 (ref 0.9–1.1)
LYMPHOCYTES # BLD AUTO: 0.62 10*3/MM3 (ref 0.7–3.1)
LYMPHOCYTES NFR BLD AUTO: 14.2 % (ref 19.6–45.3)
MCH RBC QN AUTO: 28.1 PG (ref 26.6–33)
MCHC RBC AUTO-ENTMCNC: 31.6 G/DL (ref 31.5–35.7)
MCV RBC AUTO: 88.7 FL (ref 79–97)
MONOCYTES # BLD AUTO: 0.11 10*3/MM3 (ref 0.1–0.9)
MONOCYTES NFR BLD AUTO: 2.5 % (ref 5–12)
NEUTROPHILS # BLD AUTO: 3.62 10*3/MM3 (ref 1.4–7)
NEUTROPHILS NFR BLD AUTO: 83.1 % (ref 42.7–76)
PLATELET # BLD AUTO: 152 10*3/MM3 (ref 140–450)
PMV BLD AUTO: 10.3 FL (ref 6–12)
POTASSIUM BLD-SCNC: 4.2 MMOL/L (ref 3.5–5.2)
PROTHROMBIN TIME: 49.6 SECONDS (ref 12.1–15)
RBC # BLD AUTO: 4.42 10*6/MM3 (ref 3.77–5.28)
SODIUM BLD-SCNC: 144 MMOL/L (ref 136–145)
WBC NRBC COR # BLD: 4.36 10*3/MM3 (ref 3.4–10.8)

## 2019-03-29 PROCEDURE — 25010000002 METHYLPREDNISOLONE PER 125 MG: Performed by: HOSPITALIST

## 2019-03-29 PROCEDURE — 94799 UNLISTED PULMONARY SVC/PX: CPT

## 2019-03-29 PROCEDURE — 63710000001 INSULIN ASPART PER 5 UNITS: Performed by: HOSPITALIST

## 2019-03-29 PROCEDURE — 25010000002 VITAMIN K1 PER 1 MG: Performed by: HOSPITALIST

## 2019-03-29 PROCEDURE — 85025 COMPLETE CBC W/AUTO DIFF WBC: CPT | Performed by: INTERNAL MEDICINE

## 2019-03-29 PROCEDURE — 99232 SBSQ HOSP IP/OBS MODERATE 35: CPT | Performed by: HOSPITALIST

## 2019-03-29 PROCEDURE — 82962 GLUCOSE BLOOD TEST: CPT

## 2019-03-29 PROCEDURE — 80048 BASIC METABOLIC PNL TOTAL CA: CPT | Performed by: INTERNAL MEDICINE

## 2019-03-29 PROCEDURE — 85610 PROTHROMBIN TIME: CPT | Performed by: INTERNAL MEDICINE

## 2019-03-29 RX ORDER — IPRATROPIUM BROMIDE AND ALBUTEROL SULFATE 2.5; .5 MG/3ML; MG/3ML
3 SOLUTION RESPIRATORY (INHALATION)
Status: DISCONTINUED | OUTPATIENT
Start: 2019-03-29 | End: 2019-03-31 | Stop reason: HOSPADM

## 2019-03-29 RX ORDER — PHYTONADIONE 2 MG/ML
2.5 INJECTION, EMULSION INTRAMUSCULAR; INTRAVENOUS; SUBCUTANEOUS ONCE
Status: COMPLETED | OUTPATIENT
Start: 2019-03-29 | End: 2019-03-29

## 2019-03-29 RX ADMIN — METHYLPREDNISOLONE SODIUM SUCCINATE 60 MG: 125 INJECTION, POWDER, FOR SOLUTION INTRAMUSCULAR; INTRAVENOUS at 08:47

## 2019-03-29 RX ADMIN — INSULIN ASPART 2 UNITS: 100 INJECTION, SOLUTION INTRAVENOUS; SUBCUTANEOUS at 08:46

## 2019-03-29 RX ADMIN — ACETAMINOPHEN 500 MG: 500 TABLET, FILM COATED ORAL at 17:01

## 2019-03-29 RX ADMIN — PANTOPRAZOLE SODIUM 40 MG: 40 TABLET, DELAYED RELEASE ORAL at 16:29

## 2019-03-29 RX ADMIN — METOPROLOL SUCCINATE 37.5 MG: 25 TABLET, EXTENDED RELEASE ORAL at 08:46

## 2019-03-29 RX ADMIN — METHYLPREDNISOLONE SODIUM SUCCINATE 60 MG: 125 INJECTION, POWDER, FOR SOLUTION INTRAMUSCULAR; INTRAVENOUS at 03:34

## 2019-03-29 RX ADMIN — IPRATROPIUM BROMIDE AND ALBUTEROL SULFATE 3 ML: .5; 3 SOLUTION RESPIRATORY (INHALATION) at 19:01

## 2019-03-29 RX ADMIN — METHYLPREDNISOLONE SODIUM SUCCINATE 60 MG: 125 INJECTION, POWDER, FOR SOLUTION INTRAMUSCULAR; INTRAVENOUS at 20:53

## 2019-03-29 RX ADMIN — IPRATROPIUM BROMIDE AND ALBUTEROL SULFATE 3 ML: .5; 3 SOLUTION RESPIRATORY (INHALATION) at 11:06

## 2019-03-29 RX ADMIN — INSULIN ASPART 2 UNITS: 100 INJECTION, SOLUTION INTRAVENOUS; SUBCUTANEOUS at 17:00

## 2019-03-29 RX ADMIN — METHYLPREDNISOLONE SODIUM SUCCINATE 60 MG: 125 INJECTION, POWDER, FOR SOLUTION INTRAMUSCULAR; INTRAVENOUS at 16:28

## 2019-03-29 RX ADMIN — GUAIFENESIN 600 MG: 600 TABLET, EXTENDED RELEASE ORAL at 08:48

## 2019-03-29 RX ADMIN — SODIUM CHLORIDE, PRESERVATIVE FREE 10 ML: 5 INJECTION INTRAVENOUS at 20:51

## 2019-03-29 RX ADMIN — CITALOPRAM HYDROBROMIDE 20 MG: 20 TABLET ORAL at 08:46

## 2019-03-29 RX ADMIN — IPRATROPIUM BROMIDE AND ALBUTEROL SULFATE 3 ML: .5; 3 SOLUTION RESPIRATORY (INHALATION) at 07:12

## 2019-03-29 RX ADMIN — GABAPENTIN 300 MG: 300 CAPSULE ORAL at 20:45

## 2019-03-29 RX ADMIN — GUAIFENESIN 600 MG: 600 TABLET, EXTENDED RELEASE ORAL at 20:46

## 2019-03-29 RX ADMIN — GABAPENTIN 300 MG: 300 CAPSULE ORAL at 08:46

## 2019-03-29 RX ADMIN — IPRATROPIUM BROMIDE AND ALBUTEROL SULFATE 3 ML: .5; 3 SOLUTION RESPIRATORY (INHALATION) at 02:58

## 2019-03-29 RX ADMIN — INSULIN ASPART 2 UNITS: 100 INJECTION, SOLUTION INTRAVENOUS; SUBCUTANEOUS at 20:45

## 2019-03-29 RX ADMIN — PHYTONADIONE 2.5 MG: 10 INJECTION, EMULSION INTRAMUSCULAR; INTRAVENOUS; SUBCUTANEOUS at 18:12

## 2019-03-29 RX ADMIN — BISACODYL 10 MG: 10 SUPPOSITORY RECTAL at 08:48

## 2019-03-29 RX ADMIN — INSULIN ASPART 2 UNITS: 100 INJECTION, SOLUTION INTRAVENOUS; SUBCUTANEOUS at 12:11

## 2019-03-29 RX ADMIN — IPRATROPIUM BROMIDE AND ALBUTEROL SULFATE 3 ML: .5; 3 SOLUTION RESPIRATORY (INHALATION) at 15:07

## 2019-03-30 LAB
GLUCOSE BLDC GLUCOMTR-MCNC: 148 MG/DL (ref 70–130)
GLUCOSE BLDC GLUCOMTR-MCNC: 192 MG/DL (ref 70–130)
GLUCOSE BLDC GLUCOMTR-MCNC: 239 MG/DL (ref 70–130)
GLUCOSE BLDC GLUCOMTR-MCNC: 260 MG/DL (ref 70–130)
INR PPP: 1.81 (ref 0.9–1.1)
PROTHROMBIN TIME: 20.6 SECONDS (ref 12.1–15)

## 2019-03-30 PROCEDURE — 25010000002 ENOXAPARIN PER 10 MG: Performed by: HOSPITALIST

## 2019-03-30 PROCEDURE — 82962 GLUCOSE BLOOD TEST: CPT

## 2019-03-30 PROCEDURE — 94799 UNLISTED PULMONARY SVC/PX: CPT

## 2019-03-30 PROCEDURE — 25010000002 METHYLPREDNISOLONE PER 125 MG: Performed by: HOSPITALIST

## 2019-03-30 PROCEDURE — 63710000001 INSULIN ASPART PER 5 UNITS: Performed by: HOSPITALIST

## 2019-03-30 PROCEDURE — 85610 PROTHROMBIN TIME: CPT | Performed by: INTERNAL MEDICINE

## 2019-03-30 PROCEDURE — 99232 SBSQ HOSP IP/OBS MODERATE 35: CPT | Performed by: HOSPITALIST

## 2019-03-30 RX ORDER — FUROSEMIDE 40 MG/1
40 TABLET ORAL DAILY
Status: DISCONTINUED | OUTPATIENT
Start: 2019-03-30 | End: 2019-03-31 | Stop reason: HOSPADM

## 2019-03-30 RX ORDER — WARFARIN SODIUM 7.5 MG/1
7.5 TABLET ORAL
Status: DISCONTINUED | OUTPATIENT
Start: 2019-03-30 | End: 2019-03-31 | Stop reason: HOSPADM

## 2019-03-30 RX ADMIN — ACETAMINOPHEN 500 MG: 500 TABLET, FILM COATED ORAL at 08:04

## 2019-03-30 RX ADMIN — METHYLPREDNISOLONE SODIUM SUCCINATE 60 MG: 125 INJECTION, POWDER, FOR SOLUTION INTRAMUSCULAR; INTRAVENOUS at 09:25

## 2019-03-30 RX ADMIN — METHYLPREDNISOLONE SODIUM SUCCINATE 60 MG: 125 INJECTION, POWDER, FOR SOLUTION INTRAMUSCULAR; INTRAVENOUS at 21:01

## 2019-03-30 RX ADMIN — ACETAMINOPHEN 500 MG: 500 TABLET, FILM COATED ORAL at 20:06

## 2019-03-30 RX ADMIN — IPRATROPIUM BROMIDE AND ALBUTEROL SULFATE 3 ML: .5; 3 SOLUTION RESPIRATORY (INHALATION) at 07:23

## 2019-03-30 RX ADMIN — METHYLPREDNISOLONE SODIUM SUCCINATE 60 MG: 125 INJECTION, POWDER, FOR SOLUTION INTRAMUSCULAR; INTRAVENOUS at 15:39

## 2019-03-30 RX ADMIN — GABAPENTIN 300 MG: 300 CAPSULE ORAL at 20:07

## 2019-03-30 RX ADMIN — IPRATROPIUM BROMIDE AND ALBUTEROL SULFATE 3 ML: .5; 3 SOLUTION RESPIRATORY (INHALATION) at 11:18

## 2019-03-30 RX ADMIN — GABAPENTIN 300 MG: 300 CAPSULE ORAL at 08:04

## 2019-03-30 RX ADMIN — INSULIN ASPART 6 UNITS: 100 INJECTION, SOLUTION INTRAVENOUS; SUBCUTANEOUS at 20:21

## 2019-03-30 RX ADMIN — METOPROLOL SUCCINATE 37.5 MG: 25 TABLET, EXTENDED RELEASE ORAL at 08:04

## 2019-03-30 RX ADMIN — INSULIN ASPART 4 UNITS: 100 INJECTION, SOLUTION INTRAVENOUS; SUBCUTANEOUS at 11:55

## 2019-03-30 RX ADMIN — GUAIFENESIN 600 MG: 600 TABLET, EXTENDED RELEASE ORAL at 08:04

## 2019-03-30 RX ADMIN — IPRATROPIUM BROMIDE AND ALBUTEROL SULFATE 3 ML: .5; 3 SOLUTION RESPIRATORY (INHALATION) at 15:19

## 2019-03-30 RX ADMIN — CITALOPRAM HYDROBROMIDE 20 MG: 20 TABLET ORAL at 08:08

## 2019-03-30 RX ADMIN — IPRATROPIUM BROMIDE AND ALBUTEROL SULFATE 3 ML: .5; 3 SOLUTION RESPIRATORY (INHALATION) at 20:26

## 2019-03-30 RX ADMIN — METHYLPREDNISOLONE SODIUM SUCCINATE 60 MG: 125 INJECTION, POWDER, FOR SOLUTION INTRAMUSCULAR; INTRAVENOUS at 03:32

## 2019-03-30 RX ADMIN — ENOXAPARIN SODIUM 90 MG: 100 INJECTION SUBCUTANEOUS at 20:06

## 2019-03-30 RX ADMIN — FUROSEMIDE 40 MG: 40 TABLET ORAL at 12:04

## 2019-03-30 RX ADMIN — INSULIN ASPART 2 UNITS: 100 INJECTION, SOLUTION INTRAVENOUS; SUBCUTANEOUS at 08:08

## 2019-03-30 RX ADMIN — GUAIFENESIN 600 MG: 600 TABLET, EXTENDED RELEASE ORAL at 20:05

## 2019-03-30 RX ADMIN — PANTOPRAZOLE SODIUM 40 MG: 40 TABLET, DELAYED RELEASE ORAL at 18:26

## 2019-03-30 RX ADMIN — WARFARIN SODIUM 7.5 MG: 7.5 TABLET ORAL at 18:27

## 2019-03-30 RX ADMIN — ENOXAPARIN SODIUM 90 MG: 100 INJECTION SUBCUTANEOUS at 09:25

## 2019-03-31 VITALS
SYSTOLIC BLOOD PRESSURE: 133 MMHG | TEMPERATURE: 97.2 F | HEART RATE: 82 BPM | BODY MASS INDEX: 38.42 KG/M2 | RESPIRATION RATE: 20 BRPM | HEIGHT: 60 IN | OXYGEN SATURATION: 90 % | WEIGHT: 195.7 LBS | DIASTOLIC BLOOD PRESSURE: 85 MMHG

## 2019-03-31 LAB
ANION GAP SERPL CALCULATED.3IONS-SCNC: 9.1 MMOL/L
BUN BLD-MCNC: 47 MG/DL (ref 8–23)
BUN/CREAT SERPL: 42.7 (ref 7–25)
CALCIUM SPEC-SCNC: 10.8 MG/DL (ref 8.6–10.5)
CHLORIDE SERPL-SCNC: 101 MMOL/L (ref 98–107)
CO2 SERPL-SCNC: 34.9 MMOL/L (ref 22–29)
CREAT BLD-MCNC: 1.1 MG/DL (ref 0.57–1)
GFR SERPL CREATININE-BSD FRML MDRD: 48 ML/MIN/1.73
GLUCOSE BLD-MCNC: 193 MG/DL (ref 65–99)
INR PPP: 1.14 (ref 0.9–1.1)
POTASSIUM BLD-SCNC: 4 MMOL/L (ref 3.5–5.2)
PROTHROMBIN TIME: 14.3 SECONDS (ref 12.1–15)
SODIUM BLD-SCNC: 145 MMOL/L (ref 136–145)

## 2019-03-31 PROCEDURE — 25010000002 METHYLPREDNISOLONE PER 125 MG: Performed by: HOSPITALIST

## 2019-03-31 PROCEDURE — 80048 BASIC METABOLIC PNL TOTAL CA: CPT | Performed by: HOSPITALIST

## 2019-03-31 PROCEDURE — 94799 UNLISTED PULMONARY SVC/PX: CPT

## 2019-03-31 PROCEDURE — 63710000001 INSULIN ASPART PER 5 UNITS: Performed by: HOSPITALIST

## 2019-03-31 PROCEDURE — 85610 PROTHROMBIN TIME: CPT | Performed by: INTERNAL MEDICINE

## 2019-03-31 PROCEDURE — 25010000002 ENOXAPARIN PER 10 MG: Performed by: HOSPITALIST

## 2019-03-31 PROCEDURE — 99238 HOSP IP/OBS DSCHRG MGMT 30/<: CPT | Performed by: HOSPITALIST

## 2019-03-31 RX ORDER — PREDNISONE 20 MG/1
20 TABLET ORAL DAILY
Start: 2019-03-31

## 2019-03-31 RX ADMIN — ENOXAPARIN SODIUM 90 MG: 100 INJECTION SUBCUTANEOUS at 08:19

## 2019-03-31 RX ADMIN — ACETAMINOPHEN 500 MG: 500 TABLET, FILM COATED ORAL at 08:17

## 2019-03-31 RX ADMIN — METOPROLOL SUCCINATE 37.5 MG: 25 TABLET, EXTENDED RELEASE ORAL at 08:17

## 2019-03-31 RX ADMIN — BISACODYL 10 MG: 10 SUPPOSITORY RECTAL at 08:20

## 2019-03-31 RX ADMIN — GUAIFENESIN 600 MG: 600 TABLET, EXTENDED RELEASE ORAL at 08:18

## 2019-03-31 RX ADMIN — FUROSEMIDE 40 MG: 40 TABLET ORAL at 08:20

## 2019-03-31 RX ADMIN — GABAPENTIN 300 MG: 300 CAPSULE ORAL at 08:17

## 2019-03-31 RX ADMIN — CITALOPRAM HYDROBROMIDE 20 MG: 20 TABLET ORAL at 08:17

## 2019-03-31 RX ADMIN — INSULIN ASPART 2 UNITS: 100 INJECTION, SOLUTION INTRAVENOUS; SUBCUTANEOUS at 08:16

## 2019-03-31 RX ADMIN — METHYLPREDNISOLONE SODIUM SUCCINATE 60 MG: 125 INJECTION, POWDER, FOR SOLUTION INTRAMUSCULAR; INTRAVENOUS at 09:47

## 2019-03-31 RX ADMIN — IPRATROPIUM BROMIDE AND ALBUTEROL SULFATE 3 ML: .5; 3 SOLUTION RESPIRATORY (INHALATION) at 07:26

## 2019-03-31 RX ADMIN — METHYLPREDNISOLONE SODIUM SUCCINATE 60 MG: 125 INJECTION, POWDER, FOR SOLUTION INTRAMUSCULAR; INTRAVENOUS at 04:38

## 2019-04-01 NOTE — NURSING NOTE
Case Management Discharge Note    Final Note: Discharged to [facility]    Destination - Selection Complete      Service Provider Request Status Selected Services Address Phone Number Fax Number    [facility] Albarran [facility] [address] [place] [address] [phone]      Durable Medical Equipment      No service has been selected for the patient.      Dialysis/Infusion      No service has been selected for the patient.      Home Medical Care      No service has been selected for the patient.      Therapy      No service has been selected for the patient.      Community Resources      No service has been selected for the patient.             Final Discharge Disposition Code: 04 - intermediate care facility

## 2019-04-18 PROCEDURE — 85025 COMPLETE CBC W/AUTO DIFF WBC: CPT

## 2019-04-18 PROCEDURE — 80053 COMPREHEN METABOLIC PANEL: CPT

## 2019-04-19 ENCOUNTER — LAB REQUISITION (OUTPATIENT)
Dept: LAB | Facility: HOSPITAL | Age: 84
End: 2019-04-19

## 2019-04-19 DIAGNOSIS — Z00.00 ROUTINE GENERAL MEDICAL EXAMINATION AT A HEALTH CARE FACILITY: ICD-10-CM

## 2019-04-19 LAB
ALBUMIN SERPL-MCNC: 3.1 G/DL (ref 3.5–5.2)
ALBUMIN/GLOB SERPL: 1.3 G/DL
ALP SERPL-CCNC: 66 U/L (ref 39–117)
ALT SERPL W P-5'-P-CCNC: 10 U/L (ref 1–33)
ANION GAP SERPL CALCULATED.3IONS-SCNC: 9.5 MMOL/L
AST SERPL-CCNC: 12 U/L (ref 1–32)
BASOPHILS # BLD AUTO: 0.01 10*3/MM3 (ref 0–0.2)
BASOPHILS NFR BLD AUTO: 0.2 % (ref 0–1.5)
BILIRUB SERPL-MCNC: 0.7 MG/DL (ref 0.2–1.2)
BUN BLD-MCNC: 19 MG/DL (ref 8–23)
BUN/CREAT SERPL: 20 (ref 7–25)
CALCIUM SPEC-SCNC: 9.9 MG/DL (ref 8.6–10.5)
CHLORIDE SERPL-SCNC: 100 MMOL/L (ref 98–107)
CO2 SERPL-SCNC: 33.5 MMOL/L (ref 22–29)
CREAT BLD-MCNC: 0.95 MG/DL (ref 0.57–1)
DEPRECATED RDW RBC AUTO: 49.7 FL (ref 37–54)
EOSINOPHIL # BLD AUTO: 0.12 10*3/MM3 (ref 0–0.4)
EOSINOPHIL NFR BLD AUTO: 2.7 % (ref 0.3–6.2)
ERYTHROCYTE [DISTWIDTH] IN BLOOD BY AUTOMATED COUNT: 15.2 % (ref 12.3–15.4)
GFR SERPL CREATININE-BSD FRML MDRD: 56 ML/MIN/1.73
GLOBULIN UR ELPH-MCNC: 2.4 GM/DL
GLUCOSE BLD-MCNC: 105 MG/DL (ref 65–99)
HCT VFR BLD AUTO: 35.7 % (ref 34–46.6)
HGB BLD-MCNC: 10.7 G/DL (ref 12–15.9)
IMM GRANULOCYTES # BLD AUTO: 0.11 10*3/MM3 (ref 0–0.05)
IMM GRANULOCYTES NFR BLD AUTO: 2.4 % (ref 0–0.5)
LYMPHOCYTES # BLD AUTO: 2.06 10*3/MM3 (ref 0.7–3.1)
LYMPHOCYTES NFR BLD AUTO: 45.7 % (ref 19.6–45.3)
MCH RBC QN AUTO: 27.2 PG (ref 26.6–33)
MCHC RBC AUTO-ENTMCNC: 30 G/DL (ref 31.5–35.7)
MCV RBC AUTO: 90.6 FL (ref 79–97)
MONOCYTES # BLD AUTO: 0.29 10*3/MM3 (ref 0.1–0.9)
MONOCYTES NFR BLD AUTO: 6.4 % (ref 5–12)
NEUTROPHILS # BLD AUTO: 1.92 10*3/MM3 (ref 1.4–7)
NEUTROPHILS NFR BLD AUTO: 42.6 % (ref 42.7–76)
NRBC BLD AUTO-RTO: 0.2 /100 WBC (ref 0–0)
PLATELET # BLD AUTO: 114 10*3/MM3 (ref 140–450)
PMV BLD AUTO: 9.8 FL (ref 6–12)
POTASSIUM BLD-SCNC: 4.2 MMOL/L (ref 3.5–5.2)
PROT SERPL-MCNC: 5.5 G/DL (ref 6–8.5)
RBC # BLD AUTO: 3.94 10*6/MM3 (ref 3.77–5.28)
SODIUM BLD-SCNC: 143 MMOL/L (ref 136–145)
WBC NRBC COR # BLD: 4.51 10*3/MM3 (ref 3.4–10.8)

## 2019-07-03 ENCOUNTER — LAB REQUISITION (OUTPATIENT)
Dept: LAB | Facility: HOSPITAL | Age: 84
End: 2019-07-03

## 2019-07-03 ENCOUNTER — HOSPITAL ENCOUNTER (EMERGENCY)
Facility: HOSPITAL | Age: 84
Discharge: HOME | End: 2019-07-03
Attending: EMERGENCY MEDICINE | Admitting: EMERGENCY MEDICINE

## 2019-07-03 VITALS
SYSTOLIC BLOOD PRESSURE: 111 MMHG | DIASTOLIC BLOOD PRESSURE: 63 MMHG | WEIGHT: 200 LBS | HEART RATE: 70 BPM | TEMPERATURE: 98.2 F | RESPIRATION RATE: 20 BRPM | HEIGHT: 63 IN | OXYGEN SATURATION: 98 % | BODY MASS INDEX: 35.44 KG/M2

## 2019-07-03 DIAGNOSIS — R79.89 OTHER SPECIFIED ABNORMAL FINDINGS OF BLOOD CHEMISTRY: ICD-10-CM

## 2019-07-03 DIAGNOSIS — Z00.00 ROUTINE GENERAL MEDICAL EXAMINATION AT A HEALTH CARE FACILITY: ICD-10-CM

## 2019-07-03 DIAGNOSIS — Z13.9 ENCOUNTER FOR MEDICAL SCREENING EXAMINATION: Primary | ICD-10-CM

## 2019-07-03 LAB
ALBUMIN SERPL-MCNC: 3.1 G/DL (ref 3.5–5.2)
ALBUMIN/GLOB SERPL: 1.6 G/DL
ALP SERPL-CCNC: 69 U/L (ref 39–117)
ALT SERPL W P-5'-P-CCNC: 6 U/L (ref 1–33)
ANION GAP SERPL CALCULATED.3IONS-SCNC: 10.4 MMOL/L (ref 5–15)
ANION GAP SERPL CALCULATED.3IONS-SCNC: 10.9 MMOL/L (ref 5–15)
AST SERPL-CCNC: 12 U/L (ref 1–32)
BASOPHILS # BLD AUTO: 0.02 10*3/MM3 (ref 0–0.2)
BASOPHILS NFR BLD AUTO: 0.4 % (ref 0–1.5)
BILIRUB SERPL-MCNC: 0.5 MG/DL (ref 0.2–1.2)
BUN BLD-MCNC: 22 MG/DL (ref 8–23)
BUN BLD-MCNC: 26 MG/DL (ref 8–23)
BUN/CREAT SERPL: 15.8 (ref 7–25)
BUN/CREAT SERPL: 19.1 (ref 7–25)
CALCIUM SPEC-SCNC: 10.1 MG/DL (ref 8.6–10.5)
CALCIUM SPEC-SCNC: 9.8 MG/DL (ref 8.6–10.5)
CHLORIDE SERPL-SCNC: 100 MMOL/L (ref 98–107)
CHLORIDE SERPL-SCNC: 102 MMOL/L (ref 98–107)
CO2 SERPL-SCNC: 34.6 MMOL/L (ref 22–29)
CO2 SERPL-SCNC: 35.1 MMOL/L (ref 22–29)
CREAT BLD-MCNC: 1.36 MG/DL (ref 0.57–1)
CREAT BLD-MCNC: 1.39 MG/DL (ref 0.57–1)
DEPRECATED RDW RBC AUTO: 45.1 FL (ref 37–54)
EOSINOPHIL # BLD AUTO: 0.19 10*3/MM3 (ref 0–0.4)
EOSINOPHIL NFR BLD AUTO: 3.9 % (ref 0.3–6.2)
ERYTHROCYTE [DISTWIDTH] IN BLOOD BY AUTOMATED COUNT: 13.7 % (ref 12.3–15.4)
GFR SERPL CREATININE-BSD FRML MDRD: 36 ML/MIN/1.73
GFR SERPL CREATININE-BSD FRML MDRD: 37 ML/MIN/1.73
GLOBULIN UR ELPH-MCNC: 1.9 GM/DL
GLUCOSE BLD-MCNC: 89 MG/DL (ref 65–99)
GLUCOSE BLD-MCNC: 97 MG/DL (ref 65–99)
GLUCOSE BLDC GLUCOMTR-MCNC: 150 MG/DL (ref 70–130)
HCT VFR BLD AUTO: 33.8 % (ref 34–46.6)
HGB BLD-MCNC: 10.8 G/DL (ref 12–15.9)
IMM GRANULOCYTES # BLD AUTO: 0.02 10*3/MM3 (ref 0–0.05)
IMM GRANULOCYTES NFR BLD AUTO: 0.4 % (ref 0–0.5)
LYMPHOCYTES # BLD AUTO: 1.96 10*3/MM3 (ref 0.7–3.1)
LYMPHOCYTES NFR BLD AUTO: 39.8 % (ref 19.6–45.3)
MCH RBC QN AUTO: 28.9 PG (ref 26.6–33)
MCHC RBC AUTO-ENTMCNC: 32 G/DL (ref 31.5–35.7)
MCV RBC AUTO: 90.4 FL (ref 79–97)
MONOCYTES # BLD AUTO: 0.51 10*3/MM3 (ref 0.1–0.9)
MONOCYTES NFR BLD AUTO: 10.3 % (ref 5–12)
NEUTROPHILS # BLD AUTO: 2.23 10*3/MM3 (ref 1.7–7)
NEUTROPHILS NFR BLD AUTO: 45.2 % (ref 42.7–76)
NRBC BLD AUTO-RTO: 0 /100 WBC (ref 0–0.2)
PLATELET # BLD AUTO: 150 10*3/MM3 (ref 140–450)
PMV BLD AUTO: 12 FL (ref 6–12)
POTASSIUM BLD-SCNC: 3.9 MMOL/L (ref 3.5–5.2)
POTASSIUM BLD-SCNC: 4.2 MMOL/L (ref 3.5–5.2)
PROT SERPL-MCNC: 5 G/DL (ref 6–8.5)
RBC # BLD AUTO: 3.74 10*6/MM3 (ref 3.77–5.28)
SODIUM BLD-SCNC: 145 MMOL/L (ref 136–145)
SODIUM BLD-SCNC: 148 MMOL/L (ref 136–145)
WBC NRBC COR # BLD: 4.93 10*3/MM3 (ref 3.4–10.8)

## 2019-07-03 PROCEDURE — 80053 COMPREHEN METABOLIC PANEL: CPT

## 2019-07-03 PROCEDURE — 99282 EMERGENCY DEPT VISIT SF MDM: CPT | Performed by: PHYSICIAN ASSISTANT

## 2019-07-03 PROCEDURE — 99283 EMERGENCY DEPT VISIT LOW MDM: CPT

## 2019-07-03 PROCEDURE — 80048 BASIC METABOLIC PNL TOTAL CA: CPT | Performed by: INTERNAL MEDICINE

## 2019-07-03 PROCEDURE — 85025 COMPLETE CBC W/AUTO DIFF WBC: CPT | Performed by: INTERNAL MEDICINE

## 2019-07-03 PROCEDURE — 82962 GLUCOSE BLOOD TEST: CPT

## 2019-07-04 NOTE — ED PROVIDER NOTES
EMERGENCY DEPARTMENT ENCOUNTER      Room Number: 3/03    History is provided by the patient, no translation services needed    HPI:    Chief complaint: Sent from nursing home for evaluation    Location: Denies any pain    Quality/Severity:  -    Timing/Duration: Patient was napping prior to arrival    Modifying Factors: None    Associated Symptoms: Patient denies any symptoms.    Narrative: Pt is a 83 y.o. female who presents complaining of nothing.  Patient was sent from nursing facility, paramedics state the nurse was concerned because the patient was taking a nap.  Patient states she is not having any pain, she is not sure why the nurse wanted her sent here, paramedics state that two MAs were also at the bedside stating that patient is at her baseline mental status and is acting completely normal at their time of arrival.  She is on 2 L of continuous oxygen per nasal cannula, she denies any shortness of air, chest pain, headache, abdominal pain, dysuria, urgency to urinate, nausea, vomiting, or pain whatsoever.      PMD: Alex Ayon MD    REVIEW OF SYSTEMS  Review of Systems   Constitutional: Negative for chills, fatigue and fever.   HENT: Negative for ear pain, rhinorrhea and sore throat.    Respiratory: Negative for cough and shortness of breath.    Cardiovascular: Positive for leg swelling (Chronic). Negative for chest pain and palpitations.   Gastrointestinal: Negative for abdominal pain, nausea and vomiting.   Genitourinary: Negative for difficulty urinating, dysuria, flank pain and urgency.   Musculoskeletal: Negative for back pain and myalgias.   Skin: Negative for pallor, rash and wound.   Neurological: Negative for dizziness, syncope, facial asymmetry, weakness and light-headedness.   Psychiatric/Behavioral: Negative for confusion. The patient is not nervous/anxious.        PAST MEDICAL HISTORY  Active Ambulatory Problems     Diagnosis Date Noted   • Hyperglycemia 09/27/2016   • Permanent atrial  fibrillation (CMS/[facility])    • Acute renal failure with tubular necrosis (CMS/[facility]) 01/03/2017   • Chronic anticoagulation 01/12/2017   • Lymphedema    • Morbid obesity (CMS/[facility])    • Obstructive sleep apnea of adult    • Choledocholithiasis with acute cholecystitis 03/13/2017   • Acute biliary pancreatitis without infection or necrosis 03/13/2017   • Chronic kidney disease, stage III (moderate) (CMS/[facility]) 03/13/2017   • Chronic diastolic congestive heart failure (CMS/[facility]) 03/13/2017   • Pulmonary hypertension (CMS/[facility]) 03/13/2017   • Diabetes mellitus type 2 in obese (CMS/[facility]) 03/13/2017   • Chronic respiratory failure with hypoxia (CMS/HCC) 03/13/2017   • Respiratory distress 03/31/2017   • Acute respiratory failure (CMS/[facility]) 03/31/2017   • Closed fracture of right proximal tibia 10/24/2017     Resolved Ambulatory Problems     Diagnosis Date Noted   • Pneumonia 06/08/2016   • CHF (congestive heart failure) (CMS/[facility])    • Acute hyperglycemia 09/28/2016   • Sepsis due to urinary tract infection (CMS/[facility]) 01/02/2017   • Chronic diastolic (congestive) heart failure    • Acute on chronic respiratory failure (CMS/[facility]) 01/12/2017   • Influenza A with respiratory manifestations 01/12/2017   • Abdominal pain 03/08/2017   • Cholecystitis 03/09/2017   • Acute kidney injury (CMS/[facility]) 03/13/2017   • Acute cystitis without hematuria 03/13/2017   • Escherichia coli urinary tract infection 03/13/2017   • Electrolyte imbalance 03/13/2017     Past Medical History:   Diagnosis Date   • Anemia    • Arthritis    • CHF (congestive heart failure) (CMS/[facility])    • Chronic diastolic (congestive) heart failure (CMS/[facility])    • Chronic kidney disease    • COPD (chronic obstructive pulmonary disease) (CMS/[facility])    • Diabetes mellitus (CMS/[facility])    • DVT (deep venous thrombosis) (CMS/[facility])    • Dyslipidemia    • Gout    • Hypertension    • Lymphedema    • Morbid obesity (CMS/[facility])    • Obstructive sleep apnea of adult    • Permanent atrial fibrillation  (CMS/HCC)    • Pulmonary HTN (CMS/HCC)    • Respiratory failure, acute (CMS/HCC)        PAST SURGICAL HISTORY  Past Surgical History:   Procedure Laterality Date   • CATARACT EXTRACTION      both eyes 4 years ago   • CHOLECYSTECTOMY     • CHOLECYSTECTOMY WITH INTRAOPERATIVE CHOLANGIOGRAM N/A 3/13/2017    Procedure: CHOLECYSTECTOMY LAPAROSCOPIC INTRAOPERATIVE CHOLANGIOGRAM;  Surgeon: Joy Pham DO;  Location: [facility] OR;  Service:    • EYE SURGERY     • FEMUR FRACTURE SURGERY Right     closed reduction external fixation   • FRACTURE SURGERY     • HERNIA REPAIR     • NV ERCP DX COLLECTION SPECIMEN BRUSHING/WASHING N/A 3/10/2017    Procedure: ENDOSCOPIC RETROGRADE CHOLANGIOPANCREATOGRAPHY sphincterotomy with stone extraction ;  Surgeon: Jose Estrada MD;  Location: [facility] OR;  Service: Gastroenterology   • TONSILLECTOMY         FAMILY HISTORY  Family History   Problem Relation Age of Onset   • Cancer Mother    • Heart disease Father    • Diabetes Father    • COPD Brother    • Heart disease Brother        SOCIAL HISTORY  Social History     Socioeconomic History   • Marital status:      Spouse name: Not on file   • Number of children: Not on file   • Years of education: Not on file   • Highest education level: Not on file   Tobacco Use   • Smoking status: Never Smoker   • Smokeless tobacco: Never Used   Substance and Sexual Activity   • Alcohol use: No   • Drug use: No   • Sexual activity: Defer       ALLERGIES  Codeine; Demerol [meperidine]; Propoxyphene; and Tramadol    No current facility-administered medications for this encounter.     Current Outpatient Medications:   •  acetaminophen (TYLENOL) 500 MG tablet, Take 500 mg by mouth Every 4 (Four) Hours As Needed for Mild Pain ., Disp: , Rfl:   •  bisacodyl (DULCOLAX) 10 MG suppository, Insert 10 mg into the rectum Daily., Disp: , Rfl:   •  budesonide-formoterol (SYMBICORT) 80-4.5 MCG/ACT inhaler, Inhale 2 puffs 2 (Two) Times a Day., Disp: ,  Rfl: 12  •  cholecalciferol (VITAMIN D3) 70867 UNITS capsule, Take 50,000 Units by mouth Every 30 (Thirty) Days., Disp: , Rfl:   •  citalopram (CeleXA) 20 MG tablet, Take 1 tablet by mouth Daily., Disp: , Rfl:   •  enoxaparin (LOVENOX) 100 MG/ML solution syringe, Inject 0.9 mL under the skin into the appropriate area as directed Every 12 (Twelve) Hours., Disp: 22.4 mL, Rfl: 0  •  folic acid (FOLVITE) 1 MG tablet, Take 1 mg by mouth daily., Disp: , Rfl:   •  furosemide (LASIX) 80 MG tablet, Take 80 mg by mouth 2 (Two) Times a Day., Disp: , Rfl:   •  gabapentin (NEURONTIN) 300 MG capsule, Take 300 mg by mouth 2 (two) times a day., Disp: , Rfl:   •  guaiFENesin 200 MG tablet, Take 800 mg by mouth 3 (Three) Times a Day., Disp: , Rfl:   •  hyoscyamine (LEVBID) 0.375 MG 12 hr tablet, Take 0.375 mg by mouth Every 12 (Twelve) Hours As Needed for Cramping., Disp: , Rfl:   •  insulin aspart (novoLOG) 100 UNIT/ML injection, Inject 0-7 Units under the skin Every 6 (Six) Hours., Disp: , Rfl: 12  •  ipratropium-albuterol (DUO-NEB) 0.5-2.5 mg/mL nebulizer, Take 3 mL by nebulization 4 (Four) Times a Day., Disp: 360 mL, Rfl:   •  LACTULOSE PO, Take 30 mL by mouth Daily As Needed., Disp: , Rfl:   •  metoprolol succinate XL (TOPROL-XL) 25 MG 24 hr tablet, Take 1.5 tablets by mouth Daily., Disp: , Rfl:   •  multivitamin-minerals (OCUVITE) tablet, Take 1 tablet by mouth daily., Disp: , Rfl:   •  O2 (OXYGEN), Inhale 2 L/min 1 (One) Time., Disp: , Rfl:   •  pantoprazole (PROTONIX) 40 MG EC tablet, Take 40 mg by mouth every evening., Disp: , Rfl:   •  polyethylene glycol (MIRALAX) packet, Take 17 g by mouth daily., Disp: , Rfl:   •  potassium chloride (K-DUR) 10 MEQ CR tablet, Take 20 mEq by mouth Daily., Disp: , Rfl:   •  predniSONE (DELTASONE) 20 MG tablet, Take 1 tablet by mouth Daily., Disp: , Rfl:   •  sennosides-docusate sodium (SENOKOT-S) 8.6-50 MG tablet, Take 2 tablets by mouth 2 (Two) Times a Day., Disp: 120 tablet, Rfl: 1  •   warfarin (COUMADIN) 5 MG tablet, Take 5 mg by mouth Every Night., Disp: , Rfl:     PHYSICAL EXAM  ED Triage Vitals [07/03/19 2033]   Temp Heart Rate Resp BP SpO2   98.2 °F (36.8 °C) 70 20 111/63 98 %      Temp src Heart Rate Source Patient Position BP Location FiO2 (%)   Oral Monitor Lying Right arm --       Physical Exam   Constitutional: She is oriented to person, place, and time and well-developed, well-nourished, and in no distress. Vital signs are normal.   HENT:   Head: Normocephalic and atraumatic.   Right Ear: External ear normal.   Left Ear: External ear normal.   Mouth/Throat: Oropharynx is clear and moist. No oropharyngeal exudate.   Eyes: Conjunctivae and EOM are normal. Pupils are equal, round, and reactive to light.   Neck: Normal range of motion. Neck supple.   Cardiovascular: Normal rate, regular rhythm, normal heart sounds and intact distal pulses.   Pulmonary/Chest: Effort normal and breath sounds normal. No respiratory distress.   Abdominal: Soft. Bowel sounds are normal. There is no tenderness. There is no guarding.   Musculoskeletal: Normal range of motion. She exhibits edema (2+ pitting edema bilaterally, chronic venous stasis).   Lymphadenopathy:     She has no cervical adenopathy.   Neurological: She is alert and oriented to person, place, and time. She has normal reflexes. No cranial nerve deficit. Coordination normal. GCS score is 15.   Skin: Skin is warm and dry.   Psychiatric: Mood, memory, affect and judgment normal.           LAB RESULTS  Results for orders placed or performed during the hospital encounter of 07/03/19   POC Glucose Once   Result Value Ref Range    Glucose 150 (H) 70 - 130 mg/dL         I ordered the above labs and reviewed the results    RADIOLOGY  No results found.    I ordered the above radiologic testing and reviewed the results    PROCEDURES  Procedures      PROGRESS AND CONSULTS  ED Course as of Jul 05 1753 Wed Jul 03, 2019 2114 Patient is alert and oriented  "x4, she knows who she is, she knows where she is, she knows Yonatan Cheema is the president, she knows tomorrow is 4 July, and she states she is having no pain, and no complaints, she states she feels as \"fine as a [place].\"     Her blood glucose is 150, her vitals are completely normal.  Her blood pressure is 111/63, temp 98.2, heart rate 70, respirations 20, 98% on her usual 2 L per NC.  I discussed her case with Dr. Sheriff as well, he do not see any reason to pursue further medical treatment at this time.  Patient will be discharged back to her nursing home in good condition.  [KS]      ED Course User Index  [KS] Avani Moreland PA-C           MEDICAL DECISION MAKING  Results were reviewed/discussed with the patient and they were also made aware of online access. Pt also made aware that some labs, such as cultures, will not be resulted during ER visit and follow up with PMD is necessary.     MDM  Number of Diagnoses or Management Options  Encounter for medical screening examination:      Amount and/or Complexity of Data Reviewed  Clinical lab tests: reviewed and ordered    Risk of Complications, Morbidity, and/or Mortality  Presenting problems: low  Diagnostic procedures: low  Management options: low    Patient Progress  Patient progress: stable          DIAGNOSIS  Final diagnoses:   Encounter for medical screening examination       Latest Documented Vital Signs:  As of 5:53 PM  BP- 111/63 HR- 70 Temp- 98.2 °F (36.8 °C) (Oral) O2 sat- 98%    DISPOSITION  Patient discharged back to nursing home facility via EMS.    Discussed pertinent lab findings with the patient/family.  Patient/Family voiced understanding of need to follow-up for recheck, further testing as needed.  Return to the emergency Department warnings were given.         Medication List      No changes were made to your prescriptions during this visit.           Follow-up Information     Alex Ayon MD. Call in 2 days.    Specialty:  " Internal Medicine  Why:  To schedule follow-up appointment  Contact information:  [address] [place] [id number] [phone]                     Dictated utilizing Dragon dictation       Avani Moreland PA-C  07/05/19 [id number]

## 2019-08-03 ENCOUNTER — LAB REQUISITION (OUTPATIENT)
Dept: LAB | Facility: HOSPITAL | Age: 84
End: 2019-08-03

## 2019-08-03 DIAGNOSIS — Z86.718 PERSONAL HISTORY OF OTHER VENOUS THROMBOSIS AND EMBOLISM: ICD-10-CM

## 2019-08-03 DIAGNOSIS — R79.1 ABNORMAL COAGULATION PROFILE: ICD-10-CM

## 2019-08-03 LAB
INR PPP: 5.32 (ref 0.9–1.1)
PROTHROMBIN TIME: 48.1 SECONDS (ref 12.1–15)

## 2019-08-03 PROCEDURE — 85610 PROTHROMBIN TIME: CPT | Performed by: INTERNAL MEDICINE

## 2019-08-04 ENCOUNTER — LAB REQUISITION (OUTPATIENT)
Dept: LAB | Facility: HOSPITAL | Age: 84
End: 2019-08-04

## 2019-08-04 DIAGNOSIS — I82.409 ACUTE EMBOLISM AND THROMBOSIS OF DEEP VEIN OF LOWER EXTREMITY (HCC): ICD-10-CM

## 2019-08-04 LAB
INR PPP: 4.24 (ref 0.9–1.1)
PROTHROMBIN TIME: 40.2 SECONDS (ref 12.1–15)

## 2019-08-04 PROCEDURE — 85610 PROTHROMBIN TIME: CPT | Performed by: INTERNAL MEDICINE

## 2019-08-27 NOTE — ED NOTES
Knee imobolizer applied to right knee/leg.     Frannie Warren  10/07/17 [id number]     24y/o M presents to the ED c/o nasal discharge, dry cough and b/l ears clogged for 1 day. Assoc. sx of decreased appetite and chills. No previous ear infections. Pt was at [facility] 2 days ago. Denies fever, sore throat or SOB. Denies NSAID for relief. No additional complaints at this time. 26y/o M presents to the ED c/o nasal discharge, dry cough and b/l ears clogged for 1 day. Assoc. sx of decreased appetite and chills. No previous ear infections. Denies fever, sore throat or SOB. Denies NSAID for relief. No additional complaints at this time.

## 2019-10-26 ENCOUNTER — LAB REQUISITION (OUTPATIENT)
Dept: LAB | Facility: HOSPITAL | Age: 84
End: 2019-10-26

## 2019-10-26 DIAGNOSIS — I82.409 ACUTE EMBOLISM AND THROMBOSIS OF UNSPECIFIED DEEP VEINS OF UNSPECIFIED LOWER EXTREMITY (HCC): ICD-10-CM

## 2019-10-26 LAB
INR PPP: 2.07 (ref 0.9–1.1)
PROTHROMBIN TIME: 22.9 SECONDS (ref 12.1–15)

## 2019-10-26 PROCEDURE — 85610 PROTHROMBIN TIME: CPT | Performed by: INTERNAL MEDICINE

## 2019-10-28 ENCOUNTER — LAB REQUISITION (OUTPATIENT)
Dept: LAB | Facility: HOSPITAL | Age: 84
End: 2019-10-28

## 2019-10-28 DIAGNOSIS — Z79.01 LONG TERM (CURRENT) USE OF ANTICOAGULANTS: ICD-10-CM

## 2019-10-28 LAB
INR PPP: 1.9 (ref 0.9–1.1)
PROTHROMBIN TIME: 21.4 SECONDS (ref 12.1–15)

## 2019-10-28 PROCEDURE — 85610 PROTHROMBIN TIME: CPT | Performed by: INTERNAL MEDICINE

## 2019-12-19 ENCOUNTER — LAB REQUISITION (OUTPATIENT)
Dept: LAB | Facility: HOSPITAL | Age: 84
End: 2019-12-19

## 2019-12-19 DIAGNOSIS — Z79.01 LONG TERM (CURRENT) USE OF ANTICOAGULANTS: ICD-10-CM

## 2019-12-19 LAB
INR PPP: 1.31 (ref 0.9–1.1)
PROTHROMBIN TIME: 16.1 SECONDS (ref 12.1–15)

## 2019-12-19 PROCEDURE — 85610 PROTHROMBIN TIME: CPT | Performed by: INTERNAL MEDICINE

## 2020-01-03 ENCOUNTER — LAB REQUISITION (OUTPATIENT)
Dept: LAB | Facility: HOSPITAL | Age: 85
End: 2020-01-03

## 2020-01-03 DIAGNOSIS — Z79.01 LONG TERM (CURRENT) USE OF ANTICOAGULANTS: ICD-10-CM

## 2020-01-03 LAB
INR PPP: 7.07 (ref 0.9–1.1)
PROTHROMBIN TIME: 64.1 SECONDS (ref 12.1–15)

## 2020-01-03 PROCEDURE — 85610 PROTHROMBIN TIME: CPT | Performed by: INTERNAL MEDICINE

## 2020-01-04 ENCOUNTER — LAB REQUISITION (OUTPATIENT)
Dept: LAB | Facility: HOSPITAL | Age: 85
End: 2020-01-04

## 2020-01-04 DIAGNOSIS — I48.91 UNSPECIFIED ATRIAL FIBRILLATION (HCC): ICD-10-CM

## 2020-01-04 LAB
INR PPP: 6.44 (ref 0.9–1.1)
PROTHROMBIN TIME: 59.4 SECONDS (ref 12.1–15)

## 2020-01-04 PROCEDURE — 85610 PROTHROMBIN TIME: CPT | Performed by: INTERNAL MEDICINE

## 2020-01-05 ENCOUNTER — LAB REQUISITION (OUTPATIENT)
Dept: LAB | Facility: HOSPITAL | Age: 85
End: 2020-01-05

## 2020-01-05 DIAGNOSIS — I82.409 ACUTE EMBOLISM AND THROMBOSIS OF UNSPECIFIED DEEP VEINS OF UNSPECIFIED LOWER EXTREMITY (HCC): ICD-10-CM

## 2020-01-05 LAB
INR PPP: 4.43 (ref 0.9–1.1)
PROTHROMBIN TIME: 43.7 SECONDS (ref 12.1–15)

## 2020-01-05 PROCEDURE — 85610 PROTHROMBIN TIME: CPT | Performed by: INTERNAL MEDICINE

## 2020-03-05 ENCOUNTER — LAB REQUISITION (OUTPATIENT)
Dept: LAB | Facility: HOSPITAL | Age: 85
End: 2020-03-05

## 2020-03-05 DIAGNOSIS — Z00.00 ROUTINE GENERAL MEDICAL EXAMINATION AT A HEALTH CARE FACILITY: ICD-10-CM

## 2020-03-05 LAB
INR PPP: 2.75 (ref 0.9–1.1)
PROTHROMBIN TIME: 28.8 SECONDS (ref 11.7–14.2)

## 2020-03-05 PROCEDURE — 85610 PROTHROMBIN TIME: CPT

## 2020-06-20 ENCOUNTER — LAB REQUISITION (OUTPATIENT)
Dept: LAB | Facility: HOSPITAL | Age: 85
End: 2020-06-20

## 2020-06-20 DIAGNOSIS — Z79.01 LONG TERM (CURRENT) USE OF ANTICOAGULANTS: ICD-10-CM

## 2020-06-20 LAB
INR PPP: 3.3 (ref 0.9–1.1)
PROTHROMBIN TIME: 34.3 SECONDS (ref 12.1–15)

## 2020-06-20 PROCEDURE — 85610 PROTHROMBIN TIME: CPT | Performed by: FAMILY MEDICINE

## 2020-07-04 ENCOUNTER — LAB REQUISITION (OUTPATIENT)
Dept: LAB | Facility: HOSPITAL | Age: 85
End: 2020-07-04

## 2020-07-04 DIAGNOSIS — Z79.01 LONG TERM (CURRENT) USE OF ANTICOAGULANTS: ICD-10-CM

## 2020-07-04 LAB
INR PPP: 2.88 (ref 0.9–1.1)
PROTHROMBIN TIME: 30.7 SECONDS (ref 12.1–15)

## 2020-07-04 PROCEDURE — 85610 PROTHROMBIN TIME: CPT | Performed by: FAMILY MEDICINE

## 2021-02-27 ENCOUNTER — LAB REQUISITION (OUTPATIENT)
Dept: LAB | Facility: HOSPITAL | Age: 86
End: 2021-02-27

## 2021-02-27 DIAGNOSIS — Z79.01 LONG TERM (CURRENT) USE OF ANTICOAGULANTS: ICD-10-CM

## 2021-02-27 LAB
INR PPP: 3.07 (ref 0.9–1.1)
PROTHROMBIN TIME: 30.4 SECONDS (ref 12.1–15)

## 2021-02-27 PROCEDURE — 85610 PROTHROMBIN TIME: CPT | Performed by: FAMILY MEDICINE

## 2021-05-21 ENCOUNTER — APPOINTMENT (OUTPATIENT)
Dept: VACCINE CLINIC | Facility: HOSPITAL | Age: 86
End: 2021-05-21

## 2021-06-11 ENCOUNTER — LAB REQUISITION (OUTPATIENT)
Dept: LAB | Facility: HOSPITAL | Age: 86
End: 2021-06-11

## 2021-06-11 DIAGNOSIS — Z79.01 LONG TERM (CURRENT) USE OF ANTICOAGULANTS: ICD-10-CM

## 2021-06-11 LAB
INR PPP: 2.93 (ref 0.9–1.1)
PROTHROMBIN TIME: 29.8 SECONDS (ref 12.1–15)

## 2021-06-11 PROCEDURE — 85610 PROTHROMBIN TIME: CPT | Performed by: FAMILY MEDICINE

## 2022-07-17 ENCOUNTER — LAB REQUISITION (OUTPATIENT)
Dept: LAB | Facility: HOSPITAL | Age: 87
End: 2022-07-17

## 2022-07-17 DIAGNOSIS — N39.0 URINARY TRACT INFECTION, SITE NOT SPECIFIED: ICD-10-CM

## 2022-07-17 LAB
BACTERIA UR QL AUTO: ABNORMAL /HPF
BILIRUB UR QL STRIP: NEGATIVE
CLARITY UR: ABNORMAL
COLOR UR: ABNORMAL
GLUCOSE UR STRIP-MCNC: NEGATIVE MG/DL
HGB UR QL STRIP.AUTO: ABNORMAL
HYALINE CASTS UR QL AUTO: ABNORMAL /LPF
KETONES UR QL STRIP: NEGATIVE
LEUKOCYTE ESTERASE UR QL STRIP.AUTO: ABNORMAL
NITRITE UR QL STRIP: NEGATIVE
PH UR STRIP.AUTO: 7 [PH] (ref 4.5–8)
PROT UR QL STRIP: ABNORMAL
RBC # UR STRIP: ABNORMAL /HPF
REF LAB TEST METHOD: ABNORMAL
SP GR UR STRIP: 1.02 (ref 1–1.03)
SQUAMOUS #/AREA URNS HPF: ABNORMAL /HPF
UROBILINOGEN UR QL STRIP: ABNORMAL
WBC # UR STRIP: ABNORMAL /HPF

## 2022-07-17 PROCEDURE — 87077 CULTURE AEROBIC IDENTIFY: CPT | Performed by: FAMILY MEDICINE

## 2022-07-17 PROCEDURE — 87186 SC STD MICRODIL/AGAR DIL: CPT | Performed by: FAMILY MEDICINE

## 2022-07-17 PROCEDURE — 87086 URINE CULTURE/COLONY COUNT: CPT | Performed by: FAMILY MEDICINE

## 2022-07-17 PROCEDURE — 81001 URINALYSIS AUTO W/SCOPE: CPT | Performed by: FAMILY MEDICINE

## 2022-07-19 LAB — BACTERIA SPEC AEROBE CULT: ABNORMAL

## 2022-07-21 ENCOUNTER — LAB REQUISITION (OUTPATIENT)
Dept: LAB | Facility: HOSPITAL | Age: 87
End: 2022-07-21

## 2022-07-21 DIAGNOSIS — D64.9 ANEMIA, UNSPECIFIED: ICD-10-CM

## 2022-07-21 LAB — HEMOCCULT STL QL: POSITIVE

## 2022-07-21 PROCEDURE — 82272 OCCULT BLD FECES 1-3 TESTS: CPT | Performed by: FAMILY MEDICINE

## 2022-08-02 ENCOUNTER — HOSPITAL ENCOUNTER (EMERGENCY)
Facility: HOSPITAL | Age: 87
Discharge: HOME | End: 2022-08-02
Attending: EMERGENCY MEDICINE | Admitting: EMERGENCY MEDICINE

## 2022-08-02 ENCOUNTER — APPOINTMENT (OUTPATIENT)
Dept: CT IMAGING | Facility: HOSPITAL | Age: 87
End: 2022-08-02

## 2022-08-02 VITALS
DIASTOLIC BLOOD PRESSURE: 90 MMHG | TEMPERATURE: 98.1 F | WEIGHT: 189 LBS | RESPIRATION RATE: 16 BRPM | HEIGHT: 60 IN | HEART RATE: 66 BPM | SYSTOLIC BLOOD PRESSURE: 135 MMHG | BODY MASS INDEX: 37.11 KG/M2 | OXYGEN SATURATION: 95 %

## 2022-08-02 DIAGNOSIS — D50.0 IRON DEFICIENCY ANEMIA DUE TO CHRONIC BLOOD LOSS: ICD-10-CM

## 2022-08-02 DIAGNOSIS — J90 PLEURAL EFFUSION ON RIGHT: ICD-10-CM

## 2022-08-02 DIAGNOSIS — R19.5 OCCULT GI BLEEDING: Primary | ICD-10-CM

## 2022-08-02 DIAGNOSIS — K57.90 DIVERTICULOSIS: ICD-10-CM

## 2022-08-02 LAB
ALBUMIN SERPL-MCNC: 3.5 G/DL (ref 3.5–5.2)
ALBUMIN/GLOB SERPL: 1.8 G/DL
ALP SERPL-CCNC: 113 U/L (ref 39–117)
ALT SERPL W P-5'-P-CCNC: 5 U/L (ref 1–33)
ANION GAP SERPL CALCULATED.3IONS-SCNC: 5 MMOL/L (ref 5–15)
APTT PPP: 29.7 SECONDS (ref 24.3–38.1)
AST SERPL-CCNC: 10 U/L (ref 1–32)
BASOPHILS # BLD AUTO: 0.03 10*3/MM3 (ref 0–0.2)
BASOPHILS NFR BLD AUTO: 0.7 % (ref 0–1.5)
BILIRUB SERPL-MCNC: 0.3 MG/DL (ref 0–1.2)
BUN SERPL-MCNC: 21 MG/DL (ref 8–23)
BUN/CREAT SERPL: 15.7 (ref 7–25)
CALCIUM SPEC-SCNC: 9.8 MG/DL (ref 8.6–10.5)
CHLORIDE SERPL-SCNC: 117 MMOL/L (ref 98–107)
CO2 SERPL-SCNC: 25 MMOL/L (ref 22–29)
CREAT SERPL-MCNC: 1.34 MG/DL (ref 0.57–1)
DEPRECATED RDW RBC AUTO: 54.6 FL (ref 37–54)
EGFRCR SERPLBLD CKD-EPI 2021: 38.9 ML/MIN/1.73
EOSINOPHIL # BLD AUTO: 0.13 10*3/MM3 (ref 0–0.4)
EOSINOPHIL NFR BLD AUTO: 2.9 % (ref 0.3–6.2)
ERYTHROCYTE [DISTWIDTH] IN BLOOD BY AUTOMATED COUNT: 15.4 % (ref 12.3–15.4)
GLOBULIN UR ELPH-MCNC: 2 GM/DL
GLUCOSE SERPL-MCNC: 125 MG/DL (ref 65–99)
HCT VFR BLD AUTO: 31.9 % (ref 34–46.6)
HGB BLD-MCNC: 9.1 G/DL (ref 12–15.9)
IMM GRANULOCYTES # BLD AUTO: 0.01 10*3/MM3 (ref 0–0.05)
IMM GRANULOCYTES NFR BLD AUTO: 0.2 % (ref 0–0.5)
INR PPP: 1.44 (ref 0.9–1.1)
LYMPHOCYTES # BLD AUTO: 1.13 10*3/MM3 (ref 0.7–3.1)
LYMPHOCYTES NFR BLD AUTO: 24.8 % (ref 19.6–45.3)
MCH RBC QN AUTO: 27.2 PG (ref 26.6–33)
MCHC RBC AUTO-ENTMCNC: 28.5 G/DL (ref 31.5–35.7)
MCV RBC AUTO: 95.5 FL (ref 79–97)
MONOCYTES # BLD AUTO: 0.36 10*3/MM3 (ref 0.1–0.9)
MONOCYTES NFR BLD AUTO: 7.9 % (ref 5–12)
NEUTROPHILS NFR BLD AUTO: 2.89 10*3/MM3 (ref 1.7–7)
NEUTROPHILS NFR BLD AUTO: 63.5 % (ref 42.7–76)
PLATELET # BLD AUTO: 156 10*3/MM3 (ref 140–450)
PMV BLD AUTO: 9.7 FL (ref 6–12)
POTASSIUM SERPL-SCNC: 4.6 MMOL/L (ref 3.5–5.2)
PROT SERPL-MCNC: 5.5 G/DL (ref 6–8.5)
PROTHROMBIN TIME: 17.8 SECONDS (ref 12.1–15)
RBC # BLD AUTO: 3.34 10*6/MM3 (ref 3.77–5.28)
SODIUM SERPL-SCNC: 147 MMOL/L (ref 136–145)
WBC NRBC COR # BLD: 4.55 10*3/MM3 (ref 3.4–10.8)

## 2022-08-02 PROCEDURE — 80053 COMPREHEN METABOLIC PANEL: CPT | Performed by: EMERGENCY MEDICINE

## 2022-08-02 PROCEDURE — 99284 EMERGENCY DEPT VISIT MOD MDM: CPT

## 2022-08-02 PROCEDURE — 85730 THROMBOPLASTIN TIME PARTIAL: CPT | Performed by: EMERGENCY MEDICINE

## 2022-08-02 PROCEDURE — 85025 COMPLETE CBC W/AUTO DIFF WBC: CPT | Performed by: EMERGENCY MEDICINE

## 2022-08-02 PROCEDURE — 74177 CT ABD & PELVIS W/CONTRAST: CPT

## 2022-08-02 PROCEDURE — 0 IOPAMIDOL PER 1 ML: Performed by: EMERGENCY MEDICINE

## 2022-08-02 PROCEDURE — 36415 COLL VENOUS BLD VENIPUNCTURE: CPT

## 2022-08-02 PROCEDURE — 85610 PROTHROMBIN TIME: CPT | Performed by: EMERGENCY MEDICINE

## 2022-08-02 RX ORDER — SODIUM CHLORIDE 0.9 % (FLUSH) 0.9 %
10 SYRINGE (ML) INJECTION AS NEEDED
Status: DISCONTINUED | OUTPATIENT
Start: 2022-08-02 | End: 2022-08-02 | Stop reason: HOSPADM

## 2022-08-02 RX ORDER — SODIUM PHOSPHATE, DIBASIC AND SODIUM PHOSPHATE, MONOBASIC 7; 19 G/133ML; G/133ML
1 ENEMA RECTAL DAILY PRN
COMMUNITY

## 2022-08-02 RX ORDER — AMANTADINE HYDROCHLORIDE 100 MG/1
100 CAPSULE, GELATIN COATED ORAL
COMMUNITY

## 2022-08-02 RX ORDER — FERROUS SULFATE TAB EC 324 MG (65 MG FE EQUIVALENT) 324 (65 FE) MG
324 TABLET DELAYED RESPONSE ORAL
Qty: 90 TABLET | Refills: 0 | Status: SHIPPED | OUTPATIENT
Start: 2022-08-02

## 2022-08-02 RX ORDER — FERROUS SULFATE TAB EC 324 MG (65 MG FE EQUIVALENT) 324 (65 FE) MG
324 TABLET DELAYED RESPONSE ORAL
COMMUNITY
End: 2022-08-02 | Stop reason: SDUPTHER

## 2022-08-02 RX ADMIN — IOPAMIDOL 100 ML: 755 INJECTION, SOLUTION INTRAVENOUS at 13:46

## 2022-08-02 NOTE — ED PROVIDER NOTES
Subjective   Alexandria Villanueva is an 86-year-old white female who presents secondary to abdominal discomfort and possible rectal bleed.  Ms. Villanueva is a resident at a local nursing home.  They were concerned that patient might have blood in her stool.  Patient has not noted any blood in her stool however she acknowledges she does not pay much attention to its color.  Patient also complains of mild discomfort in her abdomen today.  No nausea or vomiting.  No fever or chills.  Patient presents via EMS.  She was stable in route to the ED.      History provided by:  Patient      Review of Systems   Constitutional: Negative.  Negative for fever.   HENT: Negative.  Negative for rhinorrhea.    Eyes: Negative.  Negative for redness.   Respiratory: Negative for cough.    Cardiovascular: Negative for chest pain.   Gastrointestinal: Positive for abdominal pain.   Endocrine: Negative.    Genitourinary: Negative.  Negative for difficulty urinating.   Musculoskeletal: Negative.  Negative for back pain.   Skin: Negative.  Negative for color change.   Neurological: Negative.  Negative for syncope.   Hematological: Negative.    Psychiatric/Behavioral: Negative.    All other systems reviewed and are negative.      Past Medical History:   Diagnosis Date   • Anemia    • Arthritis    • CHF (congestive heart failure) ([facility])    • Chronic diastolic (congestive) heart failure ([facility])    • Chronic kidney disease    • COPD (chronic obstructive pulmonary disease) ([facility])    • Diabetes mellitus ([facility])    • DVT (deep venous thrombosis) ([facility])    • Dyslipidemia    • Gout    • Hypertension    • Lymphedema    • Morbid obesity ([facility])    • Obstructive sleep apnea of adult     untreated   • Permanent atrial fibrillation ([facility])    • Pulmonary HTN ([facility])    • Respiratory failure, acute ([facility])     hypoxic       Allergies   Allergen Reactions   • Codeine Nausea And Vomiting   • Demerol [Meperidine] Dizziness     Dizzy reaction   • Propoxyphene Dizziness     Dizzy  reaction   • Tramadol Dizziness     Makes her dizzy       Past Surgical History:   Procedure Laterality Date   • CATARACT EXTRACTION      both eyes 4 years ago   • CHOLECYSTECTOMY     • CHOLECYSTECTOMY WITH INTRAOPERATIVE CHOLANGIOGRAM N/A 3/13/2017    Procedure: CHOLECYSTECTOMY LAPAROSCOPIC INTRAOPERATIVE CHOLANGIOGRAM;  Surgeon: Joy Pham DO;  Location: [facility] OR;  Service:    • EYE SURGERY     • FEMUR FRACTURE SURGERY Right     closed reduction external fixation   • FRACTURE SURGERY     • HERNIA REPAIR     • [place] ERCP DX COLLECTION SPECIMEN BRUSHING/WASHING N/A 3/10/2017    Procedure: ENDOSCOPIC RETROGRADE CHOLANGIOPANCREATOGRAPHY sphincterotomy with stone extraction ;  Surgeon: Jose Estrada MD;  Location: [facility] OR;  Service: Gastroenterology   • TONSILLECTOMY         Family History   Problem Relation Age of Onset   • Cancer Mother    • Heart disease Father    • Diabetes Father    • COPD Brother    • Heart disease Brother        Social History     Socioeconomic History   • Marital status:    Tobacco Use   • Smoking status: Never Smoker   • Smokeless tobacco: Never Used   Substance and Sexual Activity   • Alcohol use: No   • Drug use: No   • Sexual activity: Defer           Objective   Physical Exam  Vitals and nursing note reviewed. Exam conducted with a chaperone present (Lana Acosta).   Constitutional:       General: She is not in acute distress.     Appearance: Normal appearance. She is well-developed. She is obese. She is not ill-appearing, toxic-appearing or diaphoretic.      Comments: 86-year-old white female laying in bed.  Patient appears in fair overall health.  She is friendly and cooperative.  Vital signs unremarkable.  Patient is A and O x4.   HENT:      Head: Normocephalic and atraumatic.      Right Ear: Tympanic membrane, ear canal and external ear normal.      Left Ear: Tympanic membrane, ear canal and external ear normal.      Nose: Nose normal.      Mouth/Throat:       Mouth: Mucous membranes are moist.      Pharynx: Oropharynx is clear.   Eyes:      Extraocular Movements: Extraocular movements intact.      Conjunctiva/sclera: Conjunctivae normal.      Pupils: Pupils are equal, round, and reactive to light.   Cardiovascular:      Rate and Rhythm: Normal rate. Rhythm irregularly irregular.      Pulses: Normal pulses.      Heart sounds: Normal heart sounds. No murmur heard.    No friction rub. No gallop.   Pulmonary:      Effort: Pulmonary effort is normal.      Breath sounds: Normal breath sounds.   Abdominal:      General: Bowel sounds are normal. There is no distension.      Palpations: Abdomen is soft. There is no mass.      Tenderness: There is no abdominal tenderness. There is no guarding or rebound.      Hernia: No hernia is present.   Genitourinary:     Rectum: Normal. Guaiac result positive.   Musculoskeletal:         General: Normal range of motion.      Cervical back: Normal range of motion and neck supple.   Skin:     General: Skin is warm and dry.      Capillary Refill: Capillary refill takes less than 2 seconds.   Neurological:      General: No focal deficit present.      Mental Status: She is alert and oriented to person, place, and time.      Deep Tendon Reflexes: Reflexes are normal and symmetric.   Psychiatric:         Mood and Affect: Mood normal.         Behavior: Behavior normal.         Procedures           ED Course  ED Course as of 08/02/22 1438   Tue Aug 02, 2022   1225 On rectal exam brown stool.  Faintly Hemoccult positive.  As patient reports mild abdominal tenderness will obtain lab work and CT. [SS]   1407 Hemoglobin(!): 9.1 [SS]   1407 Hematocrit(!): 31.9 [SS]   1407 Hemoglobin(!): 9.1 [SS]   1408 Hematocrit(!): 31.9 [SS]   1408 Chloride(!): 117 [SS]   1408 BUN: 21 [SS]   1408 Creatinine(!): 1.34 [SS]   1408 Glucose(!): 125 [SS]   1408 INR(!): 1.44 [SS]   1408 Protime(!): 17.8 [SS]   1408 PTT: 29.7 [SS]   1408 CBC shows anemia with hemoglobin 9.1  hematocrit 31.9.  This is a notable drop from hemoglobin 10.8 and 10.7 seen on lab work performed last year.  CMP notable for mildly elevated sodium at 147 and elevated chloride at 117.  Renal function is at baseline.  Remainder of labs unremarkable.  Patient's INR 1.44. [SS]   1425 CT shows several medical issues none of which are acute.  Patient has thinning abdominal wall fascia.  She has a partial staghorn stone in her left kidney.  Diverticulosis without evidence of diverticulitis.  Right pleural effusion as well as cardiomegaly but no erika CHF.      While patient has several medical issues there is no emergent issue that I find today.  Patient does not need a blood transfusion.  Her PCP should consider whether to continue Eliquis if patient's anemia worsens.  I will check patient's meds list and if she is not already taking iron tablets I will place her on iron.  The other issues found today can be worked up outpatient as needed.  Discussed at length with patient all results, diagnoses, treatment and follow-up.  Will DC home. [SS]      ED Course User Index  [SS] Siva Wallace MD      Labs Reviewed   COMPREHENSIVE METABOLIC PANEL - Abnormal; Notable for the following components:       Result Value    Glucose 125 (*)     Creatinine 1.34 (*)     Sodium 147 (*)     Chloride 117 (*)     Total Protein 5.5 (*)     eGFR 38.9 (*)     All other components within normal limits    Narrative:     GFR Normal >60  Chronic Kidney Disease <60  Kidney Failure <15     PROTIME-INR - Abnormal; Notable for the following components:    Protime 17.8 (*)     INR 1.44 (*)     All other components within normal limits    Narrative:     Therapeutic Ranges for INR: 2.0-3.0 (PT 20-30)                              2.5-3.5 (PT 25-34)   CBC WITH AUTO DIFFERENTIAL - Abnormal; Notable for the following components:    RBC 3.34 (*)     Hemoglobin 9.1 (*)     Hematocrit 31.9 (*)     MCHC 28.5 (*)     RDW-SD 54.6 (*)     All other components  within normal limits   APTT - Normal    Narrative:     PTT = The equivalent PTT values for the therapeutic range of heparin levels at 0.1 to 0.7 U/ml are 53 to 110 seconds.     CBC AND DIFFERENTIAL    Narrative:     The following orders were created for panel order CBC & Differential.  Procedure                               Abnormality         Status                     ---------                               -----------         ------                     CBC Auto Differential[[id number]]        Abnormal            Final result                 Please view results for these tests on the individual orders.     CT Abdomen Pelvis With Contrast    Result Date: 8/2/2022  Narrative: CT Abdomen Pelvis W INDICATION: 86-year-old emergency department patient with acute abdominal pain, rectal bleeding and anemia today. TECHNIQUE: CT of the abdomen and pelvis with Isovue-370-100 cc IV contrast. Coronal and sagittal reconstructions were obtained.  Radiation dose reduction techniques included automated exposure control or exposure modulation based on body size. Count of known CT and cardiac nuc med studies performed in previous 12 months: 0. COMPARISON: Ultrasound abdomen 3/8/2017 FINDINGS: Abdomen: The included images through the lung bases demonstrate a moderate dependent right pleural effusion. There is atelectasis at the left lung base. The pulmonary arteries appear enlarged. There is cardiomegaly with calcification of the mitral annulus. There is thinning of the rectus fascia with protuberance of the abdominal contents into the left flank. No true penetration through the fascia is seen. Liver, spleen are normal. There are clips consistent with prior cholecystectomy. Pancreas appears somewhat atrophic. Bilateral adrenal glands are normal. Right kidney is atrophic. There is no right renal stone or obstruction. Left kidney is larger than the right. There is a partial staghorn calculus in the upper pole measuring up to 2.5 x 2.8  cm. There is a simple cyst in the posterior upper pole in the anterior mid kidney. These are benign Bosniak 1 lesions. There is no dilatation of the left renal pelvis and collecting system. The left upper pole renal cyst is likely present on CT chest from 2018. The staghorn calculus is not seen on the limited views of the upper pole. There is atherosclerotic change of the abdominal aorta which is well opacified. No aneurysm. No dilatation of the stomach or small bowel. Colon is nondilated. No definite colonic wall thickening. There are diverticula in the sigmoid colon. Pelvis: The bladder appears normal. Uterus is present. No adnexal mass or pelvic free fluid. Bone window images demonstrate multilevel degenerative change with compression fracture of T12 similar to 2018. There is severe disc height loss at L2-3, L1-2 with severe facet arthropathy.     Impression: 1. There is marked thinning of the abdominal wall fascia with some protuberance of the abdominal contents towards the left likely due to positioning or patient positioning. No true herniation through the fascia is seen. There is previous mesh repair of a ventral hernia. 2. Diverticulosis of the sigmoid colon without colonic distention or wall thickening 3. Atrophy of the right kidney likely chronic. 4. There is a partial staghorn calculus in the upper pole of the left kidney with simple cyst in the left kidney. No pelvocaliectasis or obstruction 5. Cholecystectomy changes with mild intra and extrahepatic biliary prominence likely within normal limits for a postcholecystectomy patient 6. Moderate dependent right pleural effusion with adjacent parenchymal change in the right lower lung likely atelectasis. Trace left effusion with left basilar atelectasis. 6. The cardiomegaly with enlarged pulmonary arteries. Signer Name: Stacie Scott MD  Signed: 8/2/2022 2:03 PM  Workstation Name: [id number]  [facility]    My differential for GI  bleeding includes but is not limited to Gudelia-Betts tears, esophageal varices, esophageal rupture, esophageal cancer, bleeding ulcers of the stomach, gastritis, anticoagulation issues, anticoagulation medication, diverticulosis, diverticulitis, Crohn's disease, ulcerative colitis, colon cancer, fistulas, rectal fissures, internal hemorrhoids and external hemorrhoids.                                       MDM    Final diagnoses:   Occult GI bleeding   Diverticulosis   Pleural effusion on right   Iron deficiency anemia due to chronic blood loss       ED Disposition  ED Disposition     ED Disposition   Discharge    Condition   Stable    Comment   --             Alex Ayon MD  [address] [facility] [address] [phone]    Schedule an appointment as soon as possible for a visit in 2 days           Medication List      Changed    ferrous sulfate 324 (65 Fe) MG tablet delayed-release EC tablet  Take 1 tablet by mouth 3 (Three) Times a Day With Meals.  What changed: when to take this           Where to Get Your Medications      These medications were sent to [facility] - [place], [place] - [address] - [phone] [facility] [phone] FX  [address] [facility] [address]    Phone: [phone]   · ferrous sulfate 324 (65 Fe) MG tablet delayed-release EC tablet          Siva Wallace MD  08/02/22 0096

## 2022-08-02 NOTE — DISCHARGE INSTRUCTIONS
Increase iron tablets to 3 times daily.  Continue other medicines as prescribed.  Recommend you talk with your PCP about continuing Eliquis due to occult GI bleed.  Further work-up for pleural effusion per your PCP.  Follow-up with your PCP as above.  Return to ED for medical emergencies.

## 2022-08-24 ENCOUNTER — LAB REQUISITION (OUTPATIENT)
Dept: LAB | Facility: HOSPITAL | Age: 87
End: 2022-08-24

## 2022-08-24 DIAGNOSIS — R41.82 ALTERED MENTAL STATUS, UNSPECIFIED: ICD-10-CM

## 2022-08-24 DIAGNOSIS — R82.998 OTHER ABNORMAL FINDINGS IN URINE: ICD-10-CM

## 2022-08-24 DIAGNOSIS — R82.90 UNSPECIFIED ABNORMAL FINDINGS IN URINE: ICD-10-CM

## 2022-08-24 LAB
BACTERIA UR QL AUTO: ABNORMAL /HPF
BILIRUB UR QL STRIP: NEGATIVE
CLARITY UR: ABNORMAL
COLOR UR: YELLOW
GLUCOSE UR STRIP-MCNC: NEGATIVE MG/DL
HGB UR QL STRIP.AUTO: ABNORMAL
HYALINE CASTS UR QL AUTO: ABNORMAL /LPF
KETONES UR QL STRIP: NEGATIVE
LEUKOCYTE ESTERASE UR QL STRIP.AUTO: ABNORMAL
NITRITE UR QL STRIP: NEGATIVE
PH UR STRIP.AUTO: 5.5 [PH] (ref 4.5–8)
PROT UR QL STRIP: NEGATIVE
RBC # UR STRIP: ABNORMAL /HPF
REF LAB TEST METHOD: ABNORMAL
SP GR UR STRIP: 1.02 (ref 1–1.03)
SQUAMOUS #/AREA URNS HPF: ABNORMAL /HPF
UROBILINOGEN UR QL STRIP: ABNORMAL
WBC # UR STRIP: ABNORMAL /HPF
WBC CLUMPS # UR AUTO: ABNORMAL /HPF

## 2022-08-24 PROCEDURE — 87086 URINE CULTURE/COLONY COUNT: CPT | Performed by: FAMILY MEDICINE

## 2022-08-24 PROCEDURE — 87186 SC STD MICRODIL/AGAR DIL: CPT | Performed by: FAMILY MEDICINE

## 2022-08-24 PROCEDURE — 87088 URINE BACTERIA CULTURE: CPT | Performed by: FAMILY MEDICINE

## 2022-08-24 PROCEDURE — 81001 URINALYSIS AUTO W/SCOPE: CPT | Performed by: FAMILY MEDICINE

## 2022-08-26 LAB — BACTERIA SPEC AEROBE CULT: ABNORMAL

## 2023-09-06 NOTE — DISCHARGE INSTR - DIET
Mechanical soft consistent carb thin liquids   Where Do You Want The Question To Include Opioid Counseling Located?: Case Summary Tab

## (undated) DEVICE — SUT VIC 0/0 UR6 27IN DYED J603H

## (undated) DEVICE — 3M™ TEGADERM™ TRANSPARENT IV DRESSING W/BORDER 1614: Brand: 3M™ TEGADERM™

## (undated) DEVICE — MASK,FACE,SHIELD,BLUE,ANTI FOG,TIES: Brand: MEDLINE

## (undated) DEVICE — GLV SURG NEOLON 2G PF LF 7.5 STRL

## (undated) DEVICE — ENDOPATH XCEL BLADELESS TROCARS WITH STABILITY SLEEVES: Brand: ENDOPATH XCEL

## (undated) DEVICE — GOWN ISOL W/THUMB UNIV BLU BX/15

## (undated) DEVICE — DECANTER: Brand: UNBRANDED

## (undated) DEVICE — SUT ETHLN 2/0 FS 18IN 664H

## (undated) DEVICE — CUP SPECI 4OZ LF STRL

## (undated) DEVICE — DRSNG SURESITE WNDW 2.38X2.75

## (undated) DEVICE — 2, DISPOSABLE SUCTION/IRRIGATOR WITH DISPOSABLE TIP: Brand: STRYKEFLOW

## (undated) DEVICE — SYR CONTRL LUERLOK 10CC

## (undated) DEVICE — 3M™ STERI-STRIP™ ANTIMICROBIAL SKIN CLOSURES 1/2 INCH X 4 INCHES 50/CARTON 4 CARTONS/CASE A1847: Brand: 3M™ STERI-STRIP™

## (undated) DEVICE — GLV SURG SENSICARE W/ALOE PF LF 6.5 STRL

## (undated) DEVICE — UNDYED BRAIDED (POLYGLACTIN 910), SYNTHETIC ABSORBABLE SUTURE: Brand: COATED VICRYL

## (undated) DEVICE — DRP Z/FRICTION 10X16IN

## (undated) DEVICE — APPL CHLORAPREP W/TINT 26ML ORNG

## (undated) DEVICE — ENDOPATH XCEL UNIVERSAL TROCAR STABLILITY SLEEVES: Brand: ENDOPATH XCEL

## (undated) DEVICE — BW-412T DISP COMBO CLEANING BRUSH: Brand: SINGLE USE COMBINATION CLEANING BRUSH

## (undated) DEVICE — PK LAP GEN 90

## (undated) DEVICE — RETRIEVAL BALLOON CATHETER: Brand: EXTRACTOR™ PRO RX

## (undated) DEVICE — TBG INSUFL W FLTR STRL

## (undated) DEVICE — Device: Brand: DEFENDO AIR/WATER/SUCTION AND BIOPSY VALVE

## (undated) DEVICE — SUCTION CANISTER, 3000CC,SAFELINER: Brand: DEROYAL

## (undated) DEVICE — DRP C/ARM 41X74IN

## (undated) DEVICE — SPHINCTEROTOME: Brand: DREAMTOME™ RX 44

## (undated) DEVICE — SPNG GZ 2S 2X2 8PLY STRL PK/2

## (undated) DEVICE — Device: Brand: SINGLE USE SOFT BRUSH

## (undated) DEVICE — Device

## (undated) DEVICE — SPONGE,DRAIN,NONWVN,4"X4",6PLY,STRL,LF: Brand: MEDLINE

## (undated) DEVICE — SYR LUERLOK 30CC

## (undated) DEVICE — SYR LUERLOK 20CC

## (undated) DEVICE — DECANT BG O JET

## (undated) DEVICE — DRN WND HUBLSS FLUT FULL PERF SIL10MM

## (undated) DEVICE — SPNG GZ WOVN 4X4IN 12PLY 10/BX STRL

## (undated) DEVICE — SET CATH CHOLANG REDK W/SCOOP TIP INTRO 4F 50CM

## (undated) DEVICE — SIMS CONNECTOR,VINYL: Brand: ARGYLE

## (undated) DEVICE — ENDOPOUCH RETRIEVER SPECIMEN RETRIEVAL BAGS: Brand: ENDOPOUCH RETRIEVER

## (undated) DEVICE — TROCARS: Brand: KII® BALLOON BLUNT TIP SYSTEM

## (undated) DEVICE — SUCTION CANISTER, 1000CC,SAFELINER: Brand: DEROYAL

## (undated) DEVICE — SYR LUERLOK 10CC

## (undated) DEVICE — DEV POSITION LK AND BIOP CAP SYS

## (undated) DEVICE — PAD GRND E/S W/CORD SPLT A/

## (undated) DEVICE — 3M™ STERI-STRIP™ REINFORCED ADHESIVE SKIN CLOSURES, R1547, 1/2 IN X 4 IN (12 MM X 100 MM), 6 STRIPS/ENVELOPE: Brand: 3M™ STERI-STRIP™

## (undated) DEVICE — BLD CLIP UNIV SURG GRY

## (undated) DEVICE — THE BITE BLOCK MAXI, LATEX FREE STRAP IS USED TO PROTECT THE ENDOSCOPE INSERTION TUBE FROM BEING BITTEN BY THE PATIENT.

## (undated) DEVICE — DRSNG SURESITE WNDW 4X4.5